# Patient Record
Sex: FEMALE | Race: WHITE | NOT HISPANIC OR LATINO | Employment: UNEMPLOYED | URBAN - METROPOLITAN AREA
[De-identification: names, ages, dates, MRNs, and addresses within clinical notes are randomized per-mention and may not be internally consistent; named-entity substitution may affect disease eponyms.]

---

## 2012-11-15 LAB — PATH REPORT.MICROSCOPIC SPEC OTHER STN: NORMAL

## 2017-01-05 ENCOUNTER — OFFICE VISIT (OUTPATIENT)
Dept: FAMILY MEDICINE | Facility: CLINIC | Age: 57
End: 2017-01-05

## 2017-01-05 ENCOUNTER — CARE COORDINATION (OUTPATIENT)
Dept: FAMILY MEDICINE | Facility: CLINIC | Age: 57
End: 2017-01-05

## 2017-01-05 VITALS
HEART RATE: 106 BPM | BODY MASS INDEX: 24.3 KG/M2 | DIASTOLIC BLOOD PRESSURE: 79 MMHG | SYSTOLIC BLOOD PRESSURE: 108 MMHG | OXYGEN SATURATION: 97 % | WEIGHT: 139.4 LBS | TEMPERATURE: 98 F

## 2017-01-05 DIAGNOSIS — R11.0 NAUSEA: ICD-10-CM

## 2017-01-05 DIAGNOSIS — R11.2 NAUSEA AND VOMITING, INTRACTABILITY OF VOMITING NOT SPECIFIED, UNSPECIFIED VOMITING TYPE: Primary | ICD-10-CM

## 2017-01-05 DIAGNOSIS — I10 ESSENTIAL HYPERTENSION, BENIGN: ICD-10-CM

## 2017-01-05 DIAGNOSIS — M54.2 NECK PAIN: ICD-10-CM

## 2017-01-05 DIAGNOSIS — F51.01 PRIMARY INSOMNIA: ICD-10-CM

## 2017-01-05 RX ORDER — ZOLPIDEM TARTRATE 5 MG/1
5 TABLET ORAL
Qty: 15 TABLET | Refills: 0 | Status: SHIPPED | OUTPATIENT
Start: 2017-01-05 | End: 2017-01-19

## 2017-01-05 RX ORDER — ONDANSETRON 4 MG/1
8 TABLET, FILM COATED ORAL EVERY 12 HOURS PRN
Qty: 36 TABLET | Refills: 1 | Status: SHIPPED | OUTPATIENT
Start: 2017-01-05 | End: 2017-01-05

## 2017-01-05 RX ORDER — ZOLPIDEM TARTRATE 5 MG/1
5 TABLET ORAL
Qty: 15 TABLET | Refills: 0 | Status: SHIPPED | OUTPATIENT
Start: 2017-01-05 | End: 2017-01-05

## 2017-01-05 RX ORDER — CYCLOBENZAPRINE HCL 5 MG
5 TABLET ORAL 3 TIMES DAILY PRN
Qty: 42 TABLET | Refills: 0 | Status: SHIPPED | OUTPATIENT
Start: 2017-01-05 | End: 2017-01-12

## 2017-01-05 RX ORDER — ONDANSETRON 4 MG/1
8 TABLET, FILM COATED ORAL EVERY 12 HOURS PRN
Qty: 36 TABLET | Refills: 1 | Status: SHIPPED | OUTPATIENT
Start: 2017-01-05 | End: 2017-02-09

## 2017-01-05 NOTE — PATIENT INSTRUCTIONS
Increase Zofran to 2 tablets twice a day as needed- if you continue to feel nauseous, come back and we will talk about having you see a stomach doctor  Refills sent for muscle relaxer  Call [facility] to help you with housing  Come back in 2-4 weeks, sooner if needed

## 2017-01-05 NOTE — MR AVS SNAPSHOT
After Visit Summary   1/5/2017    Lisa Scott    MRN: [id number]           Patient Information     Date Of Birth          1960        Visit Information        Provider Department      1/5/2017 10:00 AM Shanell Isabel MD [facility]        Today's Diagnoses     Nausea and vomiting, intractability of vomiting not specified, unspecified vomiting type    -  1     Essential hypertension, benign         Nausea         Neck pain         Primary insomnia           Care Instructions    Increase Zofran to 2 tablets twice a day as needed- if you continue to feel nauseous, come back and we will talk about having you see a stomach doctor  Refills sent for muscle relaxer  Call [facility] to help you with housing  Come back in 2-4 weeks, sooner if needed        Follow-ups after your visit        Who to contact     Please call your clinic at [phone] to:    Ask questions about your health    Make or cancel appointments    Discuss your medicines    Learn about your test results    Speak to your doctor   If you have compliments or concerns about an experience at your clinic, or if you wish to file a complaint, please contact [facility] Patient Relations at [phone] or email us at [email or web address]         Additional Information About Your Visit        Care EveryWhere ID     This is your Care EveryWhere ID. This could be used by other organizations to access your [place] medical records  [id number]        Your Vitals Were     Pulse Temperature Pulse Oximetry             106 98  F (36.7  C) (Oral) 97%          Blood Pressure from Last 3 Encounters:   01/05/17 136/97   12/21/16 158/100   12/02/16 129/93    Weight from Last 3 Encounters:   01/05/17 139 lb 6.4 oz (63.231 kg)   12/21/16 143 lb (64.864 kg)   12/02/16 144 lb 12.8 oz (65.681 kg)              Today, you had the following     No orders found for display         Today's Medication Changes           These changes are accurate as of: 1/5/17 10:01 AM.  If you have any questions, ask your nurse or doctor.               Start taking these medicines.        Dose/Directions    zolpidem 5 MG tablet   Commonly known as:  AMBIEN   Used for:  Primary insomnia   Started by:  Shanell Isabel MD        Dose:  5 mg   Take 1 tablet (5 mg) by mouth nightly as needed for sleep   Quantity:  15 tablet   Refills:  0         These medicines have changed or have updated prescriptions.        Dose/Directions    * ondansetron 4 MG tablet   Commonly known as:  ZOFRAN   This may have changed:  Another medication with the same name was added. Make sure you understand how and when to take each.   Used for:  Nausea   Changed by:  Shanell Isabel MD        Dose:  4 mg   Take 1 tablet (4 mg) by mouth every 8 hours as needed for nausea   Quantity:  18 tablet   Refills:  0       * ondansetron 4 MG tablet   Commonly known as:  ZOFRAN   This may have changed:  You were already taking a medication with the same name, and this prescription was added. Make sure you understand how and when to take each.   Used for:  Nausea   Changed by:  Shanell Isabel MD        Dose:  8 mg   Take 2 tablets (8 mg) by mouth every 12 hours as needed for nausea   Quantity:  36 tablet   Refills:  1       * Notice:  This list has 2 medication(s) that are the same as other medications prescribed for you. Read the directions carefully, and ask your doctor or other care provider to review them with you.         Where to get your medicines      These medications were sent to [id number] - [place], [place] - [address], [place] [address]     Phone:  [phone]    - cyclobenzaprine 5 MG tablet  - ondansetron 4 MG tablet      Some of these will need a paper prescription and others can be bought over the counter.  Ask your nurse if you have questions.     Bring a paper prescription for each of these medications    - zolpidem 5 MG  tablet             Primary Care Provider Office Phone # Fax #    Shanell Raysa Isabel [phone]       [facility] [address] [place] [address]        Thank you!     Thank you for choosing [facility]  for your care. Our goal is always to provide you with excellent care. Hearing back from our patients is one way we can continue to improve our services. Please take a few minutes to complete the written survey that you may receive in the mail after your visit with us. Thank you!             Your Updated Medication List - Protect others around you: Learn how to safely use, store and throw away your medicines at www.disposemymeds.org.          This list is accurate as of: 1/5/17 10:01 AM.  Always use your most recent med list.                   Brand Name Dispense Instructions for use    * acetaminophen 500 MG tablet    TYLENOL    100 tablet    Take two tablets by mouth every 4 hours as needed. Max 8 tabs daily.       * acetaminophen 325 MG tablet    TYLENOL    100 tablet    Take 2 tablets (650 mg) by mouth every 6 hours as needed for mild pain       albuterol 108 (90 BASE) MCG/ACT Inhaler    albuterol    3 Inhaler    Inhale 2 puffs every 4-6 hours as needed.       amLODIPine 10 MG tablet    NORVASC    30 tablet    Take 1 tablet (10 mg) by mouth daily       Blood Pressure Cuff Misc     1 each    Take blood pressure daily       busPIRone 5 MG tablet    BUSPAR    150 tablet    Take 1 tablet (5 mg) by mouth 3 times daily       cyclobenzaprine 5 MG tablet    FLEXERIL    42 tablet    Take 1 tablet (5 mg) by mouth 3 times daily as needed for muscle spasms       fluticasone 50 MCG/ACT spray    FLONASE    16 g    Spray 1-2 sprays into both nostrils daily       fluticasone-salmeterol 500-50 MCG/DOSE diskus inhaler    ADVAIR DISKUS    60 Inhaler    Inhale 1 puff into the lungs every 12 hours       hydrOXYzine 25 MG capsule    VISTARIL    90 capsule    Take 1 capsule (25 mg) by mouth 3  times daily as needed for itching or anxiety       ibuprofen 200 MG tablet    ADVIL/MOTRIN    120 tablet    Take 1 tablet (200 mg) by mouth every 6 hours as needed for moderate pain       ipratropium - albuterol 0.5 mg/2.5 mg/3 mL 0.5-2.5 (3) MG/3ML neb solution    DUONEB    90 vial    Take 1 vial (3 mLs) by nebulization every 6 hours as needed for shortness of breath / dyspnea       lidocaine 2 % solution    XYLOCAINE    100 mL    Take 15 mLs by mouth every 6 hours as needed for moderate pain swish and spit every 3-8 hours as needed; max 8 doses/24 hour period       lisinopril 20 MG tablet    PRINIVIL/ZESTRIL    30 tablet    Take 1 tablet (20 mg) by mouth daily       loratadine 10 MG tablet    CLARITIN    90 tablet    Take 1 tablet (10 mg) by mouth daily       metoprolol 100 MG 24 hr tablet    TOPROL-XL    60 tablet    Take 1.5 tablets (150 mg) by mouth daily       mirtazapine 15 MG tablet    REMERON     Take 1 tablet (15 mg) by mouth At Bedtime       montelukast 10 MG tablet    SINGULAIR    30 tablet    Take 1 tablet (10 mg) by mouth At Bedtime       omeprazole 20 MG tablet     60 tablet    Take 1 tablet (20 mg) by mouth 2 times daily       * ondansetron 4 MG tablet    ZOFRAN    18 tablet    Take 1 tablet (4 mg) by mouth every 8 hours as needed for nausea       * ondansetron 4 MG tablet    ZOFRAN    36 tablet    Take 2 tablets (8 mg) by mouth every 12 hours as needed for nausea       order for DME     1 each    Adult nebulizer mask and tubing.       polyethylene glycol powder    MIRALAX/GLYCOLAX    500 g    Take 17 g by mouth daily       prazosin 1 MG capsule    MINIPRESS    30 capsule    Take 1 capsule (1 mg) by mouth At Bedtime       tiotropium 18 MCG capsule    SPIRIVA HANDIHALER    30 capsule    Take once daily       zolpidem 5 MG tablet    AMBIEN    15 tablet    Take 1 tablet (5 mg) by mouth nightly as needed for sleep       * Notice:  This list has 4 medication(s) that are the same as other medications  prescribed for you. Read the directions carefully, and ask your doctor or other care provider to review them with you.

## 2017-01-05 NOTE — PROGRESS NOTES
Patient in for appointment today and did not bring in her combined application that is necessary to attain housing through [facility]. She does not wish to meet with me today so asked [facility] to give her the form she needs to bring to [employer] to attain a state ID card for .50 cents. Will await her to bring in application and documentation as well as ID to secure housing.

## 2017-01-10 ENCOUNTER — TELEPHONE (OUTPATIENT)
Dept: FAMILY MEDICINE | Facility: CLINIC | Age: 57
End: 2017-01-10

## 2017-01-10 NOTE — TELEPHONE ENCOUNTER
Call from Tina, resident at [facility] to help coordinate hospital follow up appt. This is scheduled.  Asked if discharge summary could be faxed to me at discharge today. I will attempt to reach patient tomorrow to go over TCM questions.
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
## 2017-01-11 ENCOUNTER — TELEPHONE (OUTPATIENT)
Dept: FAMILY MEDICINE | Facility: CLINIC | Age: 57
End: 2017-01-11

## 2017-01-11 NOTE — TELEPHONE ENCOUNTER
Date of discharge: 1/10/2017   Facility of discharge: [facility]  Patient concerns about condition: No concerns at this time.  Patient concerns about medications: No concerns at this time.  Full med reconciliation will be completed at clinic visit.  Patient concerns about transitioning: No concerns at this time.  Clinic office visit appointment date: 1/11/2017  Dr Chalo Chaparro established through insurance.   Patient reminded to bring all medications (prescription and over-the-counter) to clinic appointment: Yes   No discharging medications sent    Using the date of discharge as day 1, the 30th day post discharge is 2/9/2017.

## 2017-01-12 ENCOUNTER — OFFICE VISIT (OUTPATIENT)
Dept: PHARMACY | Facility: CLINIC | Age: 57
End: 2017-01-12

## 2017-01-12 ENCOUNTER — OFFICE VISIT (OUTPATIENT)
Dept: FAMILY MEDICINE | Facility: CLINIC | Age: 57
End: 2017-01-12

## 2017-01-12 VITALS
BODY MASS INDEX: 27 KG/M2 | WEIGHT: 143 LBS | TEMPERATURE: 97.8 F | OXYGEN SATURATION: 100 % | HEART RATE: 70 BPM | DIASTOLIC BLOOD PRESSURE: 85 MMHG | SYSTOLIC BLOOD PRESSURE: 127 MMHG | RESPIRATION RATE: 20 BRPM | HEIGHT: 61 IN

## 2017-01-12 DIAGNOSIS — K59.09 OTHER CONSTIPATION: ICD-10-CM

## 2017-01-12 DIAGNOSIS — F33.1 MAJOR DEPRESSIVE DISORDER, RECURRENT EPISODE, MODERATE (H): ICD-10-CM

## 2017-01-12 DIAGNOSIS — K59.00 CONSTIPATION, UNSPECIFIED CONSTIPATION TYPE: ICD-10-CM

## 2017-01-12 DIAGNOSIS — I10 ESSENTIAL HYPERTENSION, BENIGN: ICD-10-CM

## 2017-01-12 DIAGNOSIS — G47.9 SLEEP DISORDER: ICD-10-CM

## 2017-01-12 DIAGNOSIS — K21.9 GASTROESOPHAGEAL REFLUX DISEASE, ESOPHAGITIS PRESENCE NOT SPECIFIED: ICD-10-CM

## 2017-01-12 DIAGNOSIS — F41.1 GENERALIZED ANXIETY DISORDER: ICD-10-CM

## 2017-01-12 DIAGNOSIS — J44.9 CHRONIC OBSTRUCTIVE PULMONARY DISEASE, UNSPECIFIED COPD TYPE (H): ICD-10-CM

## 2017-01-12 DIAGNOSIS — J31.0 CHRONIC RHINITIS: ICD-10-CM

## 2017-01-12 DIAGNOSIS — F33.1 MAJOR DEPRESSIVE DISORDER, RECURRENT EPISODE, MODERATE (H): Primary | ICD-10-CM

## 2017-01-12 DIAGNOSIS — F51.01 PRIMARY INSOMNIA: ICD-10-CM

## 2017-01-12 DIAGNOSIS — Z09 HOSPITAL DISCHARGE FOLLOW-UP: Primary | ICD-10-CM

## 2017-01-12 DIAGNOSIS — J30.2 SEASONAL ALLERGIC RHINITIS, UNSPECIFIED ALLERGIC RHINITIS TRIGGER: ICD-10-CM

## 2017-01-12 DIAGNOSIS — Z71.89 ENCOUNTER FOR MEDICATION REVIEW AND COUNSELING: Primary | ICD-10-CM

## 2017-01-12 DIAGNOSIS — F51.5 NIGHTMARES: ICD-10-CM

## 2017-01-12 LAB
ANION GAP SERPL CALCULATED.3IONS-SCNC: 9 MMOL/L (ref 5–18)
BUN SERPL-MCNC: 23 MG/DL (ref 8–22)
CALCIUM SERPL-MCNC: 9.1 MG/DL (ref 8.5–10.5)
CHLORIDE SERPL-SCNC: 106 MMOL/L (ref 98–107)
CO2 SERPL-SCNC: 21 MMOL/L (ref 22–31)
CREAT SERPL-MCNC: 1.38 MG/DL (ref 0.6–1.1)
FERRITIN SERPL-MCNC: 90 NG/ML (ref 10–130)
GLUCOSE SERPL-MCNC: 96 MG/DL (ref 70–125)
HCT VFR BLD AUTO: 33.9 % (ref 35–47)
HEMOGLOBIN: 10.6 G/DL (ref 11.7–15.7)
MCH RBC QN AUTO: 30 PG (ref 26.5–35)
MCHC RBC AUTO-ENTMCNC: 31.3 G/DL (ref 32–36)
MCV RBC AUTO: 96.1 FL (ref 78–100)
PLATELET # BLD AUTO: 439 K/UL (ref 150–450)
POTASSIUM SERPL-SCNC: 4.3 MMOL/L (ref 3.5–5)
RBC # BLD AUTO: 3.53 M/UL (ref 3.8–5.2)
SODIUM SERPL-SCNC: 136 MMOL/L (ref 136–145)
WBC # BLD AUTO: 7.9 K/UL (ref 4–11)

## 2017-01-12 RX ORDER — POLYETHYLENE GLYCOL 3350 17 G/17G
1 POWDER, FOR SOLUTION ORAL DAILY PRN
Qty: 119 G | Refills: 3 | Status: SHIPPED | OUTPATIENT
Start: 2017-01-12 | End: 2018-06-22

## 2017-01-12 RX ORDER — POLYETHYLENE GLYCOL 3350 17 G/17G
1 POWDER, FOR SOLUTION ORAL DAILY PRN
COMMUNITY
Start: 2017-01-12 | End: 2017-01-12

## 2017-01-12 RX ORDER — BUPROPION HYDROCHLORIDE 150 MG/1
300 TABLET ORAL EVERY MORNING
COMMUNITY
Start: 2017-01-12 | End: 2018-06-22

## 2017-01-12 RX ORDER — LORAZEPAM 1 MG/1
1 TABLET ORAL 2 TIMES DAILY PRN
COMMUNITY
Start: 2017-01-12 | End: 2018-06-22 | Stop reason: DRUGHIGH

## 2017-01-12 RX ORDER — LORATADINE 10 MG/1
1 TABLET ORAL DAILY PRN
COMMUNITY
Start: 2017-01-12 | End: 2018-06-22

## 2017-01-12 RX ORDER — MIRTAZAPINE 15 MG/1
30 TABLET, FILM COATED ORAL AT BEDTIME
Qty: 30 TABLET | COMMUNITY
Start: 2017-01-12 | End: 2018-06-22

## 2017-01-12 RX ORDER — TRAZODONE HYDROCHLORIDE 100 MG/1
100 TABLET ORAL
COMMUNITY
Start: 2017-01-12 | End: 2017-01-17

## 2017-01-12 RX ORDER — PRAZOSIN HYDROCHLORIDE 1 MG/1
1 CAPSULE ORAL AT BEDTIME
Qty: 30 CAPSULE | Refills: 3 | Status: SHIPPED | OUTPATIENT
Start: 2017-01-12 | End: 2018-06-22
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
## 2017-01-12 NOTE — PROGRESS NOTES
Preceptor Attestation:  Patient's case reviewed and discussed with Shanell Isabel MD resident and I evaluated the patient. I agree with written assessment and plan of care.  Supervising Physician:  MARIA EUGENIA DE LA PAZ MD  [facility]

## 2017-01-12 NOTE — MR AVS SNAPSHOT
"              After Visit Summary   1/12/2017    Lisa Scott    MRN: [id number]           Patient Information     Date Of Birth          1960        Visit Information        Provider Department      1/12/2017 4:00 PM Shanell Isabel MD [facility]        Today's Diagnoses     Hospital discharge follow-up    -  1     Seasonal allergic rhinitis, unspecified allergic rhinitis trigger         Other constipation         Sleep disorder         Nightmares           Care Instructions    Once you are at home double check your medication regimen. We want to know if and how much you are taking of Prazosin, Mirtazapine and Bupropion. Call Faye with this information.     We would prefer you use Acetaminophen (Tylenol) for pain, rather than Ibuprofen.     Great job knowing your medicines!        Follow-ups after your visit        Your next 10 appointments already scheduled     Jan 19, 2017 10:00 AM   Return Visit with Shanell Isabel MD   [facility] ([facility])    [address] [place] [id number]   [phone]              Who to contact     Please call your clinic at [phone] to:    Ask questions about your health    Make or cancel appointments    Discuss your medicines    Learn about your test results    Speak to your doctor   If you have compliments or concerns about an experience at your clinic, or if you wish to file a complaint, please contact [facility] Patient Relations at [phone] or email us at [email or web address]         Additional Information About Your Visit        Care EveryWhere ID     This is your Care EveryWhere ID. This could be used by other organizations to access your [place] medical records  [id number]        Your Vitals Were     Pulse Temperature Respirations Height BMI (Body Mass Index) Pulse Oximetry    70 97.8  F (36.6  C) (Oral) 20 5' 1\" (154.9 cm) 27.03 kg/m2 100%       Blood Pressure from Last 3 " Encounters:   01/12/17 127/85   01/05/17 108/79   12/21/16 158/100    Weight from Last 3 Encounters:   01/12/17 143 lb (64.864 kg)   01/05/17 139 lb 6.4 oz (63.231 kg)   12/21/16 143 lb (64.864 kg)              We Performed the Following     Basic Metabolic Panel ([facility]) - Results < 1 hr     CBC with Plt ([facility])     Ferritin ([facility])          Today's Medication Changes          These changes are accurate as of: 1/12/17  5:20 PM.  If you have any questions, ask your nurse or doctor.               Start taking these medicines.        Dose/Directions    polyethylene glycol powder   Commonly known as:  MIRALAX/GLYCOLAX   Used for:  Other constipation   Started by:  Shanell Isabel MD        Dose:  1 capful   Take 17 g (1 capful) by mouth daily as needed for constipation   Quantity:  119 g   Refills:  3         These medicines have changed or have updated prescriptions.        Dose/Directions    acetaminophen 500 MG tablet   Commonly known as:  TYLENOL   This may have changed:  Another medication with the same name was removed. Continue taking this medication, and follow the directions you see here.   Used for:  Cervicalgia, Chronic pain disorder   Changed by:  Shanell Isabel MD        Take two tablets by mouth every 4 hours as needed. Max 8 tabs daily.   Quantity:  100 tablet   Refills:  11       loratadine 10 MG tablet   Commonly known as:  CLARITIN   This may have changed:    - when to take this  - reasons to take this   Used for:  Seasonal allergic rhinitis, unspecified allergic rhinitis trigger   Changed by:  Shanell Isabel MD        Dose:  1 tablet   Take 1 tablet (10 mg) by mouth daily as needed for allergies   Refills:  0       mirtazapine 15 MG tablet   Commonly known as:  REMERON   This may have changed:  how much to take   Changed by:  Shanell Isabel MD        Dose:  30 mg   Take 2 tablets (30 mg) by mouth At Bedtime   Quantity:  30 tablet   Refills:  0       ondansetron 4 MG tablet   Commonly  known as:  ZOFRAN   This may have changed:  Another medication with the same name was removed. Continue taking this medication, and follow the directions you see here.   Used for:  Nausea   Changed by:  Shanell Isabel MD        Dose:  8 mg   Take 2 tablets (8 mg) by mouth every 12 hours as needed for nausea   Quantity:  36 tablet   Refills:  1         Stop taking these medicines if you haven't already. Please contact your care team if you have questions.     Blood Pressure Cuff Misc   Stopped by:  Shanell Isabel MD           busPIRone 5 MG tablet   Commonly known as:  BUSPAR   Stopped by:  Shanell Isabel MD           cyclobenzaprine 5 MG tablet   Commonly known as:  FLEXERIL   Stopped by:  Shanell Isabel MD           fluticasone 50 MCG/ACT spray   Commonly known as:  FLONASE   Stopped by:  Shanell Isabel MD           ibuprofen 200 MG tablet   Commonly known as:  ADVIL/MOTRIN   Stopped by:  Shanell Isabel MD           order for DME   Stopped by:  Shanell Isabel MD                Where to get your medicines      These medications were sent to [id number] - [place], [place] - [address], [place] [address]     Phone:  [phone]    - polyethylene glycol powder      Some of these will need a paper prescription and others can be bought over the counter.  Ask your nurse if you have questions.     Bring a paper prescription for each of these medications    - prazosin 1 MG capsule             Primary Care Provider Office Phone # Fax #    Shanell Isabel [phone]       [facility] [address] [place] [address]        Thank you!     Thank you for choosing [facility]  for your care. Our goal is always to provide you with excellent care. Hearing back from our patients is one way we can continue to improve our services. Please take a few minutes to complete the written survey that you may receive in the mail after your  visit with us. Thank you!             Your Updated Medication List - Protect others around you: Learn how to safely use, store and throw away your medicines at www.disposemymeds.org.          This list is accurate as of: 1/12/17  5:20 PM.  Always use your most recent med list.                   Brand Name Dispense Instructions for use    acetaminophen 500 MG tablet    TYLENOL    100 tablet    Take two tablets by mouth every 4 hours as needed. Max 8 tabs daily.       albuterol 108 (90 BASE) MCG/ACT Inhaler    albuterol    3 Inhaler    Inhale 2 puffs every 4-6 hours as needed.       amLODIPine 10 MG tablet    NORVASC    30 tablet    Take 1 tablet (10 mg) by mouth daily       buPROPion 150 MG 24 hr tablet    WELLBUTRIN XL     Take 1 tablet (150 mg) by mouth every morning       fluticasone-salmeterol 500-50 MCG/DOSE diskus inhaler    ADVAIR DISKUS    60 Inhaler    Inhale 1 puff into the lungs every 12 hours       hydrOXYzine 25 MG capsule    VISTARIL    90 capsule    Take 1 capsule (25 mg) by mouth 3 times daily as needed for itching or anxiety       lisinopril 20 MG tablet    PRINIVIL/ZESTRIL    30 tablet    Take 1 tablet (20 mg) by mouth daily       loratadine 10 MG tablet    CLARITIN     Take 1 tablet (10 mg) by mouth daily as needed for allergies       LORazepam 1 MG tablet    ATIVAN     Take 1 tablet (1 mg) by mouth 2 times daily as needed for anxiety       metoprolol 100 MG 24 hr tablet    TOPROL-XL    60 tablet    Take 1.5 tablets (150 mg) by mouth daily       mirtazapine 15 MG tablet    REMERON    30 tablet    Take 2 tablets (30 mg) by mouth At Bedtime       omeprazole 20 MG tablet     60 tablet    Take 1 tablet (20 mg) by mouth 2 times daily       ondansetron 4 MG tablet    ZOFRAN    36 tablet    Take 2 tablets (8 mg) by mouth every 12 hours as needed for nausea       polyethylene glycol powder    MIRALAX/GLYCOLAX    119 g    Take 17 g (1 capful) by mouth daily as needed for constipation       prazosin 1 MG  capsule    MINIPRESS    30 capsule    Take 1 capsule (1 mg) by mouth At Bedtime       tiotropium 18 MCG capsule    SPIRIVA HANDIHALER    30 capsule    Take once daily       traZODone 100 MG tablet    DESYREL     Take 1 tablet (100 mg) by mouth nightly as needed for sleep       zolpidem 5 MG tablet    AMBIEN    15 tablet    Take 1 tablet (5 mg) by mouth nightly as needed for sleep

## 2017-01-12 NOTE — PATIENT INSTRUCTIONS
Once you are at home double check your medication regimen. We want to know if and how much you are taking of Prazosin, Mirtazapine and Bupropion. Call Faye with this information.     We would prefer you use Acetaminophen (Tylenol) for pain, rather than Ibuprofen.     Great job knowing your medicines!

## 2017-01-13 RX ORDER — FLUTICASONE PROPIONATE 50 MCG
1 SPRAY, SUSPENSION (ML) NASAL DAILY PRN
COMMUNITY
Start: 2017-01-13 | End: 2018-06-22

## 2017-01-13 ASSESSMENT — PATIENT HEALTH QUESTIONNAIRE - PHQ9: SUM OF ALL RESPONSES TO PHQ QUESTIONS 1-9: 27
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
## 2017-01-16 ENCOUNTER — CARE COORDINATION (OUTPATIENT)
Dept: FAMILY MEDICINE | Facility: CLINIC | Age: 57
End: 2017-01-16

## 2017-01-16 DIAGNOSIS — G47.9 SLEEP DISORDER: Primary | ICD-10-CM

## 2017-01-16 DIAGNOSIS — J44.9 CHRONIC OBSTRUCTIVE PULMONARY DISEASE, UNSPECIFIED COPD TYPE (H): Primary | ICD-10-CM

## 2017-01-16 RX ORDER — ALBUTEROL SULFATE 90 UG/1
AEROSOL, METERED RESPIRATORY (INHALATION)
Qty: 18 G | Refills: 11 | Status: SHIPPED | OUTPATIENT
Start: 2017-01-16 | End: 2017-02-09

## 2017-01-16 NOTE — PROGRESS NOTES
Was requested to clarify some of patient's medications with her. Patient states that she has been taking Mirtazapine 30 mg q hs, Buproprion  mg q am, Metroprolol 100 mg 1 1/2 tabs qd, Prazosin 1 mg 1 q hs, and Trazodone 100 mg 3 tabs q hs.

## 2017-01-17 ENCOUNTER — TELEPHONE (OUTPATIENT)
Dept: FAMILY MEDICINE | Facility: CLINIC | Age: 57
End: 2017-01-17

## 2017-01-17 RX ORDER — TRAZODONE HYDROCHLORIDE 100 MG/1
300 TABLET ORAL AT BEDTIME
COMMUNITY
Start: 2017-01-17 | End: 2017-12-11

## 2017-01-17 NOTE — PROGRESS NOTES
Thanks for your help Faye! Updated medication list to reflect current therapies per patient report.    Per D/C instructions from [facility], was to titrate Bupropion to 450 mg daily and to D/C Trazodone.     Will route to PCP as FYI.

## 2017-01-17 NOTE — TELEPHONE ENCOUNTER
TCM APPOINTMENT FOLLOW UP    Language:English    Medication questions: no    Specialist follow up: no    Concerns since last visit: yes:  Explain: Was unsure how things are going, felt it was too early to tell- has an appt thurs here    Next appointment at [facility]:Yes    Comments: Appt Thursday here, to follow up    [facility] yes- encouraged her to call day or night if needed, she does have a follow up on Thursday- was tired and sleepy when I called   Tracy Ryan

## 2017-01-18 NOTE — PROGRESS NOTES
Preceptor Attestation:  Patient's case reviewed and discussed with Shanell Isabel MD resident and I evaluated the patient. I agree with written assessment and plan of care.  Supervising Physician:  Nga Abdullahi MD  [facility]

## 2017-01-19 ENCOUNTER — OFFICE VISIT (OUTPATIENT)
Dept: FAMILY MEDICINE | Facility: CLINIC | Age: 57
End: 2017-01-19

## 2017-01-19 VITALS
SYSTOLIC BLOOD PRESSURE: 167 MMHG | WEIGHT: 151.13 LBS | RESPIRATION RATE: 19 BRPM | HEIGHT: 62 IN | HEART RATE: 91 BPM | DIASTOLIC BLOOD PRESSURE: 114 MMHG | OXYGEN SATURATION: 92 % | BODY MASS INDEX: 27.81 KG/M2 | TEMPERATURE: 97.3 F

## 2017-01-19 DIAGNOSIS — F41.9 ANXIETY: Primary | ICD-10-CM

## 2017-01-19 DIAGNOSIS — I10 ESSENTIAL HYPERTENSION, BENIGN: ICD-10-CM

## 2017-01-19 DIAGNOSIS — F51.01 PRIMARY INSOMNIA: ICD-10-CM

## 2017-01-19 DIAGNOSIS — M54.50 ACUTE BILATERAL LOW BACK PAIN WITHOUT SCIATICA: Primary | ICD-10-CM

## 2017-01-19 RX ORDER — NAPROXEN 500 MG/1
500 TABLET ORAL 2 TIMES DAILY WITH MEALS
Qty: 60 TABLET | Refills: 1 | Status: SHIPPED | OUTPATIENT
Start: 2017-01-19 | End: 2017-01-30

## 2017-01-19 RX ORDER — ACETAMINOPHEN 325 MG/1
650 TABLET ORAL EVERY 4 HOURS PRN
Qty: 100 TABLET | Refills: 0 | Status: SHIPPED | OUTPATIENT
Start: 2017-01-19 | End: 2017-01-19

## 2017-01-19 RX ORDER — NAPROXEN SODIUM 550 MG/1
550 TABLET ORAL 2 TIMES DAILY WITH MEALS
Qty: 60 TABLET | Refills: 0 | Status: SHIPPED | OUTPATIENT
Start: 2017-01-19 | End: 2017-01-19

## 2017-01-19 RX ORDER — HYDROXYZINE PAMOATE 25 MG/1
25 CAPSULE ORAL 3 TIMES DAILY PRN
Qty: 90 CAPSULE | Refills: 11 | Status: SHIPPED | OUTPATIENT
Start: 2017-01-19 | End: 2018-06-22

## 2017-01-19 RX ORDER — ACETAMINOPHEN 325 MG/1
650 TABLET ORAL 3 TIMES DAILY
Qty: 100 TABLET | Refills: 1 | Status: SHIPPED | OUTPATIENT
Start: 2017-01-19 | End: 2017-08-09

## 2017-01-19 RX ORDER — ZOLPIDEM TARTRATE 5 MG/1
5 TABLET ORAL
Qty: 15 TABLET | Refills: 0 | Status: SHIPPED | OUTPATIENT
Start: 2017-01-19 | End: 2017-12-11

## 2017-01-19 RX ORDER — LISINOPRIL 20 MG/1
20 TABLET ORAL DAILY
Qty: 30 TABLET | Refills: 11 | Status: SHIPPED | OUTPATIENT
Start: 2017-01-19 | End: 2017-02-09

## 2017-01-19 NOTE — MR AVS SNAPSHOT
"              After Visit Summary   1/19/2017    Lisa Scott    MRN: [id number]           Patient Information     Date Of Birth          1960        Visit Information        Provider Department      1/19/2017 10:00 AM Shanell Isabel MD [facility]        Today's Diagnoses     Acute bilateral low back pain without sciatica    -  1       Care Instructions    Naproxen twice a day for 2 weeks  Take tylenol three times a day  Return for a visit if your pain worsens  Warm baths, heat, ice        Follow-ups after your visit        Who to contact     Please call your clinic at [phone] to:    Ask questions about your health    Make or cancel appointments    Discuss your medicines    Learn about your test results    Speak to your doctor   If you have compliments or concerns about an experience at your clinic, or if you wish to file a complaint, please contact [facility] Patient Relations at [phone] or email us at [email or web address]         Additional Information About Your Visit        Care EveryWhere ID     This is your Care EveryWhere ID. This could be used by other organizations to access your [place] medical records  [id number]        Your Vitals Were     Pulse Temperature Respirations Height BMI (Body Mass Index) Pulse Oximetry    91 97.3  F (36.3  C) (Oral) 19 5' 2\" (157.5 cm) 27.63 kg/m2 92%       Blood Pressure from Last 3 Encounters:   01/19/17 167/114   01/12/17 127/85   01/05/17 108/79    Weight from Last 3 Encounters:   01/19/17 151 lb 2 oz (68.55 kg)   01/12/17 143 lb (64.864 kg)   01/05/17 139 lb 6.4 oz (63.231 kg)              Today, you had the following     No orders found for display         Today's Medication Changes          These changes are accurate as of: 1/19/17 10:37 AM.  If you have any questions, ask your nurse or doctor.               Start taking these medicines.        Dose/Directions    naproxen 500 MG tablet   Commonly " known as:  NAPROSYN   Used for:  Acute bilateral low back pain without sciatica   Started by:  Shanell Isabel MD        Dose:  500 mg   Take 1 tablet (500 mg) by mouth 2 times daily (with meals)   Quantity:  60 tablet   Refills:  1         These medicines have changed or have updated prescriptions.        Dose/Directions    * acetaminophen 500 MG tablet   Commonly known as:  TYLENOL   This may have changed:  Another medication with the same name was added. Make sure you understand how and when to take each.   Used for:  Cervicalgia, Chronic pain disorder   Changed by:  Shanell Isabel MD        Take two tablets by mouth every 4 hours as needed. Max 8 tabs daily.   Quantity:  100 tablet   Refills:  11       * acetaminophen 325 MG tablet   Commonly known as:  TYLENOL   This may have changed:  You were already taking a medication with the same name, and this prescription was added. Make sure you understand how and when to take each.   Used for:  Acute bilateral low back pain without sciatica   Changed by:  Shanell Isabel MD        Dose:  650 mg   Take 2 tablets (650 mg) by mouth 3 times daily   Quantity:  100 tablet   Refills:  1       * Notice:  This list has 2 medication(s) that are the same as other medications prescribed for you. Read the directions carefully, and ask your doctor or other care provider to review them with you.         Where to get your medicines      These medications were sent to [id number] - [place], [place] - [address], [place] [address]     Phone:  [phone]    - acetaminophen 325 MG tablet      Some of these will need a paper prescription and others can be bought over the counter.  Ask your nurse if you have questions.     Bring a paper prescription for each of these medications    - naproxen 500 MG tablet             Primary Care Provider Office Phone # Fax #    Shanell Isabel [phone]       [facility] [address]  [facility] [address]        Thank you!     Thank you for choosing [facility]  for your care. Our goal is always to provide you with excellent care. Hearing back from our patients is one way we can continue to improve our services. Please take a few minutes to complete the written survey that you may receive in the mail after your visit with us. Thank you!             Your Updated Medication List - Protect others around you: Learn how to safely use, store and throw away your medicines at www.disposemymeds.org.          This list is accurate as of: 1/19/17 10:37 AM.  Always use your most recent med list.                   Brand Name Dispense Instructions for use    * acetaminophen 500 MG tablet    TYLENOL    100 tablet    Take two tablets by mouth every 4 hours as needed. Max 8 tabs daily.       * acetaminophen 325 MG tablet    TYLENOL    100 tablet    Take 2 tablets (650 mg) by mouth 3 times daily       albuterol 108 (90 BASE) MCG/ACT Inhaler    albuterol    18 g    Inhale 2 puffs every 4-6 hours as needed.       amLODIPine 10 MG tablet    NORVASC    30 tablet    Take 1 tablet (10 mg) by mouth daily       buPROPion 150 MG 24 hr tablet    WELLBUTRIN XL     Take 1 tablet (150 mg) by mouth every morning       FLONASE 50 MCG/ACT spray   Generic drug:  fluticasone      Spray 1 spray into both nostrils daily as needed for allergies       fluticasone-salmeterol 500-50 MCG/DOSE diskus inhaler    ADVAIR DISKUS    60 Inhaler    Inhale 1 puff into the lungs every 12 hours       hydrOXYzine 25 MG capsule    VISTARIL    90 capsule    Take 1 capsule (25 mg) by mouth 3 times daily as needed for itching or anxiety       lisinopril 20 MG tablet    PRINIVIL/ZESTRIL    30 tablet    Take 1 tablet (20 mg) by mouth daily       loratadine 10 MG tablet    CLARITIN     Take 1 tablet (10 mg) by mouth daily as needed for allergies       LORazepam 1 MG tablet    ATIVAN     Take 1 tablet (1 mg) by mouth 2 times daily as  needed for anxiety       metoprolol 100 MG 24 hr tablet    TOPROL-XL    60 tablet    Take 1.5 tablets (150 mg) by mouth daily       mirtazapine 15 MG tablet    REMERON    30 tablet    Take 2 tablets (30 mg) by mouth At Bedtime       naproxen 500 MG tablet    NAPROSYN    60 tablet    Take 1 tablet (500 mg) by mouth 2 times daily (with meals)       omeprazole 20 MG tablet     60 tablet    Take 1 tablet (20 mg) by mouth 2 times daily       ondansetron 4 MG tablet    ZOFRAN    36 tablet    Take 2 tablets (8 mg) by mouth every 12 hours as needed for nausea       polyethylene glycol powder    MIRALAX/GLYCOLAX    119 g    Take 17 g (1 capful) by mouth daily as needed for constipation       prazosin 1 MG capsule    MINIPRESS    30 capsule    Take 1 capsule (1 mg) by mouth At Bedtime       tiotropium 18 MCG capsule    SPIRIVA HANDIHALER    30 capsule    Take once daily       traZODone 100 MG tablet    DESYREL     Take 3 tablets (300 mg) by mouth At Bedtime       zolpidem 5 MG tablet    AMBIEN    15 tablet    Take 1 tablet (5 mg) by mouth nightly as needed for sleep       * Notice:  This list has 2 medication(s) that are the same as other medications prescribed for you. Read the directions carefully, and ask your doctor or other care provider to review them with you.

## 2017-01-19 NOTE — PATIENT INSTRUCTIONS
Naproxen twice a day for 2 weeks  Take tylenol three times a day  Return for a visit if your pain worsens  Warm baths, heat, ice

## 2017-01-20 ENCOUNTER — CARE COORDINATION (OUTPATIENT)
Dept: FAMILY MEDICINE | Facility: CLINIC | Age: 57
End: 2017-01-20

## 2017-01-20 NOTE — PROGRESS NOTES
Faxed last 2 office notes to Dr Le at [facility] along with the PAULINO.

## 2017-01-23 NOTE — PROGRESS NOTES
Preceptor Attestation:  Patient's case reviewed and discussed with Shanell Isabel MD Patient seen and discussed with the resident.. I agree with assessment and plan of care.  Supervising Physician:  Nga Abdullahi MD  [facility]

## 2017-01-24 ENCOUNTER — TELEPHONE (OUTPATIENT)
Dept: FAMILY MEDICINE | Facility: CLINIC | Age: 57
End: 2017-01-24

## 2017-01-24 NOTE — TELEPHONE ENCOUNTER
Chart reviewed. Per PCP, denied Flexeril at visit for fall and pt to do Naproxen and Tylenol with heat/ice. However will route to PCP pt's report of diarrhea side effect  and request for med.
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
## 2017-01-24 NOTE — TELEPHONE ENCOUNTER
[facility] phone call message- general phone call:    Reason for call: Patient is calling stating that slipped on ice and states that you Rx her some naproxen. She states that the medication is giving her some diarrhea and is wondering if you can prescribe her something else, perhaps a muscle relaxer. But not sure if that will help or not.     Return call needed: Yes    OK to leave a message on voice mail? Yes    Primary language: English      needed? No    Call taken on January 24, 2017 at 8:29 AM by Ivory Mina

## 2017-01-25 NOTE — TELEPHONE ENCOUNTER
Warren Gonzalez,        If the naproxen is not helping, you can try ibuprofen that you have used in the past.  Continue tylenol.  As discussed at prior visits, there are no safe options for muscle relaxers so I do not recommend these medications.              Thank  You,       Dr Nelson      Called, left message for pt to call me back. Rochelle LONGO

## 2017-01-25 NOTE — TELEPHONE ENCOUNTER
Informed Thresa of information or recommendations from Dr Isabel. She states she can further discuss with Dr Isabel on Monday, 1/30/2017 when she comes in. Rochelle LONGO

## 2017-01-30 ENCOUNTER — CARE COORDINATION (OUTPATIENT)
Dept: FAMILY MEDICINE | Facility: CLINIC | Age: 57
End: 2017-01-30

## 2017-01-30 ENCOUNTER — OFFICE VISIT (OUTPATIENT)
Dept: FAMILY MEDICINE | Facility: CLINIC | Age: 57
End: 2017-01-30

## 2017-01-30 VITALS
RESPIRATION RATE: 24 BRPM | WEIGHT: 142.8 LBS | DIASTOLIC BLOOD PRESSURE: 113 MMHG | HEART RATE: 90 BPM | BODY MASS INDEX: 25.3 KG/M2 | OXYGEN SATURATION: 99 % | SYSTOLIC BLOOD PRESSURE: 159 MMHG | TEMPERATURE: 97.4 F | HEIGHT: 63 IN

## 2017-01-30 DIAGNOSIS — I16.0 HYPERTENSIVE URGENCY: Primary | ICD-10-CM

## 2017-01-30 DIAGNOSIS — F41.1 GENERALIZED ANXIETY DISORDER: ICD-10-CM

## 2017-01-30 DIAGNOSIS — F33.1 MAJOR DEPRESSIVE DISORDER, RECURRENT EPISODE, MODERATE (H): ICD-10-CM

## 2017-01-30 NOTE — PROGRESS NOTES
Preceptor Attestation:  Patient's case reviewed and discussed with Shanell Isabel MD Patient seen and discussed with the resident.. I agree with assessment and plan of care.  Supervising Physician:  Crescencio De La Cruz MD  [facility]

## 2017-01-30 NOTE — MR AVS SNAPSHOT
"              After Visit Summary   1/30/2017    Lisa Scott    MRN: [id number]           Patient Information     Date Of Birth          1960        Visit Information        Provider Department      1/30/2017 10:00 AM Shanell Isabel MD [facility]         Follow-ups after your visit        Who to contact     Please call your clinic at [phone] to:    Ask questions about your health    Make or cancel appointments    Discuss your medicines    Learn about your test results    Speak to your doctor   If you have compliments or concerns about an experience at your clinic, or if you wish to file a complaint, please contact [facility] Patient Relations at [phone] or email us at [email or web address]         Additional Information About Your Visit        Care EveryWhere ID     This is your Care EveryWhere ID. This could be used by other organizations to access your [facility] medical records  [id number]        Your Vitals Were     Pulse Temperature Respirations Height BMI (Body Mass Index) Pulse Oximetry    90 97.4  F (36.3  C) (Oral) 24 5' 2.75\" (159.4 cm) 25.49 kg/m2 99%       Blood Pressure from Last 3 Encounters:   01/30/17 159/113   01/19/17 167/114   01/12/17 127/85    Weight from Last 3 Encounters:   01/30/17 142 lb 12.8 oz (64.774 kg)   01/19/17 151 lb 2 oz (68.55 kg)   01/12/17 143 lb (64.864 kg)              Today, you had the following     No orders found for display       Primary Care Provider Office Phone # Fax #    Shanell Isabel [phone]       [facility] [address] [place] [address]        Thank you!     Thank you for choosing [facility]  for your care. Our goal is always to provide you with excellent care. Hearing back from our patients is one way we can continue to improve our services. Please take a few minutes to complete the written survey that you may receive in the mail after your " visit with us. Thank you!             Your Updated Medication List - Protect others around you: Learn how to safely use, store and throw away your medicines at www.disposemymeds.org.          This list is accurate as of: 1/30/17 11:13 AM.  Always use your most recent med list.                   Brand Name Dispense Instructions for use    * acetaminophen 500 MG tablet    TYLENOL    100 tablet    Take two tablets by mouth every 4 hours as needed. Max 8 tabs daily.       * acetaminophen 325 MG tablet    TYLENOL    100 tablet    Take 2 tablets (650 mg) by mouth 3 times daily       albuterol 108 (90 BASE) MCG/ACT Inhaler    albuterol    18 g    Inhale 2 puffs every 4-6 hours as needed.       amLODIPine 10 MG tablet    NORVASC    30 tablet    Take 1 tablet (10 mg) by mouth daily       buPROPion 150 MG 24 hr tablet    WELLBUTRIN XL     Take 1 tablet (150 mg) by mouth every morning       FLONASE 50 MCG/ACT spray   Generic drug:  fluticasone      Spray 1 spray into both nostrils daily as needed for allergies       fluticasone-salmeterol 500-50 MCG/DOSE diskus inhaler    ADVAIR DISKUS    60 Inhaler    Inhale 1 puff into the lungs every 12 hours       hydrOXYzine 25 MG capsule    VISTARIL    90 capsule    Take 1 capsule (25 mg) by mouth 3 times daily as needed for itching or anxiety       lisinopril 20 MG tablet    PRINIVIL/ZESTRIL    30 tablet    Take 1 tablet (20 mg) by mouth daily       loratadine 10 MG tablet    CLARITIN     Take 1 tablet (10 mg) by mouth daily as needed for allergies       LORazepam 1 MG tablet    ATIVAN     Take 1 tablet (1 mg) by mouth 2 times daily as needed for anxiety       metoprolol 100 MG 24 hr tablet    TOPROL-XL    60 tablet    Take 1.5 tablets (150 mg) by mouth daily       mirtazapine 15 MG tablet    REMERON    30 tablet    Take 2 tablets (30 mg) by mouth At Bedtime       naproxen 500 MG tablet    NAPROSYN    60 tablet    Take 1 tablet (500 mg) by mouth 2 times daily (with meals)        omeprazole 20 MG tablet     60 tablet    Take 1 tablet (20 mg) by mouth 2 times daily       ondansetron 4 MG tablet    ZOFRAN    36 tablet    Take 2 tablets (8 mg) by mouth every 12 hours as needed for nausea       polyethylene glycol powder    MIRALAX/GLYCOLAX    119 g    Take 17 g (1 capful) by mouth daily as needed for constipation       prazosin 1 MG capsule    MINIPRESS    30 capsule    Take 1 capsule (1 mg) by mouth At Bedtime       tiotropium 18 MCG capsule    SPIRIVA HANDIHALER    30 capsule    Take once daily       traZODone 100 MG tablet    DESYREL     Take 3 tablets (300 mg) by mouth At Bedtime       zolpidem 5 MG tablet    AMBIEN    15 tablet    Take 1 tablet (5 mg) by mouth nightly as needed for sleep       * Notice:  This list has 2 medication(s) that are the same as other medications prescribed for you. Read the directions carefully, and ask your doctor or other care provider to review them with you.

## 2017-01-30 NOTE — PROGRESS NOTES
Met with patient at visit today. 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 She has not heard back. Called Wilton and she states that she will put this in the mail today and mail to the [facility] address. Patient is still waiting on her [place] ID card. Once these items are attained we can look into housing through [place] [facility] [place].
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
## 2017-01-31 ASSESSMENT — PATIENT HEALTH QUESTIONNAIRE - PHQ9: SUM OF ALL RESPONSES TO PHQ QUESTIONS 1-9: 27

## 2017-02-02 ENCOUNTER — TELEPHONE (OUTPATIENT)
Dept: FAMILY MEDICINE | Facility: CLINIC | Age: 57
End: 2017-02-02

## 2017-02-03 ENCOUNTER — TRANSFERRED RECORDS (OUTPATIENT)
Dept: HEALTH INFORMATION MANAGEMENT | Facility: CLINIC | Age: 57
End: 2017-02-03

## 2017-02-08 ENCOUNTER — TELEPHONE (OUTPATIENT)
Dept: FAMILY MEDICINE | Facility: CLINIC | Age: 57
End: 2017-02-08

## 2017-02-08 DIAGNOSIS — K21.9 GASTROESOPHAGEAL REFLUX DISEASE WITHOUT ESOPHAGITIS: Primary | ICD-10-CM

## 2017-02-08 RX ORDER — NICOTINE POLACRILEX 4 MG/1
20 GUM, CHEWING ORAL 2 TIMES DAILY
Qty: 60 TABLET | Refills: 3 | Status: SHIPPED | OUTPATIENT
Start: 2017-02-08 | End: 2017-02-09

## 2017-02-08 NOTE — TELEPHONE ENCOUNTER
LM for patient that I did not receive the faxed copies of her [place] ID card or proof on income that was to be faxed to me yesterday. Lisa has an appointment tomorrow, so asked that she bring it in then.

## 2017-02-09 ENCOUNTER — CARE COORDINATION (OUTPATIENT)
Dept: FAMILY MEDICINE | Facility: CLINIC | Age: 57
End: 2017-02-09

## 2017-02-09 ENCOUNTER — OFFICE VISIT (OUTPATIENT)
Dept: FAMILY MEDICINE | Facility: CLINIC | Age: 57
End: 2017-02-09

## 2017-02-09 VITALS
BODY MASS INDEX: 26.87 KG/M2 | HEIGHT: 62 IN | OXYGEN SATURATION: 99 % | RESPIRATION RATE: 17 BRPM | HEART RATE: 82 BPM | DIASTOLIC BLOOD PRESSURE: 122 MMHG | WEIGHT: 146 LBS | SYSTOLIC BLOOD PRESSURE: 184 MMHG | TEMPERATURE: 99 F

## 2017-02-09 DIAGNOSIS — J44.1 COPD EXACERBATION (H): Primary | ICD-10-CM

## 2017-02-09 DIAGNOSIS — M54.42 CHRONIC LEFT-SIDED LOW BACK PAIN WITH LEFT-SIDED SCIATICA: ICD-10-CM

## 2017-02-09 DIAGNOSIS — E87.1 HYPONATREMIA: ICD-10-CM

## 2017-02-09 DIAGNOSIS — K21.9 GASTROESOPHAGEAL REFLUX DISEASE WITHOUT ESOPHAGITIS: ICD-10-CM

## 2017-02-09 DIAGNOSIS — G89.29 CHRONIC LEFT-SIDED LOW BACK PAIN WITH LEFT-SIDED SCIATICA: ICD-10-CM

## 2017-02-09 DIAGNOSIS — I10 ESSENTIAL HYPERTENSION, BENIGN: ICD-10-CM

## 2017-02-09 RX ORDER — LISINOPRIL 20 MG/1
20 TABLET ORAL DAILY
Qty: 30 TABLET | Refills: 11 | Status: SHIPPED
Start: 2017-02-09 | End: 2017-02-09

## 2017-02-09 RX ORDER — GABAPENTIN 100 MG/1
CAPSULE ORAL
Qty: 30 CAPSULE | Refills: 0 | Status: SHIPPED | OUTPATIENT
Start: 2017-02-09 | End: 2017-02-13

## 2017-02-09 RX ORDER — NICOTINE POLACRILEX 4 MG/1
20 GUM, CHEWING ORAL 2 TIMES DAILY
Qty: 60 TABLET | Refills: 1 | Status: SHIPPED
Start: 2017-02-09 | End: 2017-07-25

## 2017-02-09 RX ORDER — AMLODIPINE BESYLATE 10 MG/1
10 TABLET ORAL DAILY
Qty: 30 TABLET | Refills: 3 | Status: SHIPPED
Start: 2017-02-09 | End: 2017-06-20

## 2017-02-09 RX ORDER — PREDNISONE 20 MG/1
20 TABLET ORAL DAILY
Qty: 5 TABLET | Refills: 0 | Status: SHIPPED
Start: 2017-02-09 | End: 2017-02-14

## 2017-02-09 RX ORDER — METOPROLOL SUCCINATE 100 MG/1
150 TABLET, EXTENDED RELEASE ORAL DAILY
Qty: 60 TABLET | Refills: 11 | Status: SHIPPED
Start: 2017-02-09 | End: 2018-06-22

## 2017-02-09 RX ORDER — LISINOPRIL 30 MG/1
30 TABLET ORAL DAILY
Qty: 30 TABLET | Refills: 1 | Status: SHIPPED
Start: 2017-02-09 | End: 2018-06-22

## 2017-02-09 RX ORDER — ALBUTEROL SULFATE 90 UG/1
AEROSOL, METERED RESPIRATORY (INHALATION)
Qty: 18 G | Refills: 11 | Status: SHIPPED
Start: 2017-02-09 | End: 2017-06-20

## 2017-02-09 RX ORDER — ONDANSETRON 4 MG/1
8 TABLET, FILM COATED ORAL EVERY 12 HOURS PRN
Qty: 36 TABLET | Refills: 1 | Status: SHIPPED
Start: 2017-02-09 | End: 2017-03-23

## 2017-02-09 NOTE — PATIENT INSTRUCTIONS
For COPD - take prednisone 20 mg for 5 more days  For back pain - continue taking Naproxen and Tylenol, can try Gabapentin to see if this helps, this can cause you to feel drowsy  For hypertension we will increase your lisinopril to 30 mg daily      What: Mammogram  Where: [facility]   When: Tuesday, February 21, 2017 at 9:45 AM  Who is with: Breast Coffeen  Telephone: [phone]  Fax: [phone]  Any additional comments: DO not use any lotions, powders, lotions, or deodorant on body day of exam  Scheduled by: NURY ARIAS  Referral faxed to [facility] [phone]    What: Colonoscopy  Where: [facility]   When: [facility] will call to schedule your colonoscopy and will send directions and prep   Who is with: [facility]  Telephone: [phone]  Fax:   [phone]   Any additional comments:   Scheduled by: NURY ARIAS  Referral faxed to [phone] NURY ARIAS

## 2017-02-09 NOTE — PROGRESS NOTES
Preceptor Attestation:  Patient's case reviewed and discussed with Bolivar Galindo MD Patient seen and discussed with the resident.. I agree with assessment and plan of care.  Supervising Physician:  Tremaine Alvarez MD  [facility]

## 2017-02-09 NOTE — PROGRESS NOTES
Met with patient. She provides her [place] ID, income documentation as well as a letter from her landlord. These items as well as Combined Application and Housing Statement of Need is faxed to [facility] at [phone].  Message left from  Coral there that they have no openings for housing right now but may next week.

## 2017-02-09 NOTE — PROGRESS NOTES
"  Hospitalization Follow-up Visit         HPI       Hospital Follow-up Visit:    Hospital:  [facility]   Date of Admission: 1/30/17  Date of Discharge: 2/1/17  Reason(s) for Admission: HTN urgency, COPD, hyponatremia            Problems taking medications regularly:  None       Post Discharge Medication Reconciliation: discharge medications reconciled and changed, per note/orders (see AVS).       Problems adhering to non-medication therapy:  None       Medications reviewed by: by myself.    Summary of hospitalization:  CareEverywhere information obtained and reviewed  \"Hyponatremia: likely secondary to dehydration. Sodium is trending up after 1L NS given in ED. ED has continued maintenance fluids, stable this morning on recheck, will resassess in AM after of eating, and returning more to a euvolemic state. Potentially may be secondary to underlying malignancy, would consider outpatient CT to r/o malignancy given pack years. Sodium 133 on discharge.      Viral GE: 10d of N/V and diarrhea. Patient reports normal stool today -albeit green.  With continued nausea and vomiting, last episode this morning.  Symptoms treated with zofran. Tolerating with fluid.    HTN: with hypertensive urgency prior to admission. home regimen: metoprolol, lisinopril, amlodipine.  Patient has been unable to keep meds down for the past day. Home meds given in ED with improvement in BP as well as headache and blurring of vision. CT head was negative in ED.  Discharge on home meds.     Acute exacerbation COPD: patient has been taking home medications as prescribed but notes worsened cough and sob over the past week with N/V and diarrhea. CXR negative for infiltrate. No leukocytosis. Afebrile.  prednisone and nebs given in ED. Will discharge with prednisone and doxycycline.\"    Diagnostic Tests/Treatments reviewed.  Follow up needed: BMP  Other Healthcare Providers Involved in Patient s Care:         Care Coordination  Update since discharge: " "improved.   Plan of care communicated with patient            HPI:     HTN: has been taking meds every day as prescribed, now able to keep meds down. BP measured at psychiatry office the other day was at goal which patient was surprised about.     Viral GE: no longer having diarrhea or vomiting. Intake now back to normal.     Chronic back pain: Taking Naproxen and Tylenol for back pain. Wonders if she can be prescribed a muscle relaxant. Not interested in PT currently because of transportation issues.     COPD exacerbation: Finished prednisone and doxycycline that was prescribed at discharge. Still wheezing and mildly short of breath.          Review of Systems:   Complete ROS negative other than above            Physical Exam:     Vitals:    02/09/17 0809 02/09/17 0816 02/09/17 0903   BP: (!) 170/115 (!) 157/110 (!) 184/122   Pulse: 83 82    Resp: 17     Temp: 99  F (37.2  C)     TempSrc: Oral     SpO2: 99%     Weight: 146 lb (66.2 kg)     Height: 5' 2\" (157.5 cm)       Body mass index is 26.7 kg/(m^2).    Gen: AOx3, NAD, well groomed  HEENT: PERRL, EOMI, no icterus, MMM  Neck: no LAD  Lungs: moderate bilateral expiratory wheezing, no respiratory distress or tachypnea   CV: RRR, no murmurs/rubs/gallops  ABD: Soft, NT, ND, no masses, BS+  Extrem: warm with pulses, no edema  Skin: no rash or lesions visible  Neuro: grossly intact, no focal deficits           Results:     none    Assessment and Plan      Lisa was seen today for hospital f/u and results.    Diagnoses and all orders for this visit:    HTN: BP significantly above goal on recheck today 157/110 --> 187/122. Asymptomatic - no chest pain, headache, blurry vision.   - increase lisinopril to 30 mg daily   - continue amlodipine 10 mg and metoprolol 150 mg  - check BMP at next visit    Hyponatremia: down to 123 on admission to the hospital on 1/30. On 2/1 had improved to 133. Likely secondary to poor intake and GI loss (diarrhea/vomiting). She now reports her " intake is back to baseline. Did order labs for today to check BMP however, patient left before they were drawn.     COPD exacerbation: diagnosed with exacerbation while inpatient and was treated with prednisone burst for 5 days as well as doxycycline. Still wheezing today and having productive cough. Sats 99% on Room air.   - Prednisone 20 mg for 5 days  - Albuterol PRN  - continue Advair and Spiriva     Chronic left low back Pain: Patient requesting muscle relaxant today. Has had trouble with altered mental status in the past related to these.  - recommended PT referral - patient declines at this time due to transportation issues  - Continue Regular use of Naproxen and Tylenol  - Trial low dose Gabapentin start with 100 mg at night, if tolerating can increase to 100 mg TID    HCM:   - ordered Hep C screening, mammogram, colonoscopy     Follow up in 2 weeks with Dr. Isabel      E&M code to be billed if TCM cannot be: 63458    Type of decision making: Moderate complexity (54054)    Options for treatment and follow-up care were reviewed with the patient  Lisa Scott   engaged in the decision making process and verbalized understanding of the options discussed and agreed with the final plan.      Bolivar Galindo MD R3    Precepted with Dr. Antonio MD

## 2017-02-09 NOTE — NURSING NOTE
TCM H/F/U  DUE FOR:  PHQ 9 / THANH 7 given  Mammogram patient agrees  Colonoscopy patient agrees    LABS DUE:  Hep C Screening Patient agrees

## 2017-02-09 NOTE — MR AVS SNAPSHOT
After Visit Summary   2/9/2017    Lisa Scott    MRN: [id number]           Patient Information     Date Of Birth          1960        Visit Information        Provider Department      2/9/2017 8:20 AM Bolivar Galindo MD [facility]        Today's Diagnoses     Encounter for screening mammogram for breast cancer    -  1     Screening for malignant neoplasm of the rectum         Need for hepatitis C screening test         Gastroesophageal reflux disease without esophagitis         Chronic obstructive pulmonary disease, unspecified COPD type (H)         Benign essential hypertension         Essential hypertension, benign         Essential hypertension         Nausea         Hyponatremia         Chronic left-sided low back pain with left-sided sciatica         COPD exacerbation (H)           Care Instructions    For COPD - take prednisone 20 mg for 5 more days  For back pain - continue taking Naproxen and Tylenol, can try Gabapentin to see if this helps, this can cause you to feel drowsy  For hypertension we will increase your lisinopril to 30 mg daily      What: Mammogram  Where: [facility]   When: Tuesday, February 21, 2017 at 9:45 AM  Who is with: [facility]  Telephone: [phone]  Fax: [phone]  Any additional comments: DO not use any lotions, powders, lotions, or deodorant on body day of exam  Scheduled by: NURY ARIAS  Referral faxed to [facility] [phone]    What: Colonoscopy  Where: [facility]   When: [facility] will call to schedule your colonoscopy and will send directions and prep   Who is with: [facility]  Telephone: [phone]  Fax:   [phone]   Any additional comments:   Scheduled by: NURY ARIAS  Referral faxed to [phone] NURY ARIAS            Follow-ups after your visit        Additional Services     GASTROENTEROLOGY ADULT REF PROCEDURE ONLY       Last Lab Result: CREATININE (mg/dL)       Date                     Value                 01/12/2017    "            1.38*            ----------  Body mass index is 26.7 kg/(m^2).     Needed:  No  Language:  English    Patient will be contacted to schedule procedure.     Please be aware that coverage of these services is subject to the terms and limitations of your health insurance plan.  Call member services at your health plan with any benefit or coverage questions.  Any procedures must be performed at a [place] facility OR coordinated by your clinic's referral office.    Please bring the following with you to your appointment:    (1) Any X-Rays, CTs or MRIs which have been performed.  Contact the facility where they were done to arrange for  prior to your scheduled appointment.    (2) List of current medications   (3) This referral request   (4) Any documents/labs given to you for this referral                  Who to contact     Please call your clinic at [phone] to:    Ask questions about your health    Make or cancel appointments    Discuss your medicines    Learn about your test results    Speak to your doctor   If you have compliments or concerns about an experience at your clinic, or if you wish to file a complaint, please contact [facility] Patient Relations at [phone] or email us at [email or web address]         Additional Information About Your Visit        Care EveryWhere ID     This is your Care EveryWhere ID. This could be used by other organizations to access your [place] medical records  [id number]        Your Vitals Were     Pulse Temperature Respirations Height BMI (Body Mass Index) Pulse Oximetry    82 99  F (37.2  C) (Oral) 17 5' 2\" (157.5 cm) 26.70 kg/m2 99%       Blood Pressure from Last 3 Encounters:   02/09/17 184/122   01/30/17 159/113   01/19/17 167/114    Weight from Last 3 Encounters:   02/09/17 146 lb (66.225 kg)   01/30/17 142 lb 12.8 oz (64.774 kg)   01/19/17 151 lb 2 oz (68.55 kg)              We Performed the " Following     Basic Metabolic Panel ([facility]) - Results < 1 hr     GASTROENTEROLOGY ADULT REF PROCEDURE ONLY     [facility]     MA SCREENING DIGITAL BILAT          Today's Medication Changes          These changes are accurate as of: 2/9/17  9:14 AM.  If you have any questions, ask your nurse or doctor.               Start taking these medicines.        Dose/Directions    gabapentin 100 MG capsule   Commonly known as:  NEURONTIN   Used for:  Chronic left-sided low back pain with left-sided sciatica   Started by:  Bolivar Galindo MD        Start with 100 mg at night. Can then increase to 100 mg twice a day as needed for pain.   Quantity:  30 capsule   Refills:  0       lisinopril 30 MG tablet   Commonly known as:  PRINIVIL,ZESTRIL   Used for:  Essential hypertension, benign   Started by:  Bolivar Galindo MD        Dose:  30 mg   Take 1 tablet (30 mg) by mouth daily   Quantity:  30 tablet   Refills:  1       order for DME   Used for:  Chronic obstructive pulmonary disease, unspecified COPD type (H)   Started by:  Bolivar Galindo MD        Equipment being ordered: Nebulizer tubing and supplies   Quantity:  1 Device   Refills:  0       predniSONE 20 MG tablet   Commonly known as:  DELTASONE   Used for:  COPD exacerbation (H)   Started by:  Bolivar Galindo MD        Dose:  20 mg   Take 1 tablet (20 mg) by mouth daily for 5 days   Quantity:  5 tablet   Refills:  0            Where to get your medicines      These medications were sent to [id number] - [place], [place] - [address], [place] [address]     Phone:  [phone]    - albuterol 108 (90 BASE) MCG/ACT Inhaler  - amLODIPine 10 MG tablet  - lisinopril 30 MG tablet  - metoprolol 100 MG 24 hr tablet  - omeprazole 20 MG tablet  - ondansetron 4 MG tablet  - predniSONE 20 MG tablet      Some of these will need a paper prescription and others can be bought over the counter.  Ask your nurse  if you have questions.     Bring a paper prescription for each of these medications    - gabapentin 100 MG capsule  - order for DME             Primary Care Provider Office Phone # Fax #    Shanell Isabel [phone]       [facility] [address] [place] [address]        Thank you!     Thank you for choosing [facility]  for your care. Our goal is always to provide you with excellent care. Hearing back from our patients is one way we can continue to improve our services. Please take a few minutes to complete the written survey that you may receive in the mail after your visit with us. Thank you!             Your Updated Medication List - Protect others around you: Learn how to safely use, store and throw away your medicines at www.disposemymeds.org.          This list is accurate as of: 2/9/17  9:14 AM.  Always use your most recent med list.                   Brand Name Dispense Instructions for use    * acetaminophen 500 MG tablet    TYLENOL    100 tablet    Take two tablets by mouth every 4 hours as needed. Max 8 tabs daily.       * acetaminophen 325 MG tablet    TYLENOL    100 tablet    Take 2 tablets (650 mg) by mouth 3 times daily       albuterol 108 (90 BASE) MCG/ACT Inhaler    albuterol    18 g    Inhale 2 puffs every 4-6 hours as needed.       amLODIPine 10 MG tablet    NORVASC    30 tablet    Take 1 tablet (10 mg) by mouth daily       buPROPion 150 MG 24 hr tablet    WELLBUTRIN XL     Take 1 tablet (150 mg) by mouth every morning       FLONASE 50 MCG/ACT spray   Generic drug:  fluticasone      Spray 1 spray into both nostrils daily as needed for allergies       fluticasone-salmeterol 500-50 MCG/DOSE diskus inhaler    ADVAIR DISKUS    60 Inhaler    Inhale 1 puff into the lungs every 12 hours       gabapentin 100 MG capsule    NEURONTIN    30 capsule    Start with 100 mg at night. Can then increase to 100 mg twice a day as needed for pain.       hydrOXYzine 25  MG capsule    VISTARIL    90 capsule    Take 1 capsule (25 mg) by mouth 3 times daily as needed for itching or anxiety       lisinopril 30 MG tablet    PRINIVIL,ZESTRIL    30 tablet    Take 1 tablet (30 mg) by mouth daily       loratadine 10 MG tablet    CLARITIN     Take 1 tablet (10 mg) by mouth daily as needed for allergies       LORazepam 1 MG tablet    ATIVAN     Take 1 tablet (1 mg) by mouth 2 times daily as needed for anxiety       metoprolol 100 MG 24 hr tablet    TOPROL-XL    60 tablet    Take 1.5 tablets (150 mg) by mouth daily       mirtazapine 15 MG tablet    REMERON    30 tablet    Take 2 tablets (30 mg) by mouth At Bedtime       omeprazole 20 MG tablet     60 tablet    Take 1 tablet (20 mg) by mouth 2 times daily       ondansetron 4 MG tablet    ZOFRAN    36 tablet    Take 2 tablets (8 mg) by mouth every 12 hours as needed for nausea       order for DME     1 Device    Equipment being ordered: Nebulizer tubing and supplies       polyethylene glycol powder    MIRALAX/GLYCOLAX    119 g    Take 17 g (1 capful) by mouth daily as needed for constipation       prazosin 1 MG capsule    MINIPRESS    30 capsule    Take 1 capsule (1 mg) by mouth At Bedtime       predniSONE 20 MG tablet    DELTASONE    5 tablet    Take 1 tablet (20 mg) by mouth daily for 5 days       tiotropium 18 MCG capsule    SPIRIVA HANDIHALER    30 capsule    Take once daily       traZODone 100 MG tablet    DESYREL     Take 3 tablets (300 mg) by mouth At Bedtime       zolpidem 5 MG tablet    AMBIEN    15 tablet    Take 1 tablet (5 mg) by mouth nightly as needed for sleep       * Notice:  This list has 2 medication(s) that are the same as other medications prescribed for you. Read the directions carefully, and ask your doctor or other care provider to review them with you.

## 2017-02-13 ENCOUNTER — CARE COORDINATION (OUTPATIENT)
Dept: FAMILY MEDICINE | Facility: CLINIC | Age: 57
End: 2017-02-13

## 2017-02-13 DIAGNOSIS — M54.42 CHRONIC LEFT-SIDED LOW BACK PAIN WITH LEFT-SIDED SCIATICA: ICD-10-CM

## 2017-02-13 DIAGNOSIS — G89.29 CHRONIC LEFT-SIDED LOW BACK PAIN WITH LEFT-SIDED SCIATICA: ICD-10-CM

## 2017-02-13 NOTE — PROGRESS NOTES
Call from Coral with [facility] [phone] stating that they have room for this patient to move in today. She is allowed to bring 2 lrg bags of clothes, hygiene items and one electronic device (TV). Call to patient. She wishes to look at the place before making a decision. She is given Coral's phone number.

## 2017-02-14 ENCOUNTER — CARE COORDINATION (OUTPATIENT)
Dept: FAMILY MEDICINE | Facility: CLINIC | Age: 57
End: 2017-02-14

## 2017-02-14 RX ORDER — GABAPENTIN 100 MG/1
CAPSULE ORAL
Qty: 60 CAPSULE | Refills: 3 | Status: SHIPPED | OUTPATIENT
Start: 2017-02-14 | End: 2017-03-23

## 2017-02-14 NOTE — PROGRESS NOTES
Call from Lisa. She states that she called Coral with [facility] and due to the rule that she cannot have visitors at the group home and that there is a curfew she has decided that she does not want to go there to live. She was upset that they do take a percentage of her [place] to help pay for her lodging and food and she is not willing to do this. She requested my help with other housing options. I informed her that I am unsure if public housing is even taking applications any longer but that she could call and get on the waiting list if they are still functioning. Advised that it takes quite a long time to receive housing.

## 2017-02-15 ENCOUNTER — RECORDS - HEALTHEAST (OUTPATIENT)
Dept: ADMINISTRATIVE | Facility: OTHER | Age: 57
End: 2017-02-15

## 2017-02-20 ENCOUNTER — TELEPHONE (OUTPATIENT)
Dept: FAMILY MEDICINE | Facility: CLINIC | Age: 57
End: 2017-02-20

## 2017-02-20 DIAGNOSIS — J44.9 CHRONIC OBSTRUCTIVE PULMONARY DISEASE, UNSPECIFIED COPD TYPE (H): Primary | ICD-10-CM

## 2017-02-20 NOTE — TELEPHONE ENCOUNTER
Call from patient. She requests that a new order for nebulizer machine, tubing be faxed to [facility] at [phone].  She also requests that a new Rx for solution be sent to her [facility] pharmacy on [address].

## 2017-02-23 ENCOUNTER — OFFICE VISIT (OUTPATIENT)
Dept: FAMILY MEDICINE | Facility: CLINIC | Age: 57
End: 2017-02-23

## 2017-02-23 VITALS
BODY MASS INDEX: 26.3 KG/M2 | SYSTOLIC BLOOD PRESSURE: 123 MMHG | DIASTOLIC BLOOD PRESSURE: 84 MMHG | OXYGEN SATURATION: 100 % | WEIGHT: 143.8 LBS | HEART RATE: 87 BPM | TEMPERATURE: 98.3 F

## 2017-02-23 DIAGNOSIS — J44.9 CHRONIC OBSTRUCTIVE PULMONARY DISEASE, UNSPECIFIED COPD TYPE (H): Primary | ICD-10-CM

## 2017-02-23 DIAGNOSIS — I10 ESSENTIAL HYPERTENSION, BENIGN: ICD-10-CM

## 2017-02-23 RX ORDER — IPRATROPIUM BROMIDE AND ALBUTEROL SULFATE 2.5; .5 MG/3ML; MG/3ML
1 SOLUTION RESPIRATORY (INHALATION) EVERY 6 HOURS PRN
Qty: 30 VIAL | Refills: 3 | Status: SHIPPED | OUTPATIENT
Start: 2017-02-23 | End: 2017-06-20

## 2017-02-23 RX ORDER — ALBUTEROL SULFATE 0.83 MG/ML
1 SOLUTION RESPIRATORY (INHALATION) EVERY 6 HOURS PRN
Qty: 50 VIAL | Refills: 1 | Status: SHIPPED | OUTPATIENT
Start: 2017-02-23 | End: 2017-07-20

## 2017-02-23 NOTE — PROGRESS NOTES
Preceptor Attestation:  Patient's case reviewed and discussed with Shanell Isabel MD Patient seen and discussed with the resident.. I agree with assessment and plan of care.  Supervising Physician:  Tracy Kraus MD  [facility]

## 2017-02-23 NOTE — MR AVS SNAPSHOT
After Visit Summary   2017    Lisa Scott    MRN: [id number]           Patient Information     Date Of Birth          1960        Visit Information        Provider Department      2017 9:00 AM Shanell Isabel MD [facility]        Today's Diagnoses     Chronic obstructive pulmonary disease, unspecified COPD type (H)    -  1    Essential hypertension, benign           Follow-ups after your visit        Your next 10 appointments already scheduled     Mar 23, 2017 10:00 AM CDT   Return Visit with Shanell Isabel MD   [facility] ([facility])    [address] [place] [id number]   [phone]              Who to contact     Please call your clinic at [phone] to:    Ask questions about your health    Make or cancel appointments    Discuss your medicines    Learn about your test results    Speak to your doctor   If you have compliments or concerns about an experience at your clinic, or if you wish to file a complaint, please contact [facility] Patient Relations at [phone] or email us at [email or web address]         Additional Information About Your Visit        MyChart Information     [employer] is an electronic gateway that provides easy, online access to your medical records. With [employer], you can request a clinic appointment, read your test results, renew a prescription or communicate with your care team.     To sign up for [facility] visit the website at [email or web address]   You will be asked to enter the access code listed below, as well as some personal information. Please follow the directions to create your username and password.     Your access code is: [email or web address]  Expires: 6/10/2017  4:00 PM     Your access code will  in 90 days. If you need help or a new code, please contact your [facility] or call [phone] for assistance.        Care EveryWhere ID      This is your Care EveryWhere ID. This could be used by other organizations to access your [facility] medical records  [id number]        Your Vitals Were     Pulse Temperature Pulse Oximetry BMI (Body Mass Index)          87 98.3  F (36.8  C) (Oral) 100% 26.3 kg/m2         Blood Pressure from Last 3 Encounters:   02/23/17 123/84   02/09/17 (!) 184/122   01/30/17 (!) 159/113    Weight from Last 3 Encounters:   02/23/17 143 lb 12.8 oz (65.2 kg)   02/09/17 146 lb (66.2 kg)   01/30/17 142 lb 12.8 oz (64.8 kg)              Today, you had the following     No orders found for display         Today's Medication Changes          These changes are accurate as of: 2/23/17 11:59 PM.  If you have any questions, ask your nurse or doctor.               Start taking these medicines.        Dose/Directions    ipratropium - albuterol 0.5 mg/2.5 mg/3 mL 0.5-2.5 (3) MG/3ML neb solution   Commonly known as:  DUONEB   Used for:  Chronic obstructive pulmonary disease, unspecified COPD type (H)   Started by:  Shanell Isabel MD        Dose:  1 vial   Take 1 vial (3 mLs) by nebulization every 6 hours as needed for shortness of breath / dyspnea or wheezing   Quantity:  30 vial   Refills:  3         These medicines have changed or have updated prescriptions.        Dose/Directions    * albuterol 108 (90 BASE) MCG/ACT Inhaler   Commonly known as:  albuterol   This may have changed:  Another medication with the same name was added. Make sure you understand how and when to take each.   Changed by:  Bolivar Galindo MD        Inhale 2 puffs every 4-6 hours as needed.   Quantity:  18 g   Refills:  11       * albuterol (2.5 MG/3ML) 0.083% neb solution   This may have changed:  You were already taking a medication with the same name, and this prescription was added. Make sure you understand how and when to take each.   Used for:  Chronic obstructive pulmonary disease, unspecified COPD type (H)   Changed by:  Shanell Isabel MD         Dose:  1 vial   Take 1 vial (2.5 mg) by nebulization every 6 hours as needed for shortness of breath / dyspnea or wheezing   Quantity:  50 vial   Refills:  1       * Notice:  This list has 2 medication(s) that are the same as other medications prescribed for you. Read the directions carefully, and ask your doctor or other care provider to review them with you.         Where to get your medicines      Some of these will need a paper prescription and others can be bought over the counter.  Ask your nurse if you have questions.     Bring a paper prescription for each of these medications     albuterol (2.5 MG/3ML) 0.083% neb solution    ipratropium - albuterol 0.5 mg/2.5 mg/3 mL 0.5-2.5 (3) MG/3ML neb solution                Primary Care Provider Office Phone # Fax #    Shanell Isabel [phone]       [facility] [address] [place] [address]        Thank you!     Thank you for choosing [facility]  for your care. Our goal is always to provide you with excellent care. Hearing back from our patients is one way we can continue to improve our services. Please take a few minutes to complete the written survey that you may receive in the mail after your visit with us. Thank you!             Your Updated Medication List - Protect others around you: Learn how to safely use, store and throw away your medicines at www.disposemymeds.org.          This list is accurate as of: 2/23/17 11:59 PM.  Always use your most recent med list.                   Brand Name Dispense Instructions for use    * acetaminophen 500 MG tablet    TYLENOL    100 tablet    Take two tablets by mouth every 4 hours as needed. Max 8 tabs daily.       * acetaminophen 325 MG tablet    TYLENOL    100 tablet    Take 2 tablets (650 mg) by mouth 3 times daily       * albuterol 108 (90 BASE) MCG/ACT Inhaler    albuterol    18 g    Inhale 2 puffs every 4-6 hours as needed.       * albuterol (2.5 MG/3ML) 0.083% neb  solution     50 vial    Take 1 vial (2.5 mg) by nebulization every 6 hours as needed for shortness of breath / dyspnea or wheezing       amLODIPine 10 MG tablet    NORVASC    30 tablet    Take 1 tablet (10 mg) by mouth daily       buPROPion 150 MG 24 hr tablet    WELLBUTRIN XL     Take 1 tablet (150 mg) by mouth every morning       FLONASE 50 MCG/ACT spray   Generic drug:  fluticasone      Spray 1 spray into both nostrils daily as needed for allergies       fluticasone-salmeterol 500-50 MCG/DOSE diskus inhaler    ADVAIR DISKUS    60 Inhaler    Inhale 1 puff into the lungs every 12 hours       gabapentin 100 MG capsule    NEURONTIN    60 capsule    Start with 100 mg at night. Can then increase to 100 mg twice a day as needed for pain.       hydrOXYzine 25 MG capsule    VISTARIL    90 capsule    Take 1 capsule (25 mg) by mouth 3 times daily as needed for itching or anxiety       ipratropium - albuterol 0.5 mg/2.5 mg/3 mL 0.5-2.5 (3) MG/3ML neb solution    DUONEB    30 vial    Take 1 vial (3 mLs) by nebulization every 6 hours as needed for shortness of breath / dyspnea or wheezing       lisinopril 30 MG tablet    PRINIVIL,ZESTRIL    30 tablet    Take 1 tablet (30 mg) by mouth daily       loratadine 10 MG tablet    CLARITIN     Take 1 tablet (10 mg) by mouth daily as needed for allergies       LORazepam 1 MG tablet    ATIVAN     Take 1 tablet (1 mg) by mouth 2 times daily as needed for anxiety       metoprolol 100 MG 24 hr tablet    TOPROL-XL    60 tablet    Take 1.5 tablets (150 mg) by mouth daily       mirtazapine 15 MG tablet    REMERON    30 tablet    Take 2 tablets (30 mg) by mouth At Bedtime       omeprazole 20 MG tablet     60 tablet    Take 1 tablet (20 mg) by mouth 2 times daily       ondansetron 4 MG tablet    ZOFRAN    36 tablet    Take 2 tablets (8 mg) by mouth every 12 hours as needed for nausea       * order for DME     1 Device    Equipment being ordered: Nebulizer tubing and supplies       * order for  DME     1 Device    Equipment being ordered: Nebulizer and tubing       polyethylene glycol powder    MIRALAX/GLYCOLAX    119 g    Take 17 g (1 capful) by mouth daily as needed for constipation       prazosin 1 MG capsule    MINIPRESS    30 capsule    Take 1 capsule (1 mg) by mouth At Bedtime       tiotropium 18 MCG capsule    SPIRIVA HANDIHALER    30 capsule    Take once daily       traZODone 100 MG tablet    DESYREL     Take 3 tablets (300 mg) by mouth At Bedtime       zolpidem 5 MG tablet    AMBIEN    15 tablet    Take 1 tablet (5 mg) by mouth nightly as needed for sleep       * Notice:  This list has 6 medication(s) that are the same as other medications prescribed for you. Read the directions carefully, and ask your doctor or other care provider to review them with you.
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
## 2017-02-25 ENCOUNTER — TRANSFERRED RECORDS (OUTPATIENT)
Dept: HEALTH INFORMATION MANAGEMENT | Facility: CLINIC | Age: 57
End: 2017-02-25

## 2017-03-03 ENCOUNTER — TRANSFERRED RECORDS (OUTPATIENT)
Dept: HEALTH INFORMATION MANAGEMENT | Facility: CLINIC | Age: 57
End: 2017-03-03

## 2017-03-23 ENCOUNTER — OFFICE VISIT (OUTPATIENT)
Dept: FAMILY MEDICINE | Facility: CLINIC | Age: 57
End: 2017-03-23

## 2017-03-23 VITALS
BODY MASS INDEX: 25.76 KG/M2 | WEIGHT: 140 LBS | HEIGHT: 62 IN | TEMPERATURE: 98.7 F | DIASTOLIC BLOOD PRESSURE: 88 MMHG | OXYGEN SATURATION: 98 % | RESPIRATION RATE: 20 BRPM | SYSTOLIC BLOOD PRESSURE: 136 MMHG | HEART RATE: 84 BPM

## 2017-03-23 DIAGNOSIS — I10 ESSENTIAL HYPERTENSION, BENIGN: Primary | ICD-10-CM

## 2017-03-23 DIAGNOSIS — R11.0 NAUSEA: Primary | ICD-10-CM

## 2017-03-23 DIAGNOSIS — F10.20 UNCOMPLICATED ALCOHOL DEPENDENCE (H): ICD-10-CM

## 2017-03-23 LAB
BILIRUBIN UR: NEGATIVE
BLOOD UR: NEGATIVE
CLARITY, URINE: CLEAR
COLOR UR: YELLOW
GLUCOSE URINE: NEGATIVE
KETONES UR QL: NEGATIVE
LEUKOCYTE ESTERASE UR: ABNORMAL
NITRITE UR QL STRIP: NEGATIVE
PH UR STRIP: 7 [PH] (ref 5–7)
PROTEIN UR: NEGATIVE
SP GR UR STRIP: 1.02
UROBILINOGEN UR STRIP-ACNC: ABNORMAL

## 2017-03-23 RX ORDER — GABAPENTIN 100 MG/1
CAPSULE ORAL
Qty: 60 CAPSULE | Refills: 3 | Status: SHIPPED | OUTPATIENT
Start: 2017-03-23 | End: 2017-06-21

## 2017-03-23 RX ORDER — IBUPROFEN 400 MG/1
400 TABLET, FILM COATED ORAL EVERY 6 HOURS PRN
Qty: 120 TABLET | Refills: 1 | Status: SHIPPED | OUTPATIENT
Start: 2017-03-23 | End: 2017-07-27

## 2017-03-23 NOTE — PATIENT INSTRUCTIONS
I will be thinking of you as you get ready to go to treatment, stay strong, you can do it!  Blood pressure looks great  Come back in 1 month for a visit- call Faye if you need help coordinating anything
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
## 2017-03-23 NOTE — MR AVS SNAPSHOT
After Visit Summary   3/23/2017    Lisa Scott    MRN: [id number]           Patient Information     Date Of Birth          1960        Visit Information        Provider Department      3/23/2017 10:00 AM Shanell Isabel MD [facility]        Today's Diagnoses     Urinary tract infection    -  1    Chronic left-sided low back pain with left-sided sciatica          Care Instructions    I will be thinking of you as you get ready to go to treatment, stay strong, you can do it!  Blood pressure looks great  Come back in 1 month for a visit- call Faye if you need help coordinating anything        Follow-ups after your visit        Who to contact     Please call your clinic at [phone] to:    Ask questions about your health    Make or cancel appointments    Discuss your medicines    Learn about your test results    Speak to your doctor   If you have compliments or concerns about an experience at your clinic, or if you wish to file a complaint, please contact [facility] Patient Relations at [phone] or email us at [email or web address]         Additional Information About Your Visit        MyChart Information     [employer] is an electronic gateway that provides easy, online access to your medical records. With [employer], you can request a clinic appointment, read your test results, renew a prescription or communicate with your care team.     To sign up for [employer] visit the website at [email or web address]   You will be asked to enter the access code listed below, as well as some personal information. Please follow the directions to create your username and password.     Your access code is: [email or web address]  Expires: 6/10/2017  4:00 PM     Your access code will  in 90 days. If you need help or a new code, please contact your [facility] or call [phone] for assistance.        Care EveryWhere ID     This is  "your Care EveryWhere ID. This could be used by other organizations to access your [facility] medical records  [id number]        Your Vitals Were     Pulse Temperature Respirations Height Pulse Oximetry BMI (Body Mass Index)    84 98.7  F (37.1  C) 20 5' 2\" (157.5 cm) 98% 25.61 kg/m2       Blood Pressure from Last 3 Encounters:   03/23/17 136/88   02/23/17 123/84   02/09/17 (!) 184/122    Weight from Last 3 Encounters:   03/23/17 140 lb (63.5 kg)   02/23/17 143 lb 12.8 oz (65.2 kg)   02/09/17 146 lb (66.2 kg)              We Performed the Following     Urinalysis, Micro If ([facility])          Today's Medication Changes          These changes are accurate as of: 3/23/17 10:31 AM.  If you have any questions, ask your nurse or doctor.               Start taking these medicines.        Dose/Directions    ibuprofen 400 MG tablet   Commonly known as:  ADVIL/MOTRIN   Used for:  Chronic left-sided low back pain with left-sided sciatica        Dose:  400 mg   Take 1 tablet (400 mg) by mouth every 6 hours as needed for moderate pain   Quantity:  120 tablet   Refills:  1            Where to get your medicines      Some of these will need a paper prescription and others can be bought over the counter.  Ask your nurse if you have questions.     Bring a paper prescription for each of these medications     gabapentin 100 MG capsule    ibuprofen 400 MG tablet                Primary Care Provider Office Phone # Fax #    Shanell Raysa Isabel [phone]       [facility] [address] [place] [address]        Thank you!     Thank you for choosing [facility]  for your care. Our goal is always to provide you with excellent care. Hearing back from our patients is one way we can continue to improve our services. Please take a few minutes to complete the written survey that you may receive in the mail after your visit with us. Thank you!             Your Updated Medication List - Protect others " around you: Learn how to safely use, store and throw away your medicines at www.disposemymeds.org.          This list is accurate as of: 3/23/17 10:31 AM.  Always use your most recent med list.                   Brand Name Dispense Instructions for use    * acetaminophen 500 MG tablet    TYLENOL    100 tablet    Take two tablets by mouth every 4 hours as needed. Max 8 tabs daily.       * acetaminophen 325 MG tablet    TYLENOL    100 tablet    Take 2 tablets (650 mg) by mouth 3 times daily       * albuterol 108 (90 BASE) MCG/ACT Inhaler    albuterol    18 g    Inhale 2 puffs every 4-6 hours as needed.       * albuterol (2.5 MG/3ML) 0.083% neb solution     50 vial    Take 1 vial (2.5 mg) by nebulization every 6 hours as needed for shortness of breath / dyspnea or wheezing       amLODIPine 10 MG tablet    NORVASC    30 tablet    Take 1 tablet (10 mg) by mouth daily       buPROPion 150 MG 24 hr tablet    WELLBUTRIN XL     Take 1 tablet (150 mg) by mouth every morning       FLONASE 50 MCG/ACT spray   Generic drug:  fluticasone      Spray 1 spray into both nostrils daily as needed for allergies       fluticasone-salmeterol 500-50 MCG/DOSE diskus inhaler    ADVAIR DISKUS    60 Inhaler    Inhale 1 puff into the lungs every 12 hours       gabapentin 100 MG capsule    NEURONTIN    60 capsule    Start with 100 mg at night. Can then increase to 100 mg twice a day as needed for pain.       hydrOXYzine 25 MG capsule    VISTARIL    90 capsule    Take 1 capsule (25 mg) by mouth 3 times daily as needed for itching or anxiety       ibuprofen 400 MG tablet    ADVIL/MOTRIN    120 tablet    Take 1 tablet (400 mg) by mouth every 6 hours as needed for moderate pain       ipratropium - albuterol 0.5 mg/2.5 mg/3 mL 0.5-2.5 (3) MG/3ML neb solution    DUONEB    30 vial    Take 1 vial (3 mLs) by nebulization every 6 hours as needed for shortness of breath / dyspnea or wheezing       lisinopril 30 MG tablet    PRINIVIL,ZESTRIL    30 tablet     Take 1 tablet (30 mg) by mouth daily       loratadine 10 MG tablet    CLARITIN     Take 1 tablet (10 mg) by mouth daily as needed for allergies       LORazepam 1 MG tablet    ATIVAN     Take 1 tablet (1 mg) by mouth 2 times daily as needed for anxiety       metoprolol 100 MG 24 hr tablet    TOPROL-XL    60 tablet    Take 1.5 tablets (150 mg) by mouth daily       mirtazapine 15 MG tablet    REMERON    30 tablet    Take 2 tablets (30 mg) by mouth At Bedtime       omeprazole 20 MG tablet     60 tablet    Take 1 tablet (20 mg) by mouth 2 times daily       ondansetron 4 MG tablet    ZOFRAN    36 tablet    Take 2 tablets (8 mg) by mouth every 12 hours as needed for nausea       * order for DME     1 Device    Equipment being ordered: Nebulizer tubing and supplies       * order for DME     1 Device    Equipment being ordered: Nebulizer and tubing       polyethylene glycol powder    MIRALAX/GLYCOLAX    119 g    Take 17 g (1 capful) by mouth daily as needed for constipation       prazosin 1 MG capsule    MINIPRESS    30 capsule    Take 1 capsule (1 mg) by mouth At Bedtime       tiotropium 18 MCG capsule    SPIRIVA HANDIHALER    30 capsule    Take once daily       traZODone 100 MG tablet    DESYREL     Take 3 tablets (300 mg) by mouth At Bedtime       zolpidem 5 MG tablet    AMBIEN    15 tablet    Take 1 tablet (5 mg) by mouth nightly as needed for sleep       * Notice:  This list has 6 medication(s) that are the same as other medications prescribed for you. Read the directions carefully, and ask your doctor or other care provider to review them with you.

## 2017-03-23 NOTE — LETTER
March 28, 2017      Lisa DACIA Scott  [address]  [place] [address]        Dear Lisa,    Your urine test was normal, there is no infection.    Please see below for your test results.    Resulted Orders   Urinalysis, Micro If ([facility])   Result Value Ref Range    Specific Gravity Urine 1.020 1.005 - 1.030    pH Urine 7.0 4.5 - 8.0    Leukocyte Esterase UR Trace (A) NEGATIVE    Nitrite Urine Negative NEGATIVE    Protein UR Negative NEGATIVE    Glucose Urine Negative NEGATIVE    Ketones Urine Negative NEGATIVE    Urobilinogen mg/dL 0.2 E.U./dL 0.2 E.U./dL    Bilirubin UR Negative NEGATIVE    Blood UR Negative NEGATIVE    Color Urine Yellow     Clarity, urine Clear    Urine Culture ([facility])   Result Value Ref Range    Culture SEE RESULTS BELOW       Comment:      CULTURE, URINE   SOURCE: Urine, Random   CULTURE RESULTS:    No Growth      Narrative    Test performed by:  [facility]  [address] [place] [address]       If you have any questions, please call the clinic to make an appointment.    Sincerely,    Shanell Isabel MD

## 2017-03-24 LAB — CULTURE: NORMAL

## 2017-03-24 RX ORDER — ONDANSETRON 4 MG/1
8 TABLET, FILM COATED ORAL EVERY 12 HOURS PRN
Qty: 36 TABLET | Refills: 3 | Status: SHIPPED | OUTPATIENT
Start: 2017-03-24 | End: 2017-06-20

## 2017-03-24 ASSESSMENT — PATIENT HEALTH QUESTIONNAIRE - PHQ9: SUM OF ALL RESPONSES TO PHQ QUESTIONS 1-9: 16

## 2017-03-28 PROBLEM — F10.20 UNCOMPLICATED ALCOHOL DEPENDENCE (H): Status: ACTIVE | Noted: 2017-03-28

## 2017-03-28 NOTE — PROGRESS NOTES
Please call:  Warren Gonzalez,  Your urine test was normal- there is no infection.  Thank you  Dr Isabel

## 2017-04-14 ENCOUNTER — TELEPHONE (OUTPATIENT)
Dept: FAMILY MEDICINE | Facility: CLINIC | Age: 57
End: 2017-04-14

## 2017-04-14 NOTE — TELEPHONE ENCOUNTER
[facility] phone call message- general phone call:    Reason for call: Forms - [phone] Inquiring about Housing Forms. She needs to talk to you before you fill them out. They will be coming directly to the clinic from Public Housing. Also still having back pain from fall would like MRI    Return call needed: Yes    OK to leave a message on voice mail? Yes    Primary language: English      needed? No    Call taken on April 14, 2017 at 10:41 AM by Tammy Crespo

## 2017-04-24 NOTE — TELEPHONE ENCOUNTER
Spoke with patient. She requests that a medication list be sent with her housing form and she would like a copy of the form for herself, please call when complete.

## 2017-05-25 ENCOUNTER — AMBULATORY - HEALTHEAST (OUTPATIENT)
Dept: PHYSICAL MEDICINE AND REHAB | Facility: CLINIC | Age: 57
End: 2017-05-25

## 2017-05-31 ENCOUNTER — TRANSFERRED RECORDS (OUTPATIENT)
Dept: HEALTH INFORMATION MANAGEMENT | Facility: CLINIC | Age: 57
End: 2017-05-31

## 2017-05-31 ENCOUNTER — TELEPHONE (OUTPATIENT)
Dept: FAMILY MEDICINE | Facility: CLINIC | Age: 57
End: 2017-05-31

## 2017-06-07 ENCOUNTER — TELEPHONE (OUTPATIENT)
Dept: FAMILY MEDICINE | Facility: CLINIC | Age: 57
End: 2017-06-07

## 2017-06-08 ENCOUNTER — TELEPHONE (OUTPATIENT)
Dept: FAMILY MEDICINE | Facility: CLINIC | Age: 57
End: 2017-06-08

## 2017-06-08 ENCOUNTER — COMMUNICATION - HEALTHEAST (OUTPATIENT)
Dept: PHYSICAL MEDICINE AND REHAB | Facility: CLINIC | Age: 57
End: 2017-06-08
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
## 2017-06-12 ENCOUNTER — HOME CARE/HOSPICE - HEALTHEAST (OUTPATIENT)
Dept: HOME HEALTH SERVICES | Facility: HOME HEALTH | Age: 57
End: 2017-06-12

## 2017-06-13 ENCOUNTER — AMBULATORY - HEALTHEAST (OUTPATIENT)
Dept: NEUROLOGY | Facility: CLINIC | Age: 57
End: 2017-06-13

## 2017-06-13 ENCOUNTER — TELEPHONE (OUTPATIENT)
Dept: FAMILY MEDICINE | Facility: CLINIC | Age: 57
End: 2017-06-13

## 2017-06-13 DIAGNOSIS — R41.82 ALTERED MENTAL STATUS: ICD-10-CM

## 2017-06-13 NOTE — TELEPHONE ENCOUNTER
[facility] phone call message - order or referral request for patient:     Order or referral being requested: SN eval & tx      Additional Comments: They gave service request over to [facility] because they are unable to go out. [facility] will call for ongoing services.     OK to leave a message on voice mail? Yes    Primary language: English      needed? No    Call taken on June 13, 2017 at 2:20 PM by Ivory Mina

## 2017-06-13 NOTE — TELEPHONE ENCOUNTER
Attempted to reach patient to follow up from discharge 6/12/17. LM on VM, called emergency contact listed in HE system as our emergency contact phone number is non working (Robyn [phone])  She provides me with another number to try [phone] as she feels that patient likely no longer has a cell. LM on this number for RC.

## 2017-06-14 ENCOUNTER — MEDICAL CORRESPONDENCE (OUTPATIENT)
Dept: HEALTH INFORMATION MANAGEMENT | Facility: CLINIC | Age: 57
End: 2017-06-14

## 2017-06-14 NOTE — TELEPHONE ENCOUNTER
Not sure if this is from [employer] Called. Left message on Mika's VM: returning call on order and with question.  To call me back

## 2017-06-14 NOTE — TELEPHONE ENCOUNTER
[facility] phone call message - order or referral request for patient:     Order or referral being requested: Harpreet luke verbal orders for OT service for home health aide for bathing       Additional Comments: none     OK to leave a message on voice mail? Yes    Primary language: English      needed? No    Call taken on June 14, 2017 at 3:20 PM by Carin Vick

## 2017-06-14 NOTE — TELEPHONE ENCOUNTER
Mika, Physical Therapist called back. Is from [facility]. Mika went out today for PT eval with referral from the hospital. Would like verbal order for OT eval/treat due to concern with cooking by self and ADL assistance. Patient also having trouble with bathing. Would like order for HHA once a week.     Verbal okay given for OT eval/tx and HHA. Will route to FYI PCP.

## 2017-06-15 ENCOUNTER — TELEPHONE (OUTPATIENT)
Dept: FAMILY MEDICINE | Facility: CLINIC | Age: 57
End: 2017-06-15

## 2017-06-15 NOTE — TELEPHONE ENCOUNTER
Message left from patient on my VM requesting a RC to help schedule a hospital follow up appointment. She provides the phone number [phone]. LM for RC from patient. LM to call the FD and schedule with Erica and Dr Isabel. This will not be TCM as outside window.

## 2017-06-20 ENCOUNTER — OFFICE VISIT (OUTPATIENT)
Dept: FAMILY MEDICINE | Facility: CLINIC | Age: 57
End: 2017-06-20

## 2017-06-20 VITALS
SYSTOLIC BLOOD PRESSURE: 188 MMHG | OXYGEN SATURATION: 100 % | WEIGHT: 147 LBS | HEIGHT: 62 IN | BODY MASS INDEX: 27.05 KG/M2 | DIASTOLIC BLOOD PRESSURE: 126 MMHG | TEMPERATURE: 98.4 F | HEART RATE: 92 BPM | RESPIRATION RATE: 16 BRPM

## 2017-06-20 DIAGNOSIS — R11.0 NAUSEA: ICD-10-CM

## 2017-06-20 DIAGNOSIS — F10.21 ALCOHOL DEPENDENCE IN REMISSION (H): Primary | ICD-10-CM

## 2017-06-20 DIAGNOSIS — I10 ESSENTIAL HYPERTENSION, BENIGN: ICD-10-CM

## 2017-06-20 DIAGNOSIS — G89.4 CHRONIC PAIN DISORDER: ICD-10-CM

## 2017-06-20 DIAGNOSIS — M54.2 CERVICALGIA: ICD-10-CM

## 2017-06-20 DIAGNOSIS — J44.9 CHRONIC OBSTRUCTIVE PULMONARY DISEASE, UNSPECIFIED COPD TYPE (H): ICD-10-CM

## 2017-06-20 PROBLEM — F10.20 UNCOMPLICATED ALCOHOL DEPENDENCE (H): Status: RESOLVED | Noted: 2017-03-28 | Resolved: 2017-06-20

## 2017-06-20 RX ORDER — ALBUTEROL SULFATE 90 UG/1
AEROSOL, METERED RESPIRATORY (INHALATION)
Qty: 18 G | Refills: 11 | Status: SHIPPED | OUTPATIENT
Start: 2017-06-20 | End: 2018-03-23

## 2017-06-20 RX ORDER — THIAMINE HCL 50 MG
100 TABLET ORAL DAILY
Qty: 30 TABLET | Refills: 3 | Status: SHIPPED | OUTPATIENT
Start: 2017-06-20 | End: 2017-12-11

## 2017-06-20 RX ORDER — AMLODIPINE BESYLATE 10 MG/1
10 TABLET ORAL DAILY
Qty: 30 TABLET | Refills: 3 | Status: SHIPPED | OUTPATIENT
Start: 2017-06-20 | End: 2018-02-20

## 2017-06-20 RX ORDER — TIOTROPIUM BROMIDE 18 UG/1
CAPSULE ORAL; RESPIRATORY (INHALATION)
Qty: 30 CAPSULE | Refills: 11 | Status: SHIPPED | OUTPATIENT
Start: 2017-06-20 | End: 2018-05-25

## 2017-06-20 RX ORDER — IPRATROPIUM BROMIDE AND ALBUTEROL SULFATE 2.5; .5 MG/3ML; MG/3ML
1 SOLUTION RESPIRATORY (INHALATION) EVERY 6 HOURS PRN
Qty: 30 VIAL | Refills: 3 | Status: SHIPPED | OUTPATIENT
Start: 2017-06-20 | End: 2018-06-22

## 2017-06-20 RX ORDER — ONDANSETRON 4 MG/1
8 TABLET, FILM COATED ORAL EVERY 12 HOURS PRN
Qty: 36 TABLET | Refills: 3 | Status: SHIPPED | OUTPATIENT
Start: 2017-06-20 | End: 2017-08-21

## 2017-06-20 RX ORDER — ACETAMINOPHEN 500 MG
1000 TABLET ORAL 3 TIMES DAILY
Qty: 100 TABLET | Refills: 1 | Status: SHIPPED | OUTPATIENT
Start: 2017-06-20 | End: 2017-07-25

## 2017-06-20 ASSESSMENT — ANXIETY QUESTIONNAIRES
3. WORRYING TOO MUCH ABOUT DIFFERENT THINGS: NEARLY EVERY DAY
6. BECOMING EASILY ANNOYED OR IRRITABLE: NEARLY EVERY DAY
2. NOT BEING ABLE TO STOP OR CONTROL WORRYING: NEARLY EVERY DAY
5. BEING SO RESTLESS THAT IT IS HARD TO SIT STILL: NEARLY EVERY DAY
1. FEELING NERVOUS, ANXIOUS, OR ON EDGE: NEARLY EVERY DAY
GAD7 TOTAL SCORE: 21
7. FEELING AFRAID AS IF SOMETHING AWFUL MIGHT HAPPEN: NEARLY EVERY DAY

## 2017-06-20 ASSESSMENT — PATIENT HEALTH QUESTIONNAIRE - PHQ9: 5. POOR APPETITE OR OVEREATING: NEARLY EVERY DAY
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
## 2017-06-20 NOTE — NURSING NOTE
DUE FOR:  PHQ9/ GAD7   Spirometry   Hepatitis C Screening  Mammogram  Colon Cancer screening    HM reviewed with patient today. Does not want any of these until current issue is better.

## 2017-06-20 NOTE — MR AVS SNAPSHOT
"              After Visit Summary   6/20/2017    Lisa Scott    MRN: [id number]           Patient Information     Date Of Birth          1960        Visit Information        Provider Department      6/20/2017 3:20 PM Shanell Isabel MD [facility]        Today's Diagnoses     Alcohol dependence in remission (H)    -  1    Cervicalgia        Chronic pain disorder        Essential hypertension, benign        Chronic obstructive pulmonary disease, unspecified COPD type (H)        Nausea          Care Instructions    Really consider an outpatient program to help you stay sober  Take thiamine vitamin, this may help with your memory  I sent the rest of the refills to [facility]          Follow-ups after your visit        Who to contact     Please call your clinic at [phone] to:    Ask questions about your health    Make or cancel appointments    Discuss your medicines    Learn about your test results    Speak to your doctor   If you have compliments or concerns about an experience at your clinic, or if you wish to file a complaint, please contact [facility] Patient Relations at [phone] or email us at [email or web address]         Additional Information About Your Visit        Care EveryWhere ID     This is your Care EveryWhere ID. This could be used by other organizations to access your [facility] medical records  [id number]        Your Vitals Were     Pulse Temperature Respirations Height Pulse Oximetry BMI (Body Mass Index)    92 98.4  F (36.9  C) (Oral) 16 5' 2\" (157.5 cm) 100% 26.89 kg/m2       Blood Pressure from Last 3 Encounters:   06/20/17 (!) 188/126   03/23/17 136/88   02/23/17 123/84    Weight from Last 3 Encounters:   06/20/17 147 lb (66.7 kg)   03/23/17 140 lb (63.5 kg)   02/23/17 143 lb 12.8 oz (65.2 kg)              We Performed the Following     Basic Metabolic Panel ([facility]) - Results < 1 hr          Today's Medication Changes          These " changes are accurate as of: 6/20/17  4:30 PM.  If you have any questions, ask your nurse or doctor.               Start taking these medicines.        Dose/Directions    vitamin  B-1 50 MG tablet   Used for:  Alcohol dependence in remission (H)   Started by:  Shanell Isabel MD        Dose:  100 mg   Take 2 tablets (100 mg) by mouth daily   Quantity:  30 tablet   Refills:  3         These medicines have changed or have updated prescriptions.        Dose/Directions    * acetaminophen 325 MG tablet   Commonly known as:  TYLENOL   This may have changed:  Another medication with the same name was changed. Make sure you understand how and when to take each.   Used for:  Acute bilateral low back pain without sciatica   Changed by:  Shanell Isabel MD        Dose:  650 mg   Take 2 tablets (650 mg) by mouth 3 times daily   Quantity:  100 tablet   Refills:  1       * acetaminophen 500 MG tablet   Commonly known as:  TYLENOL   This may have changed:    - how much to take  - how to take this  - when to take this  - additional instructions   Used for:  Cervicalgia, Chronic pain disorder   Changed by:  Shanell Isabel MD        Dose:  1000 mg   Take 2 tablets (1,000 mg) by mouth 3 times daily Take two tablets by mouth three times daily   Quantity:  100 tablet   Refills:  1       * Notice:  This list has 2 medication(s) that are the same as other medications prescribed for you. Read the directions carefully, and ask your doctor or other care provider to review them with you.         Where to get your medicines      These medications were sent to [id number] - [place], [place] - [address], [place] [address]     Phone:  [phone]     acetaminophen 500 MG tablet    albuterol 108 (90 BASE) MCG/ACT Inhaler    amLODIPine 10 MG tablet    fluticasone-salmeterol 500-50 MCG/DOSE diskus inhaler    ipratropium - albuterol 0.5 mg/2.5 mg/3 mL 0.5-2.5 (3) MG/3ML neb solution    ondansetron 4 MG  tablet    tiotropium 18 MCG capsule         Some of these will need a paper prescription and others can be bought over the counter.  Ask your nurse if you have questions.     Bring a paper prescription for each of these medications     vitamin  B-1 50 MG tablet                Primary Care Provider Office Phone # Fax #    Shanell Raysa Isabel [phone]       [facility] [address] [place] [address]        Thank you!     Thank you for choosing [facility]  for your care. Our goal is always to provide you with excellent care. Hearing back from our patients is one way we can continue to improve our services. Please take a few minutes to complete the written survey that you may receive in the mail after your visit with us. Thank you!             Your Updated Medication List - Protect others around you: Learn how to safely use, store and throw away your medicines at www.disposemymeds.org.          This list is accurate as of: 6/20/17  4:30 PM.  Always use your most recent med list.                   Brand Name Dispense Instructions for use    * acetaminophen 325 MG tablet    TYLENOL    100 tablet    Take 2 tablets (650 mg) by mouth 3 times daily       * acetaminophen 500 MG tablet    TYLENOL    100 tablet    Take 2 tablets (1,000 mg) by mouth 3 times daily Take two tablets by mouth three times daily       * albuterol (2.5 MG/3ML) 0.083% neb solution     50 vial    Take 1 vial (2.5 mg) by nebulization every 6 hours as needed for shortness of breath / dyspnea or wheezing       * albuterol 108 (90 BASE) MCG/ACT Inhaler    albuterol    18 g    Inhale 2 puffs every 4-6 hours as needed.       amLODIPine 10 MG tablet    NORVASC    30 tablet    Take 1 tablet (10 mg) by mouth daily       buPROPion 150 MG 24 hr tablet    WELLBUTRIN XL     Take 1 tablet (150 mg) by mouth every morning       FLONASE 50 MCG/ACT spray   Generic drug:  fluticasone      Spray 1 spray into both nostrils daily  as needed for allergies       fluticasone-salmeterol 500-50 MCG/DOSE diskus inhaler    ADVAIR DISKUS    60 Inhaler    Inhale 1 puff into the lungs every 12 hours       gabapentin 100 MG capsule    NEURONTIN    60 capsule    Start with 100 mg at night. Can then increase to 100 mg twice a day as needed for pain.       hydrOXYzine 25 MG capsule    VISTARIL    90 capsule    Take 1 capsule (25 mg) by mouth 3 times daily as needed for itching or anxiety       ibuprofen 400 MG tablet    ADVIL/MOTRIN    120 tablet    Take 1 tablet (400 mg) by mouth every 6 hours as needed for moderate pain       ipratropium - albuterol 0.5 mg/2.5 mg/3 mL 0.5-2.5 (3) MG/3ML neb solution    DUONEB    30 vial    Take 1 vial (3 mLs) by nebulization every 6 hours as needed for shortness of breath / dyspnea or wheezing       lisinopril 30 MG tablet    PRINIVIL,ZESTRIL    30 tablet    Take 1 tablet (30 mg) by mouth daily       loratadine 10 MG tablet    CLARITIN     Take 1 tablet (10 mg) by mouth daily as needed for allergies       LORazepam 1 MG tablet    ATIVAN     Take 1 tablet (1 mg) by mouth 2 times daily as needed for anxiety       metoprolol 100 MG 24 hr tablet    TOPROL-XL    60 tablet    Take 1.5 tablets (150 mg) by mouth daily       mirtazapine 15 MG tablet    REMERON    30 tablet    Take 2 tablets (30 mg) by mouth At Bedtime       omeprazole 20 MG tablet     60 tablet    Take 1 tablet (20 mg) by mouth 2 times daily       ondansetron 4 MG tablet    ZOFRAN    36 tablet    Take 2 tablets (8 mg) by mouth every 12 hours as needed for nausea       * order for DME     1 Device    Equipment being ordered: Nebulizer tubing and supplies       * order for DME     1 Device    Equipment being ordered: Nebulizer and tubing       polyethylene glycol powder    MIRALAX/GLYCOLAX    119 g    Take 17 g (1 capful) by mouth daily as needed for constipation       prazosin 1 MG capsule    MINIPRESS    30 capsule    Take 1 capsule (1 mg) by mouth At Bedtime        tiotropium 18 MCG capsule    SPIRIVA HANDIHALER    30 capsule    Take once daily       traZODone 100 MG tablet    DESYREL     Take 3 tablets (300 mg) by mouth At Bedtime       vitamin  B-1 50 MG tablet     30 tablet    Take 2 tablets (100 mg) by mouth daily       zolpidem 5 MG tablet    AMBIEN    15 tablet    Take 1 tablet (5 mg) by mouth nightly as needed for sleep       * Notice:  This list has 6 medication(s) that are the same as other medications prescribed for you. Read the directions carefully, and ask your doctor or other care provider to review them with you.

## 2017-06-21 ENCOUNTER — CARE COORDINATION (OUTPATIENT)
Dept: FAMILY MEDICINE | Facility: CLINIC | Age: 57
End: 2017-06-21

## 2017-06-21 DIAGNOSIS — G89.29 CHRONIC BILATERAL LOW BACK PAIN WITH LEFT-SIDED SCIATICA: Primary | ICD-10-CM

## 2017-06-21 DIAGNOSIS — M54.42 CHRONIC BILATERAL LOW BACK PAIN WITH LEFT-SIDED SCIATICA: Primary | ICD-10-CM

## 2017-06-21 ASSESSMENT — PATIENT HEALTH QUESTIONNAIRE - PHQ9: SUM OF ALL RESPONSES TO PHQ QUESTIONS 1-9: 27

## 2017-06-21 ASSESSMENT — ANXIETY QUESTIONNAIRES: GAD7 TOTAL SCORE: 21

## 2017-06-21 NOTE — PROGRESS NOTES
Met with patient at her visit yesterday. She has very poor insight to her addiction and it's consequences. I asked if she is wanting to seek CD treatment. She is unsure and changes the subject many times. Advised that I will attain some treatment options and give to her for her to call and attain more information to make a decision. [facility]. outpatient [phone]- residential [phone], [facility] residential [phone], [facility] outpatient [phone], and should she choose a program that does not do their own Rule 25, that number is [phone].  Informed patient that she is R/S with Matty at [facility] outpatient  for 6/28/17 at 11 am. Given contact info and address. Patient will let me know what she decides.

## 2017-06-23 ENCOUNTER — TELEPHONE (OUTPATIENT)
Dept: FAMILY MEDICINE | Facility: CLINIC | Age: 57
End: 2017-06-23

## 2017-06-23 NOTE — TELEPHONE ENCOUNTER
Called home care nurse back- his name is Vince, not Aleksandar as initially indicated. He is requesting for the initial nursing order to eval and treat. Initial order will have to be reviewed or approved by PCP, ok to wait until Monday 6/26/17 for Dr Isabel. Rochelle LONGO

## 2017-06-23 NOTE — TELEPHONE ENCOUNTER
[facility] phone call message - order or referral request for patient:     Order or referral being requested: OT  Nursing for home visits to be put in for teaching and med-set-up.       Additional Comments: Need to get this done ASAP please     OK to leave a message on voice mail? Yes    Primary language: English      needed? No    Call taken on June 23, 2017 at 1:43 PM by Carin Vick

## 2017-06-26 RX ORDER — GABAPENTIN 100 MG/1
CAPSULE ORAL
Qty: 60 CAPSULE | Refills: 3 | Status: SHIPPED | OUTPATIENT
Start: 2017-06-26 | End: 2017-07-25

## 2017-06-26 NOTE — TELEPHONE ENCOUNTER
Called, left detailed message approving initial nursing order for eval and treat on identified voice mail for Vince OT with Home Health. Rochelle RN

## 2017-06-28 ENCOUNTER — OFFICE VISIT - HEALTHEAST (OUTPATIENT)
Dept: BEHAVIORAL HEALTH | Facility: CLINIC | Age: 57
End: 2017-06-28

## 2017-06-28 DIAGNOSIS — F10.20 ALCOHOL USE DISORDER, SEVERE, DEPENDENCE (H): ICD-10-CM

## 2017-06-28 DIAGNOSIS — F13.20 BENZODIAZEPINE DEPENDENCE (H): ICD-10-CM

## 2017-06-28 DIAGNOSIS — F39 UNSPECIFIED MOOD (AFFECTIVE) DISORDER (H): ICD-10-CM

## 2017-06-28 DIAGNOSIS — F41.1 ANXIETY STATE: ICD-10-CM

## 2017-06-30 ENCOUNTER — TELEPHONE (OUTPATIENT)
Dept: FAMILY MEDICINE | Facility: CLINIC | Age: 57
End: 2017-06-30

## 2017-06-30 NOTE — TELEPHONE ENCOUNTER
Attempted to reach patient to follow up and see if she has looked into any treatment options. Phone number after ringing goes into fax. Emergency contact phone number is disconnected.

## 2017-07-06 ENCOUNTER — TELEPHONE (OUTPATIENT)
Dept: FAMILY MEDICINE | Facility: CLINIC | Age: 57
End: 2017-07-06

## 2017-07-06 NOTE — TELEPHONE ENCOUNTER
Attempted to reach patient. NA, called [facility], (home care agency). Patient was seen for OT on 7/3/17 then missed appointment on 7/5/17.  Unable to reach patient.

## 2017-07-09 ENCOUNTER — COMMUNICATION - HEALTHEAST (OUTPATIENT)
Dept: BEHAVIORAL HEALTH | Facility: CLINIC | Age: 57
End: 2017-07-09

## 2017-07-09 DIAGNOSIS — F10.20 ALCOHOL USE DISORDER, SEVERE, DEPENDENCE (H): ICD-10-CM

## 2017-07-09 DIAGNOSIS — I10 ESSENTIAL HYPERTENSION WITH GOAL BLOOD PRESSURE LESS THAN 140/90: ICD-10-CM

## 2017-07-11 ENCOUNTER — COMMUNICATION - HEALTHEAST (OUTPATIENT)
Dept: BEHAVIORAL HEALTH | Facility: CLINIC | Age: 57
End: 2017-07-11

## 2017-07-11 DIAGNOSIS — F10.20 ALCOHOL USE DISORDER, SEVERE, DEPENDENCE (H): ICD-10-CM

## 2017-07-12 ENCOUNTER — TELEPHONE (OUTPATIENT)
Dept: FAMILY MEDICINE | Facility: CLINIC | Age: 57
End: 2017-07-12

## 2017-07-12 ENCOUNTER — OFFICE VISIT (OUTPATIENT)
Dept: FAMILY MEDICINE | Facility: CLINIC | Age: 57
End: 2017-07-12

## 2017-07-12 VITALS
RESPIRATION RATE: 22 BRPM | DIASTOLIC BLOOD PRESSURE: 88 MMHG | HEIGHT: 62 IN | TEMPERATURE: 97.5 F | WEIGHT: 140 LBS | SYSTOLIC BLOOD PRESSURE: 136 MMHG | HEART RATE: 92 BPM | BODY MASS INDEX: 25.76 KG/M2 | OXYGEN SATURATION: 98 %

## 2017-07-12 DIAGNOSIS — S00.86XA BUG BITE OF FACE WITHOUT INFECTION, INITIAL ENCOUNTER: Primary | ICD-10-CM

## 2017-07-12 DIAGNOSIS — K21.9 GASTROESOPHAGEAL REFLUX DISEASE, ESOPHAGITIS PRESENCE NOT SPECIFIED: ICD-10-CM
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
## 2017-07-12 DIAGNOSIS — M54.6 LEFT-SIDED THORACIC BACK PAIN, UNSPECIFIED CHRONICITY: ICD-10-CM

## 2017-07-12 PROBLEM — F09 MILD COGNITIVE DISORDER: Status: ACTIVE | Noted: 2017-06-06

## 2017-07-12 RX ORDER — ONDANSETRON 8 MG/1
8 TABLET, FILM COATED ORAL EVERY 12 HOURS PRN
Qty: 14 TABLET | Refills: 0 | Status: SHIPPED | OUTPATIENT
Start: 2017-07-12 | End: 2017-07-25

## 2017-07-12 RX ORDER — HYDROCORTISONE VALERATE CREAM 2 MG/G
CREAM TOPICAL
Qty: 45 G | Refills: 0 | Status: SHIPPED | OUTPATIENT
Start: 2017-07-12 | End: 2017-12-11

## 2017-07-12 RX ORDER — GABAPENTIN 100 MG/1
100 CAPSULE ORAL 3 TIMES DAILY PRN
Qty: 21 CAPSULE | Refills: 0 | Status: SHIPPED | OUTPATIENT
Start: 2017-07-12 | End: 2017-07-12

## 2017-07-12 RX ORDER — GABAPENTIN 300 MG/1
300 CAPSULE ORAL DAILY PRN
Qty: 3 CAPSULE | Refills: 0 | Status: SHIPPED | OUTPATIENT
Start: 2017-07-12 | End: 2017-07-25
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
## 2017-07-12 NOTE — TELEPHONE ENCOUNTER
Spoke with Aleksandar from [facility] pharmacy. Pt had chose to receive RX via mail and her Gabapentin 100mg will not arrive in the mail until possibly tomorrow afternoon. Pt is without it today. Brought in a Gabapentin RX  100mg today to pharmacy but will not be covered by insurance. Will need to have new rx to cover patient until she receive her Gabapentin in mail. They have 300mg.     Discussed with Dr. Salas, new rx for gabapentin 300mg sent to [facility] pharmacy.    Called and spoke with Aleksandar and informed new rx of 300mg sent and to disregard today rx of 100mg.

## 2017-07-12 NOTE — MR AVS SNAPSHOT
"              After Visit Summary   7/12/2017    Lisa Scott    MRN: [id number]           Patient Information     Date Of Birth          1960        Visit Information        Provider Department      7/12/2017 9:40 AM Teri Salas MD [facility]        Today's Diagnoses     Bug bite of face without infection, initial encounter    -  1    Gastroesophageal reflux disease, esophagitis presence not specified        Left-sided thoracic back pain, unspecified chronicity           Follow-ups after your visit        Follow-up notes from your care team     Return in about 1 month (around 8/12/2017).      Your next 10 appointments already scheduled     Jul 27, 2017  2:40 PM CDT   Return Visit with Teri Salas MD   [facility] ([facility])    [address] [place] [id number]   [phone]              Who to contact     Please call your clinic at [phone] to:    Ask questions about your health    Make or cancel appointments    Discuss your medicines    Learn about your test results    Speak to your doctor   If you have compliments or concerns about an experience at your clinic, or if you wish to file a complaint, please contact [facility] Patient Relations at [phone] or email us at [email or web address]         Additional Information About Your Visit        Care EveryWhere ID     This is your Care EveryWhere ID. This could be used by other organizations to access your [facility] medical records  [id number]        Your Vitals Were     Pulse Temperature Respirations Height Pulse Oximetry BMI (Body Mass Index)    92 97.5  F (36.4  C) 22 5' 2\" (157.5 cm) 98% 25.61 kg/m2       Blood Pressure from Last 3 Encounters:   07/12/17 136/88   06/20/17 (!) 188/126   03/23/17 136/88    Weight from Last 3 Encounters:   07/12/17 140 lb (63.5 kg)   06/20/17 147 lb (66.7 kg)   03/23/17 140 lb (63.5 kg)              Today, you had the " following     No orders found for display         Today's Medication Changes          These changes are accurate as of: 7/12/17 11:59 PM.  If you have any questions, ask your nurse or doctor.               Start taking these medicines.        Dose/Directions    hydrocortisone 0.2 % cream   Commonly known as:  WESTCORT   Used for:  Bug bite of face without infection, initial encounter   Started by:  Teri Salas MD        Apply sparingly to affected area three times daily as needed.   Quantity:  45 g   Refills:  0         These medicines have changed or have updated prescriptions.        Dose/Directions    * gabapentin 100 MG capsule   Commonly known as:  NEURONTIN   This may have changed:  Another medication with the same name was added. Make sure you understand how and when to take each.   Used for:  Chronic bilateral low back pain with left-sided sciatica   Changed by:  Shanell Isabel MD        Start with 100 mg at night. Can then increase to 100 mg twice a day as needed for pain.   Quantity:  60 capsule   Refills:  3       * gabapentin 300 MG capsule   Commonly known as:  NEURONTIN   This may have changed:  You were already taking a medication with the same name, and this prescription was added. Make sure you understand how and when to take each.   Used for:  Left-sided thoracic back pain, unspecified chronicity   Changed by:  Teri Salas MD        Dose:  300 mg   Take 1 capsule (300 mg) by mouth daily as needed   Quantity:  3 capsule   Refills:  0       * omeprazole 20 MG tablet   This may have changed:  Another medication with the same name was added. Make sure you understand how and when to take each.   Used for:  Gastroesophageal reflux disease without esophagitis   Changed by:  Bolivar Galindo MD        Dose:  20 mg   Take 1 tablet (20 mg) by mouth 2 times daily   Quantity:  60 tablet   Refills:  1       * omeprazole 20 MG CR capsule   Commonly known as:  priLOSEC   This may  have changed:  You were already taking a medication with the same name, and this prescription was added. Make sure you understand how and when to take each.   Used for:  Gastroesophageal reflux disease, esophagitis presence not specified   Changed by:  Teri Salas MD        Dose:  20 mg   Take 1 capsule (20 mg) by mouth 2 times daily   Quantity:  14 capsule   Refills:  0       * ondansetron 4 MG tablet   Commonly known as:  ZOFRAN   This may have changed:  Another medication with the same name was added. Make sure you understand how and when to take each.   Used for:  Nausea   Changed by:  Shanell Isabel MD        Dose:  8 mg   Take 2 tablets (8 mg) by mouth every 12 hours as needed for nausea   Quantity:  36 tablet   Refills:  3       * ondansetron 8 MG tablet   Commonly known as:  ZOFRAN   This may have changed:  You were already taking a medication with the same name, and this prescription was added. Make sure you understand how and when to take each.   Used for:  Gastroesophageal reflux disease, esophagitis presence not specified   Changed by:  Teri Salas MD        Dose:  8 mg   Take 1 tablet (8 mg) by mouth every 12 hours as needed for nausea   Quantity:  14 tablet   Refills:  0       * Notice:  This list has 6 medication(s) that are the same as other medications prescribed for you. Read the directions carefully, and ask your doctor or other care provider to review them with you.         Where to get your medicines      These medications were sent to [id number] - [place], [place] - [address], [place] [address]     Phone:  [phone]     gabapentin 300 MG capsule    hydrocortisone 0.2 % cream    omeprazole 20 MG CR capsule    ondansetron 8 MG tablet                Primary Care Provider Office Phone # Fax #    Teri Salas [phone]       [facility] [address] [place] [address]        Equal Access to  Services     [facility]: Hadii cecilia schwab mishelrandy Anaanca, waludivinada luqadaha, qaybta kaalmacindy blanco, joaquin watkins . So [facility] [place] [phone].    ATENCIÓN: Si lilibeth beard, tiene a holley disposición servicios gratuitos de asistencia lingüística. Llame al [phone].    We comply with applicable federal civil rights laws and [place] laws. We do not discriminate on the basis of race, color, national origin, age, disability sex, sexual orientation or gender identity.            Thank you!     Thank you for choosing [facility]  for your care. Our goal is always to provide you with excellent care. Hearing back from our patients is one way we can continue to improve our services. Please take a few minutes to complete the written survey that you may receive in the mail after your visit with us. Thank you!             Your Updated Medication List - Protect others around you: Learn how to safely use, store and throw away your medicines at www.disposemymeds.org.          This list is accurate as of: 7/12/17 11:59 PM.  Always use your most recent med list.                   Brand Name Dispense Instructions for use Diagnosis    * acetaminophen 325 MG tablet    TYLENOL    100 tablet    Take 2 tablets (650 mg) by mouth 3 times daily    Acute bilateral low back pain without sciatica       * acetaminophen 500 MG tablet    TYLENOL    100 tablet    Take 2 tablets (1,000 mg) by mouth 3 times daily Take two tablets by mouth three times daily    Cervicalgia, Chronic pain disorder       * albuterol (2.5 MG/3ML) 0.083% neb solution     50 vial    Take 1 vial (2.5 mg) by nebulization every 6 hours as needed for shortness of breath / dyspnea or wheezing    Chronic obstructive pulmonary disease, unspecified COPD type (H)       * albuterol 108 (90 BASE) MCG/ACT Inhaler    albuterol    18 g    Inhale 2 puffs every 4-6 hours as needed.    Chronic obstructive pulmonary disease, unspecified COPD type (H)        amLODIPine 10 MG tablet    NORVASC    30 tablet    Take 1 tablet (10 mg) by mouth daily    Essential hypertension, benign       buPROPion 150 MG 24 hr tablet    WELLBUTRIN XL     Take 1 tablet (150 mg) by mouth every morning    Major depressive disorder, recurrent episode, moderate (H)       FLONASE 50 MCG/ACT spray   Generic drug:  fluticasone      Spray 1 spray into both nostrils daily as needed for allergies    Chronic rhinitis       fluticasone-salmeterol 500-50 MCG/DOSE diskus inhaler    ADVAIR DISKUS    60 Inhaler    Inhale 1 puff into the lungs every 12 hours    Chronic obstructive pulmonary disease, unspecified COPD type (H)       * gabapentin 100 MG capsule    NEURONTIN    60 capsule    Start with 100 mg at night. Can then increase to 100 mg twice a day as needed for pain.    Chronic bilateral low back pain with left-sided sciatica       * gabapentin 300 MG capsule    NEURONTIN    3 capsule    Take 1 capsule (300 mg) by mouth daily as needed    Left-sided thoracic back pain, unspecified chronicity       hydrocortisone 0.2 % cream    WESTCORT    45 g    Apply sparingly to affected area three times daily as needed.    Bug bite of face without infection, initial encounter       hydrOXYzine 25 MG capsule    VISTARIL    90 capsule    Take 1 capsule (25 mg) by mouth 3 times daily as needed for itching or anxiety    Anxiety       ibuprofen 400 MG tablet    ADVIL/MOTRIN    120 tablet    Take 1 tablet (400 mg) by mouth every 6 hours as needed for moderate pain        ipratropium - albuterol 0.5 mg/2.5 mg/3 mL 0.5-2.5 (3) MG/3ML neb solution    DUONEB    30 vial    Take 1 vial (3 mLs) by nebulization every 6 hours as needed for shortness of breath / dyspnea or wheezing    Chronic obstructive pulmonary disease, unspecified COPD type (H)       lisinopril 30 MG tablet    PRINIVIL,ZESTRIL    30 tablet    Take 1 tablet (30 mg) by mouth daily    Essential hypertension, benign       loratadine 10 MG tablet    CLARITIN      Take 1 tablet (10 mg) by mouth daily as needed for allergies    Seasonal allergic rhinitis, unspecified allergic rhinitis trigger       LORazepam 1 MG tablet    ATIVAN     Take 1 tablet (1 mg) by mouth 2 times daily as needed for anxiety    Generalized anxiety disorder       metoprolol 100 MG 24 hr tablet    TOPROL-XL    60 tablet    Take 1.5 tablets (150 mg) by mouth daily        mirtazapine 15 MG tablet    REMERON    30 tablet    Take 2 tablets (30 mg) by mouth At Bedtime        * omeprazole 20 MG tablet     60 tablet    Take 1 tablet (20 mg) by mouth 2 times daily    Gastroesophageal reflux disease without esophagitis       * omeprazole 20 MG CR capsule    priLOSEC    14 capsule    Take 1 capsule (20 mg) by mouth 2 times daily    Gastroesophageal reflux disease, esophagitis presence not specified       * ondansetron 4 MG tablet    ZOFRAN    36 tablet    Take 2 tablets (8 mg) by mouth every 12 hours as needed for nausea    Nausea       * ondansetron 8 MG tablet    ZOFRAN    14 tablet    Take 1 tablet (8 mg) by mouth every 12 hours as needed for nausea    Gastroesophageal reflux disease, esophagitis presence not specified       * order for DME     1 Device    Equipment being ordered: Nebulizer tubing and supplies        * order for DME     1 Device    Equipment being ordered: Nebulizer and tubing    Chronic obstructive pulmonary disease, unspecified COPD type (H)       polyethylene glycol powder    MIRALAX/GLYCOLAX    119 g    Take 17 g (1 capful) by mouth daily as needed for constipation    Other constipation       prazosin 1 MG capsule    MINIPRESS    30 capsule    Take 1 capsule (1 mg) by mouth At Bedtime    Nightmares       tiotropium 18 MCG capsule    SPIRIVA HANDIHALER    30 capsule    Take once daily    Chronic obstructive pulmonary disease, unspecified COPD type (H)       traZODone 100 MG tablet    DESYREL     Take 3 tablets (300 mg) by mouth At Bedtime    Sleep disorder       vitamin  B-1 50 MG tablet      30 tablet    Take 2 tablets (100 mg) by mouth daily    Alcohol dependence in remission (H)       zolpidem 5 MG tablet    AMBIEN    15 tablet    Take 1 tablet (5 mg) by mouth nightly as needed for sleep    Primary insomnia       * Notice:  This list has 12 medication(s) that are the same as other medications prescribed for you. Read the directions carefully, and ask your doctor or other care provider to review them with you.

## 2017-07-12 NOTE — TELEPHONE ENCOUNTER
[facility] phone call message- medication clarification/question:    Full Medication Name: gabapentin (NEURONTIN) 100 MG capsule    Question: Pharmacist called to change the following medication. I routed to Triage.     Pharmacy confirmed as [id number] - [place], [place] - [address]: Yes    OK to leave a message on voice mail? Yes    Primary language: English      needed? No    Call taken on July 12, 2017 at 12:06 PM by Jaz Erickson

## 2017-07-13 ASSESSMENT — PATIENT HEALTH QUESTIONNAIRE - PHQ9: SUM OF ALL RESPONSES TO PHQ QUESTIONS 1-9: 20

## 2017-07-19 NOTE — PROGRESS NOTES
Preceptor Attestation:  Patient's case reviewed and discussed with Teri Salas MD resident and I evaluated the patient. I agree with written assessment and plan of care.  Supervising Physician:  Zana Power MD MD  [facility]

## 2017-07-20 DIAGNOSIS — J44.9 CHRONIC OBSTRUCTIVE PULMONARY DISEASE, UNSPECIFIED COPD TYPE (H): ICD-10-CM

## 2017-07-20 RX ORDER — ALBUTEROL SULFATE 0.83 MG/ML
1 SOLUTION RESPIRATORY (INHALATION) EVERY 6 HOURS PRN
Qty: 150 VIAL | Refills: 3 | Status: SHIPPED | OUTPATIENT
Start: 2017-07-20 | End: 2018-06-22

## 2017-07-25 ENCOUNTER — CARE COORDINATION (OUTPATIENT)
Dept: FAMILY MEDICINE | Facility: CLINIC | Age: 57
End: 2017-07-25

## 2017-07-25 ENCOUNTER — OFFICE VISIT (OUTPATIENT)
Dept: FAMILY MEDICINE | Facility: CLINIC | Age: 57
End: 2017-07-25

## 2017-07-25 VITALS
DIASTOLIC BLOOD PRESSURE: 107 MMHG | HEIGHT: 62 IN | OXYGEN SATURATION: 97 % | BODY MASS INDEX: 26.28 KG/M2 | TEMPERATURE: 98.5 F | HEART RATE: 83 BPM | SYSTOLIC BLOOD PRESSURE: 162 MMHG | WEIGHT: 142.8 LBS | RESPIRATION RATE: 18 BRPM

## 2017-07-25 DIAGNOSIS — M19.90 ARTHRITIS: ICD-10-CM

## 2017-07-25 DIAGNOSIS — I10 ESSENTIAL HYPERTENSION, BENIGN: ICD-10-CM

## 2017-07-25 DIAGNOSIS — F41.0 PANIC DISORDER WITHOUT AGORAPHOBIA: ICD-10-CM

## 2017-07-25 DIAGNOSIS — J30.81 ACUTE ALLERGIC RHINITIS DUE TO ANIMAL HAIR AND DANDER: Primary | ICD-10-CM

## 2017-07-25 DIAGNOSIS — Z12.9 SCREENING FOR CANCER: ICD-10-CM

## 2017-07-25 RX ORDER — GABAPENTIN 100 MG/1
200 CAPSULE ORAL 3 TIMES DAILY PRN
Qty: 90 CAPSULE | Refills: 1 | Status: SHIPPED | OUTPATIENT
Start: 2017-07-25 | End: 2017-09-12

## 2017-07-25 RX ORDER — NAPROXEN 500 MG/1
500 TABLET ORAL 2 TIMES DAILY PRN
Qty: 30 TABLET | Refills: 11 | Status: SHIPPED | OUTPATIENT
Start: 2017-07-25 | End: 2017-07-25

## 2017-07-25 RX ORDER — FLUTICASONE PROPIONATE 50 MCG
1-2 SPRAY, SUSPENSION (ML) NASAL DAILY
Qty: 1 BOTTLE | Refills: 3 | Status: SHIPPED | OUTPATIENT
Start: 2017-07-25 | End: 2018-06-22

## 2017-07-25 RX ORDER — CETIRIZINE HYDROCHLORIDE 10 MG/1
10 TABLET ORAL EVERY EVENING
Qty: 90 TABLET | Refills: 3 | Status: SHIPPED | OUTPATIENT
Start: 2017-07-25 | End: 2017-12-11

## 2017-07-25 RX ORDER — NAPROXEN 500 MG/1
500 TABLET ORAL 2 TIMES DAILY PRN
Qty: 30 TABLET | Refills: 11 | Status: SHIPPED | OUTPATIENT
Start: 2017-07-25 | End: 2017-12-11

## 2017-07-25 NOTE — PATIENT INSTRUCTIONS
Referral for Mammogram faxed to  Central Scheduling and pt will be contacted to schedule appointment.  ABHILASH

## 2017-07-25 NOTE — PROGRESS NOTES
Call from patient. She states that she spoke with someone from Social [employer] who told her that she was approved for disability since May of this year. She was given the name of the person working with her case and has left several messages for RC from him to find out what the next step is for getting funds. I inform her that without a PAULINO they will not talk to me and truly they won't give me any more information than they will give her. She also states that her public housing has gone through and she is now waiting for the few apartments that she chose to have an opening. I asked her if she had reached out for CD treatment. She had forgotten our conversation and requests the contact information for these options be mailed out to her. This is done.
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
## 2017-07-25 NOTE — MR AVS SNAPSHOT
"              After Visit Summary   7/25/2017    Lisa Scott    MRN: [id number]           Patient Information     Date Of Birth          1960        Visit Information        Provider Department      7/25/2017 9:00 AM Teri Salas MD [facility]        Today's Diagnoses     Acute allergic rhinitis due to animal hair and dander    -  1    Essential hypertension, benign        Panic disorder without agoraphobia        Screening for cancer        Arthritis          Care Instructions    Referral for Mammogram faxed to  Central Scheduling and pt will be contacted to schedule appointment.  LK          Follow-ups after your visit        Follow-up notes from your care team     Return in about 1 month (around 8/25/2017) for Follow-up.      Your next 10 appointments already scheduled     Aug 14, 2017  9:40 AM CDT   Return Visit with Teri Salas MD   [facility] ([facility])    [address] [place] [id number]   [phone]              Who to contact     Please call your clinic at [phone] to:    Ask questions about your health    Make or cancel appointments    Discuss your medicines    Learn about your test results    Speak to your doctor   If you have compliments or concerns about an experience at your clinic, or if you wish to file a complaint, please contact [facility] Patient Relations at [phone] or email us at [email or web address]         Additional Information About Your Visit        Care EveryWhere ID     This is your Care EveryWhere ID. This could be used by other organizations to access your [facility] medical records  [id number]        Your Vitals Were     Pulse Temperature Respirations Height Pulse Oximetry BMI (Body Mass Index)    83 98.5  F (36.9  C) 18 5' 2\" (157.5 cm) 97% 26.12 kg/m2       Blood Pressure from Last 3 Encounters:   07/25/17 (!) 162/107   07/12/17 136/88   06/20/17 (!) 188/126    " Weight from Last 3 Encounters:   07/25/17 142 lb 12.8 oz (64.8 kg)   07/12/17 140 lb (63.5 kg)   06/20/17 147 lb (66.7 kg)              We Performed the Following     MA SCREENING DIGITAL BILAT          Today's Medication Changes          These changes are accurate as of: 7/25/17 11:59 PM.  If you have any questions, ask your nurse or doctor.               Start taking these medicines.        Dose/Directions    cetirizine 10 MG tablet   Commonly known as:  zyrTEC   Used for:  Acute allergic rhinitis due to animal hair and dander   Started by:  Teri Salas MD        Dose:  10 mg   Take 1 tablet (10 mg) by mouth every evening   Quantity:  90 tablet   Refills:  3       naproxen 500 MG tablet   Commonly known as:  NAPROSYN   Used for:  Acute allergic rhinitis due to animal hair and dander   Started by:  Teri Salas MD        Dose:  500 mg   Take 1 tablet (500 mg) by mouth 2 times daily as needed for moderate pain   Quantity:  30 tablet   Refills:  11         These medicines have changed or have updated prescriptions.        Dose/Directions    acetaminophen 325 MG tablet   Commonly known as:  TYLENOL   This may have changed:  Another medication with the same name was removed. Continue taking this medication, and follow the directions you see here.   Used for:  Acute bilateral low back pain without sciatica   Changed by:  Shanell Isabel MD        Dose:  650 mg   Take 2 tablets (650 mg) by mouth 3 times daily   Quantity:  100 tablet   Refills:  1       * FLONASE 50 MCG/ACT spray   This may have changed:  Another medication with the same name was added. Make sure you understand how and when to take each.   Used for:  Chronic rhinitis   Generic drug:  fluticasone   Changed by:  Erica Molina, [facility]        Dose:  1 spray   Spray 1 spray into both nostrils daily as needed for allergies   Refills:  0       * fluticasone 50 MCG/ACT spray   Commonly known as:  FLONASE   This may have changed:   You were already taking a medication with the same name, and this prescription was added. Make sure you understand how and when to take each.   Used for:  Acute allergic rhinitis due to animal hair and dander   Changed by:  Teri Salas MD        Dose:  1-2 spray   Spray 1-2 sprays into both nostrils daily   Quantity:  1 Bottle   Refills:  3       gabapentin 100 MG capsule   Commonly known as:  NEURONTIN   This may have changed:    - how much to take  - how to take this  - when to take this  - reasons to take this  - additional instructions  - Another medication with the same name was removed. Continue taking this medication, and follow the directions you see here.   Used for:  Arthritis   Changed by:  Teri Salas MD        Dose:  200 mg   Take 2 capsules (200 mg) by mouth 3 times daily as needed   Quantity:  90 capsule   Refills:  1       omeprazole 20 MG CR capsule   Commonly known as:  priLOSEC   This may have changed:  Another medication with the same name was removed. Continue taking this medication, and follow the directions you see here.   Used for:  Gastroesophageal reflux disease, esophagitis presence not specified   Changed by:  Teri Salas MD        Dose:  20 mg   Take 1 capsule (20 mg) by mouth 2 times daily   Quantity:  14 capsule   Refills:  0       ondansetron 4 MG tablet   Commonly known as:  ZOFRAN   This may have changed:  Another medication with the same name was removed. Continue taking this medication, and follow the directions you see here.   Used for:  Nausea   Changed by:  Shanell Isabel MD        Dose:  8 mg   Take 2 tablets (8 mg) by mouth every 12 hours as needed for nausea   Quantity:  36 tablet   Refills:  3       * Notice:  This list has 2 medication(s) that are the same as other medications prescribed for you. Read the directions carefully, and ask your doctor or other care provider to review them with you.         Where to get your medicines       These medications were sent to [id number] - [place], [place] - [address] [facility]  [address], [place] [address]     Phone:  [phone]     cetirizine 10 MG tablet    fluticasone 50 MCG/ACT spray    gabapentin 100 MG capsule         Some of these will need a paper prescription and others can be bought over the counter.  Ask your nurse if you have questions.     Bring a paper prescription for each of these medications     naproxen 500 MG tablet                Primary Care Provider Office Phone # Fax #    Teri Salas [phone]       [facility] [address] [place] [address]        Equal Access to Services     ELIF BROOKS : Hadii cecilia Storey, waaxda savannah, qaybta kaalmada bella, joaquin quintanilla. So [facility] [phone].    ATENCIÓN: Si habla español, tiene a holley disposición servicios gratuitos de asistencia lingüística. Llame al [phone].    We comply with applicable federal civil rights laws and [place] laws. We do not discriminate on the basis of race, color, national origin, age, disability sex, sexual orientation or gender identity.            Thank you!     Thank you for choosing [facility]  for your care. Our goal is always to provide you with excellent care. Hearing back from our patients is one way we can continue to improve our services. Please take a few minutes to complete the written survey that you may receive in the mail after your visit with us. Thank you!             Your Updated Medication List - Protect others around you: Learn how to safely use, store and throw away your medicines at www.disposemymeds.org.          This list is accurate as of: 7/25/17 11:59 PM.  Always use your most recent med list.                   Brand Name Dispense Instructions for use Diagnosis    acetaminophen 325 MG tablet    TYLENOL    100 tablet    Take 2 tablets (650 mg) by mouth 3 times daily     Acute bilateral low back pain without sciatica       * albuterol 108 (90 BASE) MCG/ACT Inhaler    albuterol    18 g    Inhale 2 puffs every 4-6 hours as needed.    Chronic obstructive pulmonary disease, unspecified COPD type (H)       * albuterol (2.5 MG/3ML) 0.083% neb solution     150 vial    Take 1 vial (2.5 mg) by nebulization every 6 hours as needed for shortness of breath / dyspnea or wheezing    Chronic obstructive pulmonary disease, unspecified COPD type (H)       amLODIPine 10 MG tablet    NORVASC    30 tablet    Take 1 tablet (10 mg) by mouth daily    Essential hypertension, benign       buPROPion 150 MG 24 hr tablet    WELLBUTRIN XL     Take 1 tablet (150 mg) by mouth every morning    Major depressive disorder, recurrent episode, moderate (H)       cetirizine 10 MG tablet    zyrTEC    90 tablet    Take 1 tablet (10 mg) by mouth every evening    Acute allergic rhinitis due to animal hair and dander       * FLONASE 50 MCG/ACT spray   Generic drug:  fluticasone      Spray 1 spray into both nostrils daily as needed for allergies    Chronic rhinitis       * fluticasone 50 MCG/ACT spray    FLONASE    1 Bottle    Spray 1-2 sprays into both nostrils daily    Acute allergic rhinitis due to animal hair and dander       fluticasone-salmeterol 500-50 MCG/DOSE diskus inhaler    ADVAIR DISKUS    60 Inhaler    Inhale 1 puff into the lungs every 12 hours    Chronic obstructive pulmonary disease, unspecified COPD type (H)       gabapentin 100 MG capsule    NEURONTIN    90 capsule    Take 2 capsules (200 mg) by mouth 3 times daily as needed    Arthritis       hydrocortisone 0.2 % cream    WESTCORT    45 g    Apply sparingly to affected area three times daily as needed.    Bug bite of face without infection, initial encounter       hydrOXYzine 25 MG capsule    VISTARIL    90 capsule    Take 1 capsule (25 mg) by mouth 3 times daily as needed for itching or anxiety    Anxiety       ipratropium - albuterol 0.5 mg/2.5 mg/3 mL  0.5-2.5 (3) MG/3ML neb solution    DUONEB    30 vial    Take 1 vial (3 mLs) by nebulization every 6 hours as needed for shortness of breath / dyspnea or wheezing    Chronic obstructive pulmonary disease, unspecified COPD type (H)       lisinopril 30 MG tablet    PRINIVIL,ZESTRIL    30 tablet    Take 1 tablet (30 mg) by mouth daily    Essential hypertension, benign       loratadine 10 MG tablet    CLARITIN     Take 1 tablet (10 mg) by mouth daily as needed for allergies    Seasonal allergic rhinitis, unspecified allergic rhinitis trigger       LORazepam 1 MG tablet    ATIVAN     Take 1 tablet (1 mg) by mouth 2 times daily as needed for anxiety    Generalized anxiety disorder       metoprolol 100 MG 24 hr tablet    TOPROL-XL    60 tablet    Take 1.5 tablets (150 mg) by mouth daily        mirtazapine 15 MG tablet    REMERON    30 tablet    Take 2 tablets (30 mg) by mouth At Bedtime        naproxen 500 MG tablet    NAPROSYN    30 tablet    Take 1 tablet (500 mg) by mouth 2 times daily as needed for moderate pain    Acute allergic rhinitis due to animal hair and dander       omeprazole 20 MG CR capsule    priLOSEC    14 capsule    Take 1 capsule (20 mg) by mouth 2 times daily    Gastroesophageal reflux disease, esophagitis presence not specified       ondansetron 4 MG tablet    ZOFRAN    36 tablet    Take 2 tablets (8 mg) by mouth every 12 hours as needed for nausea    Nausea       * order for DME     1 Device    Equipment being ordered: Nebulizer tubing and supplies        * order for DME     1 Device    Equipment being ordered: Nebulizer and tubing    Chronic obstructive pulmonary disease, unspecified COPD type (H)       polyethylene glycol powder    MIRALAX/GLYCOLAX    119 g    Take 17 g (1 capful) by mouth daily as needed for constipation    Other constipation       prazosin 1 MG capsule    MINIPRESS    30 capsule    Take 1 capsule (1 mg) by mouth At Bedtime    Nightmares       tiotropium 18 MCG capsule    SPIRIVA  HANDIHALER    30 capsule    Take once daily    Chronic obstructive pulmonary disease, unspecified COPD type (H)       traZODone 100 MG tablet    DESYREL     Take 3 tablets (300 mg) by mouth At Bedtime    Sleep disorder       vitamin  B-1 50 MG tablet     30 tablet    Take 2 tablets (100 mg) by mouth daily    Alcohol dependence in remission (H)       zolpidem 5 MG tablet    AMBIEN    15 tablet    Take 1 tablet (5 mg) by mouth nightly as needed for sleep    Primary insomnia       * Notice:  This list has 6 medication(s) that are the same as other medications prescribed for you. Read the directions carefully, and ask your doctor or other care provider to review them with you.

## 2017-07-26 ASSESSMENT — PATIENT HEALTH QUESTIONNAIRE - PHQ9: SUM OF ALL RESPONSES TO PHQ QUESTIONS 1-9: 19

## 2017-07-27 DIAGNOSIS — M19.90 ARTHRITIS: Primary | ICD-10-CM

## 2017-07-27 RX ORDER — IBUPROFEN 400 MG/1
400 TABLET, FILM COATED ORAL EVERY 6 HOURS PRN
Qty: 120 TABLET | Refills: 3 | Status: SHIPPED | OUTPATIENT
Start: 2017-07-27 | End: 2018-03-23 | Stop reason: ALTCHOICE

## 2017-08-01 ENCOUNTER — CARE COORDINATION (OUTPATIENT)
Dept: FAMILY MEDICINE | Facility: CLINIC | Age: 57
End: 2017-08-01

## 2017-08-01 ENCOUNTER — COMMUNICATION - HEALTHEAST (OUTPATIENT)
Dept: BEHAVIORAL HEALTH | Facility: CLINIC | Age: 57
End: 2017-08-01

## 2017-08-01 NOTE — PROGRESS NOTES
Bus exemption form faxed to [employer] at [phone]. Patient informed. Patient informs me that she received a letter stating that her disability has been denied. She has been leaving several messages for the person that called her and told her that she had been approved. She will await a call back and will look into appealing this decision.

## 2017-08-03 NOTE — PROGRESS NOTES
Preceptor Attestation:  Patient's case reviewed and discussed with  Patient seen and discussed with the resident..  I agree with written assessment and plan of care.  Supervising Physician:  Eleonora Parekh MD  [facility]

## 2017-08-04 ENCOUNTER — MEDICAL CORRESPONDENCE (OUTPATIENT)
Dept: HEALTH INFORMATION MANAGEMENT | Facility: CLINIC | Age: 57
End: 2017-08-04

## 2017-08-09 DIAGNOSIS — M54.50 ACUTE BILATERAL LOW BACK PAIN WITHOUT SCIATICA: ICD-10-CM

## 2017-08-10 RX ORDER — ACETAMINOPHEN 325 MG/1
650 TABLET ORAL 3 TIMES DAILY
Qty: 100 TABLET | Refills: 11 | Status: SHIPPED | OUTPATIENT
Start: 2017-08-10 | End: 2018-06-22

## 2017-08-14 ENCOUNTER — MEDICAL CORRESPONDENCE (OUTPATIENT)
Dept: HEALTH INFORMATION MANAGEMENT | Facility: CLINIC | Age: 57
End: 2017-08-14

## 2017-08-16 ENCOUNTER — MEDICAL CORRESPONDENCE (OUTPATIENT)
Dept: HEALTH INFORMATION MANAGEMENT | Facility: CLINIC | Age: 57
End: 2017-08-16

## 2017-08-21 DIAGNOSIS — R11.0 NAUSEA: ICD-10-CM

## 2017-08-21 RX ORDER — ONDANSETRON 4 MG/1
8 TABLET, FILM COATED ORAL EVERY 12 HOURS PRN
Qty: 36 TABLET | Refills: 3 | Status: SHIPPED | OUTPATIENT
Start: 2017-08-21 | End: 2017-12-18

## 2017-09-12 DIAGNOSIS — M19.90 ARTHRITIS: ICD-10-CM

## 2017-09-12 RX ORDER — GABAPENTIN 100 MG/1
200 CAPSULE ORAL 3 TIMES DAILY PRN
Qty: 90 CAPSULE | Refills: 0 | Status: SHIPPED | OUTPATIENT
Start: 2017-09-12 | End: 2017-12-07

## 2017-09-19 ENCOUNTER — MEDICAL CORRESPONDENCE (OUTPATIENT)
Dept: HEALTH INFORMATION MANAGEMENT | Facility: CLINIC | Age: 57
End: 2017-09-19

## 2017-09-25 DIAGNOSIS — K21.9 GASTROESOPHAGEAL REFLUX DISEASE, ESOPHAGITIS PRESENCE NOT SPECIFIED: ICD-10-CM

## 2017-11-27 ENCOUNTER — TELEPHONE (OUTPATIENT)
Dept: FAMILY MEDICINE | Facility: CLINIC | Age: 57
End: 2017-11-27

## 2017-11-27 NOTE — TELEPHONE ENCOUNTER
Friend with concern about Lisa calling primary clinic with a concern. Since this year Lisa has been experiencing seizures, probable secondary to alcohol intake per friend. Thinks Lisa may have experienced a recent seizure. Last night Robyn arrived at pt's home 930pm and has noticed since then changed behavior in [place]. C/o stuttering, pt leaning more toward left side of her body, noticed weakness in left arm and leg. No facial asymmetry. Disoriented to person, thinks Robyn (has been friends x 7 years) is her grandma or someone else. C/o visual hallucinations- seeing children in the house and there are no children. Eating cigarette buds. Cutting into candle, thinking it is a chocolate cake. Strongly advised Robyn take Lisa into ED for further evaluation and assessment. No transportation, given [facility] medical ride contact number, Robyn to call and try to coordinate ride. Otherwise if condition has worsened, to call paramedics. Rochelle LONGO

## 2017-11-27 NOTE — TELEPHONE ENCOUNTER
[facility] phone call message-patient reporting a symptom:     Symptom: Hallucinations, weak, when walking leads more towards the left, and stuttering        Same Day Visit Offered: Yes, but no transportation     Additional comments: Calling in stating that pt thinks she had a seizure last Wednesday and has been stuttering since. Pt has also been hallucinating, talking to people that aren't there, cutting a candle thinking it was a cake, and looks weak, when she walks she leads more towards the left. Offered appointment but needs help with transportation. States is worried about her. Please call and advise.     OK to leave message on voice mail? Yes    Primary language: English      needed? No    Call taken on November 27, 2017 at 9:25 AM by Rach Vasquez

## 2017-12-07 DIAGNOSIS — M19.90 ARTHRITIS: ICD-10-CM

## 2017-12-11 ENCOUNTER — OFFICE VISIT (OUTPATIENT)
Dept: FAMILY MEDICINE | Facility: CLINIC | Age: 57
End: 2017-12-11

## 2017-12-11 VITALS
WEIGHT: 144 LBS | DIASTOLIC BLOOD PRESSURE: 91 MMHG | OXYGEN SATURATION: 97 % | RESPIRATION RATE: 20 BRPM | HEIGHT: 61 IN | HEART RATE: 77 BPM | BODY MASS INDEX: 27.19 KG/M2 | SYSTOLIC BLOOD PRESSURE: 147 MMHG | TEMPERATURE: 97.7 F

## 2017-12-11 DIAGNOSIS — Z12.31 ENCOUNTER FOR SCREENING MAMMOGRAM FOR BREAST CANCER: ICD-10-CM

## 2017-12-11 DIAGNOSIS — J44.1 COPD EXACERBATION (H): ICD-10-CM

## 2017-12-11 DIAGNOSIS — R09.89 CHEST CONGESTION: ICD-10-CM

## 2017-12-11 DIAGNOSIS — G93.40 ACUTE ENCEPHALOPATHY: Primary | ICD-10-CM

## 2017-12-11 DIAGNOSIS — Z12.11 SCREEN FOR COLON CANCER: ICD-10-CM

## 2017-12-11 DIAGNOSIS — F33.1 MAJOR DEPRESSIVE DISORDER, RECURRENT EPISODE, MODERATE (H): ICD-10-CM

## 2017-12-11 DIAGNOSIS — R05.9 COUGH: ICD-10-CM

## 2017-12-11 DIAGNOSIS — I10 ESSENTIAL HYPERTENSION, BENIGN: ICD-10-CM

## 2017-12-11 RX ORDER — PREDNISONE 20 MG/1
20 TABLET ORAL DAILY
Qty: 5 TABLET | Refills: 0 | Status: SHIPPED | OUTPATIENT
Start: 2017-12-11 | End: 2018-05-25

## 2017-12-11 RX ORDER — GUAIFENESIN/DEXTROMETHORPHAN 100-10MG/5
5 SYRUP ORAL EVERY 4 HOURS PRN
Qty: 560 ML | Refills: 0 | Status: SHIPPED | OUTPATIENT
Start: 2017-12-11 | End: 2018-06-22

## 2017-12-11 NOTE — PATIENT INSTRUCTIONS
Mammogram scheduled 1/2/2018 11:30AM [facility]   No lotion, deodorant, powder, or perfume.    - FOLLOW UP WITH DR MOISE FOR MENTAL HEALTH  - TAKE STEROID FOR COPD SYMPTOMS FOR THE NEXT 5 DAYS  - CONTINUE WITH HOME INHALER  - FOLLOW UP IN 2-3 WEEKS FOR COPD  - DRINK LOTS OF FLUID  - MAY TAKE ROBITUSSIN-DM FOR CONGESTION/COUGH  - FOLLOW UP AS NEEDED
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
## 2017-12-11 NOTE — PROGRESS NOTES
Preceptor Attestation:  Patient's case reviewed and discussed with Isiah Bain MD Patient seen and discussed with the resident.. I agree with assessment and plan of care.  Supervising Physician:  Crescencio De La Cruz MD  [facility]

## 2017-12-11 NOTE — MR AVS SNAPSHOT
After Visit Summary   12/11/2017    Lisa Scott    MRN: [id number]           Patient Information     Date Of Birth          1960        Visit Information        Provider Department      12/11/2017 10:00 AM Isiah Bain MD [facility]        Today's Diagnoses     Encounter for screening mammogram for breast cancer    -  1    Screen for colon cancer        Cough        Chest congestion        COPD exacerbation (H)          Care Instructions    Mammogram scheduled 1/2/2018 11:30AM [facility]   No lotion, deodorant, powder, or perfume.    - FOLLOW UP WITH DR MOISE FOR MENTAL HEALTH  - TAKE STEROID FOR COPD SYMPTOMS FOR THE NEXT 5 DAYS  - CONTINUE WITH HOME INHALER  - FOLLOW UP IN 2-3 WEEKS FOR COPD  - DRINK LOTS OF FLUID  - MAY TAKE ROBITUSSIN-DM FOR CONGESTION/COUGH  - FOLLOW UP AS NEEDED          Follow-ups after your visit        Follow-up notes from your care team     Return in about 2 weeks (around 12/25/2017), or COPD exacerbation.      Future tests that were ordered for you today     Open Future Orders        Priority Expected Expires Ordered    MA SCREENING DIGITAL BILAT Routine  12/11/2018 12/11/2017    Fecal Occult Blood - FIT, iFOB ([facility]) Routine  12/18/2017 12/11/2017            Who to contact     Please call your clinic at [phone] to:    Ask questions about your health    Make or cancel appointments    Discuss your medicines    Learn about your test results    Speak to your doctor   If you have compliments or concerns about an experience at your clinic, or if you wish to file a complaint, please contact [facility] Patient Relations at [phone] or email us at [email or web address]         Additional Information About Your Visit        Care EveryWhere ID     This is your Care EveryWhere ID. This could be used by other organizations to access your [facility] medical records  [id number]        Your Vitals Were      "Pulse Temperature Respirations Height Pulse Oximetry BMI (Body Mass Index)    77 97.7  F (36.5  C) (Oral) 20 5' 1\" (154.9 cm) 97% 27.21 kg/m2       Blood Pressure from Last 3 Encounters:   12/11/17 (!) 147/91   07/25/17 (!) 162/107   07/12/17 136/88    Weight from Last 3 Encounters:   12/11/17 144 lb (65.3 kg)   07/25/17 142 lb 12.8 oz (64.8 kg)   07/12/17 140 lb (63.5 kg)                 Today's Medication Changes          These changes are accurate as of: 12/11/17 10:52 AM.  If you have any questions, ask your nurse or doctor.               Start taking these medicines.        Dose/Directions    guaiFENesin-dextromethorphan 100-10 MG/5ML syrup   Commonly known as:  ROBITUSSIN DM   Used for:  Cough, Chest congestion   Started by:  Isiah Bain MD        Dose:  5 mL   Take 5 mLs by mouth every 4 hours as needed for cough   Quantity:  560 mL   Refills:  0       predniSONE 20 MG tablet   Commonly known as:  DELTASONE   Used for:  COPD exacerbation (H)   Started by:  Isiah Bain MD        Dose:  20 mg   Take 1 tablet (20 mg) by mouth daily   Quantity:  5 tablet   Refills:  0            Where to get your medicines      Some of these will need a paper prescription and others can be bought over the counter.  Ask your nurse if you have questions.     Bring a paper prescription for each of these medications     guaiFENesin-dextromethorphan 100-10 MG/5ML syrup    predniSONE 20 MG tablet                Primary Care Provider Office Phone # Fax #    Teri Salas [phone]       [facility] [address] [place] [address]        Equal Access to Services     ELIF BROOKS AH: Nieves Storey, darcie lujenny, qasuma kaalmada aderhettyada, joaquin quintanilla. So [facility] [phone].    ATENCIÓN: Si habla español, tiene a holley disposición servicios gratuitos de asistencia lingüística. Gunner al [phone].    We comply with applicable federal " civil rights laws and [place] laws. We do not discriminate on the basis of race, color, national origin, age, disability, sex, sexual orientation, or gender identity.            Thank you!     Thank you for choosing [facility]  for your care. Our goal is always to provide you with excellent care. Hearing back from our patients is one way we can continue to improve our services. Please take a few minutes to complete the written survey that you may receive in the mail after your visit with us. Thank you!             Your Updated Medication List - Protect others around you: Learn how to safely use, store and throw away your medicines at www.disposemymeds.org.          This list is accurate as of: 12/11/17 10:52 AM.  Always use your most recent med list.                   Brand Name Dispense Instructions for use Diagnosis    acetaminophen 325 MG tablet    TYLENOL    100 tablet    Take 2 tablets (650 mg) by mouth 3 times daily    Acute bilateral low back pain without sciatica       * albuterol 108 (90 BASE) MCG/ACT Inhaler    PROAIR HFA    18 g    Inhale 2 puffs every 4-6 hours as needed.    Chronic obstructive pulmonary disease, unspecified COPD type (H)       * albuterol (2.5 MG/3ML) 0.083% neb solution     150 vial    Take 1 vial (2.5 mg) by nebulization every 6 hours as needed for shortness of breath / dyspnea or wheezing    Chronic obstructive pulmonary disease, unspecified COPD type (H)       amLODIPine 10 MG tablet    NORVASC    30 tablet    Take 1 tablet (10 mg) by mouth daily    Essential hypertension, benign       buPROPion 150 MG 24 hr tablet    WELLBUTRIN XL     Take 300 mg by mouth every morning    Major depressive disorder, recurrent episode, moderate (H)       * FLONASE 50 MCG/ACT spray   Generic drug:  fluticasone      Spray 1 spray into both nostrils daily as needed for allergies    Chronic rhinitis       * fluticasone 50 MCG/ACT spray    FLONASE    1 Bottle    Spray 1-2 sprays into both  nostrils daily    Acute allergic rhinitis due to animal hair and dander       fluticasone-salmeterol 500-50 MCG/DOSE diskus inhaler    ADVAIR DISKUS    60 Inhaler    Inhale 1 puff into the lungs every 12 hours    Chronic obstructive pulmonary disease, unspecified COPD type (H)       gabapentin 100 MG capsule    NEURONTIN    90 capsule    Take 2 capsules (200 mg) by mouth 3 times daily as needed    Arthritis       guaiFENesin-dextromethorphan 100-10 MG/5ML syrup    ROBITUSSIN DM    560 mL    Take 5 mLs by mouth every 4 hours as needed for cough    Cough, Chest congestion       hydrOXYzine 25 MG capsule    VISTARIL    90 capsule    Take 1 capsule (25 mg) by mouth 3 times daily as needed for itching or anxiety    Anxiety       ibuprofen 400 MG tablet    ADVIL/MOTRIN    120 tablet    Take 1 tablet (400 mg) by mouth every 6 hours as needed for moderate pain    Arthritis       ipratropium - albuterol 0.5 mg/2.5 mg/3 mL 0.5-2.5 (3) MG/3ML neb solution    DUONEB    30 vial    Take 1 vial (3 mLs) by nebulization every 6 hours as needed for shortness of breath / dyspnea or wheezing    Chronic obstructive pulmonary disease, unspecified COPD type (H)       lisinopril 30 MG tablet    PRINIVIL,ZESTRIL    30 tablet    Take 1 tablet (30 mg) by mouth daily    Essential hypertension, benign       loratadine 10 MG tablet    CLARITIN     Take 1 tablet (10 mg) by mouth daily as needed for allergies    Seasonal allergic rhinitis, unspecified allergic rhinitis trigger       LORazepam 1 MG tablet    ATIVAN     Take 1 tablet (1 mg) by mouth 2 times daily as needed for anxiety    Generalized anxiety disorder       metoprolol 100 MG 24 hr tablet    TOPROL-XL    60 tablet    Take 1.5 tablets (150 mg) by mouth daily        mirtazapine 15 MG tablet    REMERON    30 tablet    Take 2 tablets (30 mg) by mouth At Bedtime        omeprazole 20 MG CR capsule    priLOSEC    60 capsule    Take 1 capsule (20 mg) by mouth 2 times daily    Gastroesophageal  reflux disease, esophagitis presence not specified       ondansetron 4 MG tablet    ZOFRAN    36 tablet    Take 2 tablets (8 mg) by mouth every 12 hours as needed for nausea    Nausea       * order for DME     1 Device    Equipment being ordered: Nebulizer tubing and supplies        * order for DME     1 Device    Equipment being ordered: Nebulizer and tubing    Chronic obstructive pulmonary disease, unspecified COPD type (H)       polyethylene glycol powder    MIRALAX/GLYCOLAX    119 g    Take 17 g (1 capful) by mouth daily as needed for constipation    Other constipation       prazosin 1 MG capsule    MINIPRESS    30 capsule    Take 1 capsule (1 mg) by mouth At Bedtime    Nightmares       predniSONE 20 MG tablet    DELTASONE    5 tablet    Take 1 tablet (20 mg) by mouth daily    COPD exacerbation (H)       tiotropium 18 MCG capsule    SPIRIVA HANDIHALER    30 capsule    Take once daily    Chronic obstructive pulmonary disease, unspecified COPD type (H)       * Notice:  This list has 6 medication(s) that are the same as other medications prescribed for you. Read the directions carefully, and ask your doctor or other care provider to review them with you.
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
## 2017-12-13 RX ORDER — GABAPENTIN 100 MG/1
200 CAPSULE ORAL 3 TIMES DAILY PRN
Qty: 90 CAPSULE | Refills: 1 | Status: SHIPPED | OUTPATIENT
Start: 2017-12-13 | End: 2018-07-24

## 2017-12-13 ASSESSMENT — PATIENT HEALTH QUESTIONNAIRE - PHQ9: SUM OF ALL RESPONSES TO PHQ QUESTIONS 1-9: 15

## 2017-12-18 DIAGNOSIS — R11.0 NAUSEA: ICD-10-CM

## 2017-12-19 RX ORDER — ONDANSETRON 4 MG/1
8 TABLET, FILM COATED ORAL EVERY 12 HOURS PRN
Qty: 36 TABLET | Refills: 1 | Status: SHIPPED | OUTPATIENT
Start: 2017-12-19 | End: 2018-06-22

## 2018-01-11 ENCOUNTER — TELEPHONE (OUTPATIENT)
Dept: FAMILY MEDICINE | Facility: CLINIC | Age: 58
End: 2018-01-11
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
## 2018-02-20 DIAGNOSIS — I10 ESSENTIAL HYPERTENSION, BENIGN: ICD-10-CM

## 2018-02-21 RX ORDER — AMLODIPINE BESYLATE 10 MG/1
10 TABLET ORAL DAILY
Qty: 30 TABLET | Refills: 3 | Status: SHIPPED | OUTPATIENT
Start: 2018-02-21 | End: 2018-06-22

## 2018-03-14 ENCOUNTER — RECORDS - HEALTHEAST (OUTPATIENT)
Dept: LAB | Facility: CLINIC | Age: 58
End: 2018-03-14

## 2018-03-14 LAB
BASOPHILS # BLD AUTO: 0.1 THOU/UL (ref 0–0.2)
BASOPHILS NFR BLD AUTO: 1 % (ref 0–2)
EOSINOPHIL # BLD AUTO: 0.4 THOU/UL (ref 0–0.4)
EOSINOPHIL NFR BLD AUTO: 5 % (ref 0–6)
ERYTHROCYTE [DISTWIDTH] IN BLOOD BY AUTOMATED COUNT: 18 % (ref 11–14.5)
HCT VFR BLD AUTO: 33.4 % (ref 35–47)
HGB BLD-MCNC: 10.6 G/DL (ref 12–16)
LYMPHOCYTES # BLD AUTO: 3.5 THOU/UL (ref 0.8–4.4)
LYMPHOCYTES NFR BLD AUTO: 47 % (ref 20–40)
MCH RBC QN AUTO: 27.6 PG (ref 27–34)
MCHC RBC AUTO-ENTMCNC: 31.7 G/DL (ref 32–36)
MCV RBC AUTO: 87 FL (ref 80–100)
MONOCYTES # BLD AUTO: 1.2 THOU/UL (ref 0–0.9)
MONOCYTES NFR BLD AUTO: 16 % (ref 2–10)
NEUTROPHILS # BLD AUTO: 2.3 THOU/UL (ref 2–7.7)
NEUTROPHILS NFR BLD AUTO: 31 % (ref 50–70)
PLATELET # BLD AUTO: 332 THOU/UL (ref 140–440)
PMV BLD AUTO: 10.5 FL (ref 8.5–12.5)
RBC # BLD AUTO: 3.84 MILL/UL (ref 3.8–5.4)
WBC: 7.5 THOU/UL (ref 4–11)

## 2018-03-23 ENCOUNTER — OFFICE VISIT (OUTPATIENT)
Dept: FAMILY MEDICINE | Facility: CLINIC | Age: 58
End: 2018-03-23

## 2018-03-23 VITALS
BODY MASS INDEX: 24.22 KG/M2 | SYSTOLIC BLOOD PRESSURE: 169 MMHG | HEART RATE: 62 BPM | WEIGHT: 128.2 LBS | TEMPERATURE: 97.9 F | DIASTOLIC BLOOD PRESSURE: 111 MMHG | RESPIRATION RATE: 99 BRPM

## 2018-03-23 DIAGNOSIS — J44.9 CHRONIC OBSTRUCTIVE PULMONARY DISEASE, UNSPECIFIED COPD TYPE (H): ICD-10-CM

## 2018-03-23 DIAGNOSIS — M79.662 PAIN OF LEFT LOWER LEG: ICD-10-CM

## 2018-03-23 DIAGNOSIS — R03.0 ELEVATED BLOOD PRESSURE READING WITHOUT DIAGNOSIS OF HYPERTENSION: Primary | ICD-10-CM

## 2018-03-23 LAB
BUN SERPL-MCNC: 22 MG/DL (ref 7–30)
CALCIUM SERPL-MCNC: 8.9 MG/DL (ref 8.5–10.4)
CHLORIDE SERPLBLD-SCNC: 101 MMOL/L (ref 94–109)
CO2 SERPL-SCNC: 23 MMOL/L (ref 20–32)
CREAT SERPL-MCNC: 1.2 MG/DL (ref 0.6–1.3)
EGFR CALCULATED (BLACK REFERENCE): 60 ML/MIN
EGFR CALCULATED (NON BLACK REFERENCE): 49.6 ML/MIN
GLUCOSE SERPL-MCNC: 86 MG/DL (ref 60–109)
HCT VFR BLD AUTO: 40.3 % (ref 35–47)
HEMOGLOBIN: 12.7 G/DL (ref 11.7–15.7)
MCH RBC QN AUTO: 27.1 PG (ref 26.5–35)
MCHC RBC AUTO-ENTMCNC: 31.5 G/DL (ref 32–36)
MCV RBC AUTO: 85.9 FL (ref 78–100)
PLATELET # BLD AUTO: 410 K/UL (ref 150–450)
POTASSIUM SERPL-SCNC: 4.2 MMOL/L (ref 3.4–5.3)
RBC # BLD AUTO: 4.69 M/UL (ref 3.8–5.2)
SODIUM SERPL-SCNC: 132 MMOL/L (ref 133–144)
WBC # BLD AUTO: 7.1 K/UL (ref 4–11)

## 2018-03-23 RX ORDER — ALBUTEROL SULFATE 90 UG/1
AEROSOL, METERED RESPIRATORY (INHALATION)
Qty: 18 G | Refills: 11 | Status: SHIPPED | OUTPATIENT
Start: 2018-03-23 | End: 2018-06-22

## 2018-03-23 RX ORDER — NAPROXEN 500 MG/1
500 TABLET ORAL 2 TIMES DAILY WITH MEALS
Qty: 20 TABLET | Refills: 0 | Status: SHIPPED | OUTPATIENT
Start: 2018-03-23 | End: 2018-04-02

## 2018-03-23 NOTE — PROGRESS NOTES
Preceptor Attestation:  Patient's case reviewed and discussed with Misbah Y. Palla, MD Patient seen and discussed with the resident.. I agree with assessment and plan of care.  Supervising Physician:  Jacqueline Foster MD  [facility]

## 2018-03-23 NOTE — MR AVS SNAPSHOT
After Visit Summary   3/23/2018    Lisa Scott    MRN: [id number]           Patient Information     Date Of Birth          1960        Visit Information        Provider Department      3/23/2018 11:20 AM Palla, Misbah Yousuf, MD [facility]        Today's Diagnoses     Elevated blood pressure reading without diagnosis of hypertension    -  1    Pain of left lower leg        Chronic obstructive pulmonary disease, unspecified COPD type (H)           Follow-ups after your visit        Your next 10 appointments already scheduled     Apr 10, 2018 10:00 AM CDT   Return Visit with Teri Salas MD   [facility] ([facility])    [address] [place] [address]   [phone]              Who to contact     Please call your clinic at [phone] to:    Ask questions about your health    Make or cancel appointments    Discuss your medicines    Learn about your test results    Speak to your doctor            Additional Information About Your Visit        MyChart Information     [employer] is an electronic gateway that provides easy, online access to your medical records. With [employer], you can request a clinic appointment, read your test results, renew a prescription or communicate with your care team.     To sign up for [employer] visit the website at [email or web address]   You will be asked to enter the access code listed below, as well as some personal information. Please follow the directions to create your username and password.     Your access code is: [email or web address]  Expires: 2018 10:13 AM     Your access code will  in 90 days. If you need help or a new code, please contact your [facility] or call [phone] for assistance.        Care EveryWhere ID     This is your Care EveryWhere ID. This could be used by other organizations to access your [place] medical records  [id number]        Your Vitals Were     Pulse  Temperature Respirations BMI (Body Mass Index)          62 97.9  F (36.6  C) (Oral) 99 24.22 kg/m2         Blood Pressure from Last 3 Encounters:   03/23/18 (!) 169/111   12/11/17 (!) 147/91   07/25/17 (!) 162/107    Weight from Last 3 Encounters:   03/23/18 128 lb 3.2 oz (58.2 kg)   12/11/17 144 lb (65.3 kg)   07/25/17 142 lb 12.8 oz (64.8 kg)              We Performed the Following     Basic Metabolic Panel ([facility]) - Results < 1 hr     CBC with Plt ([facility])          Today's Medication Changes          These changes are accurate as of 3/23/18 11:59 PM.  If you have any questions, ask your nurse or doctor.               Start taking these medicines.        Dose/Directions    naproxen 500 MG tablet   Commonly known as:  NAPROSYN   Used for:  Pain of left lower leg   Started by:  Palla, Misbah Yousuf, MD        Dose:  500 mg   Take 1 tablet (500 mg) by mouth 2 times daily (with meals) for 10 days   Quantity:  20 tablet   Refills:  0         Stop taking these medicines if you haven't already. Please contact your care team if you have questions.     ibuprofen 400 MG tablet   Commonly known as:  ADVIL/MOTRIN   Stopped by:  Palla, Misbah Yousuf, MD                Where to get your medicines      These medications were sent to [id number] - [place], [place] - [address], [place] [address]     Phone:  [phone]     albuterol 108 (90 BASE) MCG/ACT Inhaler    naproxen 500 MG tablet                Primary Care Provider Office Phone # Fax #    Teri Salas [phone]       [facility] [address] [place] [address]        Equal Access to Services     VINNIE BROOKS AH: Nieves Storey, darcie nuñez, qaybta kaalmacindy blanco, joaquin quintanilla. So [facility] [phone].    ATENCIÓN: Si habla español, tiene a holley disposición servicios gratuitos de asistencia lingüística. Gunner garcia [phone].    We comply with  applicable federal civil rights laws and [place] laws. We do not discriminate on the basis of race, color, national origin, age, disability, sex, sexual orientation, or gender identity.            Thank you!     Thank you for choosing [facility]  for your care. Our goal is always to provide you with excellent care. Hearing back from our patients is one way we can continue to improve our services. Please take a few minutes to complete the written survey that you may receive in the mail after your visit with us. Thank you!             Your Updated Medication List - Protect others around you: Learn how to safely use, store and throw away your medicines at www.disposemymeds.org.          This list is accurate as of 3/23/18 11:59 PM.  Always use your most recent med list.                   Brand Name Dispense Instructions for use Diagnosis    acetaminophen 325 MG tablet    TYLENOL    100 tablet    Take 2 tablets (650 mg) by mouth 3 times daily    Acute bilateral low back pain without sciatica       * albuterol (2.5 MG/3ML) 0.083% neb solution     150 vial    Take 1 vial (2.5 mg) by nebulization every 6 hours as needed for shortness of breath / dyspnea or wheezing    Chronic obstructive pulmonary disease, unspecified COPD type (H)       * albuterol 108 (90 BASE) MCG/ACT Inhaler    PROAIR HFA    18 g    Inhale 2 puffs every 4-6 hours as needed.    Chronic obstructive pulmonary disease, unspecified COPD type (H)       amLODIPine 10 MG tablet    NORVASC    30 tablet    Take 1 tablet (10 mg) by mouth daily    Essential hypertension, benign       buPROPion 150 MG 24 hr tablet    WELLBUTRIN XL     Take 300 mg by mouth every morning    Major depressive disorder, recurrent episode, moderate (H)       * FLONASE 50 MCG/ACT spray   Generic drug:  fluticasone      Spray 1 spray into both nostrils daily as needed for allergies    Chronic rhinitis       * fluticasone 50 MCG/ACT spray    FLONASE    1 Bottle    Spray 1-2  sprays into both nostrils daily    Acute allergic rhinitis due to animal hair and dander       fluticasone-salmeterol 500-50 MCG/DOSE diskus inhaler    ADVAIR DISKUS    60 Inhaler    Inhale 1 puff into the lungs every 12 hours    Chronic obstructive pulmonary disease, unspecified COPD type (H)       gabapentin 100 MG capsule    NEURONTIN    90 capsule    Take 2 capsules (200 mg) by mouth 3 times daily as needed    Arthritis       guaiFENesin-dextromethorphan 100-10 MG/5ML syrup    ROBITUSSIN DM    560 mL    Take 5 mLs by mouth every 4 hours as needed for cough    Cough, Chest congestion       hydrOXYzine 25 MG capsule    VISTARIL    90 capsule    Take 1 capsule (25 mg) by mouth 3 times daily as needed for itching or anxiety    Anxiety       ipratropium - albuterol 0.5 mg/2.5 mg/3 mL 0.5-2.5 (3) MG/3ML neb solution    DUONEB    30 vial    Take 1 vial (3 mLs) by nebulization every 6 hours as needed for shortness of breath / dyspnea or wheezing    Chronic obstructive pulmonary disease, unspecified COPD type (H)       lisinopril 30 MG tablet    PRINIVIL,ZESTRIL    30 tablet    Take 1 tablet (30 mg) by mouth daily    Essential hypertension, benign       loratadine 10 MG tablet    CLARITIN     Take 1 tablet (10 mg) by mouth daily as needed for allergies    Seasonal allergic rhinitis, unspecified allergic rhinitis trigger       LORazepam 1 MG tablet    ATIVAN     Take 1 tablet (1 mg) by mouth 2 times daily as needed for anxiety    Generalized anxiety disorder       metoprolol succinate 100 MG 24 hr tablet    TOPROL-XL    60 tablet    Take 1.5 tablets (150 mg) by mouth daily        mirtazapine 15 MG tablet    REMERON    30 tablet    Take 2 tablets (30 mg) by mouth At Bedtime        naproxen 500 MG tablet    NAPROSYN    20 tablet    Take 1 tablet (500 mg) by mouth 2 times daily (with meals) for 10 days    Pain of left lower leg       omeprazole 20 MG CR capsule    priLOSEC    60 capsule    Take 1 capsule (20 mg) by mouth 2  times daily    Gastroesophageal reflux disease, esophagitis presence not specified       ondansetron 4 MG tablet    ZOFRAN    36 tablet    Take 2 tablets (8 mg) by mouth every 12 hours as needed for nausea    Nausea       * order for DME     1 Device    Equipment being ordered: Nebulizer tubing and supplies        * order for DME     1 Device    Equipment being ordered: Nebulizer and tubing    Chronic obstructive pulmonary disease, unspecified COPD type (H)       polyethylene glycol powder    MIRALAX/GLYCOLAX    119 g    Take 17 g (1 capful) by mouth daily as needed for constipation    Other constipation       prazosin 1 MG capsule    MINIPRESS    30 capsule    Take 1 capsule (1 mg) by mouth At Bedtime    Nightmares       predniSONE 20 MG tablet    DELTASONE    5 tablet    Take 1 tablet (20 mg) by mouth daily    COPD exacerbation (H)       tiotropium 18 MCG capsule    SPIRIVA HANDIHALER    30 capsule    Take once daily    Chronic obstructive pulmonary disease, unspecified COPD type (H)       * Notice:  This list has 6 medication(s) that are the same as other medications prescribed for you. Read the directions carefully, and ask your doctor or other care provider to review them with you.

## 2018-03-23 NOTE — PROGRESS NOTES
HPI    Lisa Scott is 57 year old yo female presenting for:    1. Left leg pain  - patient had hip surgery in January 2018 with Dr. Jessica Salas  - was initially on oxycodone but her TCU stopped giving it because patient was altered with oxycodone  - currently receives ibuprofen 400 mg q6h, states that minimally helps her pain  - description of pain:   - dull, achy   - localized to hip and anterior thigh   - no sharp/shooting pain   - is able to ambulate with a walker  - her pain has improved significantly from the time of surgery     ROS: Negative except as noted above    OBJECTIVE    Vitals  BP (!) 169/111  Pulse 62  Temp 97.9  F (36.6  C) (Oral)  Resp (!) 99  Wt 128 lb 3.2 oz (58.2 kg)  BMI 24.22 kg/m2      Physical Exam  General: No acute distress, appears older than age   CV: RRR  Respiratory: CTA bilaterally, no wheezes/rhonchi/rhales appreciated, no respiratory distress  Abdomen: soft, non-distended, non-tender, normoactive bowel sounds  Extremities: no cyanosis, no edema, capillary refill <2 seconds, well perfused   LLE: mild soreness with adduction/abduction; ambulating without discomfort when walking with walker; no pain on knee flexion/extension; popliteal and pedal pulses in tact    ASSESSMENT/PLAN    # Left leg pain, likely MSK  - d/c ibuprofen  - start naproxen 500 mg BID for 10 days with food  - BMP was checked   - patient has had normal BMPs in all of 6/2017   - patient did have decreased GFR and increased creatinine in 5/2017 when she was admitted for AMS, normalized by the time she was discharged - was likely pre-renal   - today, GFR is 49, creatinine is 1.2   - patient states she has not been eating/drinking well the last few days because she was mad at staff at her TCU - likely pre-renal etiology   - advised to hydrate herself and continue to ensure she is eating well    - will follow up in 1 week for BP check and repeat BMP   - if BMP continues to show decreased GFR, will consider  other alternatives for pain meds      # H/o COPD  - would like refill on albuterol  - refill prescribed    #Hypertension  - BP elevated today  - asymptomatic   - no numbness/tingling of extremities, no motor weakness, no headaches, no blurry vision  - would like to come back to talk about BP later  - advised of importance of following up  - will come back in 1 week for BP check  - [facility] ([facility] called to fax over med list         Precepted with Dr. Foster

## 2018-03-23 NOTE — LETTER
[facility] [address] [facility] [address]  Phone: [phone]  Fax: [phone]    March 23, 2018        Lisa Scott  [facility] [place] [address]          To whom it may concern:    RE: Lisa Scott    Will discontinue her ibuprofen and start naproxen 500 mg daily. Patient is OK to carry her own albuterol to use as needed.     Please contact me for questions or concerns.      Sincerely,        Misbah Y. Palla, MD

## 2018-05-10 ENCOUNTER — RECORDS - HEALTHEAST (OUTPATIENT)
Dept: LAB | Facility: CLINIC | Age: 58
End: 2018-05-10

## 2018-05-11 LAB — HGB BLD-MCNC: 11.9 G/DL (ref 12–16)

## 2018-05-25 ENCOUNTER — CARE COORDINATION (OUTPATIENT)
Dept: FAMILY MEDICINE | Facility: CLINIC | Age: 58
End: 2018-05-25

## 2018-05-25 ENCOUNTER — OFFICE VISIT (OUTPATIENT)
Dept: FAMILY MEDICINE | Facility: CLINIC | Age: 58
End: 2018-05-25

## 2018-05-25 VITALS
WEIGHT: 117 LBS | BODY MASS INDEX: 22.11 KG/M2 | SYSTOLIC BLOOD PRESSURE: 134 MMHG | HEART RATE: 49 BPM | TEMPERATURE: 97.6 F | RESPIRATION RATE: 18 BRPM | DIASTOLIC BLOOD PRESSURE: 96 MMHG | OXYGEN SATURATION: 97 %

## 2018-05-25 DIAGNOSIS — J44.9 CHRONIC OBSTRUCTIVE PULMONARY DISEASE, UNSPECIFIED COPD TYPE (H): ICD-10-CM

## 2018-05-25 DIAGNOSIS — R06.2 WHEEZING: ICD-10-CM

## 2018-05-25 DIAGNOSIS — I10 ESSENTIAL HYPERTENSION, BENIGN: ICD-10-CM

## 2018-05-25 DIAGNOSIS — J44.1 COPD EXACERBATION (H): Primary | ICD-10-CM

## 2018-05-25 LAB
BUN SERPL-MCNC: 17 MG/DL (ref 7–30)
CALCIUM SERPL-MCNC: 9.2 MG/DL (ref 8.5–10.4)
CHLORIDE SERPLBLD-SCNC: 103 MMOL/L (ref 94–109)
CO2 SERPL-SCNC: 27 MMOL/L (ref 20–32)
CREAT SERPL-MCNC: 1 MG/DL (ref 0.6–1.3)
EGFR CALCULATED (BLACK REFERENCE): 73.8 ML/MIN
EGFR CALCULATED (NON BLACK REFERENCE): 61 ML/MIN
GLUCOSE SERPL-MCNC: 94 MG/DL (ref 60–109)
POTASSIUM SERPL-SCNC: 4.3 MMOL/L (ref 3.4–5.3)
SODIUM SERPL-SCNC: 139 MMOL/L (ref 133–144)

## 2018-05-25 RX ORDER — PREDNISONE 20 MG/1
40 TABLET ORAL DAILY
Qty: 10 TABLET | Refills: 0 | Status: SHIPPED | OUTPATIENT
Start: 2018-05-25 | End: 2018-06-19

## 2018-05-25 RX ORDER — PREDNISONE 20 MG/1
40 TABLET ORAL DAILY
Qty: 10 TABLET | Refills: 0 | Status: SHIPPED | OUTPATIENT
Start: 2018-05-25 | End: 2018-05-30

## 2018-05-25 NOTE — NURSING NOTE
Faxed new med list to [facility] at [phone].  Called Incruse to [facility]. Called [facility] and cancelled Prednisone and Incruse. JR

## 2018-05-25 NOTE — MR AVS SNAPSHOT
After Visit Summary   5/25/2018    Lisa Scott    MRN: [id number]           Patient Information     Date Of Birth          1960        Visit Information        Provider Department      5/25/2018 9:40 AM Eulalia De La Cruz MD [facility]        Today's Diagnoses     COPD exacerbation (H)    -  1    Wheezing        Chronic obstructive pulmonary disease, unspecified COPD type (H)        Essential hypertension, benign           Follow-ups after your visit        Who to contact     Please call your clinic at [phone] to:    Ask questions about your health    Make or cancel appointments    Discuss your medicines    Learn about your test results    Speak to your doctor            Additional Information About Your Visit        Care EveryWhere ID     This is your Care EveryWhere ID. This could be used by other organizations to access your [facility] medical records  [id number]        Your Vitals Were     Pulse Temperature Respirations Pulse Oximetry BMI (Body Mass Index)       49 97.6  F (36.4  C) (Oral) 18 97% 22.11 kg/m2        Blood Pressure from Last 3 Encounters:   05/25/18 (!) 134/96   03/23/18 (!) 169/111   12/11/17 (!) 147/91    Weight from Last 3 Encounters:   05/25/18 117 lb (53.1 kg)   03/23/18 128 lb 3.2 oz (58.2 kg)   12/11/17 144 lb (65.3 kg)              We Performed the Following     Basic Metabolic Panel ([facility]) - Results < 1 hr     XR CHEST 2 VW          Today's Medication Changes          These changes are accurate as of 5/25/18 11:59 PM.  If you have any questions, ask your nurse or doctor.               Start taking these medicines.        Dose/Directions    umeclidinium 62.5 MCG/INH oral inhaler   Commonly known as:  INCRUSE ELLIPTA   Used for:  Chronic obstructive pulmonary disease, unspecified COPD type (H)   Started by:  Eulalia De La Cruz MD        Dose:  1 puff   Inhale 1 puff into the lungs daily   Quantity:  1 Inhaler   Refills:  3         These  medicines have changed or have updated prescriptions.        Dose/Directions    * predniSONE 20 MG tablet   Commonly known as:  DELTASONE   This may have changed:  how much to take   Used for:  COPD exacerbation (H)   Changed by:  Eulalia De La Cruz MD        Dose:  40 mg   Take 2 tablets (40 mg) by mouth daily   Quantity:  10 tablet   Refills:  0       * predniSONE 20 MG tablet   Commonly known as:  DELTASONE   This may have changed:  You were already taking a medication with the same name, and this prescription was added. Make sure you understand how and when to take each.   Used for:  COPD exacerbation (H)   Changed by:  Eulalia De La Cruz MD        Dose:  40 mg   Take 2 tablets (40 mg) by mouth daily for 5 days   Quantity:  10 tablet   Refills:  0       * Notice:  This list has 2 medication(s) that are the same as other medications prescribed for you. Read the directions carefully, and ask your doctor or other care provider to review them with you.         Where to get your medicines      These medications were sent to [facility] - [place], [place] - [address], [place] [address]     Phone:  [phone]     predniSONE 20 MG tablet    umeclidinium 62.5 MCG/INH oral inhaler         These medications were sent to [address] [address]     Phone:  [phone]     predniSONE 20 MG tablet                Primary Care Provider Office Phone # Fax #    Teri Salas [phone]       [facility] [address] [place] [address]        Equal Access to Services     [facility] AH: Hadii cecilia ku hadasho Soanca, waaxda luqadaha, qaybta kaalmada bella, joaquin quintanilla. So [facility] [phone].    ATENCIÓN: Si habla español, tiene a holley disposición servicios gratuitos de asistencia lingüística. Llame al  [phone].    We comply with applicable federal civil rights laws and [place] laws. We do not discriminate on the basis of race, color, national origin, age, disability, sex, sexual orientation, or gender identity.            Thank you!     Thank you for choosing [facility]  for your care. Our goal is always to provide you with excellent care. Hearing back from our patients is one way we can continue to improve our services. Please take a few minutes to complete the written survey that you may receive in the mail after your visit with us. Thank you!             Your Updated Medication List - Protect others around you: Learn how to safely use, store and throw away your medicines at www.disposemymeds.org.          This list is accurate as of 5/25/18 11:59 PM.  Always use your most recent med list.                   Brand Name Dispense Instructions for use Diagnosis    acetaminophen 325 MG tablet    TYLENOL    100 tablet    Take 2 tablets (650 mg) by mouth 3 times daily    Acute bilateral low back pain without sciatica       * albuterol (2.5 MG/3ML) 0.083% neb solution     150 vial    Take 1 vial (2.5 mg) by nebulization every 6 hours as needed for shortness of breath / dyspnea or wheezing    Chronic obstructive pulmonary disease, unspecified COPD type (H)       * albuterol 108 (90 Base) MCG/ACT Inhaler    PROAIR HFA    18 g    Inhale 2 puffs every 4-6 hours as needed.    Chronic obstructive pulmonary disease, unspecified COPD type (H)       amLODIPine 10 MG tablet    NORVASC    30 tablet    Take 1 tablet (10 mg) by mouth daily    Essential hypertension, benign       buPROPion 150 MG 24 hr tablet    WELLBUTRIN XL     Take 300 mg by mouth every morning    Major depressive disorder, recurrent episode, moderate (H)       * FLONASE 50 MCG/ACT spray   Generic drug:  fluticasone      Spray 1 spray into both nostrils daily as needed for allergies    Chronic rhinitis       * fluticasone 50 MCG/ACT spray     FLONASE    1 Bottle    Spray 1-2 sprays into both nostrils daily    Acute allergic rhinitis due to animal hair and dander       fluticasone-salmeterol 500-50 MCG/DOSE diskus inhaler    ADVAIR DISKUS    60 Inhaler    Inhale 1 puff into the lungs every 12 hours    Chronic obstructive pulmonary disease, unspecified COPD type (H)       gabapentin 100 MG capsule    NEURONTIN    90 capsule    Take 2 capsules (200 mg) by mouth 3 times daily as needed    Arthritis       guaiFENesin-dextromethorphan 100-10 MG/5ML syrup    ROBITUSSIN DM    560 mL    Take 5 mLs by mouth every 4 hours as needed for cough    Cough, Chest congestion       hydrOXYzine 25 MG capsule    VISTARIL    90 capsule    Take 1 capsule (25 mg) by mouth 3 times daily as needed for itching or anxiety    Anxiety       ipratropium - albuterol 0.5 mg/2.5 mg/3 mL 0.5-2.5 (3) MG/3ML neb solution    DUONEB    30 vial    Take 1 vial (3 mLs) by nebulization every 6 hours as needed for shortness of breath / dyspnea or wheezing    Chronic obstructive pulmonary disease, unspecified COPD type (H)       lisinopril 30 MG tablet    PRINIVIL,ZESTRIL    30 tablet    Take 1 tablet (30 mg) by mouth daily    Essential hypertension, benign       loratadine 10 MG tablet    CLARITIN     Take 1 tablet (10 mg) by mouth daily as needed for allergies    Seasonal allergic rhinitis, unspecified allergic rhinitis trigger       LORazepam 1 MG tablet    ATIVAN     Take 1 tablet (1 mg) by mouth 2 times daily as needed for anxiety    Generalized anxiety disorder       metoprolol succinate 100 MG 24 hr tablet    TOPROL-XL    60 tablet    Take 1.5 tablets (150 mg) by mouth daily        mirtazapine 15 MG tablet    REMERON    30 tablet    Take 2 tablets (30 mg) by mouth At Bedtime        omeprazole 20 MG CR capsule    priLOSEC    60 capsule    Take 1 capsule (20 mg) by mouth 2 times daily    Gastroesophageal reflux disease, esophagitis presence not specified       ondansetron 4 MG tablet    ZOFRAN     36 tablet    Take 2 tablets (8 mg) by mouth every 12 hours as needed for nausea    Nausea       * order for DME     1 Device    Equipment being ordered: Nebulizer tubing and supplies        * order for DME     1 Device    Equipment being ordered: Nebulizer and tubing    Chronic obstructive pulmonary disease, unspecified COPD type (H)       polyethylene glycol powder    MIRALAX/GLYCOLAX    119 g    Take 17 g (1 capful) by mouth daily as needed for constipation    Other constipation       prazosin 1 MG capsule    MINIPRESS    30 capsule    Take 1 capsule (1 mg) by mouth At Bedtime    Nightmares       * predniSONE 20 MG tablet    DELTASONE    10 tablet    Take 2 tablets (40 mg) by mouth daily    COPD exacerbation (H)       * predniSONE 20 MG tablet    DELTASONE    10 tablet    Take 2 tablets (40 mg) by mouth daily for 5 days    COPD exacerbation (H)       umeclidinium 62.5 MCG/INH oral inhaler    INCRUSE ELLIPTA    1 Inhaler    Inhale 1 puff into the lungs daily    Chronic obstructive pulmonary disease, unspecified COPD type (H)       * Notice:  This list has 8 medication(s) that are the same as other medications prescribed for you. Read the directions carefully, and ask your doctor or other care provider to review them with you.

## 2018-05-25 NOTE — PROGRESS NOTES
Patient requested to meet with me. She is at [facility] following her fracture. She states that they are telling her that she cannot leave, that she is not competent to make her own decisions. She then states that they have told her that she cannot leave because she cannot afford independent living. Verbal PAULINO is attained. LM for LARRY Zoë at [facility] for RC. Advised patient that I will speak with them and if she is looking for housing I can give her some resources to contact to find out available rentals in the area. She states that she needs a roommate, I advise I cannot find a roommate for her.     5/29/18 LM for LARRY Rosa at [facility] care for RC  RC from Rsoa JUAREZ at [facility] requesting PAULINO be faxed to her,this is done. LM informing her that it is faxed and to please RC. Call from Nurse at [facility] stating that they did not receive new orders of medications last week. Informed I faxed them to them Friday afternoon. Refaxed visit notes. Nurse states that Lisa has been upset about her spendown and that she did not want to pay the facility this. The facility will be paid by the [facility] but patient wanted to leave AMA as to not have to pay this. They could not discharge her onto the street and so a  through the [facility] has been working with her. She is able to leave AMA anytime she desires. Attempted to reach patient. She does not know how to retreive her messages, did not LM.  5/30/18 RC from [facility] who states that patient is worried about the spendown she has. She does not want to spend her monies that she gets on paying for the facility or rent out in the community. Any funds that she receives will be used partly to support her housing. Spoke with patient. She is informed that she needs to speak with relocation person as she wants independent apartment with a roommate, unsure if she could afford this. She has appropriate resources in place.

## 2018-05-25 NOTE — PROGRESS NOTES
Preceptor Attestation:   Patient seen, evaluated and discussed with the resident. I have verified the content of the note, which accurately reflects my assessment of the patient and the plan of care.  I reviewed all images , CXR with the resident.  Supervising Physician:Jacqueline Foster MD  [facility]
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
## 2018-05-25 NOTE — LETTER
June 1, 2018      Lisa Scott  [address] [place] [address]        Dear Lisa,    Please see below for your test results.    At your recent clinic visit, your electrolytes and kidney function were normal.     Please call our clinic with any questions. We look forward to seeing you again.    Resulted Orders   Basic Metabolic Panel ([facility]) - Results < 1 hr   Result Value Ref Range    Glucose 94.0 60.0 - 109.0 mg/dL    Urea Nitrogen 17.0 7.0 - 30.0 mg/dL    Creatinine 1.0 0.6 - 1.3 mg/dL    Sodium 139.0 133.0 - 144.0 mmol/L    Potassium 4.3 3.4 - 5.3 mmol/L    Chloride 103.0 94.0 - 109.0 mmol/L    Carbon Dioxide 27.0 20.0 - 32.0 mmol/L    Calcium 9.2 8.5 - 10.4 mg/dL    eGFR Calculated (Non Black Reference) 61.0 >60.0 mL/min    eGFR Calculated (Black Reference) 73.8 >60.0 mL/min       If you have any questions, please call the clinic to make an appointment.    Sincerely,    Eulalia De La Cruz MD

## 2018-05-25 NOTE — Clinical Note
ZEUS Lopez saw her today to assist with some housing concerns; sounds like she has more mood issues that can be assessed in the next week or so (her preference)

## 2018-05-26 ASSESSMENT — PATIENT HEALTH QUESTIONNAIRE - PHQ9: SUM OF ALL RESPONSES TO PHQ QUESTIONS 1-9: 17

## 2018-05-30 NOTE — PROGRESS NOTES
Please send result letter. (and include scanned imaging/report if it pertains).    Dear Lisa    At your recent clinic visit, your electrolytes and kidney function were normal.     Please call our clinic with any questions. We look forward to seeing you again.        Sincerely,   Eulalia De La Cruz MD  Family Medicine Resident, PGY-3    [facility] [address] [place] [address]  [phone]

## 2018-06-07 NOTE — PROGRESS NOTES
CXR negative for PNA. Radiology report indicates there is an elongated linear density which is likely c/w breast tissue. Could obtain CT for further clarification. Would not pursue that at this time.

## 2018-06-19 ENCOUNTER — OFFICE VISIT (OUTPATIENT)
Dept: FAMILY MEDICINE | Facility: CLINIC | Age: 58
End: 2018-06-19

## 2018-06-19 VITALS
TEMPERATURE: 97.4 F | BODY MASS INDEX: 22.28 KG/M2 | DIASTOLIC BLOOD PRESSURE: 89 MMHG | SYSTOLIC BLOOD PRESSURE: 128 MMHG | HEIGHT: 61 IN | OXYGEN SATURATION: 96 % | HEART RATE: 61 BPM | RESPIRATION RATE: 18 BRPM | WEIGHT: 118 LBS

## 2018-06-19 DIAGNOSIS — M25.552 HIP PAIN, LEFT: ICD-10-CM

## 2018-06-19 DIAGNOSIS — J44.1 COPD EXACERBATION (H): Primary | ICD-10-CM

## 2018-06-19 RX ORDER — ACETAMINOPHEN 325 MG/1
650 TABLET ORAL EVERY 4 HOURS PRN
Qty: 100 TABLET | Refills: 0 | Status: SHIPPED | OUTPATIENT
Start: 2018-06-19 | End: 2018-06-22

## 2018-06-19 RX ORDER — PREDNISONE 20 MG/1
40 TABLET ORAL DAILY
Qty: 10 TABLET | Refills: 0 | Status: SHIPPED | OUTPATIENT
Start: 2018-06-19 | End: 2018-06-24

## 2018-06-19 RX ORDER — AZITHROMYCIN 250 MG/1
TABLET, FILM COATED ORAL
Qty: 6 TABLET | Refills: 0 | Status: SHIPPED | OUTPATIENT
Start: 2018-06-19 | End: 2018-06-29

## 2018-06-19 NOTE — NURSING NOTE
6/19/2018 PCS Previsit Plan   DUE FOR:  Phq9-given  Hiv screen  Hep c screen  Mammo-declined for now, in transitional housing deal per pt  [place]/fit-declined  gad7-given  KONG Desai

## 2018-06-19 NOTE — MR AVS SNAPSHOT
"              After Visit Summary   6/19/2018    Lisa Scott    MRN: [id number]           Patient Information     Date Of Birth          1960        Visit Information        Provider Department      6/19/2018 10:20 AM Teri Salas MD [facility]        Today's Diagnoses     COPD exacerbation (H)    -  1    Hip pain, left           Follow-ups after your visit        Your next 10 appointments already scheduled     Jun 29, 2018 11:00 AM CDT   Return Visit with Erica Molina, RPH [facility] ([facility])    [address] [place] [address]   [phone]            Jun 29, 2018 11:20 AM CDT   Hospital Follow Up with Bolivar Pineda MD   [facility] ([facility])    [address] [place] [address]   [phone]              Who to contact     Please call your clinic at [phone] to:    Ask questions about your health    Make or cancel appointments    Discuss your medicines    Learn about your test results    Speak to your doctor            Additional Information About Your Visit        Care EveryWhere ID     This is your Care EveryWhere ID. This could be used by other organizations to access your [facility] medical records  [id number]        Your Vitals Were     Pulse Temperature Respirations Height Pulse Oximetry BMI (Body Mass Index)    61 97.4  F (36.3  C) (Oral) 18 5' 0.5\" (153.7 cm) 96% 22.67 kg/m2       Blood Pressure from Last 3 Encounters:   06/19/18 128/89   05/25/18 (!) 134/96   03/23/18 (!) 169/111    Weight from Last 3 Encounters:   06/19/18 118 lb (53.5 kg)   05/25/18 117 lb (53.1 kg)   03/23/18 128 lb 3.2 oz (58.2 kg)              Today, you had the following     No orders found for display         Today's Medication Changes          These changes are accurate as of 6/19/18 11:59 PM.  If you have any questions, ask your nurse or doctor.               Start taking these medicines.        " Dose/Directions    acetaminophen 325 MG tablet   Commonly known as:  TYLENOL   Used for:  COPD exacerbation (H)   Started by:  Teri Salas MD        Dose:  650 mg   Take 2 tablets (650 mg) by mouth every 4 hours as needed for mild pain   Quantity:  100 tablet   Refills:  0       azithromycin 250 MG tablet   Commonly known as:  ZITHROMAX   Used for:  COPD exacerbation (H)   Started by:  Teri Salas MD        Two tablets first day, then one tablet daily for four days.   Quantity:  6 tablet   Refills:  0       predniSONE 20 MG tablet   Commonly known as:  DELTASONE   Used for:  COPD exacerbation (H)   Started by:  Teri Salas MD        Dose:  40 mg   Take 2 tablets (40 mg) by mouth daily for 5 days   Quantity:  10 tablet   Refills:  0            Where to get your medicines      Some of these will need a paper prescription and others can be bought over the counter.  Ask your nurse if you have questions.     Bring a paper prescription for each of these medications     acetaminophen 325 MG tablet    azithromycin 250 MG tablet    predniSONE 20 MG tablet                Primary Care Provider Office Phone # Fax #    Teri Salas [phone]       [facility] [address] [place] [address]        Equal Access to Services     [facility] Center[facility] AH: Hadii cecilia schwab hadjamisono Soanca, waaxda luqadaha, qaybta kaalmada adeegyada, joaquin quintanilla. So [facility] [phone].    ATENCIÓN: Si habla español, tiene a holley disposición servicios gratuitos de asistencia lingüística. Llame al [phone].    We comply with applicable federal civil rights laws and [place] laws. We do not discriminate on the basis of race, color, national origin, age, disability, sex, sexual orientation, or gender identity.            Thank you!     Thank you for choosing [facility]  for your care. Our goal is always to provide you with excellent care. Hearing back  from our patients is one way we can continue to improve our services. Please take a few minutes to complete the written survey that you may receive in the mail after your visit with us. Thank you!             Your Updated Medication List - Protect others around you: Learn how to safely use, store and throw away your medicines at www.disposemymeds.org.          This list is accurate as of 6/19/18 11:59 PM.  Always use your most recent med list.                   Brand Name Dispense Instructions for use Diagnosis    acetaminophen 325 MG tablet    TYLENOL    100 tablet    Take 2 tablets (650 mg) by mouth every 4 hours as needed for mild pain    COPD exacerbation (H)       azithromycin 250 MG tablet    ZITHROMAX    6 tablet    Two tablets first day, then one tablet daily for four days.    COPD exacerbation (H)       gabapentin 100 MG capsule    NEURONTIN    90 capsule    Take 2 capsules (200 mg) by mouth 3 times daily as needed    Arthritis       LORazepam 1 MG tablet    ATIVAN     Take 1 tablet (1 mg) by mouth 2 times daily as needed for anxiety    Generalized anxiety disorder       omeprazole 20 MG CR capsule    priLOSEC    60 capsule    Take 1 capsule (20 mg) by mouth 2 times daily    Gastroesophageal reflux disease, esophagitis presence not specified       predniSONE 20 MG tablet    DELTASONE    10 tablet    Take 2 tablets (40 mg) by mouth daily for 5 days    COPD exacerbation (H)       umeclidinium 62.5 MCG/INH oral inhaler    INCRUSE ELLIPTA    1 Inhaler    Inhale 1 puff into the lungs daily    Chronic obstructive pulmonary disease, unspecified COPD type (H)

## 2018-06-19 NOTE — LETTER
6/19/2018    Re: Lisa Scott  1960      To Whom It May Concern:     Lisa Scott was seen in clinic today. She may keep her albuterol inhaler with her on her person at all times.      Teri Salas MD  6/19/2018 11:02 AM
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
## 2018-06-22 ENCOUNTER — TELEPHONE (OUTPATIENT)
Dept: FAMILY MEDICINE | Facility: CLINIC | Age: 58
End: 2018-06-22

## 2018-06-22 DIAGNOSIS — G40.909 NONINTRACTABLE EPILEPSY WITHOUT STATUS EPILEPTICUS, UNSPECIFIED EPILEPSY TYPE (H): ICD-10-CM

## 2018-06-22 DIAGNOSIS — F33.1 MAJOR DEPRESSIVE DISORDER, RECURRENT EPISODE, MODERATE (H): ICD-10-CM

## 2018-06-22 DIAGNOSIS — F41.1 GENERALIZED ANXIETY DISORDER: ICD-10-CM

## 2018-06-22 DIAGNOSIS — F29 PSYCHOSIS, UNSPECIFIED PSYCHOSIS TYPE (H): ICD-10-CM

## 2018-06-22 DIAGNOSIS — F10.21 ALCOHOL DEPENDENCE IN REMISSION (H): ICD-10-CM

## 2018-06-22 DIAGNOSIS — J44.9 CHRONIC OBSTRUCTIVE PULMONARY DISEASE, UNSPECIFIED COPD TYPE (H): ICD-10-CM

## 2018-06-22 DIAGNOSIS — I10 ESSENTIAL HYPERTENSION, BENIGN: ICD-10-CM

## 2018-06-22 DIAGNOSIS — Z79.899 ENCOUNTER FOR MEDICATION REVIEW: Primary | ICD-10-CM

## 2018-06-22 RX ORDER — HYDROXYZINE HYDROCHLORIDE 25 MG/1
25 TABLET, FILM COATED ORAL EVERY 6 HOURS PRN
COMMUNITY
Start: 2018-06-22 | End: 2018-06-29

## 2018-06-22 RX ORDER — VENLAFAXINE HYDROCHLORIDE 150 MG/1
150 CAPSULE, EXTENDED RELEASE ORAL DAILY
COMMUNITY
Start: 2018-06-22 | End: 2018-10-12

## 2018-06-22 RX ORDER — QUETIAPINE FUMARATE 100 MG/1
100 TABLET, FILM COATED ORAL AT BEDTIME
COMMUNITY
Start: 2018-06-22 | End: 2018-08-15

## 2018-06-22 RX ORDER — BUPROPION HYDROCHLORIDE 300 MG/1
600 TABLET ORAL EVERY MORNING
COMMUNITY
Start: 2018-06-22 | End: 2018-08-01

## 2018-06-22 RX ORDER — ACAMPROSATE CALCIUM 333 MG/1
333 TABLET, DELAYED RELEASE ORAL 3 TIMES DAILY
COMMUNITY
Start: 2018-06-22 | End: 2018-11-09

## 2018-06-22 RX ORDER — BUSPIRONE HYDROCHLORIDE 30 MG/1
30 TABLET ORAL DAILY
COMMUNITY
Start: 2018-06-22 | End: 2018-11-28

## 2018-06-22 RX ORDER — METOPROLOL SUCCINATE 50 MG/1
150 TABLET, EXTENDED RELEASE ORAL DAILY
COMMUNITY
Start: 2018-06-22 | End: 2018-07-24

## 2018-06-22 RX ORDER — ALBUTEROL SULFATE 90 UG/1
1-2 AEROSOL, METERED RESPIRATORY (INHALATION) EVERY 4 HOURS PRN
COMMUNITY
Start: 2018-06-22 | End: 2018-08-15

## 2018-06-22 RX ORDER — TIZANIDINE 2 MG/1
2 TABLET ORAL 2 TIMES DAILY PRN
COMMUNITY
Start: 2018-06-22 | End: 2018-07-24

## 2018-06-22 RX ORDER — MIRTAZAPINE 45 MG/1
45 TABLET, FILM COATED ORAL AT BEDTIME
COMMUNITY
Start: 2018-06-22 | End: 2018-08-01

## 2018-06-22 RX ORDER — LORAZEPAM 1 MG/1
1 TABLET ORAL 4 TIMES DAILY
COMMUNITY
Start: 2018-06-22 | End: 2018-07-24

## 2018-06-22 RX ORDER — DIVALPROEX SODIUM 250 MG/1
750 TABLET, EXTENDED RELEASE ORAL 2 TIMES DAILY
COMMUNITY
Start: 2018-06-22 | End: 2018-11-09

## 2018-06-22 NOTE — TELEPHONE ENCOUNTER
Called and requested records from [facility] pharmacy as patient no-showed to appointment.     Updated medication list to reflect medications filled in last 3 months. Note, majority of medicines prescribed by Ronit Pollard. The exceptions include: Lorazepam prescribed by Cheryl Cunha; Incruse and Prednisone burst 5/25/2018 prescribed by Eulalia De La Cruz.     Notably, Rx for Azithromycin and Predisone from 6/19/18 NOT received by [facility]. Prescriptions were printed in clinic. Call to [facility], learned that patient D/C from their care AMA this morning. Unclear if Rx filled elsewhere. Left message for patient to determine if Rx need to be resent.     Routed to PCP as FYI.         Current medication list as outlined:      acamprosate (CAMPRAL) 333 MG EC tablet Take 1 tablet (333 mg) by mouth 3 times daily     albuterol (PROAIR HFA) 108 (90 Base) MCG/ACT Inhaler Inhale 1-2 puffs into the lungs every 4 hours as needed for wheezing     buPROPion (WELLBUTRIN XL) 300 MG 24 hr tablet Take 2 tablets (600 mg) by mouth every morning     BusPIRone HCl 30 MG TABS Take 30 mg by mouth daily     divalproex sodium extended-release (DEPAKOTE ER) 250 MG 24 hr tablet Take 3 tablets (750 mg) by mouth 2 times daily     gabapentin (NEURONTIN) 100 MG capsule Take 2 capsules (200 mg) by mouth 3 times daily as needed     hydrOXYzine (ATARAX) 25 MG tablet Take 1 tablet (25 mg) by mouth every 6 hours as needed for anxiety     LORazepam (ATIVAN) 1 MG tablet Take 1 tablet (1 mg) by mouth 4 times daily     metoprolol succinate (TOPROL-XL) 50 MG 24 hr tablet Take 3 tablets (150 mg) by mouth daily     mirtazapine (REMERON) 45 MG tablet Take 1 tablet (45 mg) by mouth At Bedtime     naproxen (NAPROSYN) 375 MG tablet Take 1 tablet (375 mg) by mouth 2 times daily as needed (pain)     omeprazole (PRILOSEC) 20 MG CR capsule Take 1 capsule (20 mg) by mouth 2 times daily     QUEtiapine (SEROQUEL) 100 MG tablet Take 1 tablet (100 mg) by mouth At  Bedtime     tiZANidine (ZANAFLEX) 2 MG tablet Take 1 tablet (2 mg) by mouth 2 times daily as needed     tiZANidine (ZANAFLEX) 4 MG tablet Take 1 tablet (4 mg) by mouth At Bedtime     umeclidinium (INCRUSE ELLIPTA) 62.5 MCG/INH oral inhaler Inhale 1 puff into the lungs daily     venlafaxine (EFFEXOR-XR) 150 MG 24 hr capsule Take 1 capsule (150 mg) by mouth daily     azithromycin (ZITHROMAX) 250 MG tablet Two tablets first day, then one tablet daily for four days.     predniSONE (DELTASONE) 20 MG tablet Take 2 tablets (40 mg) by mouth daily for 5 days        Medications discontinued from medication list as no fill history in last 3 months:    Medication     albuterol (2.5 MG/3ML) 0.083% neb solution     acetaminophen (TYLENOL) 325 MG tablet     fluticasone-salmeterol (ADVAIR DISKUS) 500-50 MCG/DOSE diskus inhaler     fluticasone (FLONASE) 50 MCG/ACT spray     amLODIPine (NORVASC) 10 MG tablet     guaiFENesin-dextromethorphan (ROBITUSSIN DM) 100-10 MG/5ML syrup     lisinopril (PRINIVIL,ZESTRIL) 30 MG tablet     ipratropium - albuterol 0.5 mg/2.5 mg/3 mL (DUONEB) 0.5-2.5 (3) MG/3ML neb solution     loratadine (CLARITIN) 10 MG tablet     prazosin (MINIPRESS) 1 MG capsule     polyethylene glycol (MIRALAX/GLYCOLAX) powder     ondansetron (ZOFRAN) 4 MG tablet

## 2018-06-23 ASSESSMENT — PATIENT HEALTH QUESTIONNAIRE - PHQ9: SUM OF ALL RESPONSES TO PHQ QUESTIONS 1-9: 19

## 2018-06-26 ENCOUNTER — TELEPHONE (OUTPATIENT)
Dept: FAMILY MEDICINE | Facility: CLINIC | Age: 58
End: 2018-06-26

## 2018-06-26 NOTE — TELEPHONE ENCOUNTER
[facility] phone call message- general phone call:    Reason for call: FYI: Patient was hospitalized 6/23 at [facility] for alcohol dependant with withdrawals. Plan to [facility].     Return call needed: No    OK to leave a message on voice mail?    Primary language: English      needed? No    Call taken on June 26, 2018 at 10:24 AM by Dangelo Arias

## 2018-06-29 ENCOUNTER — OFFICE VISIT (OUTPATIENT)
Dept: PHARMACY | Facility: CLINIC | Age: 58
End: 2018-06-29

## 2018-06-29 ENCOUNTER — OFFICE VISIT (OUTPATIENT)
Dept: FAMILY MEDICINE | Facility: CLINIC | Age: 58
End: 2018-06-29

## 2018-06-29 VITALS
SYSTOLIC BLOOD PRESSURE: 101 MMHG | DIASTOLIC BLOOD PRESSURE: 69 MMHG | TEMPERATURE: 98.5 F | HEART RATE: 65 BPM | WEIGHT: 119.4 LBS | OXYGEN SATURATION: 96 % | BODY MASS INDEX: 21.97 KG/M2 | RESPIRATION RATE: 18 BRPM | HEIGHT: 62 IN

## 2018-06-29 DIAGNOSIS — G47.00 INSOMNIA, UNSPECIFIED TYPE: ICD-10-CM

## 2018-06-29 DIAGNOSIS — Z09 HOSPITAL DISCHARGE FOLLOW-UP: Primary | ICD-10-CM

## 2018-06-29 DIAGNOSIS — F41.1 GENERALIZED ANXIETY DISORDER: ICD-10-CM

## 2018-06-29 DIAGNOSIS — J44.9 CHRONIC OBSTRUCTIVE PULMONARY DISEASE, UNSPECIFIED COPD TYPE (H): ICD-10-CM

## 2018-06-29 DIAGNOSIS — Z71.89 ENCOUNTER FOR MEDICATION REVIEW AND COUNSELING: Primary | ICD-10-CM

## 2018-06-29 DIAGNOSIS — M54.30 SCIATICA, UNSPECIFIED LATERALITY: ICD-10-CM

## 2018-06-29 PROBLEM — F31.60 BIPOLAR DISORDER, MIXED (H): Status: ACTIVE | Noted: 2018-06-29

## 2018-06-29 RX ORDER — ONDANSETRON 4 MG/1
4 TABLET, FILM COATED ORAL EVERY 8 HOURS PRN
Qty: 30 TABLET | Refills: 0 | Status: SHIPPED | OUTPATIENT
Start: 2018-06-29 | End: 2018-07-24

## 2018-06-29 RX ORDER — ACETAMINOPHEN 325 MG/1
650 TABLET ORAL EVERY 4 HOURS PRN
Qty: 60 TABLET | Refills: 0 | Status: SHIPPED | OUTPATIENT
Start: 2018-06-29 | End: 2018-08-01

## 2018-06-29 RX ORDER — IPRATROPIUM BROMIDE AND ALBUTEROL SULFATE 2.5; .5 MG/3ML; MG/3ML
1 SOLUTION RESPIRATORY (INHALATION) EVERY 6 HOURS PRN
Qty: 90 VIAL | Refills: 1 | Status: SHIPPED | OUTPATIENT
Start: 2018-06-29 | End: 2018-08-09

## 2018-06-29 RX ORDER — HYDROXYZINE HYDROCHLORIDE 25 MG/1
25 TABLET, FILM COATED ORAL EVERY 6 HOURS PRN
Qty: 45 TABLET | Refills: 1 | Status: SHIPPED | OUTPATIENT
Start: 2018-06-29 | End: 2018-07-24
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
## 2018-06-29 NOTE — MR AVS SNAPSHOT
"              After Visit Summary   6/29/2018    Lisa Scott    MRN: [id number]           Patient Information     Date Of Birth          1960        Visit Information        Provider Department      6/29/2018 11:20 AM Teri Salas MD [facility]        Today's Diagnoses     Hospital discharge follow-up    -  1    Generalized anxiety disorder        Insomnia, unspecified type        Sciatica, unspecified laterality        Chronic obstructive pulmonary disease, unspecified COPD type (H)          Care Instructions    Take ativan 1mg at 0800 and 1200 and 1600 and 2000.          Follow-ups after your visit        Your next 10 appointments already scheduled     Jul 19, 2018 11:20 AM CDT   Return Visit with Teri Salas MD   [facility] ([facility])    [address] [place] [address]   [phone]              Who to contact     Please call your clinic at [phone] to:    Ask questions about your health    Make or cancel appointments    Discuss your medicines    Learn about your test results    Speak to your doctor            Additional Information About Your Visit        Care EveryWhere ID     This is your Care EveryWhere ID. This could be used by other organizations to access your [facility] medical records  [id number]        Your Vitals Were     Pulse Temperature Respirations Height Pulse Oximetry BMI (Body Mass Index)    65 98.5  F (36.9  C) 18 5' 2\" (157.5 cm) 96% 21.84 kg/m2       Blood Pressure from Last 3 Encounters:   06/29/18 101/69   06/19/18 128/89   05/25/18 (!) 134/96    Weight from Last 3 Encounters:   06/29/18 119 lb 6.4 oz (54.2 kg)   06/19/18 118 lb (53.5 kg)   05/25/18 117 lb (53.1 kg)              Today, you had the following     No orders found for display         Today's Medication Changes          These changes are accurate as of 6/29/18 11:59 PM.  If you have any questions, ask your nurse or doctor.               Start taking these " medicines.        Dose/Directions    acetaminophen 325 MG tablet   Commonly known as:  TYLENOL   Used for:  Sciatica, unspecified laterality   Started by:  Teri Salas MD        Dose:  650 mg   Take 2 tablets (650 mg) by mouth every 4 hours as needed for mild pain   Quantity:  60 tablet   Refills:  0       ipratropium - albuterol 0.5 mg/2.5 mg/3 mL 0.5-2.5 (3) MG/3ML neb solution   Commonly known as:  DUONEB   Used for:  Chronic obstructive pulmonary disease, unspecified COPD type (H)   Started by:  Teri Salas MD        Dose:  1 vial   Take 1 vial (3 mLs) by nebulization every 6 hours as needed for shortness of breath / dyspnea or wheezing   Quantity:  90 vial   Refills:  1       ondansetron 4 MG tablet   Commonly known as:  ZOFRAN   Used for:  Generalized anxiety disorder   Started by:  Teri Salas MD        Dose:  4 mg   Take 1 tablet (4 mg) by mouth every 8 hours as needed for nausea   Quantity:  30 tablet   Refills:  0         These medicines have changed or have updated prescriptions.        Dose/Directions    * tiZANidine 2 MG tablet   Commonly known as:  ZANAFLEX   This may have changed:  Another medication with the same name was changed. Make sure you understand how and when to take each.   Changed by:  Teri Salas MD        Dose:  2 mg   Take 1 tablet (2 mg) by mouth 2 times daily as needed   Refills:  0       * tiZANidine 4 MG tablet   Commonly known as:  ZANAFLEX   This may have changed:    - when to take this  - reasons to take this   Used for:  Sciatica, unspecified laterality   Changed by:  Teri Saals MD        Dose:  4 mg   Take 1 tablet (4 mg) by mouth nightly as needed for muscle spasms   Quantity:  30 tablet   Refills:  0       * Notice:  This list has 2 medication(s) that are the same as other medications prescribed for you. Read the directions carefully, and ask your doctor or other care provider to review them with you.         Where to  get your medicines      These medications were sent to [id number] - [place], [place] - [address] AT [facility]  [address] [place] [address], [place] [address]     Phone:  [phone]     acetaminophen 325 MG tablet    hydrOXYzine 25 MG tablet    ipratropium - albuterol 0.5 mg/2.5 mg/3 mL 0.5-2.5 (3) MG/3ML neb solution    ondansetron 4 MG tablet    tiZANidine 4 MG tablet                Primary Care Provider Office Phone # Fax #    Teri Salas [phone]       [facility] [address] [place] [address]        Equal Access to Services     ELIF BROOKS : Hadii cecilia ku hadasho Somgali, waaxda luqadaha, qaybta kaalmada adeegyada, waxay idiin haydiaz watkins . So [facility] [phone].    ATENCIÓN: Si habla español, tiene a holley disposición servicios gratuitos de asistencia lingüística. [facility] [phone].    We comply with applicable federal civil rights laws and [place] laws. We do not discriminate on the basis of race, color, national origin, age, disability, sex, sexual orientation, or gender identity.            Thank you!     Thank you for choosing [facility]  for your care. Our goal is always to provide you with excellent care. Hearing back from our patients is one way we can continue to improve our services. Please take a few minutes to complete the written survey that you may receive in the mail after your visit with us. Thank you!             Your Updated Medication List - Protect others around you: Learn how to safely use, store and throw away your medicines at www.disposemymeds.org.          This list is accurate as of 6/29/18 11:59 PM.  Always use your most recent med list.                   Brand Name Dispense Instructions for use Diagnosis    acamprosate 333 MG EC tablet    CAMPRAL     Take 1 tablet (333 mg) by mouth 3 times daily    Alcohol dependence in remission (H)       acetaminophen 325  MG tablet    TYLENOL    60 tablet    Take 2 tablets (650 mg) by mouth every 4 hours as needed for mild pain    Sciatica, unspecified laterality       buPROPion 300 MG 24 hr tablet    WELLBUTRIN XL     Take 2 tablets (600 mg) by mouth every morning    Major depressive disorder, recurrent episode, moderate (H)       BusPIRone HCl 30 MG Tabs      Take 30 mg by mouth daily    Major depressive disorder, recurrent episode, moderate (H)       divalproex sodium extended-release 250 MG 24 hr tablet    DEPAKOTE ER     Take 3 tablets (750 mg) by mouth 2 times daily    Nonintractable epilepsy without status epilepticus, unspecified epilepsy type (H)       gabapentin 100 MG capsule    NEURONTIN    90 capsule    Take 2 capsules (200 mg) by mouth 3 times daily as needed    Arthritis       hydrOXYzine 25 MG tablet    ATARAX    45 tablet    Take 1 tablet (25 mg) by mouth every 6 hours as needed for anxiety    Generalized anxiety disorder, Insomnia, unspecified type       ipratropium - albuterol 0.5 mg/2.5 mg/3 mL 0.5-2.5 (3) MG/3ML neb solution    DUONEB    90 vial    Take 1 vial (3 mLs) by nebulization every 6 hours as needed for shortness of breath / dyspnea or wheezing    Chronic obstructive pulmonary disease, unspecified COPD type (H)       LORazepam 1 MG tablet    ATIVAN     Take 1 tablet (1 mg) by mouth 4 times daily        metoprolol succinate 50 MG 24 hr tablet    TOPROL-XL     Take 3 tablets (150 mg) by mouth daily    Essential hypertension, benign       mirtazapine 45 MG tablet    REMERON     Take 1 tablet (45 mg) by mouth At Bedtime        naproxen 375 MG tablet    NAPROSYN     Take 1 tablet (375 mg) by mouth 2 times daily as needed (pain)        omeprazole 20 MG CR capsule    priLOSEC    60 capsule    Take 1 capsule (20 mg) by mouth 2 times daily    Gastroesophageal reflux disease, esophagitis presence not specified       ondansetron 4 MG tablet    ZOFRAN    30 tablet    Take 1 tablet (4 mg) by mouth every 8 hours as  needed for nausea    Generalized anxiety disorder       PROAIR  (90 Base) MCG/ACT Inhaler   Generic drug:  albuterol      Inhale 1-2 puffs into the lungs every 4 hours as needed for wheezing    Chronic obstructive pulmonary disease, unspecified COPD type (H)       QUEtiapine 100 MG tablet    SEROquel     Take 1 tablet (100 mg) by mouth At Bedtime    Psychosis, unspecified psychosis type       * tiZANidine 2 MG tablet    ZANAFLEX     Take 1 tablet (2 mg) by mouth 2 times daily as needed        * tiZANidine 4 MG tablet    ZANAFLEX    30 tablet    Take 1 tablet (4 mg) by mouth nightly as needed for muscle spasms    Sciatica, unspecified laterality       umeclidinium 62.5 MCG/INH oral inhaler    INCRUSE ELLIPTA    1 Inhaler    Inhale 1 puff into the lungs daily    Chronic obstructive pulmonary disease, unspecified COPD type (H)       venlafaxine 150 MG 24 hr capsule    EFFEXOR-XR     Take 1 capsule (150 mg) by mouth daily    Major depressive disorder, recurrent episode, moderate (H)       * Notice:  This list has 2 medication(s) that are the same as other medications prescribed for you. Read the directions carefully, and ask your doctor or other care provider to review them with you.

## 2018-06-29 NOTE — MR AVS SNAPSHOT
After Visit Summary   6/29/2018    Lisa Scott    MRN: [id number]           Patient Information     Date Of Birth          1960        Visit Information        Provider Department      6/29/2018 11:00 AM Erica Molina RPH [facility]        Today's Diagnoses     Encounter for medication review and counseling    -  1       Follow-ups after your visit        Your next 10 appointments already scheduled     Jul 19, 2018 11:20 AM CDT   Return Visit with Teri Salas MD   [facility] ([facility])    [address] [place] [address]   [phone]              Who to contact     Please call your clinic at [phone] to:    Ask questions about your health    Make or cancel appointments    Discuss your medicines    Learn about your test results    Speak to your doctor            Additional Information About Your Visit        Care EveryWhere ID     This is your Care EveryWhere ID. This could be used by other organizations to access your [facility] medical records  [id number]         Blood Pressure from Last 3 Encounters:   06/29/18 101/69   06/19/18 128/89   05/25/18 (!) 134/96    Weight from Last 3 Encounters:   06/29/18 119 lb 6.4 oz (54.2 kg)   06/19/18 118 lb (53.5 kg)   05/25/18 117 lb (53.1 kg)              Today, you had the following     No orders found for display         Today's Medication Changes          These changes are accurate as of 6/29/18 11:59 PM.  If you have any questions, ask your nurse or doctor.               Start taking these medicines.        Dose/Directions    acetaminophen 325 MG tablet   Commonly known as:  TYLENOL   Used for:  Sciatica, unspecified laterality   Started by:  Teri Salas MD        Dose:  650 mg   Take 2 tablets (650 mg) by mouth every 4 hours as needed for mild pain   Quantity:  60 tablet   Refills:  0       ipratropium - albuterol 0.5 mg/2.5 mg/3 mL 0.5-2.5 (3) MG/3ML neb solution    Commonly known as:  DUONEB   Used for:  Chronic obstructive pulmonary disease, unspecified COPD type (H)   Started by:  Teri Salas MD        Dose:  1 vial   Take 1 vial (3 mLs) by nebulization every 6 hours as needed for shortness of breath / dyspnea or wheezing   Quantity:  90 vial   Refills:  1       ondansetron 4 MG tablet   Commonly known as:  ZOFRAN   Used for:  Generalized anxiety disorder   Started by:  Teri Salas MD        Dose:  4 mg   Take 1 tablet (4 mg) by mouth every 8 hours as needed for nausea   Quantity:  30 tablet   Refills:  0         These medicines have changed or have updated prescriptions.        Dose/Directions    * tiZANidine 2 MG tablet   Commonly known as:  ZANAFLEX   This may have changed:  Another medication with the same name was changed. Make sure you understand how and when to take each.   Changed by:  Teri Salas MD        Dose:  2 mg   Take 1 tablet (2 mg) by mouth 2 times daily as needed   Refills:  0       * tiZANidine 4 MG tablet   Commonly known as:  ZANAFLEX   This may have changed:    - when to take this  - reasons to take this   Used for:  Sciatica, unspecified laterality   Changed by:  Teri Salas MD        Dose:  4 mg   Take 1 tablet (4 mg) by mouth nightly as needed for muscle spasms   Quantity:  30 tablet   Refills:  0       * Notice:  This list has 2 medication(s) that are the same as other medications prescribed for you. Read the directions carefully, and ask your doctor or other care provider to review them with you.         Where to get your medicines      These medications were sent to [id number] - [place], [place] - [address], [place] [address]     Phone:  [phone]     acetaminophen 325 MG tablet    hydrOXYzine 25 MG tablet    ipratropium - albuterol 0.5 mg/2.5 mg/3 mL 0.5-2.5 (3) MG/3ML neb solution    ondansetron 4 MG tablet     tiZANidine 4 MG tablet                Primary Care Provider Office Phone # Fax #    Teri Ruth Ann Salas [phone]       [facility] [address] [place] [address]        Equal Access to Services     ELIF BROOKS : Hadii cecilia schwab mishelo Soomaali, waaxda luqadaha, qaybta kaalmada adeegyada, joaquin francyin hayaan po peter june quintanilla. So [facility] [phone].    ATENCIÓN: Si habla español, tiene a holley disposición servicios gratuitos de asistencia lingüística. Llame al [phone].    We comply with applicable federal civil rights laws and [place] laws. We do not discriminate on the basis of race, color, national origin, age, disability, sex, sexual orientation, or gender identity.            Thank you!     Thank you for choosing [facility]  for your care. Our goal is always to provide you with excellent care. Hearing back from our patients is one way we can continue to improve our services. Please take a few minutes to complete the written survey that you may receive in the mail after your visit with us. Thank you!             Your Updated Medication List - Protect others around you: Learn how to safely use, store and throw away your medicines at www.disposemymeds.org.          This list is accurate as of 6/29/18 11:59 PM.  Always use your most recent med list.                   Brand Name Dispense Instructions for use Diagnosis    acamprosate 333 MG EC tablet    CAMPRAL     Take 1 tablet (333 mg) by mouth 3 times daily    Alcohol dependence in remission (H)       acetaminophen 325 MG tablet    TYLENOL    60 tablet    Take 2 tablets (650 mg) by mouth every 4 hours as needed for mild pain    Sciatica, unspecified laterality       buPROPion 300 MG 24 hr tablet    WELLBUTRIN XL     Take 2 tablets (600 mg) by mouth every morning    Major depressive disorder, recurrent episode, moderate (H)       BusPIRone HCl 30 MG Tabs      Take 30 mg by mouth daily    Major depressive  disorder, recurrent episode, moderate (H)       divalproex sodium extended-release 250 MG 24 hr tablet    DEPAKOTE ER     Take 3 tablets (750 mg) by mouth 2 times daily    Nonintractable epilepsy without status epilepticus, unspecified epilepsy type (H)       gabapentin 100 MG capsule    NEURONTIN    90 capsule    Take 2 capsules (200 mg) by mouth 3 times daily as needed    Arthritis       hydrOXYzine 25 MG tablet    ATARAX    45 tablet    Take 1 tablet (25 mg) by mouth every 6 hours as needed for anxiety    Generalized anxiety disorder, Insomnia, unspecified type       ipratropium - albuterol 0.5 mg/2.5 mg/3 mL 0.5-2.5 (3) MG/3ML neb solution    DUONEB    90 vial    Take 1 vial (3 mLs) by nebulization every 6 hours as needed for shortness of breath / dyspnea or wheezing    Chronic obstructive pulmonary disease, unspecified COPD type (H)       LORazepam 1 MG tablet    ATIVAN     Take 1 tablet (1 mg) by mouth 4 times daily        metoprolol succinate 50 MG 24 hr tablet    TOPROL-XL     Take 3 tablets (150 mg) by mouth daily    Essential hypertension, benign       mirtazapine 45 MG tablet    REMERON     Take 1 tablet (45 mg) by mouth At Bedtime        naproxen 375 MG tablet    NAPROSYN     Take 1 tablet (375 mg) by mouth 2 times daily as needed (pain)        omeprazole 20 MG CR capsule    priLOSEC    60 capsule    Take 1 capsule (20 mg) by mouth 2 times daily    Gastroesophageal reflux disease, esophagitis presence not specified       ondansetron 4 MG tablet    ZOFRAN    30 tablet    Take 1 tablet (4 mg) by mouth every 8 hours as needed for nausea    Generalized anxiety disorder       PROAIR  (90 Base) MCG/ACT Inhaler   Generic drug:  albuterol      Inhale 1-2 puffs into the lungs every 4 hours as needed for wheezing    Chronic obstructive pulmonary disease, unspecified COPD type (H)       QUEtiapine 100 MG tablet    SEROquel     Take 1 tablet (100 mg) by mouth At Bedtime    Psychosis, unspecified psychosis  type       * tiZANidine 2 MG tablet    ZANAFLEX     Take 1 tablet (2 mg) by mouth 2 times daily as needed        * tiZANidine 4 MG tablet    ZANAFLEX    30 tablet    Take 1 tablet (4 mg) by mouth nightly as needed for muscle spasms    Sciatica, unspecified laterality       umeclidinium 62.5 MCG/INH oral inhaler    INCRUSE ELLIPTA    1 Inhaler    Inhale 1 puff into the lungs daily    Chronic obstructive pulmonary disease, unspecified COPD type (H)       venlafaxine 150 MG 24 hr capsule    EFFEXOR-XR     Take 1 capsule (150 mg) by mouth daily    Major depressive disorder, recurrent episode, moderate (H)       * Notice:  This list has 2 medication(s) that are the same as other medications prescribed for you. Read the directions carefully, and ask your doctor or other care provider to review them with you.
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
## 2018-07-06 NOTE — PROGRESS NOTES
Preceptor Attestation:   Patient seen, evaluated and discussed with the resident. I have verified the content of the note, which accurately reflects my assessment of the patient and the plan of care.  Supervising Physician:Jacqueline Foster MD  [facility]

## 2018-07-07 ASSESSMENT — PATIENT HEALTH QUESTIONNAIRE - PHQ9: SUM OF ALL RESPONSES TO PHQ QUESTIONS 1-9: 11

## 2018-07-16 ENCOUNTER — COMMUNICATION - HEALTHEAST (OUTPATIENT)
Dept: PHARMACY | Facility: CLINIC | Age: 58
End: 2018-07-16

## 2018-07-16 DIAGNOSIS — F10.20 ALCOHOL USE DISORDER, SEVERE, DEPENDENCE (H): ICD-10-CM

## 2018-07-24 ENCOUNTER — OFFICE VISIT (OUTPATIENT)
Dept: FAMILY MEDICINE | Facility: CLINIC | Age: 58
End: 2018-07-24

## 2018-07-24 VITALS
WEIGHT: 118 LBS | HEART RATE: 73 BPM | BODY MASS INDEX: 21.58 KG/M2 | OXYGEN SATURATION: 97 % | TEMPERATURE: 97.8 F | RESPIRATION RATE: 18 BRPM | SYSTOLIC BLOOD PRESSURE: 125 MMHG | DIASTOLIC BLOOD PRESSURE: 84 MMHG

## 2018-07-24 DIAGNOSIS — F41.1 GENERALIZED ANXIETY DISORDER: ICD-10-CM

## 2018-07-24 DIAGNOSIS — I10 ESSENTIAL HYPERTENSION, BENIGN: ICD-10-CM

## 2018-07-24 DIAGNOSIS — R56.9 SEIZURE (H): Primary | ICD-10-CM

## 2018-07-24 DIAGNOSIS — G47.00 INSOMNIA, UNSPECIFIED TYPE: ICD-10-CM

## 2018-07-24 DIAGNOSIS — M19.90 ARTHRITIS: ICD-10-CM

## 2018-07-24 DIAGNOSIS — J44.9 CHRONIC OBSTRUCTIVE PULMONARY DISEASE, UNSPECIFIED COPD TYPE (H): ICD-10-CM

## 2018-07-24 RX ORDER — METOPROLOL SUCCINATE 100 MG/1
100 TABLET, EXTENDED RELEASE ORAL DAILY
Qty: 30 TABLET | Refills: 1 | Status: SHIPPED | OUTPATIENT
Start: 2018-07-24 | End: 2018-08-01 | Stop reason: DRUGHIGH

## 2018-07-24 RX ORDER — GABAPENTIN 100 MG/1
200 CAPSULE ORAL 3 TIMES DAILY PRN
Qty: 90 CAPSULE | Refills: 1 | Status: SHIPPED | OUTPATIENT
Start: 2018-07-24 | End: 2018-08-09

## 2018-07-24 RX ORDER — LORAZEPAM 1 MG/1
1 TABLET ORAL 4 TIMES DAILY
Qty: 8 TABLET | Refills: 0 | Status: SHIPPED | OUTPATIENT
Start: 2018-07-24 | End: 2018-08-15

## 2018-07-24 RX ORDER — TIZANIDINE 2 MG/1
2 TABLET ORAL 2 TIMES DAILY PRN
Qty: 90 TABLET | Refills: 0 | Status: SHIPPED | OUTPATIENT
Start: 2018-07-24 | End: 2018-08-09

## 2018-07-24 RX ORDER — HYDROXYZINE HYDROCHLORIDE 25 MG/1
25 TABLET, FILM COATED ORAL EVERY 6 HOURS PRN
Qty: 45 TABLET | Refills: 1 | Status: SHIPPED | OUTPATIENT
Start: 2018-07-24 | End: 2018-08-09

## 2018-07-24 RX ORDER — ONDANSETRON 4 MG/1
4 TABLET, FILM COATED ORAL EVERY 8 HOURS PRN
Qty: 30 TABLET | Refills: 0 | Status: SHIPPED | OUTPATIENT
Start: 2018-07-24 | End: 2018-08-09

## 2018-07-24 NOTE — MR AVS SNAPSHOT
After Visit Summary   7/24/2018    Lisa Scott    MRN: [id number]           Patient Information     Date Of Birth          1960        Visit Information        Provider Department      7/24/2018 11:00 AM Eulalia De La Cruz MD [facility]        Today's Diagnoses     Seizure (H)    -  1    Essential hypertension, benign        Generalized anxiety disorder        Insomnia, unspecified type        Chronic obstructive pulmonary disease, unspecified COPD type (H)        Arthritis          Care Instructions    Referral for Neurology along with OV notes faxed to [facility]  [phone]  [phone]  Clinic will contact patient to schedule appointment          Follow-ups after your visit        Additional Services     NEUROLOGY ADULT REFERRAL       Patient prefers to be called- ok to leave VM with appointment time (mornings preferred    Reason for Referral: recent witnessed seizure on 7/19, no known prior seizure history?, on multiple meds (wellbutrin/ativan/zolpidem/effexor/and others from psych), also on depakote--- unclear reasons and unsure who is prescribing this). Appreciate recs regarding abnormal head CT with encephalomalacia (?focus for seizure activity).   Referral Location: [facility] ([facility]): [phone]     needed: No  Language: English    May leave message on voicemail: Yes    ([facility]) Referral should be tracked (Yes/No)? Yes                  Your next 10 appointments already scheduled     Aug 23, 2018 10:00 AM CDT   Return Visit with Teri Salas MD   [facility] ([facility])    [address] [place] [id number]   [phone]              Who to contact     Please call your clinic at [phone] to:    Ask questions about your health    Make or cancel appointments    Discuss your medicines    Learn about your test results    Speak to your doctor            Additional  Information About Your Visit        Care EveryWhere ID     This is your Care EveryWhere ID. This could be used by other organizations to access your [facility] medical records  [id number]        Your Vitals Were     Pulse Temperature Respirations Pulse Oximetry BMI (Body Mass Index)       73 97.8  F (36.6  C) (Oral) 18 97% 21.58 kg/m2        Blood Pressure from Last 3 Encounters:   07/24/18 125/84   06/29/18 101/69   06/19/18 128/89    Weight from Last 3 Encounters:   07/24/18 118 lb (53.5 kg)   06/29/18 119 lb 6.4 oz (54.2 kg)   06/19/18 118 lb (53.5 kg)                 Today's Medication Changes          These changes are accurate as of 7/24/18 11:59 PM.  If you have any questions, ask your nurse or doctor.               These medicines have changed or have updated prescriptions.        Dose/Directions    metoprolol succinate 100 MG 24 hr tablet   Commonly known as:  TOPROL-XL   This may have changed:    - medication strength  - how much to take   Used for:  Essential hypertension, benign   Changed by:  Eulalia De La Cruz MD        Dose:  100 mg   Take 1 tablet (100 mg) by mouth daily   Quantity:  30 tablet   Refills:  1            Where to get your medicines      These medications were sent to [id number] - [place], [place] - [address], [place] [address]     Phone:  [phone]     gabapentin 100 MG capsule    hydrOXYzine 25 MG tablet    metoprolol succinate 100 MG 24 hr tablet    ondansetron 4 MG tablet    tiZANidine 2 MG tablet    umeclidinium 62.5 MCG/INH oral inhaler         Some of these will need a paper prescription and others can be bought over the counter.  Ask your nurse if you have questions.     Bring a paper prescription for each of these medications     LORazepam 1 MG tablet                Primary Care Provider Office Phone # Fax #    Teri Salas [phone]       [facility] [address] [place] [address]        Equal Access to Services     ELIF BROOKS : Hadii aad ku hadjamisonrandy Soanca, waaxda luqadaha, qaybta kaalmacindy blanco, joaquin petelienlyly quintanilla. So [facility] [phone].    ATENCIÓN: Si habla español, tiene a holley disposición servicios gratuitos de asistencia lingüística. Llame al [phone].    We comply with applicable federal civil rights laws and [place] laws. We do not discriminate on the basis of race, color, national origin, age, disability, sex, sexual orientation, or gender identity.            Thank you!     Thank you for choosing [facility]  for your care. Our goal is always to provide you with excellent care. Hearing back from our patients is one way we can continue to improve our services. Please take a few minutes to complete the written survey that you may receive in the mail after your visit with us. Thank you!             Your Updated Medication List - Protect others around you: Learn how to safely use, store and throw away your medicines at www.disposemymeds.org.          This list is accurate as of 7/24/18 11:59 PM.  Always use your most recent med list.                   Brand Name Dispense Instructions for use Diagnosis    acamprosate 333 MG EC tablet    CAMPRAL     Take 1 tablet (333 mg) by mouth 3 times daily    Alcohol dependence in remission (H)       acetaminophen 325 MG tablet    TYLENOL    60 tablet    Take 2 tablets (650 mg) by mouth every 4 hours as needed for mild pain    Sciatica, unspecified laterality       buPROPion 300 MG 24 hr tablet    WELLBUTRIN XL     Take 2 tablets (600 mg) by mouth every morning    Major depressive disorder, recurrent episode, moderate (H)       BusPIRone HCl 30 MG Tabs      Take 30 mg by mouth daily    Major depressive disorder, recurrent episode, moderate (H)       divalproex sodium extended-release 250 MG 24 hr tablet    DEPAKOTE ER     Take 3 tablets (750 mg) by mouth 2  times daily    Nonintractable epilepsy without status epilepticus, unspecified epilepsy type (H)       gabapentin 100 MG capsule    NEURONTIN    90 capsule    Take 2 capsules (200 mg) by mouth 3 times daily as needed    Arthritis       hydrOXYzine 25 MG tablet    ATARAX    45 tablet    Take 1 tablet (25 mg) by mouth every 6 hours as needed for anxiety    Generalized anxiety disorder, Insomnia, unspecified type       ipratropium - albuterol 0.5 mg/2.5 mg/3 mL 0.5-2.5 (3) MG/3ML neb solution    DUONEB    90 vial    Take 1 vial (3 mLs) by nebulization every 6 hours as needed for shortness of breath / dyspnea or wheezing    Chronic obstructive pulmonary disease, unspecified COPD type (H)       LORazepam 1 MG tablet    ATIVAN    8 tablet    Take 1 tablet (1 mg) by mouth 4 times daily    Generalized anxiety disorder       metoprolol succinate 100 MG 24 hr tablet    TOPROL-XL    30 tablet    Take 1 tablet (100 mg) by mouth daily    Essential hypertension, benign       mirtazapine 45 MG tablet    REMERON     Take 1 tablet (45 mg) by mouth At Bedtime        naproxen 375 MG tablet    NAPROSYN     Take 1 tablet (375 mg) by mouth 2 times daily as needed (pain)        omeprazole 20 MG CR capsule    priLOSEC    60 capsule    Take 1 capsule (20 mg) by mouth 2 times daily    Gastroesophageal reflux disease, esophagitis presence not specified       ondansetron 4 MG tablet    ZOFRAN    30 tablet    Take 1 tablet (4 mg) by mouth every 8 hours as needed for nausea    Generalized anxiety disorder       PROAIR  (90 Base) MCG/ACT Inhaler   Generic drug:  albuterol      Inhale 1-2 puffs into the lungs every 4 hours as needed for wheezing    Chronic obstructive pulmonary disease, unspecified COPD type (H)       QUEtiapine 100 MG tablet    SEROquel     Take 1 tablet (100 mg) by mouth At Bedtime    Psychosis, unspecified psychosis type       * tiZANidine 4 MG tablet    ZANAFLEX    30 tablet    Take 1 tablet (4 mg) by mouth nightly as  needed for muscle spasms    Sciatica, unspecified laterality       * tiZANidine 2 MG tablet    ZANAFLEX    90 tablet    Take 1 tablet (2 mg) by mouth 2 times daily as needed    Generalized anxiety disorder       umeclidinium 62.5 MCG/INH oral inhaler    INCRUSE ELLIPTA    1 Inhaler    Inhale 1 puff into the lungs daily    Chronic obstructive pulmonary disease, unspecified COPD type (H)       venlafaxine 150 MG 24 hr capsule    EFFEXOR-XR     Take 1 capsule (150 mg) by mouth daily    Major depressive disorder, recurrent episode, moderate (H)       * Notice:  This list has 2 medication(s) that are the same as other medications prescribed for you. Read the directions carefully, and ask your doctor or other care provider to review them with you.

## 2018-07-24 NOTE — Clinical Note
HENNA Lopez. I recommend she come in for closer follow ups with single person or a small team to identify issues better
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
## 2018-07-24 NOTE — PROGRESS NOTES
Preceptor Attestation:   Patient seen, evaluated and discussed with the resident. I have verified the content of the note, which accurately reflects my assessment of the patient and the plan of care.    Supervising Physician:Lora Atkins MD    [facility]

## 2018-07-24 NOTE — NURSING NOTE
7/23/2018 PCS Previsit Plan   DUE FOR:  Currently living in a homeless [facility]  PHQ9/GAD7-given  HIV screen  Hep C screen  Mammogram  Colon/FIT  Advance directive-packet given  KONG Desai

## 2018-07-24 NOTE — LETTER
Accommodations Letter    7/24/2018    Re: Lisa Scott  1960      To Whom It May Concern:     Lisa Scott was seen in clinic today. She is requesting a lower bunk for sleeping accommodations as sleeping on the floor makes her left hip more painful which was recently surgical revised. She had a recent seizure episode for which is why she was told to sleep on the floor. However, to my knowledge she had multiple other medical and medication reasons contributing to the state of being at that time. She does not have a known history of seizure to my knowledge. I recommend she be allowed the lower bunk bed, but not a high bunk bed. She is aware of the risk if she were to fall out of the lower bunk and wishes to still do this.     Eulalia De La Cruz MD  7/24/2018 11:33 AM

## 2018-07-26 NOTE — PATIENT INSTRUCTIONS
Referral for Neurology along with OV notes faxed to [facility]  [phone]  [phone]  Clinic will contact patient to schedule appointment    8/28/18 Per Neuro, they called and left message on 8/16/18 for pt to return call to schedule. KONG Oneal    9/18/18 Mailed letter, pt still not seen yet. KONG Oneal

## 2018-07-31 ASSESSMENT — PATIENT HEALTH QUESTIONNAIRE - PHQ9: SUM OF ALL RESPONSES TO PHQ QUESTIONS 1-9: 24

## 2018-08-01 ENCOUNTER — OFFICE VISIT (OUTPATIENT)
Dept: FAMILY MEDICINE | Facility: CLINIC | Age: 58
End: 2018-08-01
Payer: COMMERCIAL

## 2018-08-01 VITALS
OXYGEN SATURATION: 97 % | DIASTOLIC BLOOD PRESSURE: 117 MMHG | RESPIRATION RATE: 16 BRPM | WEIGHT: 118 LBS | SYSTOLIC BLOOD PRESSURE: 169 MMHG | HEART RATE: 56 BPM | TEMPERATURE: 98.1 F | BODY MASS INDEX: 21.58 KG/M2

## 2018-08-01 DIAGNOSIS — M75.01 ADHESIVE CAPSULITIS OF BOTH SHOULDERS: ICD-10-CM

## 2018-08-01 DIAGNOSIS — F33.1 MAJOR DEPRESSIVE DISORDER, RECURRENT EPISODE, MODERATE (H): ICD-10-CM

## 2018-08-01 DIAGNOSIS — F41.1 GENERALIZED ANXIETY DISORDER: Primary | ICD-10-CM

## 2018-08-01 DIAGNOSIS — R56.9 ALCOHOL RELATED SEIZURE (H): ICD-10-CM

## 2018-08-01 DIAGNOSIS — M75.02 ADHESIVE CAPSULITIS OF BOTH SHOULDERS: ICD-10-CM

## 2018-08-01 DIAGNOSIS — M54.30 SCIATICA, UNSPECIFIED LATERALITY: ICD-10-CM

## 2018-08-01 DIAGNOSIS — J44.9 CHRONIC OBSTRUCTIVE PULMONARY DISEASE, UNSPECIFIED COPD TYPE (H): ICD-10-CM

## 2018-08-01 DIAGNOSIS — K21.9 GASTROESOPHAGEAL REFLUX DISEASE, ESOPHAGITIS PRESENCE NOT SPECIFIED: ICD-10-CM

## 2018-08-01 PROBLEM — F13.20 BENZODIAZEPINE DEPENDENCE (H): Status: ACTIVE | Noted: 2017-05-03

## 2018-08-01 PROBLEM — Z59.00 HOMELESS SINGLE PERSON: Status: ACTIVE | Noted: 2018-07-30

## 2018-08-01 PROBLEM — F10.10 ALCOHOL ABUSE: Status: ACTIVE | Noted: 2018-01-07

## 2018-08-01 RX ORDER — ACETAMINOPHEN 325 MG/1
650 TABLET ORAL EVERY 4 HOURS PRN
Qty: 60 TABLET | Refills: 0 | Status: SHIPPED | OUTPATIENT
Start: 2018-08-01 | End: 2018-08-01

## 2018-08-01 RX ORDER — METOPROLOL SUCCINATE 50 MG/1
150 TABLET, EXTENDED RELEASE ORAL DAILY
COMMUNITY
End: 2018-08-09

## 2018-08-01 RX ORDER — LORAZEPAM 1 MG/1
1 TABLET ORAL 4 TIMES DAILY
Qty: 32 TABLET | Refills: 0 | Status: CANCELLED | OUTPATIENT
Start: 2018-08-01 | End: 2018-08-09

## 2018-08-01 RX ORDER — PREDNISONE 50 MG/1
50 TABLET ORAL DAILY
COMMUNITY
End: 2018-08-15

## 2018-08-01 RX ORDER — BUPROPION HYDROCHLORIDE 300 MG/1
600 TABLET ORAL EVERY MORNING
Qty: 30 TABLET | Refills: 0 | Status: SHIPPED | OUTPATIENT
Start: 2018-08-01 | End: 2018-08-15

## 2018-08-01 RX ORDER — CALCIUM CARBONATE 500 MG/1
500 TABLET, CHEWABLE ORAL 3 TIMES DAILY PRN
COMMUNITY
Start: 2018-07-31 | End: 2018-08-15

## 2018-08-01 RX ORDER — ACETAMINOPHEN 325 MG/1
650 TABLET ORAL EVERY 4 HOURS PRN
Qty: 60 TABLET | Refills: 0 | Status: SHIPPED | OUTPATIENT
Start: 2018-08-01 | End: 2018-08-10

## 2018-08-01 RX ORDER — FLUTICASONE PROPIONATE 110 UG/1
1 AEROSOL, METERED RESPIRATORY (INHALATION) 2 TIMES DAILY
COMMUNITY
Start: 2018-07-30 | End: 2018-08-01

## 2018-08-01 RX ORDER — FLUTICASONE PROPIONATE 110 UG/1
1 AEROSOL, METERED RESPIRATORY (INHALATION) 2 TIMES DAILY
Qty: 12 G | Refills: 1 | Status: SHIPPED | OUTPATIENT
Start: 2018-08-01 | End: 2018-08-15

## 2018-08-01 RX ORDER — MIRTAZAPINE 45 MG/1
45 TABLET, FILM COATED ORAL AT BEDTIME
Qty: 30 TABLET | Refills: 0 | Status: SHIPPED | OUTPATIENT
Start: 2018-08-01 | End: 2018-08-09
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
## 2018-08-01 NOTE — PATIENT INSTRUCTIONS
Thanks you for coming to clinic.    We refilled your medications today. Call your psychiatrist concerning ativan refills if needed.    See us back in 1 week to see how you are doing.

## 2018-08-01 NOTE — PROGRESS NOTES
Preceptor Attestation:  Patient's case reviewed and discussed with Benjamin Rosenstein, MD resident and I evaluated the patient. I agree with written assessment and plan of care.  Supervising Physician:  MARIA EUGENIA DE LA PAZ MD  [facility]

## 2018-08-01 NOTE — MR AVS SNAPSHOT
After Visit Summary   8/1/2018    Lisa Scott    MRN: [id number]           Patient Information     Date Of Birth          1960        Visit Information        Provider Department      8/1/2018 8:40 AM Rosenstein, Benjamin, MD [facility]        Today's Diagnoses     Gastroesophageal reflux disease, esophagitis presence not specified    -  1    Generalized anxiety disorder        Major depressive disorder, recurrent episode, moderate (H)        Sciatica, unspecified laterality        Adhesive capsulitis of both shoulders        Chronic obstructive pulmonary disease, unspecified COPD type (H)          Care Instructions    Thanks you for coming to clinic.    We refilled your medications today. Call your psychiatrist concerning ativan refills if needed.    See us back in 1 week to see how you are doing.           Follow-ups after your visit        Your next 10 appointments already scheduled     Aug 23, 2018 10:00 AM CDT   Return Visit with Teri Salas MD   [facility] ([facility])    [address] [place] [id number]   [phone]              Who to contact     Please call your clinic at [phone] to:    Ask questions about your health    Make or cancel appointments    Discuss your medicines    Learn about your test results    Speak to your doctor            Additional Information About Your Visit        Care EveryWhere ID     This is your Care EveryWhere ID. This could be used by other organizations to access your [facility] medical records  [id number]        Your Vitals Were     Pulse Temperature Respirations Pulse Oximetry BMI (Body Mass Index)       56 98.1  F (36.7  C) (Oral) 16 97% 21.58 kg/m2        Blood Pressure from Last 3 Encounters:   08/01/18 (!) 169/117   07/24/18 125/84   06/29/18 101/69    Weight from Last 3 Encounters:   08/01/18 118 lb (53.5 kg)   07/24/18 118 lb (53.5 kg)   06/29/18 119 lb 6.4 oz (54.2 kg)              Today, you  had the following     No orders found for display         Today's Medication Changes          These changes are accurate as of 8/1/18 10:34 AM.  If you have any questions, ask your nurse or doctor.               Start taking these medicines.        Dose/Directions    acetaminophen 325 MG tablet   Commonly known as:  TYLENOL   Used for:  Sciatica, unspecified laterality   Started by:  Rosenstein, Benjamin, MD        Dose:  650 mg   Take 2 tablets (650 mg) by mouth every 4 hours as needed for mild pain   Quantity:  60 tablet   Refills:  0         These medicines have changed or have updated prescriptions.        Dose/Directions    metoprolol succinate 50 MG 24 hr tablet   Commonly known as:  TOPROL-XL   This may have changed:  Another medication with the same name was removed. Continue taking this medication, and follow the directions you see here.   Changed by:  Rosenstein, Benjamin, MD        Dose:  150 mg   Take 150 mg by mouth daily   Refills:  0            Where to get your medicines      These medications were sent to [facility] [address] [place] [address] [address] [place] [address] [address] [facility] [address]     Phone:  [phone]     buPROPion 300 MG 24 hr tablet    fluticasone 110 MCG/ACT Inhaler    mirtazapine 45 MG tablet    naproxen 375 MG tablet    ranitidine 150 MG tablet         Some of these will need a paper prescription and others can be bought over the counter.  Ask your nurse if you have questions.     Bring a paper prescription for each of these medications     acetaminophen 325 MG tablet                Primary Care Provider Office Phone # Fax #    Teri Ruth Ann Salas [phone]       [facility] [address] [place] [address]        Equal Access to Services     [facility] TODD AH: Nieves Storey, waaxda luqadaha, qaybta kaaljoaquin wheatley . So [facility]  [phone].    ATENCIÓN: Si lilibeth beard, tiene a holley disposición servicios gratuitos de asistencia lingüística. Gunner garcia [phone].    We comply with applicable federal civil rights laws and [place] laws. We do not discriminate on the basis of race, color, national origin, age, disability, sex, sexual orientation, or gender identity.            Thank you!     Thank you for choosing [facility]  for your care. Our goal is always to provide you with excellent care. Hearing back from our patients is one way we can continue to improve our services. Please take a few minutes to complete the written survey that you may receive in the mail after your visit with us. Thank you!             Your Updated Medication List - Protect others around you: Learn how to safely use, store and throw away your medicines at www.disposemymeds.org.          This list is accurate as of 8/1/18 10:34 AM.  Always use your most recent med list.                   Brand Name Dispense Instructions for use Diagnosis    acamprosate 333 MG EC tablet    CAMPRAL     Take 1 tablet (333 mg) by mouth 3 times daily    Alcohol dependence in remission (H)       acetaminophen 325 MG tablet    TYLENOL    60 tablet    Take 2 tablets (650 mg) by mouth every 4 hours as needed for mild pain    Sciatica, unspecified laterality       buPROPion 300 MG 24 hr tablet    WELLBUTRIN XL    30 tablet    Take 2 tablets (600 mg) by mouth every morning    Major depressive disorder, recurrent episode, moderate (H)       BusPIRone HCl 30 MG Tabs      Take 30 mg by mouth daily    Major depressive disorder, recurrent episode, moderate (H)       calcium carbonate 500 MG chewable tablet    TUMS     Take 500 mg by mouth 3 times daily as needed        divalproex sodium extended-release 250 MG 24 hr tablet    DEPAKOTE ER     Take 3 tablets (750 mg) by mouth 2 times daily    Nonintractable epilepsy without status epilepticus, unspecified epilepsy type (H)        fluticasone 110 MCG/ACT Inhaler    FLOVENT HFA    12 g    Inhale 1 puff into the lungs 2 times daily    Chronic obstructive pulmonary disease, unspecified COPD type (H)       gabapentin 100 MG capsule    NEURONTIN    90 capsule    Take 2 capsules (200 mg) by mouth 3 times daily as needed    Arthritis       hydrOXYzine 25 MG tablet    ATARAX    45 tablet    Take 1 tablet (25 mg) by mouth every 6 hours as needed for anxiety    Generalized anxiety disorder, Insomnia, unspecified type       ipratropium - albuterol 0.5 mg/2.5 mg/3 mL 0.5-2.5 (3) MG/3ML neb solution    DUONEB    90 vial    Take 1 vial (3 mLs) by nebulization every 6 hours as needed for shortness of breath / dyspnea or wheezing    Chronic obstructive pulmonary disease, unspecified COPD type (H)       LORazepam 1 MG tablet    ATIVAN    8 tablet    Take 1 tablet (1 mg) by mouth 4 times daily    Generalized anxiety disorder       metoprolol succinate 50 MG 24 hr tablet    TOPROL-XL     Take 150 mg by mouth daily        mirtazapine 45 MG tablet    REMERON    30 tablet    Take 1 tablet (45 mg) by mouth At Bedtime    Major depressive disorder, recurrent episode, moderate (H), Generalized anxiety disorder       naproxen 375 MG tablet    NAPROSYN    60 tablet    Take 1 tablet (375 mg) by mouth 2 times daily as needed (pain)    Adhesive capsulitis of both shoulders       omeprazole 20 MG CR capsule    priLOSEC    60 capsule    Take 1 capsule (20 mg) by mouth 2 times daily    Gastroesophageal reflux disease, esophagitis presence not specified       ondansetron 4 MG tablet    ZOFRAN    30 tablet    Take 1 tablet (4 mg) by mouth every 8 hours as needed for nausea    Generalized anxiety disorder       predniSONE 50 MG tablet    DELTASONE     Take 50 mg by mouth daily        PROAIR  (90 Base) MCG/ACT Inhaler   Generic drug:  albuterol      Inhale 1-2 puffs into the lungs every 4 hours as needed for wheezing    Chronic obstructive pulmonary disease, unspecified  COPD type (H)       QUEtiapine 100 MG tablet    SEROquel     Take 1 tablet (100 mg) by mouth At Bedtime    Psychosis, unspecified psychosis type       ranitidine 150 MG tablet    ZANTAC    60 tablet    Take 1 tablet (150 mg) by mouth 2 times daily    Gastroesophageal reflux disease, esophagitis presence not specified       * tiZANidine 4 MG tablet    ZANAFLEX    30 tablet    Take 1 tablet (4 mg) by mouth nightly as needed for muscle spasms    Sciatica, unspecified laterality       * tiZANidine 2 MG tablet    ZANAFLEX    90 tablet    Take 1 tablet (2 mg) by mouth 2 times daily as needed    Generalized anxiety disorder       umeclidinium 62.5 MCG/INH oral inhaler    INCRUSE ELLIPTA    1 Inhaler    Inhale 1 puff into the lungs daily    Chronic obstructive pulmonary disease, unspecified COPD type (H)       venlafaxine 150 MG 24 hr capsule    EFFEXOR-XR     Take 1 capsule (150 mg) by mouth daily    Major depressive disorder, recurrent episode, moderate (H)       * Notice:  This list has 2 medication(s) that are the same as other medications prescribed for you. Read the directions carefully, and ask your doctor or other care provider to review them with you.

## 2018-08-01 NOTE — LETTER
August 1, 2018      Lisa Scott  [address] [place] [address]        To whom it may concern    Please allow Lisa Scott to remain on a lower bunk due to her medical conditions.       Thank you,        Benjamin Rosenstein, MD, MA

## 2018-08-02 PROBLEM — J44.9 CHRONIC OBSTRUCTIVE PULMONARY DISEASE, UNSPECIFIED COPD TYPE (H): Status: RESOLVED | Noted: 2017-02-23 | Resolved: 2018-08-02

## 2018-08-02 ASSESSMENT — PATIENT HEALTH QUESTIONNAIRE - PHQ9: SUM OF ALL RESPONSES TO PHQ QUESTIONS 1-9: 22

## 2018-08-09 ENCOUNTER — OFFICE VISIT (OUTPATIENT)
Dept: FAMILY MEDICINE | Facility: CLINIC | Age: 58
End: 2018-08-09
Payer: COMMERCIAL

## 2018-08-09 VITALS
HEART RATE: 70 BPM | TEMPERATURE: 97.6 F | OXYGEN SATURATION: 99 % | SYSTOLIC BLOOD PRESSURE: 178 MMHG | DIASTOLIC BLOOD PRESSURE: 119 MMHG | BODY MASS INDEX: 22.28 KG/M2 | HEIGHT: 61 IN | WEIGHT: 118 LBS

## 2018-08-09 DIAGNOSIS — G47.00 INSOMNIA, UNSPECIFIED TYPE: ICD-10-CM

## 2018-08-09 DIAGNOSIS — Z59.00 HOMELESS SINGLE PERSON: ICD-10-CM

## 2018-08-09 DIAGNOSIS — F41.1 GENERALIZED ANXIETY DISORDER: ICD-10-CM

## 2018-08-09 DIAGNOSIS — J44.9 CHRONIC OBSTRUCTIVE PULMONARY DISEASE, UNSPECIFIED COPD TYPE (H): ICD-10-CM

## 2018-08-09 DIAGNOSIS — I10 ESSENTIAL HYPERTENSION: Primary | ICD-10-CM

## 2018-08-09 DIAGNOSIS — F41.0 PANIC DISORDER WITHOUT AGORAPHOBIA: ICD-10-CM

## 2018-08-09 RX ORDER — METOPROLOL SUCCINATE 50 MG/1
150 TABLET, EXTENDED RELEASE ORAL DAILY
Qty: 90 TABLET | Refills: 1 | Status: SHIPPED | OUTPATIENT
Start: 2018-08-09 | End: 2018-11-09

## 2018-08-09 RX ORDER — MIRTAZAPINE 45 MG/1
45 TABLET, FILM COATED ORAL AT BEDTIME
Qty: 60 TABLET | Refills: 0 | Status: SHIPPED | OUTPATIENT
Start: 2018-08-09 | End: 2018-11-28

## 2018-08-09 RX ORDER — IPRATROPIUM BROMIDE AND ALBUTEROL SULFATE 2.5; .5 MG/3ML; MG/3ML
1 SOLUTION RESPIRATORY (INHALATION) EVERY 6 HOURS PRN
Qty: 90 VIAL | Refills: 1 | Status: SHIPPED | OUTPATIENT
Start: 2018-08-09 | End: 2018-11-09

## 2018-08-09 RX ORDER — GABAPENTIN 100 MG/1
200 CAPSULE ORAL 3 TIMES DAILY PRN
Qty: 90 CAPSULE | Refills: 1 | Status: SHIPPED | OUTPATIENT
Start: 2018-08-09 | End: 2018-10-12

## 2018-08-09 RX ORDER — ALBUTEROL SULFATE 90 UG/1
2 AEROSOL, METERED RESPIRATORY (INHALATION) EVERY 6 HOURS PRN
Qty: 1 INHALER | Refills: 1 | Status: SHIPPED | OUTPATIENT
Start: 2018-08-09 | End: 2018-11-28

## 2018-08-09 RX ORDER — ONDANSETRON 4 MG/1
4 TABLET, FILM COATED ORAL EVERY 8 HOURS PRN
Qty: 30 TABLET | Refills: 0 | Status: SHIPPED | OUTPATIENT
Start: 2018-08-09 | End: 2018-11-09

## 2018-08-09 RX ORDER — HYDROXYZINE HYDROCHLORIDE 25 MG/1
25 TABLET, FILM COATED ORAL EVERY 6 HOURS PRN
Qty: 60 TABLET | Refills: 1 | Status: SHIPPED | OUTPATIENT
Start: 2018-08-09 | End: 2018-11-28

## 2018-08-09 SDOH — ECONOMIC STABILITY - HOUSING INSECURITY: HOMELESSNESS UNSPECIFIED: Z59.00
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
## 2018-08-09 NOTE — NURSING NOTE
Spirometry  PHQ9/GAD7- provided   HIV screen  Hep C screen  Mammogram- declined  Colon/FIT- declined   Advance directive

## 2018-08-09 NOTE — PROGRESS NOTES
Preceptor Attestation:   Patient seen, evaluated and discussed with the resident, Dr. Teri Salas. I have verified the content of the note, which accurately reflects my assessment of the patient and the plan of care.  Supervising Physician:Nga Abdullahi MD  [facility]

## 2018-08-09 NOTE — MR AVS SNAPSHOT
"              After Visit Summary   8/9/2018    Lisa Scott    MRN: [id number]           Patient Information     Date Of Birth          1960        Visit Information        Provider Department      8/9/2018 8:20 AM Teri Salas MD [facility]        Today's Diagnoses     Essential hypertension    -  1    Generalized anxiety disorder        Insomnia, unspecified type        Panic disorder without agoraphobia        Homeless single person        Chronic obstructive pulmonary disease, unspecified COPD type (H)           Follow-ups after your visit        Your next 10 appointments already scheduled     Aug 16, 2018 11:20 AM CDT   Return Visit with Teri Salas MD   [facility] ([facility])    [address] [place] [address]   [phone]            Aug 23, 2018 10:00 AM CDT   Return Visit with Teri Salas MD   [facility] ([facility])    [address] [place] [address]   [phone]              Who to contact     Please call your clinic at [phone] to:    Ask questions about your health    Make or cancel appointments    Discuss your medicines    Learn about your test results    Speak to your doctor            Additional Information About Your Visit        Care EveryWhere ID     This is your Care EveryWhere ID. This could be used by other organizations to access your [place] medical records  [id number]        Your Vitals Were     Pulse Temperature Height Pulse Oximetry BMI (Body Mass Index)       70 97.6  F (36.4  C) (Oral) 5' 0.67\" (154.1 cm) 99% 22.54 kg/m2        Blood Pressure from Last 3 Encounters:   08/09/18 (!) 178/119   08/01/18 (!) 169/117   07/24/18 125/84    Weight from Last 3 Encounters:   08/09/18 118 lb (53.5 kg)   08/01/18 118 lb (53.5 kg)   07/24/18 118 lb (53.5 kg)              We Performed the Following     Nebulizer/Inhalation Treatment, initial          Today's Medication Changes        "   These changes are accurate as of 8/9/18 11:06 AM.  If you have any questions, ask your nurse or doctor.               These medicines have changed or have updated prescriptions.        Dose/Directions    * PROAIR  (90 Base) MCG/ACT Inhaler   This may have changed:  Another medication with the same name was added. Make sure you understand how and when to take each.   Used for:  Chronic obstructive pulmonary disease, unspecified COPD type (H)   Generic drug:  albuterol   Changed by:  Teri Salas MD        Dose:  1-2 puff   Inhale 1-2 puffs into the lungs every 4 hours as needed for wheezing   Refills:  0       * albuterol 108 (90 Base) MCG/ACT Inhaler   Commonly known as:  PROAIR HFA/PROVENTIL HFA/VENTOLIN HFA   This may have changed:  You were already taking a medication with the same name, and this prescription was added. Make sure you understand how and when to take each.   Used for:  Chronic obstructive pulmonary disease, unspecified COPD type (H)   Changed by:  Teri Salas MD        Dose:  2 puff   Inhale 2 puffs into the lungs every 6 hours as needed for shortness of breath / dyspnea or wheezing   Quantity:  1 Inhaler   Refills:  1       * Notice:  This list has 2 medication(s) that are the same as other medications prescribed for you. Read the directions carefully, and ask your doctor or other care provider to review them with you.         Where to get your medicines      These medications were sent to [id number] - [place], [place] - [address], [place] [address]     Phone:  [phone]     albuterol 108 (90 Base) MCG/ACT Inhaler    gabapentin 100 MG capsule    hydrOXYzine 25 MG tablet    ipratropium - albuterol 0.5 mg/2.5 mg/3 mL 0.5-2.5 (3) MG/3ML neb solution    metoprolol succinate 50 MG 24 hr tablet    mirtazapine 45 MG tablet    ondansetron 4 MG tablet    umeclidinium 62.5 MCG/INH oral  inhaler                Primary Care Provider Office Phone # Fax #    Teri Salas [phone]       [facility] [address] [place] [address]        Equal Access to Services     ELIF BROOKS : Nieves schwab mishelo Somgali, waaxda luqadaha, qaybta kaalmada adeegyada, joaquin formann po green june quintanilla. So [facility] [phone].    ATENCIÓN: Si habla español, tiene a holley disposición servicios gratuitos de asistencia lingüística. Llame al [phone].    We comply with applicable federal civil rights laws and [place] laws. We do not discriminate on the basis of race, color, national origin, age, disability, sex, sexual orientation, or gender identity.            Thank you!     Thank you for choosing [facility]  for your care. Our goal is always to provide you with excellent care. Hearing back from our patients is one way we can continue to improve our services. Please take a few minutes to complete the written survey that you may receive in the mail after your visit with us. Thank you!             Your Updated Medication List - Protect others around you: Learn how to safely use, store and throw away your medicines at www.disposemymeds.org.          This list is accurate as of 8/9/18 11:06 AM.  Always use your most recent med list.                   Brand Name Dispense Instructions for use Diagnosis    acamprosate 333 MG EC tablet    CAMPRAL     Take 1 tablet (333 mg) by mouth 3 times daily    Alcohol dependence in remission (H)       acetaminophen 325 MG tablet    TYLENOL    60 tablet    Take 2 tablets (650 mg) by mouth every 4 hours as needed for mild pain    Sciatica, unspecified laterality       buPROPion 300 MG 24 hr tablet    WELLBUTRIN XL    30 tablet    Take 2 tablets (600 mg) by mouth every morning    Major depressive disorder, recurrent episode, moderate (H)       BusPIRone HCl 30 MG Tabs      Take 30 mg by mouth daily    Major depressive disorder,  recurrent episode, moderate (H)       calcium carbonate 500 MG chewable tablet    TUMS     Take 500 mg by mouth 3 times daily as needed        divalproex sodium extended-release 250 MG 24 hr tablet    DEPAKOTE ER     Take 3 tablets (750 mg) by mouth 2 times daily    Nonintractable epilepsy without status epilepticus, unspecified epilepsy type (H)       fluticasone 110 MCG/ACT Inhaler    FLOVENT HFA    12 g    Inhale 1 puff into the lungs 2 times daily    Chronic obstructive pulmonary disease, unspecified COPD type (H)       gabapentin 100 MG capsule    NEURONTIN    90 capsule    Take 2 capsules (200 mg) by mouth 3 times daily as needed        hydrOXYzine 25 MG tablet    ATARAX    60 tablet    Take 1 tablet (25 mg) by mouth every 6 hours as needed for anxiety    Generalized anxiety disorder, Insomnia, unspecified type       ipratropium - albuterol 0.5 mg/2.5 mg/3 mL 0.5-2.5 (3) MG/3ML neb solution    DUONEB    90 vial    Take 1 vial (3 mLs) by nebulization every 6 hours as needed for shortness of breath / dyspnea or wheezing    Chronic obstructive pulmonary disease, unspecified COPD type (H)       LORazepam 1 MG tablet    ATIVAN    8 tablet    Take 1 tablet (1 mg) by mouth 4 times daily    Generalized anxiety disorder       metoprolol succinate 50 MG 24 hr tablet    TOPROL-XL    90 tablet    Take 3 tablets (150 mg) by mouth daily    Essential hypertension       mirtazapine 45 MG tablet    REMERON    60 tablet    Take 1 tablet (45 mg) by mouth At Bedtime    Generalized anxiety disorder       naproxen 375 MG tablet    NAPROSYN    60 tablet    Take 1 tablet (375 mg) by mouth 2 times daily as needed (pain)    Adhesive capsulitis of both shoulders       omeprazole 20 MG CR capsule    priLOSEC    60 capsule    Take 1 capsule (20 mg) by mouth 2 times daily    Gastroesophageal reflux disease, esophagitis presence not specified       ondansetron 4 MG tablet    ZOFRAN    30 tablet    Take 1 tablet (4 mg) by mouth every 8 hours  as needed for nausea    Generalized anxiety disorder       predniSONE 50 MG tablet    DELTASONE     Take 50 mg by mouth daily        * PROAIR  (90 Base) MCG/ACT Inhaler   Generic drug:  albuterol      Inhale 1-2 puffs into the lungs every 4 hours as needed for wheezing    Chronic obstructive pulmonary disease, unspecified COPD type (H)       * albuterol 108 (90 Base) MCG/ACT Inhaler    PROAIR HFA/PROVENTIL HFA/VENTOLIN HFA    1 Inhaler    Inhale 2 puffs into the lungs every 6 hours as needed for shortness of breath / dyspnea or wheezing    Chronic obstructive pulmonary disease, unspecified COPD type (H)       QUEtiapine 100 MG tablet    SEROquel     Take 1 tablet (100 mg) by mouth At Bedtime    Psychosis, unspecified psychosis type       ranitidine 150 MG tablet    ZANTAC    60 tablet    Take 1 tablet (150 mg) by mouth 2 times daily    Gastroesophageal reflux disease, esophagitis presence not specified       tiZANidine 4 MG tablet    ZANAFLEX    30 tablet    Take 1 tablet (4 mg) by mouth nightly as needed for muscle spasms    Sciatica, unspecified laterality       umeclidinium 62.5 MCG/INH oral inhaler    INCRUSE ELLIPTA    1 Inhaler    Inhale 1 puff into the lungs daily    Chronic obstructive pulmonary disease, unspecified COPD type (H)       venlafaxine 150 MG 24 hr capsule    EFFEXOR-XR     Take 1 capsule (150 mg) by mouth daily    Major depressive disorder, recurrent episode, moderate (H)       * Notice:  This list has 2 medication(s) that are the same as other medications prescribed for you. Read the directions carefully, and ask your doctor or other care provider to review them with you.

## 2018-08-10 ENCOUNTER — CARE COORDINATION (OUTPATIENT)
Dept: FAMILY MEDICINE | Facility: CLINIC | Age: 58
End: 2018-08-10

## 2018-08-10 DIAGNOSIS — M54.30 SCIATICA, UNSPECIFIED LATERALITY: ICD-10-CM

## 2018-08-10 NOTE — PROGRESS NOTES
Was requested to provide a reduced fee ID card form for patient and to also RS her appointment with [facility]. Call to [facility] and she has not missed an appointment. She is scheduled for 8/23 at 1140 with Dr Le. Will await patient's call as she does not have a phone right now.     8/13 Attempted patient, phone still not accepting calls. Patient has appointment 8/15 will see her then. Donated walker to be given to her.

## 2018-08-13 RX ORDER — ACETAMINOPHEN 325 MG/1
650 TABLET ORAL EVERY 4 HOURS PRN
Qty: 60 TABLET | Refills: 1 | Status: SHIPPED | OUTPATIENT
Start: 2018-08-13 | End: 2018-10-12

## 2018-08-14 ASSESSMENT — PATIENT HEALTH QUESTIONNAIRE - PHQ9: SUM OF ALL RESPONSES TO PHQ QUESTIONS 1-9: 14

## 2018-08-15 ENCOUNTER — OFFICE VISIT (OUTPATIENT)
Dept: FAMILY MEDICINE | Facility: CLINIC | Age: 58
End: 2018-08-15
Payer: COMMERCIAL

## 2018-08-15 VITALS
BODY MASS INDEX: 23.27 KG/M2 | DIASTOLIC BLOOD PRESSURE: 123 MMHG | SYSTOLIC BLOOD PRESSURE: 179 MMHG | OXYGEN SATURATION: 100 % | HEART RATE: 65 BPM | RESPIRATION RATE: 20 BRPM | WEIGHT: 121.8 LBS | TEMPERATURE: 98.5 F

## 2018-08-15 DIAGNOSIS — Z79.899 ENCOUNTER FOR MEDICATION REVIEW: Primary | ICD-10-CM

## 2018-08-15 DIAGNOSIS — I10 ESSENTIAL HYPERTENSION: ICD-10-CM

## 2018-08-15 DIAGNOSIS — J42 CHRONIC BRONCHITIS, UNSPECIFIED CHRONIC BRONCHITIS TYPE (H): ICD-10-CM

## 2018-08-15 DIAGNOSIS — K21.9 GASTROESOPHAGEAL REFLUX DISEASE, ESOPHAGITIS PRESENCE NOT SPECIFIED: ICD-10-CM

## 2018-08-15 DIAGNOSIS — F29 PSYCHOSIS, UNSPECIFIED PSYCHOSIS TYPE (H): ICD-10-CM

## 2018-08-15 DIAGNOSIS — G40.802 OTHER EPILEPSY WITHOUT STATUS EPILEPTICUS, NOT INTRACTABLE (H): ICD-10-CM

## 2018-08-15 RX ORDER — QUETIAPINE FUMARATE 100 MG/1
100 TABLET, FILM COATED ORAL AT BEDTIME
Qty: 2 TABLET | Refills: 0 | Status: SHIPPED | OUTPATIENT
Start: 2018-08-15 | End: 2018-11-28

## 2018-08-15 RX ORDER — AMLODIPINE BESYLATE 5 MG/1
5 TABLET ORAL DAILY
Qty: 90 TABLET | Refills: 1 | Status: SHIPPED | OUTPATIENT
Start: 2018-08-15 | End: 2018-11-09

## 2018-08-15 NOTE — MR AVS SNAPSHOT
After Visit Summary   8/15/2018    Lisa Scott    MRN: [id number]           Patient Information     Date Of Birth          1960        Visit Information        Provider Department      8/15/2018 4:00 PM Teri Salas MD [facility]        Today's Diagnoses     Other epilepsy without status epilepticus, not intractable (H)    -  1    Gastroesophageal reflux disease, esophagitis presence not specified        Psychosis, unspecified psychosis type        Essential hypertension           Follow-ups after your visit        Additional Services     NEUROLOGY ADULT REFERRAL       Patient prefers to be called    Reason for Referral: Questionable diagnosis of epilepsy. Was on divalproex  Sodium, but has now run out. Complicated by hx of alcohol abuse.    [facility]erral Location[facility] ([facility]): [phone]     needed: No  Language: English    May leave message on voicemail: Yes    ([facility]) Referral should be tracked (Yes/No)?                  Your next 10 appointments already scheduled     Aug 23, 2018 10:00 AM CDT   Return Visit with Teri Salas MD   [facility] ([facility])    [address] [place] [address]   [phone]              Who to contact     Please call your clinic at [phone] to:    Ask questions about your health    Make or cancel appointments    Discuss your medicines    Learn about your test results    Speak to your doctor            Additional Information About Your Visit        Care EveryWhere ID     This is your Care EveryWhere ID. This could be used by other organizations to access your [place] medical records  [id number]        Your Vitals Were     Pulse Temperature Respirations Pulse Oximetry BMI (Body Mass Index)       65 98.5  F (36.9  C) (Oral) 20 100% 23.27 kg/m2        Blood Pressure from Last 3 Encounters:   08/15/18 (!) 179/123   08/09/18 (!) 178/119   08/01/18 (!) 169/117     Weight from Last 3 Encounters:   08/15/18 121 lb 12.8 oz (55.2 kg)   08/09/18 118 lb (53.5 kg)   08/01/18 118 lb (53.5 kg)              We Performed the Following     NEUROLOGY ADULT REFERRAL          Today's Medication Changes          These changes are accurate as of 8/15/18  5:32 PM.  If you have any questions, ask your nurse or doctor.               Start taking these medicines.        Dose/Directions    amLODIPine 5 MG tablet   Commonly known as:  NORVASC   Used for:  Essential hypertension   Started by:  Teri Salas MD        Dose:  5 mg   Take 1 tablet (5 mg) by mouth daily   Quantity:  90 tablet   Refills:  1            Where to get your medicines      These medications were sent to [id number] - [place], [place] - [address], [place] [address]     Phone:  [phone]     amLODIPine 5 MG tablet    omeprazole 20 MG CR capsule    QUEtiapine 100 MG tablet                Primary Care Provider Office Phone # Fax #    Teri Salas [phone]       [facility] [address] [place] [address]        Equal Access to Services     VINNIE BROOKS AH: Hadii aad ku hadasho Soomaali, waaxda luqadaha, qaybta kaalmada adeegyada, waxay idiin haymolinan po green lachantelle quintanilla. So [facility] [phone].    ATENCIÓN: Si habla español, tiene a holley disposición servicios gratuitos de asistencia lingüística. Ll[facility] [phone].    We comply with applicable federal civil rights laws and [place] laws. We do not discriminate on the basis of race, color, national origin, age, disability, sex, sexual orientation, or gender identity.            Thank you!     Thank you for choosing [facility]  for your care. Our goal is always to provide you with excellent care. Hearing back from our patients is one way we can continue to improve our services. Please take a few minutes to complete the written  survey that you may receive in the mail after your visit with us. Thank you!             Your Updated Medication List - Protect others around you: Learn how to safely use, store and throw away your medicines at www.disposemymeds.org.          This list is accurate as of 8/15/18  5:32 PM.  Always use your most recent med list.                   Brand Name Dispense Instructions for use Diagnosis    acamprosate 333 MG EC tablet    CAMPRAL     Take 1 tablet (333 mg) by mouth 3 times daily    Alcohol dependence in remission (H)       acetaminophen 325 MG tablet    TYLENOL    60 tablet    Take 2 tablets (650 mg) by mouth every 4 hours as needed for mild pain    Sciatica, unspecified laterality       albuterol 108 (90 Base) MCG/ACT inhaler    PROAIR HFA/PROVENTIL HFA/VENTOLIN HFA    1 Inhaler    Inhale 2 puffs into the lungs every 6 hours as needed for shortness of breath / dyspnea or wheezing    Chronic obstructive pulmonary disease, unspecified COPD type (H)       amLODIPine 5 MG tablet    NORVASC    90 tablet    Take 1 tablet (5 mg) by mouth daily    Essential hypertension       BusPIRone HCl 30 MG Tabs      Take 30 mg by mouth daily    Major depressive disorder, recurrent episode, moderate (H)       divalproex sodium extended-release 250 MG 24 hr tablet    DEPAKOTE ER     Take 3 tablets (750 mg) by mouth 2 times daily    Nonintractable epilepsy without status epilepticus, unspecified epilepsy type (H)       gabapentin 100 MG capsule    NEURONTIN    90 capsule    Take 2 capsules (200 mg) by mouth 3 times daily as needed        hydrOXYzine 25 MG tablet    ATARAX    60 tablet    Take 1 tablet (25 mg) by mouth every 6 hours as needed for anxiety    Generalized anxiety disorder, Insomnia, unspecified type       ipratropium - albuterol 0.5 mg/2.5 mg/3 mL 0.5-2.5 (3) MG/3ML neb solution    DUONEB    90 vial    Take 1 vial (3 mLs) by nebulization every 6 hours as needed for shortness of breath / dyspnea or wheezing    Chronic  obstructive pulmonary disease, unspecified COPD type (H)       metoprolol succinate 50 MG 24 hr tablet    TOPROL-XL    90 tablet    Take 3 tablets (150 mg) by mouth daily    Essential hypertension       mirtazapine 45 MG tablet    REMERON    60 tablet    Take 1 tablet (45 mg) by mouth At Bedtime    Generalized anxiety disorder       omeprazole 20 MG CR capsule    priLOSEC    60 capsule    Take 1 capsule (20 mg) by mouth 2 times daily    Gastroesophageal reflux disease, esophagitis presence not specified       ondansetron 4 MG tablet    ZOFRAN    30 tablet    Take 1 tablet (4 mg) by mouth every 8 hours as needed for nausea    Generalized anxiety disorder       QUEtiapine 100 MG tablet    SEROquel    2 tablet    Take 1 tablet (100 mg) by mouth At Bedtime for 2 days    Psychosis, unspecified psychosis type       umeclidinium 62.5 MCG/INH inhaler    INCRUSE ELLIPTA    1 Inhaler    Inhale 1 puff into the lungs daily    Chronic obstructive pulmonary disease, unspecified COPD type (H)       venlafaxine 150 MG 24 hr capsule    EFFEXOR-XR     Take 1 capsule (150 mg) by mouth daily    Major depressive disorder, recurrent episode, moderate (H)

## 2018-08-15 NOTE — NURSING NOTE
Chief Complaint   Patient presents with     Hypertension     follow up.      Medication Reconciliation     completed but here to review with provider per patient.       BP (!) 195/116  Pulse 65  Temp 98.5  F (36.9  C) (Oral)  Resp 20  Wt 121 lb 12.8 oz (55.2 kg)  SpO2 100%  BMI 23.27 kg/m2

## 2018-08-15 NOTE — PROGRESS NOTES
HPI       Lisa Scott is a 58 year old female who presents for:  Chief Complaint   Patient presents with     Hypertension     follow up.      Medication Reconciliation     completed but here to review with provider per patient. Needs attention.        Hypertension  - BP elevated today  - patient feels like it is d/t not having any of her anxiety medications.  - has been having headaches, but no chest pain. Dyspnea per COPD as below    COPD  - has been coughing up more phlegm in the last couple weeks  - has had prednisone in the past with some improvement  - has nebulizer machine now in her shelter  - has not been using her incruse  - exposed to second hand smoke, but she has not smoked for 6 months.    Insomnia  - would like seroquel refilled to last until her psychiatrist appointment on Friday.  - upon review, QTc was 434 in August 9 2018.    Medication reconcilliation  - met with pharmacist at the shelter who went through her medications  - she does not have her psych medications, but she has an appt with psychiatrist this Friday (8/17).  - brought in both pill bottles and printed medication list    ROS: Complete 6 point ROS completed and negative other than stated above.         Physical Exam:     Vitals:    08/15/18 1609 08/15/18 1619   BP: (!) 195/116 (!) 179/123   Pulse: 65    Resp: 20    Temp: 98.5  F (36.9  C)    TempSrc: Oral    SpO2: 100%    Weight: 121 lb 12.8 oz (55.2 kg)      Body mass index is 23.27 kg/(m^2).  Vital signs normal except for elevated BP    General: Alert and orientated in NAD. Pleasant and cooperative.   Cards: RRR, no murmurs, rubs or gallops. No lower extremity edema  Resp: clear vesicular breath sounds bilaterally, no crackles or wheezes. No increased work of breathing  Psych: mood anxious, affect congruent. Somewhat pressured speech, but no abnormal speech or mentation. Poor judgement, but at baseline w/o change. No evidence of hallucinations or delusions.    Assessment and  Plan     Medications were able to reviewed during this visit. See below for details.    1. Gastroesophageal reflux disease, esophagitis presence not specified  Trialed H2 blocker w/o alleviation. Discussed risks/benefits of PPI. Patient would like to continue on PPI for now.  - omeprazole (PRILOSEC) 20 MG CR capsule; Take 1 capsule (20 mg) by mouth 2 times daily  Dispense: 60 capsule; Refill: 2    2. Other epilepsy without status epilepticus, not intractable (H)  EtOH vs organic brain disorder? Has rx for depakote from outside provider. Will refer to neurology to assess if patient needs to continue on anti-seizure medication or not.  - NEUROLOGY ADULT REFERRAL    3. Psychosis, unspecified psychosis type  Chronic issue for patient. Sees a psychiatrist and has appointment for this Friday. She has multiple psychiatric medications listed, will defer to him for further management. Will give 3 doses of seroquel for sleep today.  - QUEtiapine (SEROQUEL) 100 MG tablet; Take 1 tablet (100 mg) by mouth At Bedtime for 2 days  Dispense: 2 tablet; Refill: 0    4. Essential hypertension  BP above goal today. Per patient report, she received amlodipine at [facility] which greatly improved her BP. Currently on metoprolol 150mg daily. Will start on amlodipine today.  - amLODIPine (NORVASC) 5 MG tablet; Take 1 tablet (5 mg) by mouth daily  Dispense: 90 tablet; Refill: 1    7. Chronic bronchitis, unspecified chronic bronchitis type (H)  Examination today consistent with prior examinations. HR, RR, and SpO2 unremarkable today. Abx and steroids not warranted at this time. Has not been taking controller medication d/t lost medication and just this week received nebulizer machine at her shelter.  - continue with controller medication  - continue with nebs prn    Follow up in 2 weeks for breathing and BP assessment.    Contact number at [facility]: [phone] ([place])    Options for treatment and follow-up care were reviewed with the patient.  Lisa Scott  engaged in the decision making process and verbalized understanding of the options discussed and agreed with the final plan.    Precepted with: Dr. Nitish Salas MD (PGY3)  Pager: [phone]  [facility] Resident

## 2018-08-15 NOTE — LETTER
8/15/2018    Re: Lisa Scott  1960      To Whom It May Concern:     Due to COPD Lisa Scott must be in the respit care section or medical section so she is able to use her nebulizer machine when needed. She is also medically cleared to use a fan for improved ventilation.         Teri Salas MD  8/15/2018 5:19 PM

## 2018-08-20 PROBLEM — F10.21 ALCOHOL DEPENDENCE IN REMISSION (H): Status: RESOLVED | Noted: 2017-06-20 | Resolved: 2018-08-20

## 2018-08-21 NOTE — PROGRESS NOTES
Preceptor Attestation:   Patient seen, evaluated and discussed with the resident. I have verified the content of the note, which accurately reflects my assessment of the patient and the plan of care.    Supervising Physician:Nitish Simpson MD    [facility]

## 2018-09-13 ENCOUNTER — TELEPHONE (OUTPATIENT)
Dept: FAMILY MEDICINE | Facility: CLINIC | Age: 58
End: 2018-09-13

## 2018-09-13 NOTE — TELEPHONE ENCOUNTER
Attempted to reach patient to follow up from recent discharge from [facility] for pneumonia. Phone is not working at this time.

## 2018-09-13 NOTE — LETTER
September 18, 2018      Lisa Scott  [address] [place] [address]        Dear Lisa,    I have been trying to reach you in regards to your referral to see [facility]  [phone].  Please give them a call to set up an appointment.      Please let me know if you have any question or need any assistance in scheduling this.    Sincerely,    KONG Desai

## 2018-09-18 NOTE — TELEPHONE ENCOUNTER
9/18/18 2:40pm   attempted to reach patient regarding [facility] referral.  Patient have not been seen there, several referrals done from our office.  Mailed letter to patient. CXiong, KONG

## 2018-10-12 ENCOUNTER — OFFICE VISIT (OUTPATIENT)
Dept: FAMILY MEDICINE | Facility: CLINIC | Age: 58
End: 2018-10-12
Payer: COMMERCIAL

## 2018-10-12 VITALS
SYSTOLIC BLOOD PRESSURE: 109 MMHG | HEIGHT: 61 IN | DIASTOLIC BLOOD PRESSURE: 82 MMHG | BODY MASS INDEX: 22.81 KG/M2 | TEMPERATURE: 97.9 F | HEART RATE: 104 BPM | WEIGHT: 120.8 LBS | RESPIRATION RATE: 18 BRPM | OXYGEN SATURATION: 100 %

## 2018-10-12 DIAGNOSIS — K21.9 GASTROESOPHAGEAL REFLUX DISEASE, ESOPHAGITIS PRESENCE NOT SPECIFIED: ICD-10-CM

## 2018-10-12 DIAGNOSIS — M75.02 ADHESIVE CAPSULITIS OF BOTH SHOULDERS: ICD-10-CM

## 2018-10-12 DIAGNOSIS — J44.1 COPD EXACERBATION (H): ICD-10-CM

## 2018-10-12 DIAGNOSIS — M54.30 SCIATICA, UNSPECIFIED LATERALITY: ICD-10-CM

## 2018-10-12 DIAGNOSIS — M75.01 ADHESIVE CAPSULITIS OF BOTH SHOULDERS: ICD-10-CM

## 2018-10-12 DIAGNOSIS — F33.1 MAJOR DEPRESSIVE DISORDER, RECURRENT EPISODE, MODERATE (H): ICD-10-CM

## 2018-10-12 DIAGNOSIS — J42 CHRONIC BRONCHITIS, UNSPECIFIED CHRONIC BRONCHITIS TYPE (H): Primary | ICD-10-CM

## 2018-10-12 RX ORDER — GABAPENTIN 100 MG/1
200 CAPSULE ORAL 3 TIMES DAILY PRN
Qty: 90 CAPSULE | Refills: 1 | Status: SHIPPED | OUTPATIENT
Start: 2018-10-12 | End: 2018-11-28

## 2018-10-12 RX ORDER — ACETAMINOPHEN 325 MG/1
650 TABLET ORAL EVERY 4 HOURS PRN
Qty: 60 TABLET | Refills: 1 | Status: SHIPPED | OUTPATIENT
Start: 2018-10-12 | End: 2019-01-30

## 2018-10-12 RX ORDER — VENLAFAXINE HYDROCHLORIDE 150 MG/1
150 CAPSULE, EXTENDED RELEASE ORAL DAILY
Qty: 30 CAPSULE | Refills: 1 | Status: SHIPPED | OUTPATIENT
Start: 2018-10-12 | End: 2019-03-06

## 2018-10-12 NOTE — MR AVS SNAPSHOT
After Visit Summary   10/12/2018    Lisa Scott    MRN: [id number]           Patient Information     Date Of Birth          1960        Visit Information        Provider Department      10/12/2018 10:20 AM Rosenstein, Benjamin, MD [facility]        Today's Diagnoses     Chronic bronchitis, unspecified chronic bronchitis type (H)    -  1    Sciatica, unspecified laterality        Gastroesophageal reflux disease, esophagitis presence not specified        Major depressive disorder, recurrent episode, moderate (H)        Adhesive capsulitis of both shoulders          Care Instructions    Thank you for coming to [facility]!     Important points to take away from today    I have refilled your medications    Check to see you have prednisone from the ED. Take 2 tablets daily until it's gone (another 3 days)    See us again in 1 month to see how you're doing    Good luck with the housing meeting    Your current medication list is printed and included with this document. Please keep this with you - it is helpful to bring this current list to any other medical appointments and if you ever go to the emergency room or hospital.    If you had lab testing today we will be sharing the results with you by mail, phone call, or [facility] when we have them.     The phone number we would call with results is # [phone] (home) . If this is not the best number please call our clinic and change the number.    If you need any refills, please call your pharmacy and they will contact us.    If you have any further concerns or wish to schedule another appointment, please call our office at [phone] during normal business hours (8-5, M-F)    If you have urgent medical questions that cannot wait, you may call [phone] at any time of day.    If you have a medical emergency, please call [phone].    Benjamin Rosenstein, MD, MA  [facility]            Follow-ups after your visit       "  Your next 10 appointments already scheduled     Oct 26, 2018 10:40 AM CDT   Return Visit with Benjamin Rosenstein, MD   [facility] ([facility])    [address] [place] [id number]   [phone]              Who to contact     Please call your clinic at [phone] to:    Ask questions about your health    Make or cancel appointments    Discuss your medicines    Learn about your test results    Speak to your doctor            Additional Information About Your Visit        Care EveryWhere ID     This is your Care EveryWhere ID. This could be used by other organizations to access your [place] medical records  [id number]        Your Vitals Were     Pulse Temperature Respirations Height Pulse Oximetry BMI (Body Mass Index)    104 97.9  F (36.6  C) (Oral) 18 5' 0.83\" (154.5 cm) 100% 22.95 kg/m2       Blood Pressure from Last 3 Encounters:   10/12/18 109/82   08/15/18 (!) 179/123   08/09/18 (!) 178/119    Weight from Last 3 Encounters:   10/12/18 120 lb 12.8 oz (54.8 kg)   08/15/18 121 lb 12.8 oz (55.2 kg)   08/09/18 118 lb (53.5 kg)              Today, you had the following     No orders found for display         Today's Medication Changes          These changes are accurate as of 10/12/18 10:52 AM.  If you have any questions, ask your nurse or doctor.               Start taking these medicines.        Dose/Directions    tiZANidine 4 MG tablet   Commonly known as:  ZANAFLEX   Used for:  Adhesive capsulitis of both shoulders   Started by:  Rosenstein, Benjamin, MD        Dose:  4 mg   Take 1 tablet (4 mg) by mouth 3 times daily as needed for muscle spasms   Quantity:  90 tablet   Refills:  1         These medicines have changed or have updated prescriptions.        Dose/Directions    gabapentin 100 MG capsule   Commonly known as:  NEURONTIN   This may have changed:  reasons to take this   Used for:  Adhesive capsulitis of both shoulders, Sciatica, unspecified laterality   Changed by:  " Rosenstein, Benjamin, MD        Dose:  200 mg   Take 2 capsules (200 mg) by mouth 3 times daily as needed (pain)   Quantity:  90 capsule   Refills:  1            Where to get your medicines      These medications were sent to [id number] - [place], [place] - [address] AT [facility]  [address], [place] [address]     Phone:  [phone]     acetaminophen 325 MG tablet    gabapentin 100 MG capsule    omeprazole 20 MG CR capsule    tiZANidine 4 MG tablet    venlafaxine 150 MG 24 hr capsule                Primary Care Provider Office Phone # Fax #    Teri Salas [phone]       [facility] [address] [place] [address]        Equal Access to Services     ELIF BROOKS : Hadii cecilia schwab hadasho Soomaali, waaxda luqadaha, qaybta kaalmada adeegyada, waxreanna senin haydiaz watkins . So [facility] [phone].    ATENCIÓN: Si habla español, tiene a holley disposición servicios gratuitos de asistencia lingüística. [facility] [phone].    We comply with applicable federal civil rights laws and [place] laws. We do not discriminate on the basis of race, color, national origin, age, disability, sex, sexual orientation, or gender identity.            Thank you!     Thank you for choosing [facility]  for your care. Our goal is always to provide you with excellent care. Hearing back from our patients is one way we can continue to improve our services. Please take a few minutes to complete the written survey that you may receive in the mail after your visit with us. Thank you!             Your Updated Medication List - Protect others around you: Learn how to safely use, store and throw away your medicines at www.disposemymeds.org.          This list is accurate as of 10/12/18 10:52 AM.  Always use your most recent med list.                   Brand Name Dispense Instructions for use Diagnosis    acamprosate 333 MG  EC tablet    CAMPRAL     Take 1 tablet (333 mg) by mouth 3 times daily    Alcohol dependence in remission (H)       acetaminophen 325 MG tablet    TYLENOL    60 tablet    Take 2 tablets (650 mg) by mouth every 4 hours as needed for mild pain    Adhesive capsulitis of both shoulders       albuterol 108 (90 Base) MCG/ACT inhaler    PROAIR HFA/PROVENTIL HFA/VENTOLIN HFA    1 Inhaler    Inhale 2 puffs into the lungs every 6 hours as needed for shortness of breath / dyspnea or wheezing    Chronic obstructive pulmonary disease, unspecified COPD type (H)       amLODIPine 5 MG tablet    NORVASC    90 tablet    Take 1 tablet (5 mg) by mouth daily    Essential hypertension       BusPIRone HCl 30 MG Tabs      Take 30 mg by mouth daily    Major depressive disorder, recurrent episode, moderate (H)       divalproex sodium extended-release 250 MG 24 hr tablet    DEPAKOTE ER     Take 3 tablets (750 mg) by mouth 2 times daily    Nonintractable epilepsy without status epilepticus, unspecified epilepsy type (H)       gabapentin 100 MG capsule    NEURONTIN    90 capsule    Take 2 capsules (200 mg) by mouth 3 times daily as needed (pain)    Adhesive capsulitis of both shoulders, Sciatica, unspecified laterality       hydrOXYzine 25 MG tablet    ATARAX    60 tablet    Take 1 tablet (25 mg) by mouth every 6 hours as needed for anxiety    Generalized anxiety disorder, Insomnia, unspecified type       ipratropium - albuterol 0.5 mg/2.5 mg/3 mL 0.5-2.5 (3) MG/3ML neb solution    DUONEB    90 vial    Take 1 vial (3 mLs) by nebulization every 6 hours as needed for shortness of breath / dyspnea or wheezing    Chronic obstructive pulmonary disease, unspecified COPD type (H)       metoprolol succinate 50 MG 24 hr tablet    TOPROL-XL    90 tablet    Take 3 tablets (150 mg) by mouth daily    Essential hypertension       mirtazapine 45 MG tablet    REMERON    60 tablet    Take 1 tablet (45 mg) by mouth At Bedtime    Generalized anxiety disorder        omeprazole 20 MG CR capsule    priLOSEC    60 capsule    Take 1 capsule (20 mg) by mouth 2 times daily    Gastroesophageal reflux disease, esophagitis presence not specified       ondansetron 4 MG tablet    ZOFRAN    30 tablet    Take 1 tablet (4 mg) by mouth every 8 hours as needed for nausea    Generalized anxiety disorder       QUEtiapine 100 MG tablet    SEROquel    2 tablet    Take 1 tablet (100 mg) by mouth At Bedtime for 2 days    Psychosis, unspecified psychosis type (H)       tiZANidine 4 MG tablet    ZANAFLEX    90 tablet    Take 1 tablet (4 mg) by mouth 3 times daily as needed for muscle spasms    Adhesive capsulitis of both shoulders       umeclidinium 62.5 MCG/INH inhaler    INCRUSE ELLIPTA    1 Inhaler    Inhale 1 puff into the lungs daily    Chronic obstructive pulmonary disease, unspecified COPD type (H)       venlafaxine 150 MG 24 hr capsule    EFFEXOR-XR    30 capsule    Take 1 capsule (150 mg) by mouth daily    Major depressive disorder, recurrent episode, moderate (H)
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
## 2018-10-12 NOTE — PATIENT INSTRUCTIONS
Thank you for coming to [facility]!     Important points to take away from today    I have refilled your medications    Check to see you have prednisone from the ED. Take 2 tablets daily until it's gone (another 3 days)    See us again in 1 month to see how you're doing    Good luck with the housing meeting    Your current medication list is printed and included with this document. Please keep this with you - it is helpful to bring this current list to any other medical appointments and if you ever go to the emergency room or hospital.    If you had lab testing today we will be sharing the results with you by mail, phone call, or MyChart when we have them.     The phone number we would call with results is # [phone] (home) . If this is not the best number please call our clinic and change the number.    If you need any refills, please call your pharmacy and they will contact us.    If you have any further concerns or wish to schedule another appointment, please call our office at [phone] during normal business hours (8-5, M-F)    If you have urgent medical questions that cannot wait, you may call [phone] at any time of day.    If you have a medical emergency, please call [phone].    Benjamin Rosenstein, MD, MA  [place]

## 2018-10-13 ASSESSMENT — PATIENT HEALTH QUESTIONNAIRE - PHQ9: SUM OF ALL RESPONSES TO PHQ QUESTIONS 1-9: 16

## 2018-10-15 ENCOUNTER — TELEPHONE (OUTPATIENT)
Dept: FAMILY MEDICINE | Facility: CLINIC | Age: 58
End: 2018-10-15

## 2018-10-15 NOTE — TELEPHONE ENCOUNTER
Attempted to reach patient to follow up from recent ED visit for seizure. Phone number not in service.

## 2018-10-24 ENCOUNTER — TELEPHONE (OUTPATIENT)
Dept: FAMILY MEDICINE | Facility: CLINIC | Age: 58
End: 2018-10-24

## 2018-10-24 NOTE — TELEPHONE ENCOUNTER
Attempted to reach patient to follow up from recent discharge from [facility] for seizures. Phone number is disconnected.

## 2018-11-06 ENCOUNTER — TELEPHONE (OUTPATIENT)
Dept: FAMILY MEDICINE | Facility: CLINIC | Age: 58
End: 2018-11-06

## 2018-11-06 NOTE — TELEPHONE ENCOUNTER
I got the pt ED discharge paperwork, I called to check up on the pt, and help setup a ED follow up.  The pt was at [facility] for right hip pain.  Both of the pt phone number on file was not working.  I will make the  staff aware of this.

## 2018-11-09 ENCOUNTER — AMBULATORY - HEALTHEAST (OUTPATIENT)
Dept: ADMINISTRATIVE | Facility: REHABILITATION | Age: 58
End: 2018-11-09

## 2018-11-09 ENCOUNTER — OFFICE VISIT (OUTPATIENT)
Dept: FAMILY MEDICINE | Facility: CLINIC | Age: 58
End: 2018-11-09
Payer: COMMERCIAL

## 2018-11-09 VITALS
DIASTOLIC BLOOD PRESSURE: 120 MMHG | HEART RATE: 107 BPM | OXYGEN SATURATION: 99 % | TEMPERATURE: 98.2 F | HEIGHT: 61 IN | RESPIRATION RATE: 20 BRPM | BODY MASS INDEX: 21.83 KG/M2 | WEIGHT: 115.6 LBS | SYSTOLIC BLOOD PRESSURE: 174 MMHG

## 2018-11-09 DIAGNOSIS — I10 ESSENTIAL HYPERTENSION: ICD-10-CM

## 2018-11-09 DIAGNOSIS — F10.21 ALCOHOL DEPENDENCE IN REMISSION (H): ICD-10-CM

## 2018-11-09 DIAGNOSIS — R19.7 VOMITING AND DIARRHEA: ICD-10-CM

## 2018-11-09 DIAGNOSIS — J44.9 CHRONIC OBSTRUCTIVE PULMONARY DISEASE, UNSPECIFIED COPD TYPE (H): ICD-10-CM

## 2018-11-09 DIAGNOSIS — F41.1 GENERALIZED ANXIETY DISORDER: ICD-10-CM

## 2018-11-09 DIAGNOSIS — R11.10 VOMITING AND DIARRHEA: ICD-10-CM

## 2018-11-09 DIAGNOSIS — F33.1 MAJOR DEPRESSIVE DISORDER, RECURRENT EPISODE, MODERATE (H): ICD-10-CM

## 2018-11-09 DIAGNOSIS — G40.309 NONINTRACTABLE GENERALIZED IDIOPATHIC EPILEPSY WITHOUT STATUS EPILEPTICUS (H): ICD-10-CM

## 2018-11-09 DIAGNOSIS — F10.10 ALCOHOL ABUSE: ICD-10-CM

## 2018-11-09 DIAGNOSIS — G40.909 NONINTRACTABLE EPILEPSY WITHOUT STATUS EPILEPTICUS, UNSPECIFIED EPILEPSY TYPE (H): ICD-10-CM

## 2018-11-09 DIAGNOSIS — M25.551 HIP PAIN, RIGHT: Primary | ICD-10-CM

## 2018-11-09 DIAGNOSIS — K21.9 GASTROESOPHAGEAL REFLUX DISEASE, ESOPHAGITIS PRESENCE NOT SPECIFIED: ICD-10-CM

## 2018-11-09 DIAGNOSIS — F13.20 BENZODIAZEPINE DEPENDENCE (H): ICD-10-CM

## 2018-11-09 DIAGNOSIS — M25.551 HIP PAIN, RIGHT: ICD-10-CM

## 2018-11-09 RX ORDER — ONDANSETRON 4 MG/1
4 TABLET, FILM COATED ORAL EVERY 8 HOURS PRN
Qty: 30 TABLET | Refills: 0 | Status: SHIPPED | OUTPATIENT
Start: 2018-11-09 | End: 2018-11-28

## 2018-11-09 RX ORDER — ACAMPROSATE CALCIUM 333 MG/1
333 TABLET, DELAYED RELEASE ORAL 3 TIMES DAILY
Qty: 90 TABLET | Refills: 1 | Status: SHIPPED | OUTPATIENT
Start: 2018-11-09 | End: 2019-01-30

## 2018-11-09 RX ORDER — METOPROLOL SUCCINATE 50 MG/1
150 TABLET, EXTENDED RELEASE ORAL DAILY
Qty: 90 TABLET | Refills: 1 | Status: SHIPPED | OUTPATIENT
Start: 2018-11-09 | End: 2019-01-30

## 2018-11-09 RX ORDER — IPRATROPIUM BROMIDE AND ALBUTEROL SULFATE 2.5; .5 MG/3ML; MG/3ML
1 SOLUTION RESPIRATORY (INHALATION) EVERY 6 HOURS PRN
Qty: 90 VIAL | Refills: 1 | Status: SHIPPED | OUTPATIENT
Start: 2018-11-09 | End: 2019-03-06

## 2018-11-09 RX ORDER — DIVALPROEX SODIUM 250 MG/1
750 TABLET, EXTENDED RELEASE ORAL 2 TIMES DAILY
Qty: 90 TABLET | Refills: 1 | Status: SHIPPED | OUTPATIENT
Start: 2018-11-09 | End: 2019-03-06

## 2018-11-09 RX ORDER — IBUPROFEN 800 MG/1
800 TABLET, FILM COATED ORAL EVERY 8 HOURS PRN
Qty: 60 TABLET | Refills: 1 | Status: SHIPPED | OUTPATIENT
Start: 2018-11-09 | End: 2019-01-30

## 2018-11-09 RX ORDER — AMLODIPINE BESYLATE 5 MG/1
5 TABLET ORAL DAILY
Qty: 90 TABLET | Refills: 1 | Status: SHIPPED | OUTPATIENT
Start: 2018-11-09 | End: 2019-01-30
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
## 2018-11-09 NOTE — MR AVS SNAPSHOT
After Visit Summary   11/9/2018    Lisa Scott    MRN: [id number]           Patient Information     Date Of Birth          1960        Visit Information        Provider Department      11/9/2018 10:00 AM Dixon Montes MD [facility]        Today's Diagnoses     Hip pain, right    -  1    Chronic obstructive pulmonary disease, unspecified COPD type (H)        Essential hypertension        Gastroesophageal reflux disease, esophagitis presence not specified        Generalized anxiety disorder        Nonintractable epilepsy without status epilepticus, unspecified epilepsy type (H)        Alcohol dependence in remission (H)        Alcohol abuse        Benzodiazepine dependence (H)        Nonintractable generalized idiopathic epilepsy without status epilepticus (H)        Major depressive disorder, recurrent episode, moderate (H)        Vomiting and diarrhea           Follow-ups after your visit        Additional Services     PHYSICAL THERAPY REFERRAL       PT/OT REFERRAL  Lisa Scott  1960  Phone #: [phone] (home)    needed: No  Language: English    PT/OT  Facility:   [facility], P: [phone], F: [phone]    History: SI dysfunction, overuse    Precautions/Contraindications: None    Imaging Studies: None    Surgical Procedure/Test Results: None    Treatment Goals:   Increase: Flexibility, Strength and ROM    Prognosis: good    Visits: Up to 6    Evaluate: Evaluate and treat    Plan: Flexibility Exercise and Strength Exercise    Ionto/Phonophoresis                  Follow-up notes from your care team     Return in about 1 month (around 12/9/2018) for F/u right hip, alcohol abuse, MDD/THANH.      Your next 10 appointments already scheduled     Nov 27, 2018 11:00 AM [facility]   Hospital Follow Up with Teri Salas MD   [facility] ([facility])    [address] [place] [id number]   [phone]             "  Who to contact     Please call your clinic at [phone] to:    Ask questions about your health    Make or cancel appointments    Discuss your medicines    Learn about your test results    Speak to your doctor            Additional Information About Your Visit        Care EveryWhere ID     This is your Care EveryWhere ID. This could be used by other organizations to access your [facility] medical records  [id number]        Your Vitals Were     Pulse Temperature Respirations Height Pulse Oximetry BMI (Body Mass Index)    107 98.2  F (36.8  C) (Oral) 20 5' 1\" (154.9 cm) 99% 21.84 kg/m2       Blood Pressure from Last 3 Encounters:   11/09/18 (!) 174/120   10/12/18 109/82   08/15/18 (!) 179/123    Weight from Last 3 Encounters:   11/09/18 115 lb 9.6 oz (52.4 kg)   10/12/18 120 lb 12.8 oz (54.8 kg)   08/15/18 121 lb 12.8 oz (55.2 kg)                 Today's Medication Changes          These changes are accurate as of 11/9/18 11:59 PM.  If you have any questions, ask your nurse or doctor.               Start taking these medicines.        Dose/Directions    ibuprofen 800 MG tablet   Commonly known as:  ADVIL/MOTRIN   Used for:  Hip pain, right   Started by:  Dixon Montes MD        Dose:  800 mg   Take 1 tablet (800 mg) by mouth every 8 hours as needed for moderate pain Always take with food.   Quantity:  60 tablet   Refills:  1            Where to get your medicines      These medications were sent to [id number] - [place], [place] - [address], [place] [address]     Phone:  [phone]     acamprosate 333 MG EC tablet    amLODIPine 5 MG tablet    divalproex sodium extended-release 250 MG 24 hr tablet    ibuprofen 800 MG tablet    ipratropium - albuterol 0.5 mg/2.5 mg/3 mL 0.5-2.5 (3) MG/3ML neb solution    metoprolol succinate 50 MG 24 hr tablet    omeprazole 20 MG CR capsule    ondansetron 4 MG tablet    umeclidinium " 62.5 MCG/INH inhaler                Primary Care Provider Office Phone # Fax #    Teri Ruth Ann Salas [phone]       [address] [place] [address]        Equal Access to Services     ELIF BROOKS : Nieves cecilia ku mishelo Soanca, waaxda luqadaha, qaybta kaalmada adeegyada, joaquin ramosdiaz quintanilla. So [place] [phone].    ATENCIÓN: Si habla español, tiene a holley disposición servicios gratuitos de asistencia lingüística. Llame al [phone].    We comply with applicable federal civil rights laws and [place] laws. We do not discriminate on the basis of race, color, national origin, age, disability, sex, sexual orientation, or gender identity.            Thank you!     Thank you for choosing [facility]  for your care. Our goal is always to provide you with excellent care. Hearing back from our patients is one way we can continue to improve our services. Please take a few minutes to complete the written survey that you may receive in the mail after your visit with us. Thank you!             Your Updated Medication List - Protect others around you: Learn how to safely use, store and throw away your medicines at www.disposemymeds.org.          This list is accurate as of 11/9/18 11:59 PM.  Always use your most recent med list.                   Brand Name Dispense Instructions for use Diagnosis    acamprosate 333 MG EC tablet    CAMPRAL    90 tablet    Take 1 tablet (333 mg) by mouth 3 times daily    Alcohol dependence in remission (H)       acetaminophen 325 MG tablet    TYLENOL    60 tablet    Take 2 tablets (650 mg) by mouth every 4 hours as needed for mild pain    Adhesive capsulitis of both shoulders       albuterol 108 (90 Base) MCG/ACT inhaler    PROAIR HFA/PROVENTIL HFA/VENTOLIN HFA    1 Inhaler    Inhale 2 puffs into the lungs every 6 hours as needed for shortness of breath / dyspnea or wheezing    Chronic obstructive pulmonary disease,  unspecified COPD type (H)       amLODIPine 5 MG tablet    NORVASC    90 tablet    Take 1 tablet (5 mg) by mouth daily    Essential hypertension       busPIRone HCl 30 MG tablet    BUSPAR     Take 30 mg by mouth daily    Major depressive disorder, recurrent episode, moderate (H)       divalproex sodium extended-release 250 MG 24 hr tablet    DEPAKOTE ER    90 tablet    Take 3 tablets (750 mg) by mouth 2 times daily    Nonintractable epilepsy without status epilepticus, unspecified epilepsy type (H)       gabapentin 100 MG capsule    NEURONTIN    90 capsule    Take 2 capsules (200 mg) by mouth 3 times daily as needed (pain)    Adhesive capsulitis of both shoulders, Sciatica, unspecified laterality       hydrOXYzine 25 MG tablet    ATARAX    60 tablet    Take 1 tablet (25 mg) by mouth every 6 hours as needed for anxiety    Generalized anxiety disorder, Insomnia, unspecified type       ibuprofen 800 MG tablet    ADVIL/MOTRIN    60 tablet    Take 1 tablet (800 mg) by mouth every 8 hours as needed for moderate pain Always take with food.    Hip pain, right       ipratropium - albuterol 0.5 mg/2.5 mg/3 mL 0.5-2.5 (3) MG/3ML neb solution    DUONEB    90 vial    Take 1 vial (3 mLs) by nebulization every 6 hours as needed for shortness of breath / dyspnea or wheezing    Chronic obstructive pulmonary disease, unspecified COPD type (H)       metoprolol succinate 50 MG 24 hr tablet    TOPROL-XL    90 tablet    Take 3 tablets (150 mg) by mouth daily    Essential hypertension       mirtazapine 45 MG tablet    REMERON    60 tablet    Take 1 tablet (45 mg) by mouth At Bedtime    Generalized anxiety disorder       omeprazole 20 MG CR capsule    priLOSEC    60 capsule    Take 1 capsule (20 mg) by mouth 2 times daily    Gastroesophageal reflux disease, esophagitis presence not specified       ondansetron 4 MG tablet    ZOFRAN    30 tablet    Take 1 tablet (4 mg) by mouth every 8 hours as needed for nausea    Generalized anxiety  disorder       QUEtiapine 100 MG tablet    SEROquel    2 tablet    Take 1 tablet (100 mg) by mouth At Bedtime for 2 days    Psychosis, unspecified psychosis type (H)       tiZANidine 4 MG tablet    ZANAFLEX    90 tablet    Take 1 tablet (4 mg) by mouth 3 times daily as needed for muscle spasms    Adhesive capsulitis of both shoulders       umeclidinium 62.5 MCG/INH inhaler    INCRUSE ELLIPTA    1 Inhaler    Inhale 1 puff into the lungs daily    Chronic obstructive pulmonary disease, unspecified COPD type (H)       venlafaxine 150 MG 24 hr capsule    EFFEXOR-XR    30 capsule    Take 1 capsule (150 mg) by mouth daily    Major depressive disorder, recurrent episode, moderate (H)

## 2018-11-16 PROBLEM — R19.7 VOMITING AND DIARRHEA: Status: ACTIVE | Noted: 2018-11-16

## 2018-11-16 PROBLEM — G40.909 EPILEPSY (H): Status: RESOLVED | Noted: 2018-06-22 | Resolved: 2018-11-16

## 2018-11-16 PROBLEM — R11.10 VOMITING AND DIARRHEA: Status: ACTIVE | Noted: 2018-11-16

## 2018-11-16 PROBLEM — G40.309 IDIOPATHIC GENERALIZED EPILEPSY (H): Status: ACTIVE | Noted: 2018-11-16

## 2018-11-16 PROBLEM — Z59.00 HOMELESS SINGLE PERSON: Status: RESOLVED | Noted: 2018-07-30 | Resolved: 2018-11-16

## 2018-11-21 ENCOUNTER — TELEPHONE (OUTPATIENT)
Dept: FAMILY MEDICINE | Facility: CLINIC | Age: 58
End: 2018-11-21

## 2018-11-21 NOTE — TELEPHONE ENCOUNTER
Date of discharge: 11/20/2018  Facility of discharge: [facility]  Patient concerns about condition: No concerns at this time.  Patient concerns about medications: No concerns at this time.  Full med reconciliation will be completed at clinic visit.  Patient concerns about transitioning: No concerns at this time.  Clinic office visit appointment date: 11/27/201/18  Patient reminded to bring all medications (prescription and over-the-counter) to clinic appointment: Yes    Using the date of discharge as day 1, the 30th day post discharge is 12/20/2018.

## 2018-11-24 NOTE — PROGRESS NOTES
Preceptor Attestation:   Patient seen, evaluated and discussed with the resident. I have verified the content of the note, which accurately reflects my assessment of the patient and the plan of care.  Supervising Physician:Eulalia Rivera DO [facility]

## 2018-11-27 RX ORDER — LEVETIRACETAM 500 MG/1
500 TABLET ORAL 2 TIMES DAILY
Qty: 60 TABLET | OUTPATIENT
Start: 2018-11-27

## 2018-11-27 NOTE — TELEPHONE ENCOUNTER
Message to physician:PLEASE VERIFY THIS MEDICATION NOT IN EPIC OR CURRENT MED LIST A REFILL FROM FROM THE PHARMACY.    Date of last visit: 11/9/2018     Date of next visit if scheduled: Visit date 11/28/2018    Last Comprehensive Metabolic Panel:  Sodium   Date Value Ref Range Status   05/25/2018 139.0 133.0 - 144.0 mmol/L Final     Potassium   Date Value Ref Range Status   05/25/2018 4.3 3.4 - 5.3 mmol/L Final     Chloride   Date Value Ref Range Status   05/25/2018 103.0 94.0 - 109.0 mmol/L Final     Carbon Dioxide   Date Value Ref Range Status   05/25/2018 27.0 20.0 - 32.0 mmol/L Final     Glucose   Date Value Ref Range Status   05/25/2018 94.0 60.0 - 109.0 mg/dL Final     Urea Nitrogen   Date Value Ref Range Status   05/25/2018 17.0 7.0 - 30.0 mg/dL Final     Creatinine   Date Value Ref Range Status   05/25/2018 1.0 0.6 - 1.3 mg/dL Final     GFR Estimate   Date Value Ref Range Status   01/12/2017 40 (L) >60 mL/min/1.73m2 Final     Calcium   Date Value Ref Range Status   05/25/2018 9.2 8.5 - 10.4 mg/dL Final       BP Readings from Last 3 Encounters:   11/09/18 (!) 174/120   10/12/18 109/82   08/15/18 (!) 179/123       Lab Results   Component Value Date    A1C 4.9 11/20/2014    A1C 5.7 03/16/2011                Please complete refill and CLOSE ENCOUNTER.  Closing the encounter signifies the refill is complete.

## 2018-11-28 ENCOUNTER — OFFICE VISIT (OUTPATIENT)
Dept: FAMILY MEDICINE | Facility: CLINIC | Age: 58
End: 2018-11-28
Payer: COMMERCIAL

## 2018-11-28 VITALS
DIASTOLIC BLOOD PRESSURE: 80 MMHG | BODY MASS INDEX: 22.24 KG/M2 | WEIGHT: 117.8 LBS | SYSTOLIC BLOOD PRESSURE: 110 MMHG | HEIGHT: 61 IN | TEMPERATURE: 98 F | HEART RATE: 86 BPM | RESPIRATION RATE: 18 BRPM | OXYGEN SATURATION: 100 %

## 2018-11-28 DIAGNOSIS — T43.503A: Primary | ICD-10-CM

## 2018-11-28 DIAGNOSIS — M75.02 ADHESIVE CAPSULITIS OF BOTH SHOULDERS: ICD-10-CM

## 2018-11-28 DIAGNOSIS — K21.9 GASTROESOPHAGEAL REFLUX DISEASE, ESOPHAGITIS PRESENCE NOT SPECIFIED: ICD-10-CM

## 2018-11-28 DIAGNOSIS — M54.30 SCIATICA, UNSPECIFIED LATERALITY: ICD-10-CM

## 2018-11-28 DIAGNOSIS — F41.1 GENERALIZED ANXIETY DISORDER: ICD-10-CM

## 2018-11-28 DIAGNOSIS — J44.9 CHRONIC OBSTRUCTIVE PULMONARY DISEASE, UNSPECIFIED COPD TYPE (H): ICD-10-CM

## 2018-11-28 DIAGNOSIS — F43.10 POSTTRAUMATIC STRESS DISORDER: ICD-10-CM

## 2018-11-28 DIAGNOSIS — M75.01 ADHESIVE CAPSULITIS OF BOTH SHOULDERS: ICD-10-CM

## 2018-11-28 DIAGNOSIS — F29 PSYCHOSIS, UNSPECIFIED PSYCHOSIS TYPE (H): ICD-10-CM

## 2018-11-28 RX ORDER — LEVETIRACETAM 500 MG/1
500 TABLET ORAL 2 TIMES DAILY
Qty: 60 TABLET | Refills: 3 | Status: SHIPPED | OUTPATIENT
Start: 2018-11-28 | End: 2019-08-15

## 2018-11-28 RX ORDER — AMLODIPINE BESYLATE 5 MG/1
5 TABLET ORAL DAILY
COMMUNITY
Start: 2018-09-12 | End: 2018-11-28

## 2018-11-28 RX ORDER — BUPROPION HYDROCHLORIDE 150 MG/1
150 TABLET ORAL DAILY
COMMUNITY
Start: 2018-11-20 | End: 2019-03-05

## 2018-11-28 RX ORDER — METOPROLOL SUCCINATE 50 MG/1
150 TABLET, EXTENDED RELEASE ORAL DAILY
COMMUNITY
End: 2018-11-28

## 2018-11-28 RX ORDER — PRAZOSIN HYDROCHLORIDE 2 MG/1
2 CAPSULE ORAL AT BEDTIME
Qty: 30 CAPSULE | Refills: 1 | Status: SHIPPED | OUTPATIENT
Start: 2018-11-28 | End: 2020-03-24

## 2018-11-28 RX ORDER — LEVETIRACETAM 500 MG/1
500 TABLET ORAL 2 TIMES DAILY
COMMUNITY
Start: 2018-10-23 | End: 2018-11-28

## 2018-11-28 RX ORDER — GABAPENTIN 100 MG/1
200 CAPSULE ORAL 3 TIMES DAILY
COMMUNITY
Start: 2018-09-12 | End: 2018-11-28

## 2018-11-28 RX ORDER — IPRATROPIUM BROMIDE AND ALBUTEROL SULFATE 2.5; .5 MG/3ML; MG/3ML
3 SOLUTION RESPIRATORY (INHALATION) 4 TIMES DAILY PRN
COMMUNITY
Start: 2018-09-12 | End: 2018-11-28

## 2018-11-28 RX ORDER — BUSPIRONE HYDROCHLORIDE 15 MG/1
30 TABLET ORAL EVERY MORNING
COMMUNITY
Start: 2018-07-27 | End: 2019-03-06

## 2018-11-28 RX ORDER — ACAMPROSATE CALCIUM 333 MG/1
1 TABLET, DELAYED RELEASE ORAL 3 TIMES DAILY
COMMUNITY
Start: 2018-11-09 | End: 2018-11-28

## 2018-11-28 RX ORDER — VENLAFAXINE HYDROCHLORIDE 150 MG/1
150 CAPSULE, EXTENDED RELEASE ORAL DAILY
COMMUNITY
Start: 2018-10-12 | End: 2018-11-28

## 2018-11-28 RX ORDER — ALBUTEROL SULFATE 90 UG/1
2 AEROSOL, METERED RESPIRATORY (INHALATION) EVERY 6 HOURS PRN
Qty: 1 INHALER | Refills: 3 | Status: SHIPPED | OUTPATIENT
Start: 2018-11-28 | End: 2019-01-30

## 2018-11-28 RX ORDER — HYDROXYZINE HYDROCHLORIDE 25 MG/1
25 TABLET, FILM COATED ORAL EVERY 6 HOURS PRN
COMMUNITY
Start: 2018-11-20 | End: 2019-03-06

## 2018-11-28 RX ORDER — QUETIAPINE FUMARATE 100 MG/1
100 TABLET, FILM COATED ORAL AT BEDTIME
Qty: 30 TABLET | Refills: 0 | Status: SHIPPED | OUTPATIENT
Start: 2018-11-28 | End: 2019-03-05

## 2018-11-28 RX ORDER — MIRTAZAPINE 45 MG/1
45 TABLET, FILM COATED ORAL AT BEDTIME
Qty: 60 TABLET | Refills: 1 | Status: SHIPPED | OUTPATIENT
Start: 2018-11-28 | End: 2019-03-05

## 2018-11-28 RX ORDER — DIVALPROEX SODIUM 250 MG/1
750 TABLET, EXTENDED RELEASE ORAL 2 TIMES DAILY
COMMUNITY
Start: 2018-11-09 | End: 2018-11-28

## 2018-11-28 RX ORDER — GABAPENTIN 100 MG/1
200 CAPSULE ORAL 3 TIMES DAILY PRN
Qty: 90 CAPSULE | Refills: 2 | Status: SHIPPED | OUTPATIENT
Start: 2018-11-28 | End: 2019-03-06

## 2018-11-28 RX ORDER — ONDANSETRON 4 MG/1
4 TABLET, FILM COATED ORAL EVERY 8 HOURS PRN
Qty: 30 TABLET | Refills: 0 | Status: SHIPPED | OUTPATIENT
Start: 2018-11-28 | End: 2019-01-30

## 2018-11-28 ASSESSMENT — PAIN SCALES - GENERAL: PAINLEVEL: EXTREME PAIN (8)

## 2018-11-28 ASSESSMENT — PATIENT HEALTH QUESTIONNAIRE - PHQ9: SUM OF ALL RESPONSES TO PHQ QUESTIONS 1-9: 19
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
## 2018-11-29 PROBLEM — R19.7 VOMITING AND DIARRHEA: Status: RESOLVED | Noted: 2018-11-16 | Resolved: 2018-11-29

## 2018-11-29 PROBLEM — T43.503A: Status: ACTIVE | Noted: 2018-11-17

## 2018-11-29 PROBLEM — R11.10 VOMITING AND DIARRHEA: Status: RESOLVED | Noted: 2018-11-16 | Resolved: 2018-11-29

## 2018-12-04 ENCOUNTER — TELEPHONE (OUTPATIENT)
Dept: FAMILY MEDICINE | Facility: CLINIC | Age: 58
End: 2018-12-04

## 2018-12-04 ENCOUNTER — AMBULATORY - HEALTHEAST (OUTPATIENT)
Dept: PULMONOLOGY | Facility: OTHER | Age: 58
End: 2018-12-04

## 2018-12-04 DIAGNOSIS — J44.9 COPD (CHRONIC OBSTRUCTIVE PULMONARY DISEASE) (H): ICD-10-CM

## 2018-12-04 NOTE — TELEPHONE ENCOUNTER
TCM APPOINTMENT FOLLOW UP    Language:English    Medication questions: no    Specialist follow up: no    Concerns since last visit: no    Next appointment at [facility]:Yes    Comments: Unable to speak to pt directly, she is scheduled her on Monday the 10th with Dr Salas for another follow up     @[facility]@ Pt is aware of this number from previous    Tracy Ryan

## 2018-12-05 ENCOUNTER — TELEPHONE (OUTPATIENT)
Dept: FAMILY MEDICINE | Facility: CLINIC | Age: 58
End: 2018-12-05

## 2018-12-05 NOTE — PROGRESS NOTES
Preceptor Attestation:  Patient's case reviewed and discussed with Benjamin Rosenstein, MD resident and I evaluated the patient. I agree with written assessment and plan of care.  Supervising Physician:  Zana Power MD MD  [facility]

## 2018-12-05 NOTE — TELEPHONE ENCOUNTER
I called to check up on the pt and help the pt setup a hospital follow up.  The pt phone was not accepting any incoming calls right now.

## 2018-12-05 NOTE — TELEPHONE ENCOUNTER
I called to check up on the pt and help setup a hospital follow up.  The pt phone was not accepting any incoming calls, and the emergency contact was disconnected.

## 2018-12-06 ENCOUNTER — TELEPHONE (OUTPATIENT)
Dept: FAMILY MEDICINE | Facility: CLINIC | Age: 58
End: 2018-12-06

## 2018-12-06 ENCOUNTER — COMMUNICATION - HEALTHEAST (OUTPATIENT)
Dept: RESPIRATORY THERAPY | Facility: CLINIC | Age: 58
End: 2018-12-06

## 2018-12-06 NOTE — TELEPHONE ENCOUNTER
I called to check up on the pt and help the pt setup a hospital follow up.  The pt phone was not accepting any incoming calls right now, and the emergency contact number is not working.

## 2018-12-07 ENCOUNTER — COMMUNICATION - HEALTHEAST (OUTPATIENT)
Dept: RESPIRATORY THERAPY | Facility: CLINIC | Age: 58
End: 2018-12-07

## 2018-12-19 ENCOUNTER — COMMUNICATION - HEALTHEAST (OUTPATIENT)
Dept: RESPIRATORY THERAPY | Facility: CLINIC | Age: 58
End: 2018-12-19

## 2019-01-03 ENCOUNTER — TELEPHONE (OUTPATIENT)
Dept: FAMILY MEDICINE | Facility: CLINIC | Age: 59
End: 2019-01-03

## 2019-01-03 NOTE — TELEPHONE ENCOUNTER
Hospital Ed follow up    Attempted to reach, and had to leave message for pt    Encouraged a call back and an appt to follow up on recent ed trips.    Britni    1/4/19  Outgoing call made to patient [phone] F/U [facility]. LMTCB to discuss how she is feeling and to also schedule clinic F/U.

## 2019-01-07 ENCOUNTER — COMMUNICATION - HEALTHEAST (OUTPATIENT)
Dept: RESPIRATORY THERAPY | Facility: CLINIC | Age: 59
End: 2019-01-07

## 2019-01-07 ENCOUNTER — TELEPHONE (OUTPATIENT)
Dept: FAMILY MEDICINE | Facility: CLINIC | Age: 59
End: 2019-01-07

## 2019-01-07 NOTE — TELEPHONE ENCOUNTER
3rd attempt calling to follow up on recent ER visit @[facility] 12/31/18. LMTCB to check in and assist with scheduling f/u visit.

## 2019-01-14 ENCOUNTER — TELEPHONE (OUTPATIENT)
Dept: FAMILY MEDICINE | Facility: CLINIC | Age: 59
End: 2019-01-14

## 2019-01-14 NOTE — TELEPHONE ENCOUNTER
Out going call made for January 2019 hospital visit. Lmtcb to assist with scheduling for post hospital visit.

## 2019-01-23 ENCOUNTER — TELEPHONE (OUTPATIENT)
Dept: FAMILY MEDICINE | Facility: CLINIC | Age: 59
End: 2019-01-23

## 2019-01-23 ENCOUNTER — OFFICE VISIT - HEALTHEAST (OUTPATIENT)
Dept: ADDICTION MEDICINE | Facility: CLINIC | Age: 59
End: 2019-01-23

## 2019-01-23 ENCOUNTER — TRANSFERRED RECORDS (OUTPATIENT)
Dept: HEALTH INFORMATION MANAGEMENT | Facility: CLINIC | Age: 59
End: 2019-01-23

## 2019-01-23 DIAGNOSIS — F12.20 CANNABIS USE DISORDER, SEVERE, DEPENDENCE (H): ICD-10-CM

## 2019-01-23 DIAGNOSIS — F10.20 ALCOHOL USE DISORDER, SEVERE, DEPENDENCE (H): ICD-10-CM

## 2019-01-23 NOTE — TELEPHONE ENCOUNTER
Out going call made to f/u on ER visit 1/23/19. LMTCB to discuss a couple of her recent ER visits and getting her scheduled for a clinic visit for medication and wellness check.

## 2019-01-30 ENCOUNTER — TELEPHONE (OUTPATIENT)
Dept: FAMILY MEDICINE | Facility: CLINIC | Age: 59
End: 2019-01-30

## 2019-01-30 ENCOUNTER — OFFICE VISIT (OUTPATIENT)
Dept: FAMILY MEDICINE | Facility: CLINIC | Age: 59
End: 2019-01-30

## 2019-01-30 VITALS — SYSTOLIC BLOOD PRESSURE: 170 MMHG | DIASTOLIC BLOOD PRESSURE: 106 MMHG

## 2019-01-30 DIAGNOSIS — K21.9 GASTROESOPHAGEAL REFLUX DISEASE, ESOPHAGITIS PRESENCE NOT SPECIFIED: ICD-10-CM

## 2019-01-30 DIAGNOSIS — F41.1 GENERALIZED ANXIETY DISORDER: ICD-10-CM

## 2019-01-30 DIAGNOSIS — F10.10 ALCOHOL ABUSE: Primary | ICD-10-CM

## 2019-01-30 DIAGNOSIS — J44.9 CHRONIC OBSTRUCTIVE PULMONARY DISEASE, UNSPECIFIED COPD TYPE (H): ICD-10-CM

## 2019-01-30 DIAGNOSIS — M75.01 ADHESIVE CAPSULITIS OF BOTH SHOULDERS: ICD-10-CM

## 2019-01-30 DIAGNOSIS — M75.02 ADHESIVE CAPSULITIS OF BOTH SHOULDERS: ICD-10-CM

## 2019-01-30 DIAGNOSIS — F33.1 MAJOR DEPRESSIVE DISORDER, RECURRENT EPISODE, MODERATE (H): ICD-10-CM

## 2019-01-30 DIAGNOSIS — Z96.642 HISTORY OF TOTAL LEFT HIP ARTHROPLASTY: ICD-10-CM

## 2019-01-30 DIAGNOSIS — I10 ESSENTIAL HYPERTENSION: ICD-10-CM

## 2019-01-30 RX ORDER — ALBUTEROL SULFATE 90 UG/1
2 AEROSOL, METERED RESPIRATORY (INHALATION) EVERY 6 HOURS PRN
Qty: 1 INHALER | Refills: 3 | Status: SHIPPED | OUTPATIENT
Start: 2019-01-30 | End: 2019-04-26

## 2019-01-30 RX ORDER — NAPROXEN 500 MG/1
500 TABLET ORAL 2 TIMES DAILY WITH MEALS
Qty: 180 TABLET | Refills: 3 | Status: SHIPPED | OUTPATIENT
Start: 2019-01-30 | End: 2019-03-05

## 2019-01-30 RX ORDER — NALTREXONE HYDROCHLORIDE 50 MG/1
50 TABLET, FILM COATED ORAL DAILY
Qty: 90 TABLET | Refills: 0 | Status: SHIPPED | OUTPATIENT
Start: 2019-01-30 | End: 2019-03-05

## 2019-01-30 RX ORDER — METOPROLOL SUCCINATE 50 MG/1
50 TABLET, EXTENDED RELEASE ORAL DAILY
Qty: 90 TABLET | Refills: 1 | Status: SHIPPED | OUTPATIENT
Start: 2019-01-30 | End: 2019-03-05

## 2019-01-30 RX ORDER — ONDANSETRON 4 MG/1
4 TABLET, FILM COATED ORAL EVERY 8 HOURS PRN
Qty: 30 TABLET | Refills: 0 | Status: SHIPPED | OUTPATIENT
Start: 2019-01-30 | End: 2019-03-05

## 2019-01-30 RX ORDER — AMLODIPINE BESYLATE 5 MG/1
5 TABLET ORAL DAILY
Qty: 90 TABLET | Refills: 1 | Status: SHIPPED | OUTPATIENT
Start: 2019-01-30 | End: 2019-03-05

## 2019-01-30 ASSESSMENT — PATIENT HEALTH QUESTIONNAIRE - PHQ9: SUM OF ALL RESPONSES TO PHQ QUESTIONS 1-9: 9

## 2019-01-30 NOTE — PROGRESS NOTES
HPI       Lisa Scott is a 58 year old female who presents for:  Chief Complaint   Patient presents with     ER F/U     On pneumonia and BP     Medication Reconciliation       Alcohol abuse  - chronic problem for patient  - multiple admission for intoxication and AMS  - questionable diagnosis of EtOH w/d seizures  - has had inpatient treatment x1 in the past  - wanting to get into an inpatient treatment plan at [facility]. Currently on a wait list. Has already completed a rule 25.  - has been on acramposate in the past and would like a medication to help with her abuse  - main issue with alcohol abuse is the craving  - LFTs from earlier this month wnl  - wanting to get all her chronic medication conditions stable in preparation for possible inpatient treatment.    HTN  - previously on amlodipine + metoprolol  - BP has not been that well controlled, but varies greatly with substance use.  - currently w/o headache, chest pain, dyspnea  BP Readings from Last 6 Encounters:   01/30/19 (!) 170/106   11/28/18 110/80   11/09/18 (!) 174/120   10/12/18 109/82   08/15/18 (!) 179/123   08/09/18 (!) 178/119       COPD  - patient currently on LAMA + prn NEREIDA.  - currently w/o productive sputum, stable breathing  - no fevers/chills  - UTD on flu and pneumonia vaccinations  - no current tobacco use    Chronic pain  OA  - s/p left hip replacement  - bilateral shoulder surgery  - all her chronic pain has worsened with the current weather  - ibuprofen somewhat helps, wanting another medication   Ref Range & Units Value   Sodium 136 - 145 mmol/L 136    Potassium 3.5 - 5.0 mmol/L 3.5    Chloride 98 - 107 mmol/L 103    CO2 22 - 31 mmol/L 22    Anion Gap, Calculation 5 - 18 mmol/L 11    Glucose 70 - 125 mg/dL 79    Calcium 8.5 - 10.5 mg/dL 8.9    BUN 8 - 22 mg/dL 8    Creatinine 0.60 - 1.10 mg/dL 0.72    GFR MDRD Af Amer >60 mL/min/1.73m2 >60    GFR MDRD Non Af Amer >60 mL/min/1.73m2 >60      Depression, anxiety, bipolar  - seeing  psychiatrist on Friday  - he prescribed all her medications  - no acute issues today.    ROS: Complete 6 point ROS completed and negative other than stated above.         Physical Exam:     Vitals:    01/30/19 1038   BP: (!) 170/106     There is no height or weight on file to calculate BMI.  Vital signs normal except for elevated BP    General: Alert and orientated in NAD. Pleasant and cooperative.   Cards: RRR, no murmurs, rubs or gallops. No lower extremity edema  Resp: clear vesicular breath sounds bilaterally, no crackles or wheezes. No increased work of breathing  Psych: mood good, affect congruent    Assessment and Plan     1. Alcohol abuse  Completed rule 25, on wait list for inpatient treatment at [facility]. Here to update chronic medication conditions in preparation for hopeful inpatient stay. Wanting medication to help with cravings. LFTs wnl earlier this month. No opioid use currently. Will trial naltrexone today.   - naltrexone (DEPADE/REVIA) 50 MG tablet; Take 1 tablet (50 mg) by mouth daily  Dispense: 90 tablet; Refill: 0    2. Essential hypertension  BP above goal but varies markedly when she is on medication. Asymptomatic today. Will restart amlodipine + metoprolol (at smaller dose; previously 150mg, concurrent anxiety).  - amLODIPine (NORVASC) 5 MG tablet; Take 1 tablet (5 mg) by mouth daily  Dispense: 90 tablet; Refill: 1  - metoprolol succinate ER (TOPROL-XL) 50 MG 24 hr tablet; Take 1 tablet (50 mg) by mouth daily  Dispense: 90 tablet; Refill: 1    3. Chronic obstructive pulmonary disease, unspecified COPD type (H)  Hx of noncompliance with controller inhaler. Looses multiple times as well given homelessness and unstable housing. UTD on vaccinations. No tobacco use. No signs or symptoms of exacerbation today. Inhalers refilled today. Basic education about controller and disease process reviewed.  - umeclidinium (INCRUSE ELLIPTA) 62.5 MCG/INH inhaler; Inhale 1 puff into the lungs daily  Dispense:  1 Inhaler; Refill: 3  - albuterol (PROAIR HFA/PROVENTIL HFA/VENTOLIN HFA) 108 (90 Base) MCG/ACT inhaler; Inhale 2 puffs into the lungs every 6 hours as needed for shortness of breath / dyspnea or wheezing  Dispense: 1 Inhaler; Refill: 3    4. Adhesive capsulitis of both shoulders  5. History of total left hip arthroplasty  - naproxen (NAPROSYN) 500 MG tablet; Take 1 tablet (500 mg) by mouth 2 times daily (with meals)  Dispense: 180 tablet; Refill: 3  - Acetaminophen (TYLENOL) 325 MG CAPS; Take 325-650 mg by mouth every 4 hours as needed (pain)  Dispense: 90 capsule; Refill: 3    6. Generalized anxiety disorder  7. Major depressive disorder, recurrent episode, moderate (H)  Follow up with psychiatrist this Friday as previously scheduled    8. Gastroesophageal reflux disease, esophagitis presence not specified  Refilled. Educated on risk of reduced bone density (, EtOH use, tobacco use, lower weight/malnourished), but patient declined stopping this medication in exchange of H2 blocker. Will continue to discuss at further appointments.  - omeprazole (PRILOSEC) 20 MG DR capsule; Take 1 capsule (20 mg) by mouth 2 times daily  Dispense: 180 capsule; Refill: 3    Follow up in 2-3 months: BP check, discuss PPI    Options for treatment and follow-up care were reviewed with the patient. Lisa Scott  engaged in the decision making process and verbalized understanding of the options discussed and agreed with the final plan.    Precepted with: MD Teri Justin MD (PGY3)  Pager: [phone]  [facility] Resident

## 2019-01-30 NOTE — TELEPHONE ENCOUNTER
"[facility] phone call message- medication clarification/question:    Full Medication Name: Nausea medication   Dose:     Question: Patient stated she would like a medication to help with her nausea. She states she believes she was previously prescribed a medication named \"ADENSTREN\". She states she she cant remember the name to well but would like a possible refill.     Pharmacy confirmed as    [id number] - [place], [place] - [address] AT [facility] & [facility] [address]: yes    [facility] [address] [place] [address] [address] [facility] [employer] [address]: No    OK to leave a message on voice mail? Yes    Primary language: English      needed? No    Call taken on January 30, 2019 at 12:11 PM by Ciara Rodriguez    "

## 2019-01-31 NOTE — PROGRESS NOTES
I have personally reviewed the history and examination as documented by Dr. Salas.  I was present during key portions of the visit and agree with the assessment and plan as documented for 58 yr old female with hx of alcohol abuse, COPD, HTN, depression/anxiety, chronic pain here for follow-up. Will try naltrexone for EtOH abuse, BP meds restarted, seeing psych for mood. Precautions given. Anticipatory guidance given.     Nikolay Cook MD  January 31, 2019  8:40 AM

## 2019-02-05 ENCOUNTER — COMMUNICATION - HEALTHEAST (OUTPATIENT)
Dept: RESPIRATORY THERAPY | Facility: CLINIC | Age: 59
End: 2019-02-05

## 2019-02-06 ENCOUNTER — TRANSFERRED RECORDS (OUTPATIENT)
Dept: HEALTH INFORMATION MANAGEMENT | Facility: CLINIC | Age: 59
End: 2019-02-06

## 2019-03-05 ENCOUNTER — OFFICE VISIT (OUTPATIENT)
Dept: FAMILY MEDICINE | Facility: CLINIC | Age: 59
End: 2019-03-05

## 2019-03-05 DIAGNOSIS — F10.10 ALCOHOL ABUSE: Primary | ICD-10-CM

## 2019-03-05 DIAGNOSIS — F41.1 GENERALIZED ANXIETY DISORDER: ICD-10-CM

## 2019-03-05 DIAGNOSIS — M75.02 ADHESIVE CAPSULITIS OF BOTH SHOULDERS: ICD-10-CM

## 2019-03-05 DIAGNOSIS — M75.01 ADHESIVE CAPSULITIS OF BOTH SHOULDERS: ICD-10-CM

## 2019-03-05 DIAGNOSIS — J42 CHRONIC BRONCHITIS, UNSPECIFIED CHRONIC BRONCHITIS TYPE (H): ICD-10-CM

## 2019-03-05 DIAGNOSIS — G47.00 INSOMNIA, UNSPECIFIED TYPE: ICD-10-CM

## 2019-03-05 DIAGNOSIS — F31.60 BIPOLAR DISORDER, MIXED (H): ICD-10-CM

## 2019-03-05 DIAGNOSIS — I95.1 ORTHOSTATIC HYPOTENSION: ICD-10-CM

## 2019-03-05 DIAGNOSIS — F29 PSYCHOSIS, UNSPECIFIED PSYCHOSIS TYPE (H): ICD-10-CM

## 2019-03-05 DIAGNOSIS — Z79.899 ENCOUNTER FOR MEDICATION REVIEW: ICD-10-CM

## 2019-03-05 LAB
BUN SERPL-MCNC: 21 MG/DL (ref 7–30)
CALCIUM SERPL-MCNC: 9.7 MG/DL (ref 8.5–10.4)
CHLORIDE SERPLBLD-SCNC: 98 MMOL/L (ref 94–109)
CO2 SERPL-SCNC: 27 MMOL/L (ref 20–32)
CREAT SERPL-MCNC: 0.8 MG/DL (ref 0.6–1.3)
EGFR CALCULATED (BLACK REFERENCE): >90 ML/MIN
EGFR CALCULATED (NON BLACK REFERENCE): 78.9 ML/MIN
GLUCOSE SERPL-MCNC: 93 MG/DL (ref 60–109)
POTASSIUM SERPL-SCNC: 4.1 MMOL/L (ref 3.4–5.3)
SODIUM SERPL-SCNC: 138 MMOL/L (ref 133–144)

## 2019-03-05 RX ORDER — ONDANSETRON 4 MG/1
4 TABLET, FILM COATED ORAL EVERY 8 HOURS PRN
Qty: 30 TABLET | Refills: 0 | Status: SHIPPED | OUTPATIENT
Start: 2019-03-05 | End: 2019-03-27

## 2019-03-05 RX ORDER — QUETIAPINE FUMARATE 100 MG/1
100 TABLET, FILM COATED ORAL
COMMUNITY
Start: 2018-08-29 | End: 2019-03-05

## 2019-03-05 RX ORDER — FLUTICASONE PROPIONATE 50 MCG
1 SPRAY, SUSPENSION (ML) NASAL DAILY
COMMUNITY
Start: 2019-02-28 | End: 2019-03-06

## 2019-03-05 RX ORDER — LANOLIN ALCOHOL/MO/W.PET/CERES
6 CREAM (GRAM) TOPICAL
Qty: 180 TABLET | Refills: 3 | Status: SHIPPED | OUTPATIENT
Start: 2019-03-05 | End: 2019-03-06

## 2019-03-05 RX ORDER — FUROSEMIDE 20 MG
20 TABLET ORAL DAILY
COMMUNITY
Start: 2019-03-01 | End: 2019-03-05

## 2019-03-05 RX ORDER — LORATADINE 10 MG/1
1 TABLET ORAL DAILY
COMMUNITY
Start: 2017-04-15 | End: 2019-03-06

## 2019-03-05 RX ORDER — QUETIAPINE FUMARATE 100 MG/1
100 TABLET, FILM COATED ORAL AT BEDTIME
Qty: 90 TABLET | Refills: 3 | Status: SHIPPED | OUTPATIENT
Start: 2019-03-05 | End: 2019-05-13

## 2019-03-05 RX ORDER — PROPRANOLOL HYDROCHLORIDE 10 MG/1
10 TABLET ORAL 3 TIMES DAILY PRN
COMMUNITY
Start: 2019-02-28 | End: 2019-03-06 | Stop reason: ALTCHOICE

## 2019-03-05 RX ORDER — NALTREXONE HYDROCHLORIDE 50 MG/1
50 TABLET, FILM COATED ORAL DAILY
Qty: 90 TABLET | Refills: 0 | Status: SHIPPED | OUTPATIENT
Start: 2019-03-05 | End: 2019-06-26

## 2019-03-05 RX ORDER — AMOXICILLIN 250 MG
1 CAPSULE ORAL 2 TIMES DAILY
COMMUNITY
Start: 2019-02-28 | End: 2019-03-06

## 2019-03-05 RX ORDER — ACETAMINOPHEN 500 MG
1000 TABLET ORAL 3 TIMES DAILY PRN
Qty: 100 TABLET | Refills: 3 | Status: SHIPPED | OUTPATIENT
Start: 2019-03-05 | End: 2019-03-06

## 2019-03-05 RX ORDER — CALCIUM CARBONATE 500 MG/1
400 TABLET, CHEWABLE ORAL 3 TIMES DAILY PRN
COMMUNITY
Start: 2019-02-28 | End: 2019-03-06

## 2019-03-05 ASSESSMENT — PAIN SCALES - GENERAL: PAINLEVEL: SEVERE PAIN (6)
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
## 2019-03-05 NOTE — Clinical Note
Good work, Teri! I think you completed what would have otherwise been an observation hospital stay in the setting of our clinic!!

## 2019-03-05 NOTE — LETTER
3/5/2019    Re: Lisa Scott  1960      To Whom It May Concern:     Lisa Scott was seen in clinic today. Due to previous hip fracture and surgery, bilateral shoulder adhesive capsulitis, please excuse her from vacuuming, shoveling, excessive stair use, or repetitive overhead movements. She is able to do laundry and dishes.      Teri Salas MD  3/5/2019 11:16 AM

## 2019-03-05 NOTE — NURSING NOTE
Triaged patient r/t dizziness. Patient was just discharged from inpatient treatment for ETOH dependency. Patient verbalized she has been sick with a cough for a long time, approximately a month long, and had a fall earlier today r/t dizziness. She stated she got up from her chair to walk to the kitchen and fell. Denies hitting her head, denies losing consciousness. Vitals assessed, blood pressure below standard. Offered fluids for the patient and blood pressure increased upon recheck. Afebrile(oral temperature difficult to obtain as patient was drinking fluids), o2 WNL, RR WNL, pulse WNL. Patient tearful during time spent with her, discussing past abuse, discontent with treatment at recent inpatient stay, and verbalized a recent change in her blood pressure medication. Discussed case with  who advised she still wanted to see the patient before determining if she needs to go to the hospital for IV hydration. Hand-off with , I stayed in patient room until the physician entered the room to see the patient. Sherif LONGO

## 2019-03-05 NOTE — LETTER
3/5/2019    Re: Lisa Scott  1960      To Whom It May Concern:     Lisa Scott was seen in clinic today. She will be late to her meeting this afternoon. Please call the clinic with any further questions or concerns.      Teri Salas MD  3/5/2019 11:47 AM

## 2019-03-05 NOTE — NURSING NOTE
Patient also noted during triaging patient, she is interested in seeing a  or licensed therapist alongside seeing her regular psychiatrist. Sherif LONGO

## 2019-03-05 NOTE — Clinical Note
We coded this a 5 based on the potential life threatening causes of hypotension and the fact that we were able to stabilize the patient in clinic.

## 2019-03-06 ENCOUNTER — TELEPHONE (OUTPATIENT)
Dept: FAMILY MEDICINE | Facility: CLINIC | Age: 59
End: 2019-03-06

## 2019-03-06 ENCOUNTER — COMMUNICATION - HEALTHEAST (OUTPATIENT)
Dept: RESPIRATORY THERAPY | Facility: CLINIC | Age: 59
End: 2019-03-06

## 2019-03-06 ENCOUNTER — OFFICE VISIT (OUTPATIENT)
Dept: PHARMACY | Facility: CLINIC | Age: 59
End: 2019-03-06

## 2019-03-06 VITALS
OXYGEN SATURATION: 96 % | SYSTOLIC BLOOD PRESSURE: 106 MMHG | TEMPERATURE: 97.7 F | HEART RATE: 59 BPM | BODY MASS INDEX: 24.61 KG/M2 | WEIGHT: 130.2 LBS | RESPIRATION RATE: 18 BRPM | DIASTOLIC BLOOD PRESSURE: 70 MMHG

## 2019-03-06 VITALS
OXYGEN SATURATION: 99 % | BODY MASS INDEX: 25.45 KG/M2 | RESPIRATION RATE: 16 BRPM | SYSTOLIC BLOOD PRESSURE: 106 MMHG | HEART RATE: 80 BPM | DIASTOLIC BLOOD PRESSURE: 72 MMHG | TEMPERATURE: 97.8 F | WEIGHT: 134.6 LBS

## 2019-03-06 DIAGNOSIS — I50.9 CHF (CONGESTIVE HEART FAILURE) (H): ICD-10-CM

## 2019-03-06 DIAGNOSIS — R12 HEARTBURN: ICD-10-CM

## 2019-03-06 DIAGNOSIS — F10.10 ALCOHOL ABUSE: ICD-10-CM

## 2019-03-06 DIAGNOSIS — Z71.89 ENCOUNTER FOR MEDICATION REVIEW AND COUNSELING: Primary | ICD-10-CM

## 2019-03-06 DIAGNOSIS — I10 ESSENTIAL HYPERTENSION: ICD-10-CM

## 2019-03-06 DIAGNOSIS — R52 PAIN: ICD-10-CM

## 2019-03-06 DIAGNOSIS — J44.9 CHRONIC OBSTRUCTIVE PULMONARY DISEASE, UNSPECIFIED COPD TYPE (H): ICD-10-CM

## 2019-03-06 DIAGNOSIS — F41.1 GENERALIZED ANXIETY DISORDER: ICD-10-CM

## 2019-03-06 DIAGNOSIS — G40.909 NONINTRACTABLE EPILEPSY WITHOUT STATUS EPILEPTICUS, UNSPECIFIED EPILEPSY TYPE (H): ICD-10-CM

## 2019-03-06 DIAGNOSIS — J30.81 ALLERGIC RHINITIS DUE TO ANIMALS: ICD-10-CM

## 2019-03-06 RX ORDER — NAPROXEN 500 MG/1
250-500 TABLET ORAL 2 TIMES DAILY PRN
COMMUNITY
Start: 2019-03-06 | End: 2019-05-13

## 2019-03-06 RX ORDER — GABAPENTIN 300 MG/1
600 CAPSULE ORAL 3 TIMES DAILY
COMMUNITY
Start: 2019-03-06 | End: 2019-05-01

## 2019-03-06 RX ORDER — FLUTICASONE PROPIONATE AND SALMETEROL 232; 14 UG/1; UG/1
1 POWDER, METERED RESPIRATORY (INHALATION) 2 TIMES DAILY
COMMUNITY
Start: 2019-03-06 | End: 2019-04-26

## 2019-03-06 RX ORDER — POLYETHYLENE GLYCOL 3350 17 G/17G
1 POWDER, FOR SOLUTION ORAL DAILY PRN
COMMUNITY
Start: 2019-03-06 | End: 2020-12-30

## 2019-03-06 RX ORDER — FUROSEMIDE 20 MG
20 TABLET ORAL DAILY
COMMUNITY
Start: 2019-03-06 | End: 2019-03-27

## 2019-03-06 RX ORDER — HYDROXYZINE HYDROCHLORIDE 25 MG/1
25 TABLET, FILM COATED ORAL 4 TIMES DAILY PRN
COMMUNITY
Start: 2019-03-06 | End: 2019-12-23

## 2019-03-06 RX ORDER — DIVALPROEX SODIUM 500 MG/1
1500 TABLET, EXTENDED RELEASE ORAL DAILY
COMMUNITY
Start: 2019-03-06 | End: 2019-03-27

## 2019-03-06 RX ORDER — AMOXICILLIN 250 MG
1 CAPSULE ORAL DAILY
COMMUNITY
Start: 2019-03-06 | End: 2019-03-27

## 2019-03-06 RX ORDER — IPRATROPIUM BROMIDE AND ALBUTEROL SULFATE 2.5; .5 MG/3ML; MG/3ML
1 SOLUTION RESPIRATORY (INHALATION) 3 TIMES DAILY
COMMUNITY
Start: 2019-03-06 | End: 2019-10-24

## 2019-03-06 RX ORDER — FLUTICASONE PROPIONATE 50 MCG
2 SPRAY, SUSPENSION (ML) NASAL DAILY
COMMUNITY
Start: 2019-03-06 | End: 2019-07-24

## 2019-03-06 RX ORDER — LOPERAMIDE HYDROCHLORIDE 2 MG/1
2 TABLET ORAL 4 TIMES DAILY PRN
COMMUNITY
Start: 2019-03-06 | End: 2020-03-24

## 2019-03-06 RX ORDER — VENLAFAXINE HYDROCHLORIDE 150 MG/1
150 CAPSULE, EXTENDED RELEASE ORAL 2 TIMES DAILY
COMMUNITY
Start: 2019-03-06 | End: 2019-08-15

## 2019-03-06 RX ORDER — PHENOL 1.4 %
10 AEROSOL, SPRAY (ML) MUCOUS MEMBRANE AT BEDTIME
COMMUNITY
Start: 2019-03-06 | End: 2019-04-26

## 2019-03-06 RX ORDER — ACAMPROSATE CALCIUM 333 MG/1
666 TABLET, DELAYED RELEASE ORAL 3 TIMES DAILY
COMMUNITY
Start: 2019-03-06 | End: 2019-08-15

## 2019-03-06 RX ORDER — BUSPIRONE HYDROCHLORIDE 30 MG/1
15 TABLET ORAL 3 TIMES DAILY
COMMUNITY
Start: 2019-03-06 | End: 2020-04-27

## 2019-03-06 RX ORDER — CALCIUM CARBONATE 500 MG/1
2 TABLET, CHEWABLE ORAL 4 TIMES DAILY PRN
COMMUNITY
Start: 2019-03-06 | End: 2020-06-03

## 2019-03-06 RX ORDER — ACETAMINOPHEN 500 MG
1000 TABLET ORAL 3 TIMES DAILY
COMMUNITY
Start: 2019-03-06 | End: 2019-05-13

## 2019-03-06 NOTE — PATIENT INSTRUCTIONS
Medication changes from today:  Acamprosate (for alcohol cravings): Start taking 2 tablets three times per day. You can take this with your Gabapentin and your Acetaminophen.     Flonase (for cat allergies): Start using 2 sprays in each nostril once per day.     Inhaler: Start using 1 puff two times per day.    Naproxen (for pain): Try using one-half tablet as needed for pain instead of a whole pill. Take with food or after you have eaten. Please use this instead of Ibuprofen.     Continue to use Hydroxyzine for anxiety. This will replace the Propranolol medicine.
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
## 2019-03-11 ENCOUNTER — TELEPHONE (OUTPATIENT)
Dept: FAMILY MEDICINE | Facility: CLINIC | Age: 59
End: 2019-03-11

## 2019-03-11 NOTE — TELEPHONE ENCOUNTER
[facility] min. TCB

## 2019-03-12 ENCOUNTER — TELEPHONE (OUTPATIENT)
Dept: FAMILY MEDICINE | Facility: CLINIC | Age: 59
End: 2019-03-12
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
## 2019-03-13 ENCOUNTER — TELEPHONE (OUTPATIENT)
Dept: FAMILY MEDICINE | Facility: CLINIC | Age: 59
End: 2019-03-13

## 2019-03-13 ENCOUNTER — TRANSFERRED RECORDS (OUTPATIENT)
Dept: HEALTH INFORMATION MANAGEMENT | Facility: CLINIC | Age: 59
End: 2019-03-13

## 2019-03-13 ENCOUNTER — OFFICE VISIT (OUTPATIENT)
Dept: FAMILY MEDICINE | Facility: CLINIC | Age: 59
End: 2019-03-13

## 2019-03-13 VITALS
HEART RATE: 117 BPM | SYSTOLIC BLOOD PRESSURE: 163 MMHG | OXYGEN SATURATION: 100 % | TEMPERATURE: 97.4 F | RESPIRATION RATE: 22 BRPM | DIASTOLIC BLOOD PRESSURE: 130 MMHG

## 2019-03-13 DIAGNOSIS — R11.2 INTRACTABLE VOMITING WITH NAUSEA, UNSPECIFIED VOMITING TYPE: Primary | ICD-10-CM

## 2019-03-13 LAB — GLUCOSE CASUAL: 122 MG/DL (ref 51–200)

## 2019-03-13 NOTE — PROGRESS NOTES
"Pt is a 58 year old female last seen on 3/5/19 here today for:     Vomiting - off an on all day yesterday and today  Dry heaves now  Not much in terms of keeping water down  No fevers  No diarrhea  No hematemesis   No melena  +Abd pain - \"all over\"    Last drink was 2 months ago    Past Medical History:   Diagnosis Date     Alcohol withdrawal seizure (H) 02/25/2017     Chronic obstructive pulmonary disease, unspecified COPD type (H) 2/23/2017     COPD (chronic obstructive pulmonary disease) (H)      Depressive disorder       No past surgical history on file.   Current Outpatient Medications   Medication Sig Dispense Refill     acamprosate (CAMPRAL) 333 MG EC tablet Take 2 tablets (666 mg) by mouth 3 times daily       acetaminophen (TYLENOL) 500 MG tablet Take 2 tablets (1,000 mg) by mouth 3 times daily       albuterol (PROAIR HFA/PROVENTIL HFA/VENTOLIN HFA) 108 (90 Base) MCG/ACT inhaler Inhale 2 puffs into the lungs every 6 hours as needed for shortness of breath / dyspnea or wheezing 1 Inhaler 3     busPIRone (BUSPAR) 30 MG tablet Take 0.5 tablets (15 mg) by mouth 3 times daily       calcium carbonate (TUMS) 500 MG chewable tablet Take 2 tablets (1,000 mg) by mouth 4 times daily as needed for heartburn       divalproex sodium extended-release (DEPAKOTE ER) 500 MG 24 hr tablet Take 3 tablets (1,500 mg) by mouth daily       fluticasone (FLONASE) 50 MCG/ACT nasal spray Spray 2 sprays in nostril daily       fluticasone-salmeterol (AIRDUO RESPICLICK) 232-14 MCG/ACT inhaler Inhale 1 puff into the lungs 2 times daily       furosemide (LASIX) 20 MG tablet Take 1 tablet (20 mg) by mouth daily       gabapentin (NEURONTIN) 300 MG capsule Take 2 capsules (600 mg) by mouth 3 times daily       hydrOXYzine (ATARAX) 25 MG tablet Take 1 tablet (25 mg) by mouth 4 times daily as needed for anxiety       ipratropium - albuterol 0.5 mg/2.5 mg/3 mL (DUONEB) 0.5-2.5 (3) MG/3ML neb solution Take 1 vial (3 mLs) by nebulization 3 times " daily       levETIRAcetam (KEPPRA) 500 MG tablet Take 1 tablet (500 mg) by mouth 2 times daily 60 tablet 3     loperamide (IMODIUM A-D) 2 MG tablet Take 1 tablet (2 mg) by mouth 4 times daily as needed for diarrhea       melatonin 10 MG TABS tablet Take 1 tablet (10 mg) by mouth At Bedtime , additional tablet as needed for late night awakenings       naltrexone (DEPADE/REVIA) 50 MG tablet Take 1 tablet (50 mg) by mouth daily 90 tablet 0     naproxen (NAPROSYN) 500 MG tablet Take 0.5-1 tablets (250-500 mg) by mouth 2 times daily as needed (for pain) with meals       omeprazole (PRILOSEC) 20 MG DR capsule Take 1 capsule (20 mg) by mouth 2 times daily 180 capsule 3     ondansetron (ZOFRAN) 4 MG tablet Take 1 tablet (4 mg) by mouth every 8 hours as needed for nausea 30 tablet 0     polyethylene glycol (MIRALAX/GLYCOLAX) powder Take 17 g (1 capful) by mouth daily as needed for constipation       prazosin (MINIPRESS) 2 MG capsule Take 1 capsule (2 mg) by mouth At Bedtime 30 capsule 1     QUEtiapine (SEROQUEL) 100 MG tablet Take 1 tablet (100 mg) by mouth At Bedtime 90 tablet 3     senna-docusate (SENOKOT-S/PERICOLACE) 8.6-50 MG tablet Take 1 tablet by mouth daily       venlafaxine (EFFEXOR-XR) 150 MG 24 hr capsule Take 1 capsule (150 mg) by mouth 2 times daily        Allergies   Allergen Reactions     Nkda [No Known Drug Allergies]         ROS:   Gen- no weight change, no fevers/chills   ENT- + URI symptoms   Cardiac - no palpitations, no chest pain   Respiratory - no shortness of breath , no wheezing   GI - + nausea, + vomiting, no diarrhea, no constipation   Remainder of ROS negative.     Exam:   BP (!) 163/130   Pulse 117   Temp 97.4  F (36.3  C) (Oral)   Resp 22   SpO2 100%    Alert and oriented x 3; +tearful, retching in office    ABd: soft, + bowel sounds, diffuse mild tenderness/nondistended, no rebound/guarding   Derm: no rashes     (R11.2) Intractable vomiting with nausea, unspecified vomiting type  (primary  encounter diagnosis)  Comment:   Plan: pt declined IM Tigan or PO Zofran trial; continues to retch and dry heave in office. Pt requesting to go to the ED; transport arranged; spoke to doc at [facility] ED for signout      Nikolay Cook MD  March 13, 2019  10:26 AM

## 2019-03-14 ENCOUNTER — TELEPHONE (OUTPATIENT)
Dept: FAMILY MEDICINE | Facility: CLINIC | Age: 59
End: 2019-03-14

## 2019-03-14 NOTE — TELEPHONE ENCOUNTER
Date of discharge: 03/11/2019  Facility of discharge: [facility]  Patient concerns about condition: No concerns at this time.  Patient concerns about medications: No concerns at this time.  Full med reconciliation will be completed at clinic visit.  Patient concerns about transitioning: No concerns at this time.  Clinic office visit appointment date: 03/13/2019  Patient reminded to bring all medications (prescription and over-the-counter) to clinic appointment: Yes    Using the date of discharge as day 1, the 30th day post discharge is 04/11/2019.
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
## 2019-03-18 ENCOUNTER — TELEPHONE (OUTPATIENT)
Dept: FAMILY MEDICINE | Facility: CLINIC | Age: 59
End: 2019-03-18

## 2019-03-18 ENCOUNTER — TRANSFERRED RECORDS (OUTPATIENT)
Dept: HEALTH INFORMATION MANAGEMENT | Facility: CLINIC | Age: 59
End: 2019-03-18

## 2019-03-18 NOTE — TELEPHONE ENCOUNTER
Outgoing f/u call made to patient f/u recent ER visit 3/18/19 and assist scheduling clinic visit. LMTCB

## 2019-03-18 NOTE — TELEPHONE ENCOUNTER
Cyn,Counselor called to report concerning behavior/ symptoms presented when patient arrived for group session today (outpatient treatment). C/o extremity weakness, did not specify right or left- unable to hold cup of coffee well, spilled coffee. Kept taking coat off and putting it on again repeatedly a few times. Unsteady on feet with slurred speech. Appropriate responsiveness to questions from counselor. Lisa informs counselor she slept yesterday from 3 pm to 6 am today. Arrived to group session this morning via [facility]. Advised and recommend Lisa be taken to ED for further evaluation. Cyn will speak with her supervisor and arrange for patient to be taken to ED. Rochelle LONGO

## 2019-03-19 ENCOUNTER — TELEPHONE (OUTPATIENT)
Dept: FAMILY MEDICINE | Facility: CLINIC | Age: 59
End: 2019-03-19

## 2019-03-19 NOTE — TELEPHONE ENCOUNTER
I got a call from Rosa from [facility] about the pt. She wanted to know if we can call the pt medical insurance, and have the pt restricted to only  only.  She stated that she wanted to have this done so that the pt is not able to get benzo from her psychiatrist, or other providers.  It seems like the pt is getting other benzo or medication from other providers, and it is causing the pt to be hospitalized.  I told Rosa that I will send this request to , and see what she thinks, and wants to do. I told her that I will give her a call back on what she decides.

## 2019-03-20 ENCOUNTER — TELEPHONE (OUTPATIENT)
Dept: FAMILY MEDICINE | Facility: CLINIC | Age: 59
End: 2019-03-20

## 2019-03-20 NOTE — TELEPHONE ENCOUNTER
I called to check up on the pt and help the pt setup a hospital follow up.  The pt did not answer her phone, so I left a vm for the pt to give me a call back.

## 2019-03-21 ENCOUNTER — TRANSFERRED RECORDS (OUTPATIENT)
Dept: HEALTH INFORMATION MANAGEMENT | Facility: CLINIC | Age: 59
End: 2019-03-21

## 2019-03-21 ENCOUNTER — TELEPHONE (OUTPATIENT)
Dept: FAMILY MEDICINE | Facility: CLINIC | Age: 59
End: 2019-03-21

## 2019-03-21 NOTE — TELEPHONE ENCOUNTER
I called to check up on the pt, and help the pt setup a hospital follow up.  The pt did not answer her phone, so I left a vm for the pt to give me a call back.

## 2019-03-22 ENCOUNTER — TELEPHONE (OUTPATIENT)
Dept: FAMILY MEDICINE | Facility: CLINIC | Age: 59
End: 2019-03-22

## 2019-03-25 ENCOUNTER — TELEPHONE (OUTPATIENT)
Dept: FAMILY MEDICINE | Facility: CLINIC | Age: 59
End: 2019-03-25

## 2019-03-25 NOTE — TELEPHONE ENCOUNTER
Out going call made to patient f/u on recent ER visit. LMTCB f/u and assist her with finding chemical dependency program.  Please see ER visit note 3/21/19.

## 2019-03-27 ENCOUNTER — OFFICE VISIT (OUTPATIENT)
Dept: FAMILY MEDICINE | Facility: CLINIC | Age: 59
End: 2019-03-27

## 2019-03-27 ENCOUNTER — OFFICE VISIT (OUTPATIENT)
Dept: PHARMACY | Facility: CLINIC | Age: 59
End: 2019-03-27

## 2019-03-27 VITALS
RESPIRATION RATE: 18 BRPM | WEIGHT: 125 LBS | DIASTOLIC BLOOD PRESSURE: 97 MMHG | BODY MASS INDEX: 23.6 KG/M2 | HEART RATE: 95 BPM | OXYGEN SATURATION: 99 % | HEIGHT: 61 IN | SYSTOLIC BLOOD PRESSURE: 138 MMHG | TEMPERATURE: 98.1 F

## 2019-03-27 DIAGNOSIS — G40.909 NONINTRACTABLE EPILEPSY WITHOUT STATUS EPILEPTICUS, UNSPECIFIED EPILEPSY TYPE (H): ICD-10-CM

## 2019-03-27 DIAGNOSIS — I10 ESSENTIAL HYPERTENSION: ICD-10-CM

## 2019-03-27 DIAGNOSIS — Z71.89 ENCOUNTER FOR MEDICATION REVIEW AND COUNSELING: Primary | ICD-10-CM

## 2019-03-27 DIAGNOSIS — Z09 HOSPITAL DISCHARGE FOLLOW-UP: Primary | ICD-10-CM

## 2019-03-27 RX ORDER — AMLODIPINE BESYLATE 10 MG/1
10 TABLET ORAL DAILY
COMMUNITY
Start: 2019-03-12 | End: 2019-03-27

## 2019-03-27 RX ORDER — DIVALPROEX SODIUM 500 MG/1
1500 TABLET, EXTENDED RELEASE ORAL EVERY MORNING
Qty: 90 TABLET | Refills: 1 | Status: SHIPPED | OUTPATIENT
Start: 2019-03-27 | End: 2019-08-15

## 2019-03-27 RX ORDER — POLYVINYL ALCOHOL 14 MG/ML
1 SOLUTION/ DROPS OPHTHALMIC PRN
COMMUNITY
End: 2021-08-31

## 2019-03-27 RX ORDER — ONDANSETRON 4 MG/1
4 TABLET, FILM COATED ORAL EVERY 8 HOURS PRN
Qty: 15 TABLET | Refills: 0 | Status: SHIPPED | OUTPATIENT
Start: 2019-03-27 | End: 2019-06-26

## 2019-03-27 ASSESSMENT — MIFFLIN-ST. JEOR: SCORE: 1088.5
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
## 2019-03-28 ENCOUNTER — MEDICAL CORRESPONDENCE (OUTPATIENT)
Dept: HEALTH INFORMATION MANAGEMENT | Facility: CLINIC | Age: 59
End: 2019-03-28

## 2019-04-01 NOTE — PROGRESS NOTES
I have personally reviewed the history and examination as documented by Dr. Huff.  I was present during key portions of the visit and agree with the assessment and plan as documented for 58 yr old female with  alcohol dependence, benzodiazepine dependence, COPD, asthma, CHF, HTN, stroke here for hospital follow-up s/p admission for altered mental status. Is off benzos now. Lengthy visit w/ our  regarding limited remaining housing options. Precautions given. Anticipatory guidance given.     Nikolay Cook MD  April 1, 2019  9:51 AM

## 2019-04-05 ENCOUNTER — COMMUNICATION - HEALTHEAST (OUTPATIENT)
Dept: RESPIRATORY THERAPY | Facility: CLINIC | Age: 59
End: 2019-04-05

## 2019-04-26 ENCOUNTER — OFFICE VISIT (OUTPATIENT)
Dept: FAMILY MEDICINE | Facility: CLINIC | Age: 59
End: 2019-04-26

## 2019-04-26 VITALS
WEIGHT: 128.6 LBS | HEIGHT: 61 IN | DIASTOLIC BLOOD PRESSURE: 78 MMHG | RESPIRATION RATE: 20 BRPM | HEART RATE: 111 BPM | SYSTOLIC BLOOD PRESSURE: 132 MMHG | BODY MASS INDEX: 24.28 KG/M2 | TEMPERATURE: 98.2 F | OXYGEN SATURATION: 97 %

## 2019-04-26 DIAGNOSIS — M79.671 RIGHT FOOT PAIN: ICD-10-CM

## 2019-04-26 DIAGNOSIS — Z76.0 ENCOUNTER FOR MEDICATION REFILL: Primary | ICD-10-CM

## 2019-04-26 RX ORDER — ALBUTEROL SULFATE 90 UG/1
2 AEROSOL, METERED RESPIRATORY (INHALATION) EVERY 6 HOURS PRN
Qty: 8.5 G | Refills: 11 | Status: SHIPPED | OUTPATIENT
Start: 2019-04-26 | End: 2019-07-05

## 2019-04-26 RX ORDER — PHENOL 1.4 %
10 AEROSOL, SPRAY (ML) MUCOUS MEMBRANE AT BEDTIME
Qty: 90 TABLET | Refills: 1 | Status: SHIPPED | OUTPATIENT
Start: 2019-04-26 | End: 2020-03-26

## 2019-04-26 RX ORDER — CYCLOBENZAPRINE HCL 10 MG
10 TABLET ORAL 3 TIMES DAILY PRN
Qty: 30 TABLET | Refills: 0 | Status: SHIPPED | OUTPATIENT
Start: 2019-04-26 | End: 2019-05-13

## 2019-04-26 RX ORDER — FLUTICASONE PROPIONATE AND SALMETEROL 232; 14 UG/1; UG/1
1 POWDER, METERED RESPIRATORY (INHALATION) 2 TIMES DAILY
Qty: 1 INHALER | Refills: 1 | Status: SHIPPED | OUTPATIENT
Start: 2019-04-26 | End: 2019-07-11

## 2019-04-26 RX ORDER — ACETAMINOPHEN 500 MG
500-1000 TABLET ORAL EVERY 8 HOURS PRN
Qty: 90 TABLET | Refills: 1 | Status: SHIPPED | OUTPATIENT
Start: 2019-04-26 | End: 2019-05-13

## 2019-04-26 ASSESSMENT — MIFFLIN-ST. JEOR: SCORE: 1102.77

## 2019-04-26 NOTE — PROGRESS NOTES
Preceptor Attestation:  Patient's case reviewed and discussed with Teri Salas MD resident and I evaluated the patient. I agree with written assessment and plan of care.  Supervising Physician:  MARIA EUGENIA DE LA PAZ MD  [facility]

## 2019-04-26 NOTE — PROGRESS NOTES
The pt wanted to talk to me about her SNAP form.  I had helped the pt fill out the SNAP form, but she has moved out and has not turned it in.  The pt still has the completed form, so I told her she can walk it to the county or mail it to them.  Pt stated that she will send it to them.

## 2019-04-29 ENCOUNTER — OFFICE VISIT (OUTPATIENT)
Dept: FAMILY MEDICINE | Facility: CLINIC | Age: 59
End: 2019-04-29

## 2019-04-29 VITALS
BODY MASS INDEX: 25.68 KG/M2 | TEMPERATURE: 98.3 F | RESPIRATION RATE: 16 BRPM | HEIGHT: 60 IN | HEART RATE: 83 BPM | DIASTOLIC BLOOD PRESSURE: 90 MMHG | WEIGHT: 130.8 LBS | SYSTOLIC BLOOD PRESSURE: 130 MMHG | OXYGEN SATURATION: 99 %

## 2019-04-29 DIAGNOSIS — M25.571 PAIN IN JOINT, ANKLE AND FOOT, RIGHT: Primary | ICD-10-CM

## 2019-04-29 DIAGNOSIS — Z76.0 ENCOUNTER FOR MEDICATION REFILL: ICD-10-CM

## 2019-04-29 RX ORDER — IBUPROFEN 400 MG/1
400 TABLET, FILM COATED ORAL EVERY 6 HOURS PRN
Qty: 30 TABLET | Refills: 3 | Status: SHIPPED | OUTPATIENT
Start: 2019-04-29 | End: 2019-05-13

## 2019-04-29 ASSESSMENT — PATIENT HEALTH QUESTIONNAIRE - PHQ9: SUM OF ALL RESPONSES TO PHQ QUESTIONS 1-9: 9

## 2019-04-29 ASSESSMENT — MIFFLIN-ST. JEOR: SCORE: 1108.55

## 2019-04-29 NOTE — PATIENT INSTRUCTIONS
I have an xray ordered for you to have performed at a different site.   Alternate between tylenol and ibuprofen for pain   Use heat for ankle swelling
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
## 2019-04-29 NOTE — PROGRESS NOTES
Preceptor Attestation:   Patient seen, evaluated and discussed with the resident. I have verified the content of the note, which accurately reflects my assessment of the patient and the plan of care.  Supervising Physician:Crescencio De La Cruz MD  [facility]

## 2019-04-29 NOTE — NURSING NOTE
Mammogram, Colonoscopy or fit test referral--offer pt decline since in pain  PHQ9--given to pt to fill out for MD to fill out

## 2019-04-29 NOTE — LETTER
May 3, 2019      Lisa Scott  [address] [place] [address]        Dear Lisa,    Please see below for your test results.    Xray does not show ay fracture, youmay continue current plan of care.     If you have any questions, please call the clinic to make an appointment.    Sincerely,    Elmer Doan MD

## 2019-05-01 ENCOUNTER — ALLIED HEALTH/NURSE VISIT (OUTPATIENT)
Dept: FAMILY MEDICINE | Facility: CLINIC | Age: 59
End: 2019-05-01

## 2019-05-01 DIAGNOSIS — M54.2 CERVICALGIA: Primary | ICD-10-CM

## 2019-05-01 DIAGNOSIS — M25.571 PAIN IN JOINT, ANKLE AND FOOT, RIGHT: Primary | ICD-10-CM

## 2019-05-01 RX ORDER — GABAPENTIN 300 MG/1
600 CAPSULE ORAL 3 TIMES DAILY
Qty: 90 CAPSULE | Refills: 3 | Status: SHIPPED | OUTPATIENT
Start: 2019-05-01 | End: 2019-07-05

## 2019-05-07 ENCOUNTER — COMMUNICATION - HEALTHEAST (OUTPATIENT)
Dept: RESPIRATORY THERAPY | Facility: CLINIC | Age: 59
End: 2019-05-07

## 2019-05-13 ENCOUNTER — OFFICE VISIT (OUTPATIENT)
Dept: FAMILY MEDICINE | Facility: CLINIC | Age: 59
End: 2019-05-13

## 2019-05-13 VITALS
OXYGEN SATURATION: 98 % | HEART RATE: 97 BPM | RESPIRATION RATE: 18 BRPM | HEIGHT: 61 IN | TEMPERATURE: 97.5 F | SYSTOLIC BLOOD PRESSURE: 159 MMHG | WEIGHT: 127.6 LBS | BODY MASS INDEX: 24.09 KG/M2 | DIASTOLIC BLOOD PRESSURE: 115 MMHG

## 2019-05-13 DIAGNOSIS — M75.01 ADHESIVE CAPSULITIS OF BOTH SHOULDERS: Primary | ICD-10-CM

## 2019-05-13 DIAGNOSIS — M75.02 ADHESIVE CAPSULITIS OF BOTH SHOULDERS: Primary | ICD-10-CM

## 2019-05-13 DIAGNOSIS — F29 PSYCHOSIS, UNSPECIFIED PSYCHOSIS TYPE (H): ICD-10-CM

## 2019-05-13 DIAGNOSIS — N28.9 RENAL INSUFFICIENCY: ICD-10-CM

## 2019-05-13 DIAGNOSIS — M54.30 SCIATICA, UNSPECIFIED LATERALITY: ICD-10-CM

## 2019-05-13 DIAGNOSIS — F51.01 PRIMARY INSOMNIA: ICD-10-CM

## 2019-05-13 DIAGNOSIS — I10 ESSENTIAL HYPERTENSION: ICD-10-CM

## 2019-05-13 LAB
BUN SERPL-MCNC: 10 MG/DL (ref 7–30)
CALCIUM SERPL-MCNC: 9.7 MG/DL (ref 8.5–10.4)
CHLORIDE SERPLBLD-SCNC: 98 MMOL/L (ref 94–109)
CO2 SERPL-SCNC: 26 MMOL/L (ref 20–32)
CREAT SERPL-MCNC: 0.7 MG/DL (ref 0.6–1.3)
EGFR CALCULATED (BLACK REFERENCE): >90 ML/MIN
EGFR CALCULATED (NON BLACK REFERENCE): >90 ML/MIN
GLUCOSE SERPL-MCNC: 93 MG/DL (ref 60–109)
POTASSIUM SERPL-SCNC: 3.5 MMOL/L (ref 3.4–5.3)
SODIUM SERPL-SCNC: 134 MMOL/L (ref 133–144)

## 2019-05-13 RX ORDER — IBUPROFEN 400 MG/1
400 TABLET, FILM COATED ORAL EVERY 6 HOURS PRN
Qty: 90 TABLET | Refills: 11 | Status: SHIPPED | OUTPATIENT
Start: 2019-05-13 | End: 2019-10-23

## 2019-05-13 RX ORDER — ZOLPIDEM TARTRATE 5 MG/1
5 TABLET ORAL
Qty: 15 TABLET | Refills: 0 | Status: SHIPPED | OUTPATIENT
Start: 2019-05-13 | End: 2019-06-26

## 2019-05-13 RX ORDER — ACETAMINOPHEN 500 MG
500-1000 TABLET ORAL EVERY 8 HOURS PRN
Qty: 90 TABLET | Refills: 11 | Status: SHIPPED | OUTPATIENT
Start: 2019-05-13 | End: 2020-02-25

## 2019-05-13 RX ORDER — AMLODIPINE BESYLATE 10 MG/1
10 TABLET ORAL DAILY
Qty: 30 TABLET | Refills: 3 | Status: SHIPPED | OUTPATIENT
Start: 2019-05-13 | End: 2019-08-15

## 2019-05-13 RX ORDER — ZOLPIDEM TARTRATE 5 MG/1
5 TABLET ORAL
Qty: 15 TABLET | Refills: 0 | Status: SHIPPED | OUTPATIENT
Start: 2019-05-13 | End: 2019-05-13

## 2019-05-13 RX ORDER — QUETIAPINE FUMARATE 200 MG/1
200 TABLET, FILM COATED ORAL AT BEDTIME
Qty: 30 TABLET | Refills: 3 | Status: SHIPPED | OUTPATIENT
Start: 2019-05-13 | End: 2019-08-15

## 2019-05-13 ASSESSMENT — MIFFLIN-ST. JEOR: SCORE: 1095.29

## 2019-05-13 NOTE — PATIENT INSTRUCTIONS
Referral for ( TEST )  :      Physical Therapy   LOCATION/PLACE/Provider :    [facility] [place]  DATE & TIME :     5- at 1:40  PHONE :     [phone]  FAX :     [phone]  Appointment made by clinic staff/:    Cheryl

## 2019-05-13 NOTE — PROGRESS NOTES
"Pt is a 59 year old female last seen on 4/29/19 by Dr Ryan Doan for foot pain here for follow up of:     Was seen by me for nausea/vomiting -> suspected to be overdose from lorazepam so has been weaned off    Needs med refills:  Ibuprofen 400 - for sciatica and shoulder pain   Tylenol -extra strength  Tizanidine  Ambien - nightly - on this for 1 yr now  Seroquel - would like to change to 200 mg because she is taking more now    HPI:   Back pain:   Location: midline - shoots down both buttocks and down both legs to ankles   Duration: years - worse x past 2 months   Radiation: yes, see above   Numbness/ Tingling? YES - \"feet are numb 50% of the time\"   Weakness? YES   Flexion or Extension bias: extension   Red flags? No   Meds tried? Multiple meds -> tylenol, ibuprofenm, tizanidine, gabapentin   PT? Last PT was 2 yrs ago;    Imaging? None recently    HPI:   R Shoulder pain:   Location: diffuse -> actually feels hardware coming out  Duration: shoulder replacement 15 yrs ago   Injury? Inciting activity? No new injury   Radiation: up neck and down arm, but describes a permanent neck injury as well   Numbness/tingling? No   Weakness? YES   Instability? NO   Clicking/ catching? YES    Imaging? None recent   Treatment? Years ago did PT    HTN - off meds   BP Readings from Last 6 Encounters:   05/13/19 (!) 159/115   04/29/19 130/90   04/26/19 132/78   03/27/19 (!) 138/97   03/13/19 (!) 163/130   03/06/19 106/72       Past Medical History:   Diagnosis Date     Alcohol withdrawal seizure (H) 02/25/2017     Chronic obstructive pulmonary disease, unspecified COPD type (H) 2/23/2017     COPD (chronic obstructive pulmonary disease) (H)      Depressive disorder       Current Outpatient Medications   Medication Sig Dispense Refill     acamprosate (CAMPRAL) 333 MG EC tablet Take 2 tablets (666 mg) by mouth 3 times daily       acetaminophen (TYLENOL) 500 MG tablet Take 1-2 tablets (500-1,000 mg) by mouth every 8 hours as needed " for mild pain 90 tablet 1     acetaminophen (TYLENOL) 500 MG tablet Take 2 tablets (1,000 mg) by mouth 3 times daily       albuterol (PROAIR HFA/PROVENTIL HFA/VENTOLIN HFA) 108 (90 Base) MCG/ACT inhaler Inhale 2 puffs into the lungs every 6 hours as needed for shortness of breath / dyspnea or wheezing 8.5 g 11     busPIRone (BUSPAR) 30 MG tablet Take 0.5 tablets (15 mg) by mouth 3 times daily       calcium carbonate (TUMS) 500 MG chewable tablet Take 2 tablets (1,000 mg) by mouth 4 times daily as needed for heartburn       cyclobenzaprine (FLEXERIL) 10 MG tablet Take 1 tablet (10 mg) by mouth 3 times daily as needed for muscle spasms 30 tablet 0     divalproex sodium extended-release (DEPAKOTE ER) 500 MG 24 hr tablet Take 3 tablets (1,500 mg) by mouth every morning 90 tablet 1     fluticasone (FLONASE) 50 MCG/ACT nasal spray Spray 2 sprays in nostril daily       fluticasone-salmeterol (AIRDUO RESPICLICK) 232-14 MCG/ACT inhaler Inhale 1 puff into the lungs 2 times daily 1 Inhaler 1     gabapentin (NEURONTIN) 300 MG capsule Take 2 capsules (600 mg) by mouth 3 times daily 90 capsule 3     hydrOXYzine (ATARAX) 25 MG tablet Take 1 tablet (25 mg) by mouth 4 times daily as needed for anxiety       ibuprofen (ADVIL/MOTRIN) 400 MG tablet Take 1 tablet (400 mg) by mouth every 6 hours as needed for moderate pain 30 tablet 3     ipratropium - albuterol 0.5 mg/2.5 mg/3 mL (DUONEB) 0.5-2.5 (3) MG/3ML neb solution Take 1 vial (3 mLs) by nebulization 3 times daily       levETIRAcetam (KEPPRA) 500 MG tablet Take 1 tablet (500 mg) by mouth 2 times daily 60 tablet 3     loperamide (IMODIUM A-D) 2 MG tablet Take 1 tablet (2 mg) by mouth 4 times daily as needed for diarrhea       Melatonin 10 MG TABS tablet Take 1 tablet (10 mg) by mouth At Bedtime , additional tablet as needed for late night awakenings 90 tablet 1     naltrexone (DEPADE/REVIA) 50 MG tablet Take 1 tablet (50 mg) by mouth daily 90 tablet 0     naproxen (NAPROSYN) 500 MG  "tablet Take 0.5-1 tablets (250-500 mg) by mouth 2 times daily as needed (for pain) with meals       omeprazole (PRILOSEC) 20 MG DR capsule Take 1 capsule (20 mg) by mouth 2 times daily 180 capsule 3     polyethylene glycol (MIRALAX/GLYCOLAX) powder Take 17 g (1 capful) by mouth daily as needed for constipation       polyvinyl alcohol (LIQUIFILM TEARS) 1.4 % ophthalmic solution Place 1 drop into both eyes as needed for dry eyes       prazosin (MINIPRESS) 2 MG capsule Take 1 capsule (2 mg) by mouth At Bedtime 30 capsule 1     QUEtiapine (SEROQUEL) 100 MG tablet Take 1 tablet (100 mg) by mouth At Bedtime 90 tablet 3     venlafaxine (EFFEXOR-XR) 150 MG 24 hr capsule Take 1 capsule (150 mg) by mouth 2 times daily        Allergies   Allergen Reactions     Nkda [No Known Drug Allergies]       ROS:   Gen- no fevers/chills   Rheum - no morning stiffness   Derm - no rash/ redness   Neuro - see HPI   Remainder of ROS negative.     Exam:   BP (!) 159/115   Pulse 97   Temp 97.5  F (36.4  C) (Oral)   Resp 18   Ht 1.54 m (5' 0.63\")   Wt 57.9 kg (127 lb 9.6 oz)   SpO2 98%   BMI 24.40 kg/m      BACK:   ROM: flexion -full /extension -limited /lateral rotation- full /side bend- full   Bony tenderness: sacrum  Paraspinal tenderness: None.   Sensation: intact in b/l lower extremities; subjective numbness and dec sensation in both thighs and feet   Strength: 5/5 w/ dorsiflexion/ plantarflexion/ inversion/ eversion/ knee flexion/ extension  Maneuvers: +Slump b/l Neg SARAH   Neuro: DTR's 2+. Babinski's downgoing.     R Shoulder:    Inspection: asymmetry? 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 Precautions given  Plan: QUEtiapine (SEROQUEL) 200 MG tablet            (M75.01,  M75.02) Adhesive capsulitis of both shoulders  Comment: diagnosis is actually end-stage shoulder OA; discussed tx options; she will try some PT; if no better, consider injection vs surgical eval; recommended she avoid nsaids til we recheck her kidney function  Plan: acetaminophen (TYLENOL) 500 MG tablet,         ibuprofen (ADVIL/MOTRIN) 400 MG tablet, XR         Shoulder Right 2 Views, PHYSICAL THERAPY         REFERRAL, CANCELED: XR Shoulder Right G/E 3         Views            (M54.30) Sciatica, unspecified laterality  Comment: see above; will rehab  Plan: acetaminophen (TYLENOL) 500 MG tablet,         ibuprofen (ADVIL/MOTRIN) 400 MG tablet,         tiZANidine (ZANAFLEX) 4 MG tablet, XR Lumbar         Spine 2-3 Views*, PHYSICAL THERAPY REFERRAL            (N28.9) Renal insufficiency  Comment: last Cr elevated; BP very high today; meds restarted; will avoid nsaids and f/u in 1 wk  Plan: Basic Metabolic Panel ([facility]) - Results < 1 hr            (F51.01) Primary insomnia  Comment: low dose, small supply given; precautions given  Plan: zolpidem (AMBIEN) 5 MG tablet            (I10) Essential hypertension  Comment:  Plan: very high today; restarted her amlodipine; recheck in 1 wk    >60 min spent in direct care/ counseling w/ pt today    Nikolay Cook MD  May 13, 2019  10:23 AM

## 2019-05-13 NOTE — NURSING NOTE
Pt does not want to address Health maintenance today ( mammogram, colonoscopy, pap smear). Pt is willing to schedule a physical to address health maintenance.

## 2019-05-20 ENCOUNTER — CARE COORDINATION (OUTPATIENT)
Dept: FAMILY MEDICINE | Facility: CLINIC | Age: 59
End: 2019-05-20

## 2019-05-20 ENCOUNTER — OFFICE VISIT (OUTPATIENT)
Dept: FAMILY MEDICINE | Facility: CLINIC | Age: 59
End: 2019-05-20

## 2019-05-20 VITALS
TEMPERATURE: 97.3 F | BODY MASS INDEX: 25.86 KG/M2 | SYSTOLIC BLOOD PRESSURE: 154 MMHG | RESPIRATION RATE: 16 BRPM | HEART RATE: 84 BPM | HEIGHT: 61 IN | OXYGEN SATURATION: 100 % | DIASTOLIC BLOOD PRESSURE: 84 MMHG | WEIGHT: 137 LBS

## 2019-05-20 DIAGNOSIS — I10 ESSENTIAL HYPERTENSION: Primary | ICD-10-CM

## 2019-05-20 RX ORDER — HYDROCHLOROTHIAZIDE 12.5 MG/1
25 TABLET ORAL DAILY
Qty: 30 TABLET | Refills: 3 | Status: SHIPPED | OUTPATIENT
Start: 2019-05-20 | End: 2019-06-25

## 2019-05-20 ASSESSMENT — MIFFLIN-ST. JEOR: SCORE: 1139.19

## 2019-05-20 NOTE — NURSING NOTE
Started to discuss health maintenance patient started crying with many things going on in life currently. Yumiko our Care Coordinator is informed per patient and knows whats going on.

## 2019-05-20 NOTE — PROGRESS NOTES
Met pt after her visit with Lizbeth Butt. Pt reportedly frustrated by her housing situation and does not know about her status on the public housing list. SW offered to reach out and had Pt. Sign an PAULINO to speak with [facility]. Pt also lest message for an [employer] ([phone]) regarding her case against her housemate.  No other needs at this time.

## 2019-05-20 NOTE — PROGRESS NOTES
Pt is a 59 year old female last seen on 5/13/19 here today for:     HTN - diastolic greatly improved; No CP/SOB/palp  BP Readings from Last 6 Encounters:   05/20/19 154/84   05/13/19 (!) 159/115   04/29/19 130/90   04/26/19 132/78   03/27/19 (!) 138/97   03/13/19 (!) 163/130     Msk - did not setup PT  Pain is stable on advil, tylenol, tizanidine    Past Medical History:   Diagnosis Date     Alcohol withdrawal seizure (H) 02/25/2017     Chronic obstructive pulmonary disease, unspecified COPD type (H) 2/23/2017     COPD (chronic obstructive pulmonary disease) (H)      Depressive disorder       History reviewed. No pertinent surgical history.   Current Outpatient Medications   Medication Sig Dispense Refill     acamprosate (CAMPRAL) 333 MG EC tablet Take 2 tablets (666 mg) by mouth 3 times daily       acetaminophen (TYLENOL) 500 MG tablet Take 1-2 tablets (500-1,000 mg) by mouth every 8 hours as needed for mild pain 90 tablet 11     albuterol (PROAIR HFA/PROVENTIL HFA/VENTOLIN HFA) 108 (90 Base) MCG/ACT inhaler Inhale 2 puffs into the lungs every 6 hours as needed for shortness of breath / dyspnea or wheezing 8.5 g 11     amLODIPine (NORVASC) 10 MG tablet Take 1 tablet (10 mg) by mouth daily 30 tablet 3     busPIRone (BUSPAR) 30 MG tablet Take 0.5 tablets (15 mg) by mouth 3 times daily       calcium carbonate (TUMS) 500 MG chewable tablet Take 2 tablets (1,000 mg) by mouth 4 times daily as needed for heartburn       divalproex sodium extended-release (DEPAKOTE ER) 500 MG 24 hr tablet Take 3 tablets (1,500 mg) by mouth every morning 90 tablet 1     fluticasone (FLONASE) 50 MCG/ACT nasal spray Spray 2 sprays in nostril daily       gabapentin (NEURONTIN) 300 MG capsule Take 2 capsules (600 mg) by mouth 3 times daily 90 capsule 3     ibuprofen (ADVIL/MOTRIN) 400 MG tablet Take 1 tablet (400 mg) by mouth every 6 hours as needed for moderate pain 90 tablet 11     ipratropium - albuterol 0.5 mg/2.5 mg/3 mL (DUONEB) 0.5-2.5  (3) MG/3ML neb solution Take 1 vial (3 mLs) by nebulization 3 times daily       levETIRAcetam (KEPPRA) 500 MG tablet Take 1 tablet (500 mg) by mouth 2 times daily 60 tablet 3     Melatonin 10 MG TABS tablet Take 1 tablet (10 mg) by mouth At Bedtime , additional tablet as needed for late night awakenings 90 tablet 1     omeprazole (PRILOSEC) 20 MG DR capsule Take 1 capsule (20 mg) by mouth 2 times daily 180 capsule 3     polyethylene glycol (MIRALAX/GLYCOLAX) powder Take 17 g (1 capful) by mouth daily as needed for constipation       polyvinyl alcohol (LIQUIFILM TEARS) 1.4 % ophthalmic solution Place 1 drop into both eyes as needed for dry eyes       prazosin (MINIPRESS) 2 MG capsule Take 1 capsule (2 mg) by mouth At Bedtime 30 capsule 1     QUEtiapine (SEROQUEL) 200 MG tablet Take 1 tablet (200 mg) by mouth At Bedtime 30 tablet 3     tiZANidine (ZANAFLEX) 4 MG tablet Take 1 tablet (4 mg) by mouth 3 times daily as needed for muscle spasms 90 tablet 3     venlafaxine (EFFEXOR-XR) 150 MG 24 hr capsule Take 1 capsule (150 mg) by mouth 2 times daily       zolpidem (AMBIEN) 5 MG tablet Take 1 tablet (5 mg) by mouth nightly as needed for sleep 15 tablet 0     fluticasone-salmeterol (AIRDUO RESPICLICK) 232-14 MCG/ACT inhaler Inhale 1 puff into the lungs 2 times daily 1 Inhaler 1     hydrOXYzine (ATARAX) 25 MG tablet Take 1 tablet (25 mg) by mouth 4 times daily as needed for anxiety       loperamide (IMODIUM A-D) 2 MG tablet Take 1 tablet (2 mg) by mouth 4 times daily as needed for diarrhea       naltrexone (DEPADE/REVIA) 50 MG tablet Take 1 tablet (50 mg) by mouth daily (Patient not taking: Reported on 5/13/2019) 90 tablet 0      Allergies   Allergen Reactions     Nkda [No Known Drug Allergies]       ROS:   Gen- no weight change, no fevers/chills   Head/ Eyes- no blurred vision, no headaches   Cardiac - no palpitations, no chest pain   Respiratory - no shortness of breath , no wheezing     Remainder of ROS negative.  "    Exam:   /84   Pulse 84   Temp 97.3  F (36.3  C) (Oral)   Resp 16   Ht 1.542 m (5' 0.71\")   Wt 62.1 kg (137 lb)   SpO2 100%   BMI 26.13 kg/m     Alert and oriented x 3; No acute distress       (I10) Essential hypertension  (primary encounter diagnosis)  Comment: added hydrochlorothiazide; f/u in 4 wks  Plan: hydrochlorothiazide (HYDRODIURIL) 12.5 MG    tablet            Nikolay Cook MD  May 20, 2019  9:16 AM    "

## 2019-05-31 ENCOUNTER — TRANSFERRED RECORDS (OUTPATIENT)
Dept: HEALTH INFORMATION MANAGEMENT | Facility: CLINIC | Age: 59
End: 2019-05-31

## 2019-06-05 ENCOUNTER — COMMUNICATION - HEALTHEAST (OUTPATIENT)
Dept: RESPIRATORY THERAPY | Facility: CLINIC | Age: 59
End: 2019-06-05

## 2019-06-19 ENCOUNTER — OFFICE VISIT (OUTPATIENT)
Dept: FAMILY MEDICINE | Facility: CLINIC | Age: 59
End: 2019-06-19
Payer: COMMERCIAL

## 2019-06-19 ENCOUNTER — RECORDS - HEALTHEAST (OUTPATIENT)
Dept: ADMINISTRATIVE | Facility: OTHER | Age: 59
End: 2019-06-19

## 2019-06-19 ENCOUNTER — CARE COORDINATION (OUTPATIENT)
Dept: FAMILY MEDICINE | Facility: CLINIC | Age: 59
End: 2019-06-19

## 2019-06-19 VITALS
HEIGHT: 61 IN | RESPIRATION RATE: 18 BRPM | HEART RATE: 78 BPM | DIASTOLIC BLOOD PRESSURE: 93 MMHG | SYSTOLIC BLOOD PRESSURE: 163 MMHG | TEMPERATURE: 98.1 F | OXYGEN SATURATION: 99 % | WEIGHT: 138.8 LBS | BODY MASS INDEX: 26.21 KG/M2

## 2019-06-19 DIAGNOSIS — Z12.11 SCREENING FOR COLON CANCER: ICD-10-CM

## 2019-06-19 DIAGNOSIS — Z12.39 SCREENING FOR BREAST CANCER: ICD-10-CM

## 2019-06-19 DIAGNOSIS — R55 SYNCOPE, UNSPECIFIED SYNCOPE TYPE: Primary | ICD-10-CM

## 2019-06-19 ASSESSMENT — MIFFLIN-ST. JEOR: SCORE: 1158.58

## 2019-06-19 NOTE — NURSING NOTE
6/18/2019 PCS Previsit Plan    Due For:  Hepatitis C Screening  Mammogram referral - Agreed   Colonoscopy or fit test - Fit Test  Advance Directive--offer      Gladys Strange, CMA

## 2019-06-19 NOTE — PROGRESS NOTES
Spoke w/ pt via phone after her visit. Pt wanted to know how she could get a CADI waiver. SW said pt would need to contact [employer] [facility] @ [phone] and answer questions in order to be scheduled [employer] assessment to gauge eligibility for CADI waiver.      Pt also requested SW contact a Zad @ [facility] ([phone]) to see what eligibility criteria would be for residence. LVM @ to listed number to call back.

## 2019-06-19 NOTE — PATIENT INSTRUCTIONS
Referral for ( TEST )  :      Mammogram Screening  LOCATION/PLACE/Provider :    [facility] [place]  DATE & TIME :     Location will call patient.   PHONE :     [phone]  FAX :     [phone]  ADDITIONAL INFORMATION :       Appointment made by clinic staff/:    Ivory

## 2019-06-19 NOTE — PROGRESS NOTES
Met pt before her appt and aided in faxing paperwork to her financial worker in [facility].

## 2019-06-19 NOTE — PROGRESS NOTES
"Pt is a 59 year old female last seen on 5/20/19 here today for:     1) HTN - never started new BP med  BP Readings from Last 6 Encounters:   06/19/19 (!) 163/93   05/20/19 154/84   05/13/19 (!) 159/115   04/29/19 130/90   04/26/19 132/78   03/27/19 (!) 138/97     2) Msk - attended PT for low back and shoulders    3) syncope - passed out washing dishes; felt totally fine and then was on the ground; No pre-syncopal warning  Hit her head  Then got up quickly, then it happened again. This time felt like it was going to happen again   Today feels \"funny\"    Orthostatics:  Lying -   /102   HR -72  Sitting -  /105 HR -77   Standing - /106 HR- 82    Past Medical History:   Diagnosis Date     Alcohol withdrawal seizure (H) 02/25/2017     Chronic obstructive pulmonary disease, unspecified COPD type (H) 2/23/2017     COPD (chronic obstructive pulmonary disease) (H)      Depressive disorder       No past surgical history on file.   Current Outpatient Medications   Medication Sig Dispense Refill     acamprosate (CAMPRAL) 333 MG EC tablet Take 2 tablets (666 mg) by mouth 3 times daily       acetaminophen (TYLENOL) 500 MG tablet Take 1-2 tablets (500-1,000 mg) by mouth every 8 hours as needed for mild pain 90 tablet 11     albuterol (PROAIR HFA/PROVENTIL HFA/VENTOLIN HFA) 108 (90 Base) MCG/ACT inhaler Inhale 2 puffs into the lungs every 6 hours as needed for shortness of breath / dyspnea or wheezing 8.5 g 11     amLODIPine (NORVASC) 10 MG tablet Take 1 tablet (10 mg) by mouth daily 30 tablet 3     busPIRone (BUSPAR) 30 MG tablet Take 0.5 tablets (15 mg) by mouth 3 times daily       calcium carbonate (TUMS) 500 MG chewable tablet Take 2 tablets (1,000 mg) by mouth 4 times daily as needed for heartburn       divalproex sodium extended-release (DEPAKOTE ER) 500 MG 24 hr tablet Take 3 tablets (1,500 mg) by mouth every morning 90 tablet 1     fluticasone (FLONASE) 50 MCG/ACT nasal spray Spray 2 sprays in nostril daily  "      fluticasone-salmeterol (AIRDUO RESPICLICK) 232-14 MCG/ACT inhaler Inhale 1 puff into the lungs 2 times daily 1 Inhaler 1     gabapentin (NEURONTIN) 300 MG capsule Take 2 capsules (600 mg) by mouth 3 times daily 90 capsule 3     hydrochlorothiazide (HYDRODIURIL) 12.5 MG tablet Take 2 tablets (25 mg) by mouth daily 30 tablet 3     hydrOXYzine (ATARAX) 25 MG tablet Take 1 tablet (25 mg) by mouth 4 times daily as needed for anxiety       ibuprofen (ADVIL/MOTRIN) 400 MG tablet Take 1 tablet (400 mg) by mouth every 6 hours as needed for moderate pain 90 tablet 11     ipratropium - albuterol 0.5 mg/2.5 mg/3 mL (DUONEB) 0.5-2.5 (3) MG/3ML neb solution Take 1 vial (3 mLs) by nebulization 3 times daily       levETIRAcetam (KEPPRA) 500 MG tablet Take 1 tablet (500 mg) by mouth 2 times daily 60 tablet 3     loperamide (IMODIUM A-D) 2 MG tablet Take 1 tablet (2 mg) by mouth 4 times daily as needed for diarrhea       Melatonin 10 MG TABS tablet Take 1 tablet (10 mg) by mouth At Bedtime , additional tablet as needed for late night awakenings 90 tablet 1     naltrexone (DEPADE/REVIA) 50 MG tablet Take 1 tablet (50 mg) by mouth daily (Patient not taking: Reported on 5/13/2019) 90 tablet 0     omeprazole (PRILOSEC) 20 MG DR capsule Take 1 capsule (20 mg) by mouth 2 times daily 180 capsule 3     polyethylene glycol (MIRALAX/GLYCOLAX) powder Take 17 g (1 capful) by mouth daily as needed for constipation       polyvinyl alcohol (LIQUIFILM TEARS) 1.4 % ophthalmic solution Place 1 drop into both eyes as needed for dry eyes       prazosin (MINIPRESS) 2 MG capsule Take 1 capsule (2 mg) by mouth At Bedtime 30 capsule 1     QUEtiapine (SEROQUEL) 200 MG tablet Take 1 tablet (200 mg) by mouth At Bedtime 30 tablet 3     tiZANidine (ZANAFLEX) 4 MG tablet Take 1 tablet (4 mg) by mouth 3 times daily as needed for muscle spasms 90 tablet 3     venlafaxine (EFFEXOR-XR) 150 MG 24 hr capsule Take 1 capsule (150 mg) by mouth 2 times daily        "zolpidem (AMBIEN) 5 MG tablet Take 1 tablet (5 mg) by mouth nightly as needed for sleep 15 tablet 0      Allergies   Allergen Reactions     Nkda [No Known Drug Allergies]         ROS:   Gen- no weight change, no fevers/chills   Head/ Eyes- no blurred vision, no headaches   ENT- no cough, no congestion, no URI symptoms   Cardiac - no palpitations, no chest pain; see HPI  Respiratory - no shortness of breath , no wheezing   Neuro - no numbness, no tingling; see HPI  Remainder of ROS negative.     Exam:   BP (!) 163/93   Pulse 78   Temp 98.1  F (36.7  C) (Oral)   Resp 18   Ht 1.56 m (5' 1.42\")   Wt 63 kg (138 lb 12.8 oz)   SpO2 99%   BMI 25.87 kg/m     Alert and oriented x 3; No acute distress   Neck: No carotid bruits  Resp: clear to auscultation bilaterally, no wheezing/ronchi   CV: rate/rhythm regular, no murmurs/rubs/gallops   Extrem: no clubbing/cyanosis/edema   Neuro: no focal deficits       EKG - no arrhythmia noted      (R55) Syncope, unspecified syncope type  (primary encounter diagnosis)  Comment: unclear etiology; may be vasovagal; did have significant drop from lying to sitting on BP but not sitting to standing; discussed options of labs/ holter/ echo/ carotids but she declined; precautions given; f/u in 1 wk  Plan: EKG 12-lead complete w/read - Clinics,         CANCELED: CBC with Diff Plt ([facility]), CANCELED:        Basic Metabolic Panel ([facility]) - Results < 1 hr            (Z12.31) Screening for breast cancer  Comment:   Plan: MA SCREENING DIGITAL BILAT            (Z12.11) Screening for colon cancer  Comment:   Plan: Fecal Occult Blood - FIT, iFOB ([facility])           Nikolay Cook MD  June 20, 2019  12:03 PM      "

## 2019-06-21 ENCOUNTER — TELEPHONE (OUTPATIENT)
Dept: ORTHOPEDICS | Facility: CLINIC | Age: 59
End: 2019-06-21

## 2019-06-21 ENCOUNTER — TELEPHONE (OUTPATIENT)
Dept: FAMILY MEDICINE | Facility: CLINIC | Age: 59
End: 2019-06-21

## 2019-06-21 DIAGNOSIS — I10 ESSENTIAL HYPERTENSION: ICD-10-CM

## 2019-06-21 NOTE — TELEPHONE ENCOUNTER
"Incoming call received from [facility] requesting \"Dr. Cook contact [facility]'s to approve prescriptions and submit paperwork allowing Dr. Le to treat\".    Out going f/u call made to [facility] to obtain additional information. Anna reports patient presented with 6 prescriptions today unfortunately, her insurance is not able [facility], due to be [facility].  I Requested [facility] fax over prescriptions for Dr. Cook to review on Monday.    Additional call made to Dr. Le's office, Detailed message left requesting a copy of medications prescribed today be faxed over for Dr. Cook to review. Message also included patient is restricted to Dr. Cook and will ask for Dr. Le to be added to [facility]'s team if appropriate.  Patient has been notified we are unable to process the approval today but this will be addressed on Monday by Dr. Cook.  Dicussed with Leoncio (Triage) whom also confirms we are unable to assist and will have to wait for Dr. Cook to review and approve.      "

## 2019-06-21 NOTE — TELEPHONE ENCOUNTER
[facility] phone call message- medication clarification/question:    Full Medication Name: lasix   Strength:     Have you contacted your pharmacy about this refill request?     If  Yes,  which pharmacy?    When did you contact the pharmacy?    Additional comments/concerns from call to pharmacy:    Reason for call to clinic: Patient is calling stating she was recently seen this week and was suppose to get medication above but she contacted the pharmacy and they never got it. So she is calling the clinic to see if Dr. Cook is going to send in Rx for her as she is going to go in today to  all her medications. Please call and advise.       Pharmacy confirmed as [id number] - [place], [place] - [address] AT [facility]: Yes    OK to leave a message on voice mail?     Primary language: English      needed? No    Call taken on June 21, 2019 at 9:48 AM by Dangelo Arias

## 2019-06-24 NOTE — TELEPHONE ENCOUNTER
Patient called back wondering if Dr. Cook has received her message as she needs the medication as soon as possible. She's been checking the pharmacy and they still haven't received anything.

## 2019-06-24 NOTE — TELEPHONE ENCOUNTER
Patient called today, just in case Lizbeth has not got to PCP about it yet. Patient would like PCP to okay the medications so she can pick them up. Patient also mention that pharmacy has not received the [place] PCP mention about sending to them previously. Patient would like PCP to call her back when he gets the message

## 2019-06-25 ENCOUNTER — TELEPHONE (OUTPATIENT)
Dept: ORTHOPEDICS | Facility: CLINIC | Age: 59
End: 2019-06-25

## 2019-06-25 RX ORDER — HYDROCHLOROTHIAZIDE 12.5 MG/1
25 TABLET ORAL DAILY
Qty: 60 TABLET | Refills: 3 | Status: SHIPPED | OUTPATIENT
Start: 2019-06-25 | End: 2019-07-30

## 2019-06-25 NOTE — TELEPHONE ENCOUNTER
[facility] phone call message- medication clarification/question:    Full Medication Name: Tazanidine, Zolpidem, and Lorazepam  Dose:     Question: Patient states she needs Dr. Cook to approve all medication listed above from her psychiatrist. She states provider name is Dr. Pastor Le and phone number is [phone]. Please call and advise.     Pharmacy confirmed as [id number] - [place], [place] - [address]  AT [facility]: Yes    OK to leave a message on voice mail? Yes    Primary language: English      needed? No    Call taken on June 25, 2019 at 2:41 PM by Ciara Rodriguez

## 2019-06-25 NOTE — TELEPHONE ENCOUNTER
[facility] Medicine phone call message- medication clarification/question:    Full Medication Name: ZOLPIDEM   Dose: 5MH    Question: Pharmacist called  Stating she needs confirmation on 2 prescription that needed to be ok with Dr. Cook. Second medication was the LORAZEPAM. Please call and advise these wee ok by provider.      [id number] - [place], [place] - [address] AT [facility]: Yes    OK to leave a message on voice mail? Yes    Primary language: English      needed? No    Call taken on June 25, 2019 at 11:41 AM by Kaycee Arias

## 2019-06-25 NOTE — TELEPHONE ENCOUNTER
Lizbeth and I discussed yesterday and Lisa is ok to receive scripts from Dr Le provided we have records of the prescriptions in order to update her med list and confirm her regimen.      I left a voicemail this morning at [facility] confirming that she could get these prescriptions and left my cell phone #.     Team - please inform pt that the pharmacy opens at 9AM and she should be able to get scripts today.  Thanks!    Nikolay Cook MD  June 25, 2019  7:19 AM

## 2019-06-25 NOTE — TELEPHONE ENCOUNTER
[facility] Family Medicine phone call message- medication clarification/question:    Full Medication Name: LASIX   Dose:     Question: Patient states she has been waiting for a prescription for the medication listed above that is a water pill. She states she has been waiting for a call and has not heard back. Please call and advise.      [facility] as [id number] - [place], [place] - [address] AT [facility]: Yes    OK to leave a message on voice mail? Yes    Primary language: English      needed? No    Call taken on June 25, 2019 at 2:49 PM by Ciara Rodriguez

## 2019-06-26 ENCOUNTER — OFFICE VISIT (OUTPATIENT)
Dept: FAMILY MEDICINE | Facility: CLINIC | Age: 59
End: 2019-06-26
Payer: COMMERCIAL

## 2019-06-26 ENCOUNTER — TELEPHONE (OUTPATIENT)
Dept: FAMILY MEDICINE | Facility: CLINIC | Age: 59
End: 2019-06-26

## 2019-06-26 VITALS
WEIGHT: 137 LBS | DIASTOLIC BLOOD PRESSURE: 103 MMHG | SYSTOLIC BLOOD PRESSURE: 159 MMHG | HEIGHT: 61 IN | TEMPERATURE: 99.1 F | OXYGEN SATURATION: 96 % | BODY MASS INDEX: 25.86 KG/M2 | HEART RATE: 73 BPM | RESPIRATION RATE: 24 BRPM

## 2019-06-26 DIAGNOSIS — R56.9 ALCOHOL RELATED SEIZURE (H): ICD-10-CM

## 2019-06-26 DIAGNOSIS — F31.60 BIPOLAR DISORDER, MIXED (H): ICD-10-CM

## 2019-06-26 DIAGNOSIS — F09 MILD COGNITIVE DISORDER: ICD-10-CM

## 2019-06-26 DIAGNOSIS — F10.10 ALCOHOL ABUSE: ICD-10-CM

## 2019-06-26 DIAGNOSIS — I10 ESSENTIAL HYPERTENSION: Primary | ICD-10-CM

## 2019-06-26 DIAGNOSIS — M54.30 SCIATICA, UNSPECIFIED LATERALITY: ICD-10-CM

## 2019-06-26 DIAGNOSIS — F51.01 PRIMARY INSOMNIA: ICD-10-CM

## 2019-06-26 RX ORDER — ZOLPIDEM TARTRATE 5 MG/1
5 TABLET ORAL
Qty: 30 TABLET | Refills: 0 | Status: SHIPPED | OUTPATIENT
Start: 2019-06-26 | End: 2019-07-17

## 2019-06-26 ASSESSMENT — MIFFLIN-ST. JEOR: SCORE: 1144.19

## 2019-06-26 NOTE — TELEPHONE ENCOUNTER
[facility] Medicine phone call message- medication clarification/question:    Full Medication Name: Lorazepam   Dose:     Question: Caller states he spoke with patient and patient stated that she was prescribed Lorazepam by her primary provider. Caller states he would like to speak with Dr. Cook to verify information. Please call and advise.      Pharmacy confirmed as [id number] - [place], [place] - [address] AT [facility]: Yes    OK to leave a message on voice mail? Yes    Primary language: English      needed? No    Call taken on June 26, 2019 at 2:35 PM by Ciara Rodriguez
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
## 2019-06-26 NOTE — PROGRESS NOTES
I have personally reviewed the history and examination as documented by Dr. Bojorquez.  I was present during key portions of the visit and agree with the assessment and plan as documented for 59 yr old female here for follow-up of pre-syncope, HTN, and mood.  Will call Dr Le's office at [phone] tomorrow to review medication regimen. BP regimen reviewed. Precautions given. Anticipatory guidance given.     Nikolay Cook MD  June 26, 2019  10:45 AM

## 2019-06-26 NOTE — TELEPHONE ENCOUNTER
Spoke with  and verified patient was not prescribed lorazepam by  during today's visit nor was it approved. Attempted to contact On[facility], no answer. Left VM to call clinic back. Sherif LONGO
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
## 2019-06-26 NOTE — TELEPHONE ENCOUNTER
Onofre returned phone call. Advised lorazepam is not approved by  and awaiting conversation between patient's psychiatrist and . Onofre is in agreement and shared concerns for patient being prescribed lorazepam. Will discuss with , deidre Adhikari to inform patient lorazepam will not be prescribed. Onofre verbalized understanding. Sherif LONGO

## 2019-06-27 NOTE — TELEPHONE ENCOUNTER
Called her psychiatrist office and was unable to speak to anybody. Left a voicemail requesting a callback from Dr Le. The machine said expect a callback in 48 hrs.    Nikolay Cook MD  June 27, 2019  1:30 PM

## 2019-06-28 NOTE — TELEPHONE ENCOUNTER
Left another voicemail for Dr Le to call back. I left my direct cell #.    Nikolay Cook MD  June 28, 2019  1:24 PM

## 2019-06-28 NOTE — TELEPHONE ENCOUNTER
Spoke to Dr Le regarding Lisa.  He was in agreement that further discussion would be needed with Lisa regarding her benzo prescription and our concerns. We would therefore NOT be filling the prescription for lorazepam and she will need to see Dr Le for follow-up on this matter.     If he feels strongly that this is the only medication option for her, then our office would need to submit paperwork to her insurance allowing him to prescribe the medication as she is currently restricted to me and I do NOT feel comfortable preacribing benzos for her.    Nikolay Cook MD  June 28, 2019  4:07 PM

## 2019-06-28 NOTE — TELEPHONE ENCOUNTER
Dr. Cisse was returning a call to Dr. Cook regarding the patient. Notified provider that Dr. Cook will be in this afternoon. Please call and advise.

## 2019-07-03 ENCOUNTER — COMMUNICATION - HEALTHEAST (OUTPATIENT)
Dept: RESPIRATORY THERAPY | Facility: CLINIC | Age: 59
End: 2019-07-03

## 2019-07-05 ENCOUNTER — TELEPHONE (OUTPATIENT)
Dept: ORTHOPEDICS | Facility: CLINIC | Age: 59
End: 2019-07-05

## 2019-07-05 ENCOUNTER — CARE COORDINATION (OUTPATIENT)
Dept: FAMILY MEDICINE | Facility: CLINIC | Age: 59
End: 2019-07-05

## 2019-07-05 ENCOUNTER — OFFICE VISIT (OUTPATIENT)
Dept: FAMILY MEDICINE | Facility: CLINIC | Age: 59
End: 2019-07-05
Payer: COMMERCIAL

## 2019-07-05 ENCOUNTER — RECORDS - HEALTHEAST (OUTPATIENT)
Dept: ADMINISTRATIVE | Facility: OTHER | Age: 59
End: 2019-07-05

## 2019-07-05 VITALS
WEIGHT: 137.8 LBS | RESPIRATION RATE: 18 BRPM | SYSTOLIC BLOOD PRESSURE: 130 MMHG | DIASTOLIC BLOOD PRESSURE: 90 MMHG | HEART RATE: 73 BPM | OXYGEN SATURATION: 99 % | BODY MASS INDEX: 26.01 KG/M2 | HEIGHT: 61 IN | TEMPERATURE: 97.7 F

## 2019-07-05 DIAGNOSIS — J42 CHRONIC BRONCHITIS, UNSPECIFIED CHRONIC BRONCHITIS TYPE (H): ICD-10-CM

## 2019-07-05 DIAGNOSIS — M19.111 POST-TRAUMATIC OSTEOARTHRITIS OF RIGHT SHOULDER: ICD-10-CM

## 2019-07-05 DIAGNOSIS — J42 CHRONIC BRONCHITIS, UNSPECIFIED CHRONIC BRONCHITIS TYPE (H): Primary | ICD-10-CM

## 2019-07-05 DIAGNOSIS — M54.2 CERVICALGIA: ICD-10-CM

## 2019-07-05 DIAGNOSIS — I10 ESSENTIAL HYPERTENSION: Primary | ICD-10-CM

## 2019-07-05 RX ORDER — GABAPENTIN 300 MG/1
600 CAPSULE ORAL 3 TIMES DAILY
Qty: 180 CAPSULE | Refills: 3 | Status: SHIPPED | OUTPATIENT
Start: 2019-07-05 | End: 2019-11-20

## 2019-07-05 RX ORDER — ALBUTEROL SULFATE 90 UG/1
2 AEROSOL, METERED RESPIRATORY (INHALATION) EVERY 6 HOURS PRN
Qty: 8.5 G | Refills: 11 | Status: SHIPPED | OUTPATIENT
Start: 2019-07-05 | End: 2019-08-15

## 2019-07-05 ASSESSMENT — MIFFLIN-ST. JEOR: SCORE: 1143.47

## 2019-07-05 NOTE — PROGRESS NOTES
Pt confirmed that she contacted [facility] 1 week ago to schedule an assessment to see if pt would be eligible for services. [facility]  has not called her yet to arrange a visit. LARRY advised pt that schedulers take a while to reach out and to wait a few weeks before reaching out.     SW said her friend gave her another lead another group home, but was unsure of the name and contact. LARRY told pt to let him know what happens and if pt would him to reach out to a group home previously mentioned.     Helped pt complete a [place] [employer] Application Form for [place] benefits. Faxed application to [facility] ([phone]) and gave pt the original copy.

## 2019-07-05 NOTE — Clinical Note
Kylee Gallegos to bother you. This is a patient of mine at [facility] I would like seen in fellows clinic for an U/S-guided shoulder injection. Can you help get her scheduled?  Thank you!Nikolay

## 2019-07-05 NOTE — TELEPHONE ENCOUNTER
[facility] phone call message - order or referral request for patient:     Order or referral being requested: Nebulizer part      Additional Comments: Patient states she needs a order for a part of her nebulizer. She states she is missing the part that connects to the tube. Please call and advise.     OK to leave a message on voice mail? Yes    Primary language: English      needed? No    Call taken on July 5, 2019 at 3:33 PM by Ciara Rodriguez

## 2019-07-05 NOTE — PROGRESS NOTES
Pt is a 59 year old female last seen on 6/26/19 by Dr [facility] here today for:     1) syncope follow-up - no episodes since then  Denies CP/SOB/palp    2) HTN - greatly improved on proper dosing of diuretic  BP Readings from Last 6 Encounters:   07/05/19 130/90   06/26/19 (!) 159/103   06/19/19 (!) 163/93   05/20/19 154/84   05/13/19 (!) 159/115   04/29/19 130/90     3) Mood - has decided against lorazepam altogether  Since she made that decision, she feels so relieved      4) Colon Ca screening - FIT test pending  5) Breast CA screening - needs to schedule mammogram  6) R shoulder - severe humeral head deformity and OA on previous xray; has tried PT in the past w/o relief      Past Medical History:   Diagnosis Date     Alcohol withdrawal seizure (H) 02/25/2017     Chronic obstructive pulmonary disease, unspecified COPD type (H) 2/23/2017     COPD (chronic obstructive pulmonary disease) (H)      Depressive disorder       History reviewed. No pertinent surgical history.   Current Outpatient Medications   Medication Sig Dispense Refill     acetaminophen (TYLENOL) 500 MG tablet Take 1-2 tablets (500-1,000 mg) by mouth every 8 hours as needed for mild pain 90 tablet 11     albuterol (PROAIR HFA/PROVENTIL HFA/VENTOLIN HFA) 108 (90 Base) MCG/ACT inhaler Inhale 2 puffs into the lungs every 6 hours as needed for shortness of breath / dyspnea or wheezing 8.5 g 11     fluticasone (FLONASE) 50 MCG/ACT nasal spray Spray 2 sprays in nostril daily       fluticasone-salmeterol (AIRDUO RESPICLICK) 232-14 MCG/ACT inhaler Inhale 1 puff into the lungs 2 times daily 1 Inhaler 1     gabapentin (NEURONTIN) 300 MG capsule Take 2 capsules (600 mg) by mouth 3 times daily 90 capsule 3     hydrochlorothiazide (HYDRODIURIL) 12.5 MG tablet Take 2 tablets (25 mg) by mouth daily 60 tablet 3     hydrOXYzine (ATARAX) 25 MG tablet Take 1 tablet (25 mg) by mouth 4 times daily as needed for anxiety       ibuprofen (ADVIL/MOTRIN) 400 MG tablet Take  1 tablet (400 mg) by mouth every 6 hours as needed for moderate pain 90 tablet 11     ipratropium - albuterol 0.5 mg/2.5 mg/3 mL (DUONEB) 0.5-2.5 (3) MG/3ML neb solution Take 1 vial (3 mLs) by nebulization 3 times daily       loperamide (IMODIUM A-D) 2 MG tablet Take 1 tablet (2 mg) by mouth 4 times daily as needed for diarrhea       Melatonin 10 MG TABS tablet Take 1 tablet (10 mg) by mouth At Bedtime , additional tablet as needed for late night awakenings 90 tablet 1     omeprazole (PRILOSEC) 20 MG DR capsule Take 1 capsule (20 mg) by mouth 2 times daily 180 capsule 3     polyethylene glycol (MIRALAX/GLYCOLAX) powder Take 17 g (1 capful) by mouth daily as needed for constipation       polyvinyl alcohol (LIQUIFILM TEARS) 1.4 % ophthalmic solution Place 1 drop into both eyes as needed for dry eyes       prazosin (MINIPRESS) 2 MG capsule Take 1 capsule (2 mg) by mouth At Bedtime 30 capsule 1     QUEtiapine (SEROQUEL) 200 MG tablet Take 1 tablet (200 mg) by mouth At Bedtime 30 tablet 3     tiZANidine (ZANAFLEX) 4 MG tablet Take 1 tablet (4 mg) by mouth 3 times daily as needed for muscle spasms 90 tablet 3     venlafaxine (EFFEXOR-XR) 150 MG 24 hr capsule Take 1 capsule (150 mg) by mouth 2 times daily       zolpidem (AMBIEN) 5 MG tablet Take 1 tablet (5 mg) by mouth nightly as needed for sleep 30 tablet 0     acamprosate (CAMPRAL) 333 MG EC tablet Take 2 tablets (666 mg) by mouth 3 times daily       amLODIPine (NORVASC) 10 MG tablet Take 1 tablet (10 mg) by mouth daily (Patient not taking: Reported on 6/26/2019) 30 tablet 3     busPIRone (BUSPAR) 30 MG tablet Take 0.5 tablets (15 mg) by mouth 3 times daily       calcium carbonate (TUMS) 500 MG chewable tablet Take 2 tablets (1,000 mg) by mouth 4 times daily as needed for heartburn       divalproex sodium extended-release (DEPAKOTE ER) 500 MG 24 hr tablet Take 3 tablets (1,500 mg) by mouth every morning (Patient not taking: Reported on 6/26/2019) 90 tablet 1      "levETIRAcetam (KEPPRA) 500 MG tablet Take 1 tablet (500 mg) by mouth 2 times daily (Patient not taking: Reported on 6/26/2019) 60 tablet 3      Allergies   Allergen Reactions     Nkda [No Known Drug Allergies]         ROS:   Gen- no weight change, no fevers/chills   Head/ Eyes- no blurred vision, no headaches   ENT- no cough, no congestion, no URI symptoms   Cardiac - no palpitations, no chest pain   Respiratory - no shortness of breath , no wheezing   GI - no nausea, no vomiting, no diarrhea, no constipation   Urinary - no dysuria, no polyuria   Neuro - no numbness, no tingling   Remainder of ROS negative.     Exam:   /90   Pulse 73   Temp 97.7  F (36.5  C) (Oral)   Resp 18   Ht 1.543 m (5' 0.75\")   Wt 62.5 kg (137 lb 12.8 oz)   SpO2 99%   BMI 26.25 kg/m     Alert and oriented x 3; No acute distress   Resp: clear to auscultation bilaterally, no wheezing/ronchi   CV: rate/rhythm regular, no murmurs/rubs/gallops   Extrem: no clubbing/cyanosis/edema   Neuro: no focal deficits   Derm: no rashes       (I10) Essential hypertension  (primary encounter diagnosis)  Comment:   Plan: stable/ improved; cont current regimen    (M54.2) Cervicalgia  Comment: med refilled  Plan: gabapentin (NEURONTIN) 300 MG capsule            (J42) Chronic bronchitis, unspecified chronic bronchitis type (H)  Comment: refilled  Plan: albuterol (PROAIR HFA/PROVENTIL HFA/VENTOLIN         HFA) 108 (90 Base) MCG/ACT inhaler            (M19.111) Post-traumatic osteoarthritis of right shoulder  Comment: will help setup U/S-guided injection in fellows clinic [facility]  Plan: US Joint Injection Aspiration Major Right           Nikolay Cook MD  July 5, 2019  2:13 PM    "

## 2019-07-07 NOTE — TELEPHONE ENCOUNTER
DME order placed for nebulizer tubing.    Team - please help pt with getting necessary equipment.    Nikolay Cook MD  July 7, 2019  3:55 PM

## 2019-07-08 NOTE — TELEPHONE ENCOUNTER
I found paper Rx at [facility] printer, had Dr. Rivera sign and faxed over Rx over to the [address] [place] [address] pharmacy.

## 2019-07-09 NOTE — PATIENT INSTRUCTIONS
Referral for :     US joint injection Aspiration    LOCATION/PLACE/Provider :    Faxed referral, location will call.  DATE & TIME :      PHONE :    [phone]  FAX :    [phone]  Appointment made by clinic staff/:    Ivory

## 2019-07-10 ENCOUNTER — DOCUMENTATION ONLY (OUTPATIENT)
Dept: CARE COORDINATION | Facility: CLINIC | Age: 59
End: 2019-07-10

## 2019-07-11 DIAGNOSIS — J42 CHRONIC BRONCHITIS, UNSPECIFIED CHRONIC BRONCHITIS TYPE (H): Primary | ICD-10-CM

## 2019-07-11 DIAGNOSIS — F51.01 PRIMARY INSOMNIA: ICD-10-CM

## 2019-07-15 ENCOUNTER — TELEPHONE (OUTPATIENT)
Dept: ORTHOPEDICS | Facility: CLINIC | Age: 59
End: 2019-07-15

## 2019-07-15 ENCOUNTER — TELEPHONE (OUTPATIENT)
Dept: FAMILY MEDICINE | Facility: CLINIC | Age: 59
End: 2019-07-15

## 2019-07-15 DIAGNOSIS — F51.01 PRIMARY INSOMNIA: ICD-10-CM

## 2019-07-15 RX ORDER — ZOLPIDEM TARTRATE 5 MG/1
5 TABLET ORAL
Qty: 30 TABLET | OUTPATIENT
Start: 2019-07-15

## 2019-07-15 RX ORDER — FLUTICASONE PROPIONATE AND SALMETEROL 232; 14 UG/1; UG/1
1 POWDER, METERED RESPIRATORY (INHALATION) 2 TIMES DAILY
Qty: 1 INHALER | Refills: 11 | Status: SHIPPED | OUTPATIENT
Start: 2019-07-15 | End: 2019-07-30

## 2019-07-15 NOTE — TELEPHONE ENCOUNTER
Call received from pt, who reportedly needs to fax additional documents to [facility]. Agreed to meet w/ pt on 7/16 to fax docs to [facility].

## 2019-07-15 NOTE — TELEPHONE ENCOUNTER
Prior Authorization needed on:  7/15/19    Medication:  ALBUTEROL [id number]  Dose:  18GM    Pharmacy confirmed as   [id number] - [place], [place] - [address] [place] [address]  Phone: [phone] Fax: [phone]  : Yes    Insurance Name:    Insurance Phone: [phone]  Insurance Patient ID: [id number]    Alternatives Suggested:      DINAH LAL July 15, 2019 at 10:05 AM

## 2019-07-15 NOTE — TELEPHONE ENCOUNTER
[facility] phone call message - order or referral request for patient:     Order or referral being requested: referral for [facility].       Additional Comments: Caller states patient needs orders from Dr. Cook in order for her to get her Cortizone shot, and that patient can not access transportation without a referral form pcp. Also please specify  Date range for for patient. Call and advise.    OK to leave a message on voice mail? Yes    Primary language: English      needed? No    Call taken on July 15, 2019 at 4:25 PM by Kaycee Arias

## 2019-07-15 NOTE — TELEPHONE ENCOUNTER
LVM for pt that script for Ambien was sent to pharmacy and for her to pick it up, per provider.  NURY Avendaño

## 2019-07-15 NOTE — TELEPHONE ENCOUNTER
Call received from pt, who said that phone number for [facility] through [facility] was not working ([phone]). SW gave pt contact info to [facility] ([phone]). Pt thanks SW and said she would try and reach out to them.     Pt also said she would be meeting w/ a [facility]  in 6 to 8 weeks, but was unsure of the exact date.

## 2019-07-15 NOTE — TELEPHONE ENCOUNTER
Pt was given a prescription on 6/26/19 which has not been filled yet.    Nikolay Cook MD  July 15, 2019  8:55 AM

## 2019-07-16 NOTE — TELEPHONE ENCOUNTER
Met w/ pt in clinic and faxed housing and financial paperwork to [employer] [facility] ([phone]).

## 2019-07-17 ENCOUNTER — TELEPHONE (OUTPATIENT)
Dept: FAMILY MEDICINE | Facility: CLINIC | Age: 59
End: 2019-07-17

## 2019-07-17 RX ORDER — ZOLPIDEM TARTRATE 5 MG/1
5 TABLET ORAL
Qty: 30 TABLET | Refills: 0 | Status: SHIPPED | OUTPATIENT
Start: 2019-07-24 | End: 2019-07-17

## 2019-07-17 RX ORDER — ZOLPIDEM TARTRATE 5 MG/1
5 TABLET ORAL
Qty: 30 TABLET | Refills: 0 | Status: SHIPPED | OUTPATIENT
Start: 2019-07-24 | End: 2019-11-13

## 2019-07-17 NOTE — TELEPHONE ENCOUNTER
Patient states she spoke with [facility] who stated they need the ok from Dr. Cook in order fill the medication Zolpidem. Please call and advise.

## 2019-07-17 NOTE — TELEPHONE ENCOUNTER
Patient is calling stating that [facility] is stating that they didn't get the Rx and that Dr. Cook did not okay Rx. Please call and advise.

## 2019-07-17 NOTE — TELEPHONE ENCOUNTER
[facility] phone call message- medication clarification/question:    Full Medication Name: fluticasone-salmeterol (AIRDUO RESPICLICK) 232-14 MCG/ACT inhaler       Question: Patient states that the pharmacy states they have not received a prescription for the mediation listed above. Please call and advise.      Pharmacy confirmed as [id number] - [place], [place] - [address] AT [facility]: Yes    OK to leave a message on voice mail? Yes    Primary language: English      needed? No    Call taken on July 17, 2019 at 4:45 PM by Ciara Rodriguez

## 2019-07-17 NOTE — TELEPHONE ENCOUNTER
"Called pharmacy to discuss. Patient already picked up prescription from 06/26, was picked up on 06/26. Pharmacy staff said okay to send new prescription with a \"do not fill until date\" on the prescription. Recommended \"Do not fill until 07/24/2019). Will route to PCP for review and to prescribe if deemed appropriate. Sherif LONGO  "

## 2019-07-17 NOTE — TELEPHONE ENCOUNTER
Faxed referral form to [facility] @ [facility] ([phone]) pt to attend 7/25 appt w/ [facility]

## 2019-07-17 NOTE — TELEPHONE ENCOUNTER
Controlled Substance Printed Prescription Disposal   Date: 07/17/19  Time: 1605  [facility] prescription number: [id number]  Medication name: Zolpidem   Strength: 5mg tablet  Prescription quantity: 30  Reason for disposal: printed incorrect start date per   Prescription shredded: Yes Witnessed by: QING ByrdRN

## 2019-07-18 NOTE — TELEPHONE ENCOUNTER
"Unable to speak with pharmacy staff. Called x2 and each time a VM stated \"we are experiencing technical difficulties, please call back at a later time\" and automatically disconnects call. Will attempt again later today. Sherif LONGO  "

## 2019-07-18 NOTE — TELEPHONE ENCOUNTER
Called pt to let her know her referral to [facility] was processed and she should be cleared to arrange transportation to her 7/25 appt. Pt understood  And said she would schedule transportation.    Pt was concerned she was unable to  her [facility]. LARRY told pt  The zolpidem was a future order and would be available on 7/24/2019.     Pt was concerned she could not  her fluticasone-salmeterol inhaler either. LARRY told that order was placed 7/15/2019 and the prescription was resent today. Pt understood and said she would try to  her inhaler.

## 2019-07-18 NOTE — TELEPHONE ENCOUNTER
Called Piotr @ [facility] to confirm receipt of referral form. Piotr the form was receipt and was being processed. Pt is cleared to attend her 7/25 appt @ the [facility].

## 2019-07-24 ENCOUNTER — TELEPHONE (OUTPATIENT)
Dept: FAMILY MEDICINE | Facility: CLINIC | Age: 59
End: 2019-07-24

## 2019-07-24 DIAGNOSIS — J30.2 SEASONAL ALLERGIC RHINITIS, UNSPECIFIED TRIGGER: Primary | ICD-10-CM

## 2019-07-24 NOTE — TELEPHONE ENCOUNTER
Message to physician: Patient called and request refill     Date of last visit: 7/5/2019     Date of next visit if scheduled: Visit date 08/07/2019    Last Comprehensive Metabolic Panel:  Sodium   Date Value Ref Range Status   05/13/2019 134.0 133.0 - 144.0 mmol/L Final     Potassium   Date Value Ref Range Status   05/13/2019 3.5 3.4 - 5.3 mmol/L Final     Chloride   Date Value Ref Range Status   05/13/2019 98.0 94.0 - 109.0 mmol/L Final     Carbon Dioxide   Date Value Ref Range Status   05/13/2019 26.0 20.0 - 32.0 mmol/L Final     Glucose   Date Value Ref Range Status   05/13/2019 93.0 60.0 - 109.0 mg/dL Final     Urea Nitrogen   Date Value Ref Range Status   05/13/2019 10.0 7.0 - 30.0 mg/dL Final     Creatinine   Date Value Ref Range Status   05/13/2019 0.7 0.6 - 1.3 mg/dL Final     GFR Estimate   Date Value Ref Range Status   01/12/2017 40 (L) >60 mL/min/1.73m2 Final     Calcium   Date Value Ref Range Status   05/13/2019 9.7 8.5 - 10.4 mg/dL Final       BP Readings from Last 3 Encounters:   07/05/19 130/90   06/26/19 (!) 159/103   06/19/19 (!) 163/93       Lab Results   Component Value Date    A1C 4.9 11/20/2014    A1C 5.7 03/16/2011                Please complete refill and CLOSE ENCOUNTER.  Closing the encounter signifies the refill is complete.

## 2019-07-24 NOTE — TELEPHONE ENCOUNTER
[facility] phone call message- patient requesting a refill:    Full Medication Name: fluticasone-salmeterol    Dose:     Pharmacy confirmed as   [id number] - [place], [place] - [address] [place] [address]  Phone: [phone] Fax: [phone]  : Yes    Additional Comments: patient needs refill     OK to leave a message on voice mail? Yes    Primary language: English      needed? No    Call taken on July 24, 2019 at 12:35 PM by Jeronimo Gauthier

## 2019-07-24 NOTE — TELEPHONE ENCOUNTER
After review medication list the Fluticason -salmeteron inhaler approved and sent to [facility] and I did called to check on the order was sent in 07/15/19 per pharmacy they did received the Fluticason and was sold to the patient 07/15/2019 and the patient was looking for refill of the Flonase spray and I did put through as refill send to the provider to approve.I also called patient back with this information.

## 2019-07-25 ENCOUNTER — OFFICE VISIT (OUTPATIENT)
Dept: ORTHOPEDICS | Facility: CLINIC | Age: 59
End: 2019-07-25
Payer: COMMERCIAL

## 2019-07-25 ENCOUNTER — ANCILLARY PROCEDURE (OUTPATIENT)
Dept: GENERAL RADIOLOGY | Facility: CLINIC | Age: 59
End: 2019-07-25
Attending: FAMILY MEDICINE
Payer: COMMERCIAL

## 2019-07-25 VITALS
DIASTOLIC BLOOD PRESSURE: 86 MMHG | HEIGHT: 61 IN | WEIGHT: 138.1 LBS | BODY MASS INDEX: 26.07 KG/M2 | SYSTOLIC BLOOD PRESSURE: 133 MMHG | HEART RATE: 71 BPM

## 2019-07-25 DIAGNOSIS — M25.511 CHRONIC RIGHT SHOULDER PAIN: ICD-10-CM

## 2019-07-25 DIAGNOSIS — M25.511 CHRONIC RIGHT SHOULDER PAIN: Primary | ICD-10-CM

## 2019-07-25 DIAGNOSIS — G89.29 CHRONIC RIGHT SHOULDER PAIN: ICD-10-CM

## 2019-07-25 DIAGNOSIS — G89.29 CHRONIC RIGHT SHOULDER PAIN: Primary | ICD-10-CM

## 2019-07-25 ASSESSMENT — MIFFLIN-ST. JEOR: SCORE: 1144.83

## 2019-07-25 NOTE — PROGRESS NOTES
Subjective:   Lisa Scott is a 59 year old right-hand-dominant female who complains of right shoulder pain. She was referred by Dr. Cook for right glenohumeral joint CSI under ultrasound guidance. She states that she broke her shoulder about 15 years ago and had a surgical fixation.  Had hardware removed years ago.  She also has a history of left shoulder replacement secondary to an injury as well.  She does have better movement in her left shoulder when compared to her right.    She is unable to really explain to me the details involving her previous surgeries and injury secondary to a history of alcohol use.  She reports that she is been sober from alcohol since January of this year.  She has significant pain with any movement past 90 degrees of flexion or abduction.  She feels a lot of crepitus when she is moving her arm, and it feels to limit her.  She remembers being told that she was going to need another surgery to fix her shoulder, and is interested in this.  She has not seen a surgeon for many years.  She denies any numbness or tingling down her arm.  She has weakness secondary to pain and limited mobility.  No neck pain.    Background:   Date of injury: None   Duration of symptoms: 15 years  Mechanism of Injury: Chronic; Unknown   Aggravating factors: Use of the arm, overhead motion  Relieving Factors: ice  Prior Evaluation: Prior Physician Evalutation: Dr. 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  She  has no past surgical history on file.  Her family history includes Coronary Artery Disease in her paternal grandmother; Diabetes in her father and paternal grandmother; Hypertension in her father, mother, and paternal grandmother.  She reports that she  "has quit smoking. She has never used smokeless tobacco. She reports that she drinks alcohol. She reports that she does not use drugs.    ALLERGIES: She is allergic to nkda [no known drug allergies].    CURRENT MEDICATIONS: She has a current medication list which includes the following prescription(s): acamprosate, acetaminophen, albuterol, buspirone hcl, calcium carbonate, fluticasone, fluticasone-salmeterol, gabapentin, hydrochlorothiazide, hydroxyzine, ibuprofen, ipratropium - albuterol 0.5 mg/2.5 mg/3 ml, loperamide, melatonin, omeprazole, order for dme, polyethylene glycol, polyvinyl alcohol, prazosin, quetiapine, tizanidine, venlafaxine, zolpidem, amlodipine, divalproex sodium extended-release, and levetiracetam.     REVIEW OF SYSTEMS: 6 point review of systems is negative except as noted above.     Exam:   /86   Pulse 71   Ht 1.543 m (5' 0.75\")   Wt 62.6 kg (138 lb 1.6 oz)   BMI 26.31 kg/m      CONSTITUTIONAL: healthy, alert and no distress  HEENT: Normocephalic. No masses, lesions, tenderness or abnormalities.  Poor dentition.  EOMI.  Sclera clear.  SKIN: She has 2 healing bite scabs over her right shoulder.  No other lesions or rashes.  GAIT: normal  NEUROLOGIC: Non-focal  PSYCHIATRIC: affect normal/bright.    MUSCULOSKELETAL:   Right shoulder: No obvious deformity or bruising.  Active range of motion to 90 with flexion and abduction.  External rotation approximately 10 and internal rotation to back pocket.  Passive range of motion with a hard endpoint approximately 5-10 degrees past active range of motion limits.  Strength 4/5 with resisted flexion.  Strength 5/5 with internal and external rotation.  Negative empty can, but does produce pain.  Tenderness to palpation over the glenohumeral joint line as well as the AC joint.  Positive Hung and Neer's test.  No tenderness over the clavicle or SC joint.  Sensation to light touch intact to the distal right arm.  Radial pulse 2+.  Left shoulder: No " obvious deformity or bruising.  AROM 160 with flexion and near full abduction.  Internal rotation to T8 and external rotation approximately 15 degrees.  Strength 5/5 with flexion, internal and external rotation.  No significant tenderness over the SC joint.  Mild tenderness over the AC joint.     Assessment/Plan:   Lisa Scott is a 59-year-old female who presents with chronic right shoulder pain secondary to an injury approximately 15 years ago.  Significant limitations of range of motion on exam.  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    X-RAY INTERPRETATION:   X-Ray of the Right Shoulder: 3-view, ap, y, axillary were obtained today and demonstrate a significant deformity of the humeral head and flattening of the glenoid.  Sclerosis of the acromion and significant narrowing of the subacromial space appreciated.    X-ray findings were discussed and shown to the patient in the office today.  Patient was interested in speaking with the surgeon after seeing her x-ray results and having a discussion of possible surgical options given her significant deformity of her humeral head.  Discussed that we would like to wait on a CSI into her shoulder joint until she speaks with the surgeons.  Discussed risks and benefits of CSI's, and that likely this may not provide much benefit given her severe degeneration.  All her questions were answered.    -Patient scheduled to see orthopedic surgeon to discuss possible surgical fixation  -Will follow-up after speaking with them    Discussed with Dr. Becerra.    Allyn Sanches DO  Primary Care Sports Medicine Fellow

## 2019-07-25 NOTE — LETTER
7/25/2019      RE: Lisa Scott  [address]  [place] [address]        Subjective:   Lisa Scott is a 59 year old right-hand-dominant female who complains of right shoulder pain. She was referred by Dr. Cook for right glenohumeral joint CSI under ultrasound guidance. She states that she broke her shoulder about 15 years ago and had a surgical fixation.  Had hardware removed years ago.  She also has a history of left shoulder replacement secondary to an injury as well.  She does have better movement in her left shoulder when compared to her right.    She is unable to really explain to me the details involving her previous surgeries and injury secondary to a history of alcohol use.  She reports that she is been sober from alcohol since January of this year.  She has significant pain with any movement past 90 degrees of flexion or abduction.  She feels a lot of crepitus when she is moving her arm, and it feels to limit her.  She remembers being told that she was going to need another surgery to fix her shoulder, and is interested in this.  She has not seen a surgeon for many years.  She denies any numbness or tingling down her arm.  She has weakness secondary to pain and limited mobility.  No neck pain.    Background:   Date of injury: None   Duration of symptoms: 15 years  Mechanism of Injury: Chronic; Unknown   Aggravating factors: Use of the arm, overhead motion  Relieving Factors: ice  Prior Evaluation: Prior Physician Evalutation: Dr. 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  She  has no past surgical history on file.  Her family history includes Coronary Artery Disease in her paternal grandmother; Diabetes in her father and paternal grandmother;  "Hypertension in her father, mother, and paternal grandmother.  She reports that she has quit smoking. She has never used smokeless tobacco. She reports that she drinks alcohol. She reports that she does not use drugs.    ALLERGIES: She is allergic to nkda [no known drug allergies].    CURRENT MEDICATIONS: She has a current medication list which includes the following prescription(s): acamprosate, acetaminophen, albuterol, buspirone hcl, calcium carbonate, fluticasone, fluticasone-salmeterol, gabapentin, hydrochlorothiazide, hydroxyzine, ibuprofen, ipratropium - albuterol 0.5 mg/2.5 mg/3 ml, loperamide, melatonin, omeprazole, order for dme, polyethylene glycol, polyvinyl alcohol, prazosin, quetiapine, tizanidine, venlafaxine, zolpidem, amlodipine, divalproex sodium extended-release, and levetiracetam.     REVIEW OF SYSTEMS: 6 point review of systems is negative except as noted above.     Exam:   /86   Pulse 71   Ht 1.543 m (5' 0.75\")   Wt 62.6 kg (138 lb 1.6 oz)   BMI 26.31 kg/m       CONSTITUTIONAL: healthy, alert and no distress  HEENT: Normocephalic. No masses, lesions, tenderness or abnormalities.  Poor dentition.  EOMI.  Sclera clear.  SKIN: She has 2 healing bite scabs over her right shoulder.  No other lesions or rashes.  GAIT: normal  NEUROLOGIC: Non-focal  PSYCHIATRIC: affect normal/bright.    MUSCULOSKELETAL:   Right shoulder: No obvious deformity or bruising.  Active range of motion to 90 with flexion and abduction.  External rotation approximately 10 and internal rotation to back pocket.  Passive range of motion with a hard endpoint approximately 5-10 degrees past active range of motion limits.  Strength 4/5 with resisted flexion.  Strength 5/5 with internal and external rotation.  Negative empty can, but does produce pain.  Tenderness to palpation over the glenohumeral joint line as well as the AC joint.  Positive Hung and Neer's test.  No tenderness over the clavicle or SC joint.  Sensation " to light touch intact to the distal right arm.  Radial pulse 2+.  Left shoulder: No obvious deformity or bruising.  AROM 160 with flexion and near full abduction.  Internal rotation to T8 and external rotation approximately 15 degrees.  Strength 5/5 with flexion, internal and external rotation.  No significant tenderness over the SC joint.  Mild tenderness over the AC joint.     Assessment/Plan:   Lisa Scott is a 59-year-old female who presents with chronic right shoulder pain secondary to an injury approximately 15 years ago.  Significant limitations of range of motion on exam.  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    X-RAY INTERPRETATION:   X-Ray of the Right Shoulder: 3-view, ap, y, axillary were obtained today and demonstrate a significant deformity of the humeral head and flattening of the glenoid.  Sclerosis of the acromion and significant narrowing of the subacromial space appreciated.    X-ray findings were discussed and shown to the patient in the office today.  Patient was interested in speaking with the surgeon after seeing her x-ray results and having a discussion of possible surgical options given her significant deformity of her humeral head.  Discussed that we would like to wait on a CSI into her shoulder joint until she speaks with the surgeons.  Discussed risks and benefits of CSI's, and that likely this may not provide much benefit given her severe degeneration.  All her questions were answered.    -Patient scheduled to see orthopedic surgeon to discuss possible surgical fixation  -Will follow-up after speaking with them    Discussed with Dr. Becerra.    Allyn Sanches DO  Primary Care Sports Medicine Fellow      Attending Note:   I have personally examined this patient and have reviewed the clinical presentation and progress note with the fellow. I agree with the treatment plan as outlined. The plan  was formulated with the fellow on the day of the patient's visit. I have reviewed all imaging with the fellow and agree with the findings in the documentation.     Trish Becerra MD, CAQ, CCD  [facility]  Sports Medicine and Bone Health    Allyn Sanches, DO

## 2019-07-25 NOTE — PROGRESS NOTES
Attending Note:   I have personally examined this patient and have reviewed the clinical presentation and progress note with the fellow. I agree with the treatment plan as outlined. The plan was formulated with the fellow on the day of the patient's visit. I have reviewed all imaging with the fellow and agree with the findings in the documentation.     Trish Becerra MD, CAQ, CCD  [facility]  Sports Medicine and Bone Health

## 2019-07-27 RX ORDER — FLUTICASONE PROPIONATE 50 MCG
2 SPRAY, SUSPENSION (ML) NASAL DAILY
Qty: 9.9 ML | Refills: 11 | Status: SHIPPED | OUTPATIENT
Start: 2019-07-27 | End: 2019-07-30

## 2019-07-30 ENCOUNTER — TELEPHONE (OUTPATIENT)
Dept: ORTHOPEDICS | Facility: CLINIC | Age: 59
End: 2019-07-30

## 2019-07-30 ENCOUNTER — TELEPHONE (OUTPATIENT)
Dept: FAMILY MEDICINE | Facility: CLINIC | Age: 59
End: 2019-07-30

## 2019-07-30 DIAGNOSIS — J42 CHRONIC BRONCHITIS, UNSPECIFIED CHRONIC BRONCHITIS TYPE (H): Primary | ICD-10-CM

## 2019-07-30 DIAGNOSIS — J30.2 SEASONAL ALLERGIC RHINITIS, UNSPECIFIED TRIGGER: ICD-10-CM

## 2019-07-30 DIAGNOSIS — J42 CHRONIC BRONCHITIS, UNSPECIFIED CHRONIC BRONCHITIS TYPE (H): ICD-10-CM

## 2019-07-30 DIAGNOSIS — I10 ESSENTIAL HYPERTENSION: ICD-10-CM

## 2019-07-30 RX ORDER — HYDROCHLOROTHIAZIDE 12.5 MG/1
25 TABLET ORAL DAILY
Qty: 60 TABLET | Refills: 11 | Status: SHIPPED | OUTPATIENT
Start: 2019-07-30 | End: 2019-08-15

## 2019-07-30 RX ORDER — FLUTICASONE PROPIONATE AND SALMETEROL 232; 14 UG/1; UG/1
1 POWDER, METERED RESPIRATORY (INHALATION) 2 TIMES DAILY
Qty: 1 INHALER | Refills: 11 | Status: SHIPPED | OUTPATIENT
Start: 2019-07-30 | End: 2019-08-01

## 2019-07-30 RX ORDER — FLUTICASONE PROPIONATE 50 MCG
2 SPRAY, SUSPENSION (ML) NASAL DAILY
Qty: 9.9 ML | Refills: 11 | Status: SHIPPED | OUTPATIENT
Start: 2019-07-30 | End: 2020-05-20

## 2019-07-30 NOTE — TELEPHONE ENCOUNTER
Message to physician: ALTERNATIVE FLUTICASON (FLONASE NOT COVER BY INSURANCE    Date of last visit: 7/5/2019     Date of next visit if scheduled: Visit date 08/07/2019    Last Comprehensive Metabolic Panel:  Sodium   Date Value Ref Range Status   05/13/2019 134.0 133.0 - 144.0 mmol/L Final     Potassium   Date Value Ref Range Status   05/13/2019 3.5 3.4 - 5.3 mmol/L Final     Chloride   Date Value Ref Range Status   05/13/2019 98.0 94.0 - 109.0 mmol/L Final     Carbon Dioxide   Date Value Ref Range Status   05/13/2019 26.0 20.0 - 32.0 mmol/L Final     Glucose   Date Value Ref Range Status   05/13/2019 93.0 60.0 - 109.0 mg/dL Final     Urea Nitrogen   Date Value Ref Range Status   05/13/2019 10.0 7.0 - 30.0 mg/dL Final     Creatinine   Date Value Ref Range Status   05/13/2019 0.7 0.6 - 1.3 mg/dL Final     GFR Estimate   Date Value Ref Range Status   01/12/2017 40 (L) >60 mL/min/1.73m2 Final     Calcium   Date Value Ref Range Status   05/13/2019 9.7 8.5 - 10.4 mg/dL Final       BP Readings from Last 3 Encounters:   07/25/19 133/86   07/05/19 130/90   06/26/19 (!) 159/103       Lab Results   Component Value Date    A1C 4.9 11/20/2014    A1C 5.7 03/16/2011                Please complete refill and CLOSE ENCOUNTER.  Closing the encounter signifies the refill is complete.

## 2019-07-30 NOTE — TELEPHONE ENCOUNTER
[facility] Family Medicine phone call message- patient requesting a refill:    Full Medication Name: hydrochlorothiazide    Dose:     Pharmacy confirmed as   [id number] - [place], [place] - [address] AT [facility] & [address] [place] [address]  Phone: [phone] Fax: [phone]  : Yes    Additional Comments: Patient is out and needs refill     OK to leave a message on voice mail? Yes    Primary language: English      needed? No    Call taken on July 30, 2019 at 1:48 PM by Jeronimo Gauthier

## 2019-07-30 NOTE — TELEPHONE ENCOUNTER
[facility] Family Medicine phone call message- medication clarification/question:    Full Medication Name: fluticasone-salmeterol inhaler     Dose:     Question: Patient states that pharmacy would not fill this medication no more as insurance will no longer cover this. Patient would like to request for a different powder form inhaler so she can get it cover from insurance. Patient states she really need her inhaler.    Pharmacy confirmed as [facility] #[id number] - [place], [place] - [address] AT [facility]: Yes    OK to leave a message on voice mail? Yes    Primary language: English      needed? No    Call taken on July 30, 2019 at 1:47 PM by Jeronimo Gauthier

## 2019-07-30 NOTE — TELEPHONE ENCOUNTER
Patient is calling stating that she did not get her nasal spray as her insurance did not cover for the Fluticason so she only got the flonase spray. She also needs her BP medication refill the hydrochlorothiazide she only has 1 pill left and won't anymore for tomorrow. Please call and advise.

## 2019-07-31 NOTE — TELEPHONE ENCOUNTER
Patient is calling back about message still waiting for a call back. She states it's been a week and that she called and been 3 days she haven't heard back, she doesn't want to end up at the hospital. Please call and advise.

## 2019-08-01 RX ORDER — FLUTICASONE PROPIONATE AND SALMETEROL XINAFOATE 230; 21 UG/1; UG/1
2 AEROSOL, METERED RESPIRATORY (INHALATION) 2 TIMES DAILY
Qty: 1 INHALER | Refills: 11 | Status: SHIPPED | OUTPATIENT
Start: 2019-08-01 | End: 2020-08-20

## 2019-08-02 NOTE — TELEPHONE ENCOUNTER
Patient is calling about the message request, she is stating that  is not covering it and she has been waiting for a month. She states her lungs are getting really bad. She is wondering is she can get advair or spriva inhaler, stating she wants powder form. Please call and advise.

## 2019-08-04 ENCOUNTER — TRANSFERRED RECORDS (OUTPATIENT)
Dept: HEALTH INFORMATION MANAGEMENT | Facility: CLINIC | Age: 59
End: 2019-08-04

## 2019-08-07 ENCOUNTER — TELEPHONE (OUTPATIENT)
Dept: FAMILY MEDICINE | Facility: CLINIC | Age: 59
End: 2019-08-07

## 2019-08-15 ENCOUNTER — OFFICE VISIT (OUTPATIENT)
Dept: FAMILY MEDICINE | Facility: CLINIC | Age: 59
End: 2019-08-15
Payer: COMMERCIAL

## 2019-08-15 ENCOUNTER — TELEPHONE (OUTPATIENT)
Dept: FAMILY MEDICINE | Facility: CLINIC | Age: 59
End: 2019-08-15

## 2019-08-15 VITALS
HEIGHT: 61 IN | WEIGHT: 134 LBS | DIASTOLIC BLOOD PRESSURE: 104 MMHG | TEMPERATURE: 97.8 F | OXYGEN SATURATION: 97 % | HEART RATE: 77 BPM | RESPIRATION RATE: 16 BRPM | BODY MASS INDEX: 25.3 KG/M2 | SYSTOLIC BLOOD PRESSURE: 156 MMHG

## 2019-08-15 DIAGNOSIS — I10 ESSENTIAL HYPERTENSION: ICD-10-CM

## 2019-08-15 DIAGNOSIS — J44.1 COPD EXACERBATION (H): ICD-10-CM

## 2019-08-15 DIAGNOSIS — E87.6 HYPOKALEMIA: ICD-10-CM

## 2019-08-15 DIAGNOSIS — M54.30 SCIATICA, UNSPECIFIED LATERALITY: ICD-10-CM

## 2019-08-15 DIAGNOSIS — F29 PSYCHOSIS, UNSPECIFIED PSYCHOSIS TYPE (H): ICD-10-CM

## 2019-08-15 DIAGNOSIS — J42 CHRONIC BRONCHITIS, UNSPECIFIED CHRONIC BRONCHITIS TYPE (H): ICD-10-CM

## 2019-08-15 DIAGNOSIS — R60.0 EDEMA OF BOTH LEGS: Primary | ICD-10-CM

## 2019-08-15 DIAGNOSIS — E87.1 HYPONATREMIA: ICD-10-CM

## 2019-08-15 LAB
BUN SERPL-MCNC: 9 MG/DL (ref 7–30)
CALCIUM SERPL-MCNC: 9 MG/DL (ref 8.5–10.4)
CHLORIDE SERPLBLD-SCNC: 89 MMOL/L (ref 94–109)
CO2 SERPL-SCNC: 27 MMOL/L (ref 20–32)
CREAT SERPL-MCNC: 0.7 MG/DL (ref 0.6–1.3)
EGFR CALCULATED (BLACK REFERENCE): >90 ML/MIN
EGFR CALCULATED (NON BLACK REFERENCE): >90 ML/MIN
GLUCOSE SERPL-MCNC: 93 MG/DL (ref 60–109)
POTASSIUM SERPL-SCNC: 2.7 MMOL/L (ref 3.4–5.3)
SODIUM SERPL-SCNC: 130 MMOL/L (ref 133–144)

## 2019-08-15 RX ORDER — PREDNISONE 20 MG/1
20 TABLET ORAL DAILY
Qty: 5 TABLET | Refills: 0 | Status: SHIPPED | OUTPATIENT
Start: 2019-08-15 | End: 2019-08-19

## 2019-08-15 RX ORDER — FLUTICASONE PROPIONATE AND SALMETEROL XINAFOATE 230; 21 UG/1; UG/1
2 AEROSOL, METERED RESPIRATORY (INHALATION) 2 TIMES DAILY
Qty: 1 INHALER | Refills: 11 | Status: CANCELLED | OUTPATIENT
Start: 2019-08-15

## 2019-08-15 RX ORDER — FUROSEMIDE 20 MG
20 TABLET ORAL DAILY
Qty: 20 TABLET | Refills: 0 | Status: SHIPPED | OUTPATIENT
Start: 2019-08-15 | End: 2019-08-28 | Stop reason: ALTCHOICE

## 2019-08-15 RX ORDER — QUETIAPINE FUMARATE 200 MG/1
200 TABLET, FILM COATED ORAL AT BEDTIME
Qty: 30 TABLET | Refills: 3 | Status: SHIPPED | OUTPATIENT
Start: 2019-08-15 | End: 2019-08-28

## 2019-08-15 RX ORDER — ALBUTEROL SULFATE 90 UG/1
2 AEROSOL, METERED RESPIRATORY (INHALATION) EVERY 6 HOURS PRN
Qty: 8.5 G | Refills: 11 | Status: SHIPPED | OUTPATIENT
Start: 2019-08-15 | End: 2019-10-23

## 2019-08-15 RX ORDER — POTASSIUM CHLORIDE 1500 MG/1
20 TABLET, EXTENDED RELEASE ORAL 2 TIMES DAILY
Qty: 60 TABLET | Refills: 0 | Status: SHIPPED | OUTPATIENT
Start: 2019-08-15 | End: 2019-08-28

## 2019-08-15 ASSESSMENT — MIFFLIN-ST. JEOR: SCORE: 1126.23

## 2019-08-15 NOTE — TELEPHONE ENCOUNTER
[facility] phone call message-patient reporting a symptom:     Symptom: has very bad edema    Same Day Visit Offered: patient is restricted to dr. cook    Additional comments: Patient called asking to see if Dr. Cook is in today, she states she is having very bad edema and not sure what she is suppose to do. Told her will have a nurse call her back about that and not sure if she is able to see another doctor so will have to check on that. Please call and advise.     OK to leave message on voice mail?     Primary language: English      needed? No    Call taken on August 15, 2019 at 8:08 AM by Dangelo Arias

## 2019-08-15 NOTE — RESULT ENCOUNTER NOTE
I called the patient and left message for her to call about new medicine instructions, need to also start a potassium supplement in addition to her lasix.    Angela Ontiveros MD

## 2019-08-15 NOTE — TELEPHONE ENCOUNTER
Called patient. Patient experiencing bilateral LE edema since discharging from hospital. Patient restricted but is able to see faculty only. Scheduled appointment for today with faculty provider. Will keep hospital follow up visit with Dr.Kapur. Sherif LONGO

## 2019-08-15 NOTE — PROGRESS NOTES
"Hospitalization Follow-up Visit         HPI       Hospital Follow-up Visit:    Hospital:  [facility]   Date of Admission: 8/4/2019  Date of Discharge: 8/7/2019  Reason(s) for Admission: nausea/vomitting with Hyponatremia and Hypokalemia            Problems taking medications regularly:  None       Post Discharge Medication Reconciliation: discharge medications reconciled, continue medications without change.       Problems adhering to non-medication therapy:  None       Medications reviewed by: by myself. Findings include reducing venlafaxine, .    Summary of hospitalization:  [facility] discharge summary reviewed  Diagnostic Tests/Treatments reviewed.  Follow up needed: none  Other Healthcare Providers Involved in Patient s Care:         None  Update since discharge: worsened.   Plan of care communicated with patient                       Review of Systems:   CONSTITUTIONAL: no fatigue, no unexpected change in weight  SKIN: no worrisome rashes, no worrisome moles, no worrisome lesions  RESP:copd flare, coughing, wheezing, feels tight  CV: no chest pain, no palpitations, worsening peripheral edema x 3 days  GI: no nausea, no vomiting, no constipation, no diarrhea              Physical Exam:     Vitals:    08/15/19 1115   BP: (!) 156/104   Pulse: 77   Resp: 16   Temp: 97.8  F (36.6  C)   TempSrc: Oral   SpO2: 97%   Weight: 60.8 kg (134 lb)   Height: 1.543 m (5' 0.75\")     Body mass index is 25.53 kg/m .     Wt Readings from Last 5 Encounters:   08/15/19 60.8 kg (134 lb)   07/25/19 62.6 kg (138 lb 1.6 oz)   07/05/19 62.5 kg (137 lb 12.8 oz)   06/26/19 62.1 kg (137 lb)   06/19/19 63 kg (138 lb 12.8 oz)         GENERAL: healthy, alert and no distress  NECK: no tenderness, no adenopathy, no asymmetry, no masses, no stiffness; thyroid- normal to palpation  RESP: expiratory wheezes throughout and inspiratory wheezes   CV: regular rates and rhythm, normal S1 S2, no S3 or S4 and no murmur, no click or rub -  MS: 2-3+ " edema to the midshins bilaterally, 3-4+ at the ankles and feet  SKIN: no suspicious lesions, scattered scabs  PSYCH: Alert and oriented times 3; speech- coherent , normal rate and volume; able to articulate logical thoughts, able to abstract reason, no tangential thoughts, no hallucinations or delusions, affect- normal  LYMPHATICS: ant. cervical- normal, post. cervical- normal, axillary- normal, supraclavicular- normal, inguinal- normal         Results:     Results from the last 24 hours   Results for orders placed or performed in visit on 08/15/19 (from the past 24 hour(s))   Basic Metabolic Panel ([facility])  - Results < 1 hr   Result Value Ref Range    Glucose 93.0 60.0 - 109.0 mg/dL    Urea Nitrogen 9.0 7.0 - 30.0 mg/dL    Creatinine 0.7 0.6 - 1.3 mg/dL    Sodium 130.0 (L) 133.0 - 144.0 mmol/L    Potassium 2.7 (LL) 3.4 - 5.3 mmol/L    Chloride 89.0 (L) 94.0 - 109.0 mmol/L    Carbon Dioxide 27.0 20.0 - 32.0 mmol/L    Calcium 9.0 8.5 - 10.4 mg/dL    eGFR Calculated (Non Black Reference) >90 >60.0 mL/min    eGFR Calculated (Black Reference) >90 >60.0 mL/min    Narrative    Critical Value was reported to and read back by Dr. Ontiveros at 8/15/2019 2:01   PM. --HHer, CMA       Assessment and Plan      Lisa was seen today for edema, medication reconciliation, referral and refill request.    Diagnoses and all orders for this visit:    Edema of both legs  -     furosemide (LASIX) 20 MG tablet; Take 1 tablet (20 mg) by mouth daily  -     Basic Metabolic Panel ([facility])  - Results < 1 hr; Future  -     Basic Metabolic Panel ([facility])  - Results < 1 hr    Psychosis, unspecified psychosis type (H)  -     QUEtiapine (SEROQUEL) 200 MG tablet; Take 1 tablet (200 mg) by mouth At Bedtime  -     Basic Metabolic Panel ([facility])  - Results < 1 hr; Future  -     Basic Metabolic Panel ([facility])  - Results < 1 hr    Chronic bronchitis, unspecified chronic bronchitis type (H)  -     albuterol (PROAIR HFA/PROVENTIL HFA/VENTOLIN HFA) 108 (90  Base) MCG/ACT inhaler; Inhale 2 puffs into the lungs every 6 hours as needed for shortness of breath / dyspnea or wheezing  -     tiotropium (SPIRIVA RESPIMAT) 2.5 MCG/ACT inhaler; Inhale 2 puffs into the lungs daily  -     Basic Metabolic Panel ([facility])  - Results < 1 hr; Future  -     Basic Metabolic Panel ([facility])  - Results < 1 hr    COPD exacerbation (H)  -     predniSONE (DELTASONE) 20 MG tablet; Take 1 tablet (20 mg) by mouth daily for 5 days    Hyponatremia  -     Cancel: Basic Metabolic Panel ([facility])  - Results < 1 hr    Sciatica, unspecified laterality  -     tiZANidine (ZANAFLEX) 4 MG tablet; Take 1 tablet (4 mg) by mouth 3 times daily as needed for muscle spasms    Hypokalemia  -     potassium chloride ER (K-DUR/KLOR-CON M) 20 MEQ CR tablet; Take 1 tablet (20 mEq) by mouth 2 times daily    Essential hypertension        (R60.0) Edema of both legs  (primary encounter diagnosis)  Comment: appears hypervolemic hyponatremic at this point, no sign of venous issues, has wheezing but otherwise does not have known cardiac dz. more pulmonary perhaps more right heart issues  Plan: furosemide (LASIX) 20 MG tablet, Basic         Metabolic Panel ([facility])  - Results < 1 hr        Will watch closely with no known cardiac hx. May consider echo, may have more right heart failure from pulmonary dz.  Hospital ECG did show some changes from 2018 so recommend close f/u and consider if other cardiomyopathy also involved, possibly non-ischemic as neg trop.  Has f/u next week with PCP.      (F29) Psychosis, unspecified psychosis type (H)  Comment: stable just wanting refill  Plan: QUEtiapine (SEROQUEL) 200 MG tablet, Basic         Metabolic Panel ([facility])  - Results < 1 hr            (J42) Chronic bronchitis, unspecified chronic bronchitis type (H)  Comment: acute flare, patient states similar to previous, can't use the new advair inhaler, prefers powder, easier to use.  Unsure if covered by insurance, patient agrees to restart  spiriva  Plan: albuterol (PROAIR HFA/PROVENTIL HFA/VENTOLIN         HFA) 108 (90 Base) MCG/ACT inhaler, tiotropium         (SPIRIVA RESPIMAT) 2.5 MCG/ACT inhaler, Basic         Metabolic Panel ([facility])  - Results < 1 hr            (J44.1) COPD exacerbation (H)  Comment: will start oral as unsure as to ability to use ICS and which are covered  Plan: predniSONE (DELTASONE) 20 MG tablet            (E87.1) Hyponatremia  Comment: hypervolemic (possibly SIADH from effexor and that has been stopped by patient)  Plan: CANCELED: Basic Metabolic Panel ([facility])  -         Results < 1 hr        Will see if improves with diuresis, consider cardiac echo, recheck bmp next week.    (M54.30) Sciatica, unspecified laterality  Comment: requesting refill  Plan: tiZANidine (ZANAFLEX) 4 MG tablet        stable    (E87.6) Hypokalemia  Comment: marked again  Plan: start potassium replacement as will be diuresing as well  Consider eval for hyperaldo?  As BP also elevated    (I10) Essential hypertension  Comment: patient states she stopped bp meds, and in hospital had no hypertension  Plan: had her previous BP meds stopped (unsure when by patient) but not restarted at last admission, may consider restarting --hx of betablocker and hx of amlodipine (avoid due to edema).  Recheck next week.        E&M code to be billed if TCM cannot be: 92489          Options for treatment and follow-up care were reviewed with the patient  Lisa Scott   engaged in the decision making process and verbalized understanding of the options discussed and agreed with the final plan.      Angela Ontiveros MD

## 2019-08-16 ENCOUNTER — TELEPHONE (OUTPATIENT)
Dept: FAMILY MEDICINE | Facility: CLINIC | Age: 59
End: 2019-08-16

## 2019-08-16 ENCOUNTER — TELEPHONE (OUTPATIENT)
Dept: ORTHOPEDICS | Facility: CLINIC | Age: 59
End: 2019-08-16

## 2019-08-16 NOTE — TELEPHONE ENCOUNTER
[facility] phone call message- general phone call:    Reason for call: Patient calling back in regards to the low potasium level voice message she received yesterday. Patient would like someone to call her back to discuss further on what to do.    Return call needed: Yes    OK to leave a message on voice mail? Yes    Primary language: English      needed? No    Call taken on August 16, 2019 at 2:44 PM by Jeronimo Gauthier

## 2019-08-16 NOTE — TELEPHONE ENCOUNTER
Called patient to discuss. Advised of potassium sent to pharmacy. To take one tablet by mouth twice daily. Advised to continue taking until upcoming appointment and can reassess along with lasix prescription. Patient verbalized understanding. Sherif LONGO

## 2019-08-19 ENCOUNTER — TELEPHONE (OUTPATIENT)
Dept: ORTHOPEDICS | Facility: CLINIC | Age: 59
End: 2019-08-19

## 2019-08-19 ENCOUNTER — TELEPHONE (OUTPATIENT)
Dept: FAMILY MEDICINE | Facility: CLINIC | Age: 59
End: 2019-08-19

## 2019-08-19 DIAGNOSIS — J44.1 COPD EXACERBATION (H): ICD-10-CM

## 2019-08-19 NOTE — TELEPHONE ENCOUNTER
Message to physician: SEE A PHONE CALLED NOTE    Date of last visit: 8/15/2019     Date of next visit if scheduled: Visit date 08/21/2019    Last Comprehensive Metabolic Panel:  Sodium   Date Value Ref Range Status   08/15/2019 130.0 (L) 133.0 - 144.0 mmol/L Final     Potassium   Date Value Ref Range Status   08/15/2019 2.7 (LL) 3.4 - 5.3 mmol/L Final     Chloride   Date Value Ref Range Status   08/15/2019 89.0 (L) 94.0 - 109.0 mmol/L Final     Carbon Dioxide   Date Value Ref Range Status   08/15/2019 27.0 20.0 - 32.0 mmol/L Final     Glucose   Date Value Ref Range Status   08/15/2019 93.0 60.0 - 109.0 mg/dL Final     Urea Nitrogen   Date Value Ref Range Status   08/15/2019 9.0 7.0 - 30.0 mg/dL Final     Creatinine   Date Value Ref Range Status   08/15/2019 0.7 0.6 - 1.3 mg/dL Final     GFR Estimate   Date Value Ref Range Status   01/12/2017 40 (L) >60 mL/min/1.73m2 Final     Calcium   Date Value Ref Range Status   08/15/2019 9.0 8.5 - 10.4 mg/dL Final       BP Readings from Last 3 Encounters:   08/15/19 (!) 156/104   07/25/19 133/86   07/05/19 130/90       Lab Results   Component Value Date    A1C 4.9 11/20/2014    A1C 5.7 03/16/2011                Please complete refill and CLOSE ENCOUNTER.  Closing the encounter signifies the refill is complete.
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
## 2019-08-20 RX ORDER — PREDNISONE 20 MG/1
20 TABLET ORAL DAILY
Qty: 5 TABLET | Refills: 0 | Status: SHIPPED | OUTPATIENT
Start: 2019-08-20 | End: 2019-09-03

## 2019-08-20 NOTE — TELEPHONE ENCOUNTER
Team - I noted that the only medication ordered for refill was her prednisone. If that was the only script she needs refilled, it has been sent. If she needs ALL her meds refilled, please schedule her for 40 min visit with me tomorrow (11:20 +11:40 slot). Please clarify w/ pt and schedule if needed. Thank you!    Nikolay Cook MD  August 20, 2019  8:30 AM

## 2019-08-23 ENCOUNTER — TELEPHONE (OUTPATIENT)
Dept: FAMILY MEDICINE | Facility: CLINIC | Age: 59
End: 2019-08-23

## 2019-08-23 DIAGNOSIS — J42 CHRONIC BRONCHITIS, UNSPECIFIED CHRONIC BRONCHITIS TYPE (H): Primary | ICD-10-CM

## 2019-08-23 NOTE — TELEPHONE ENCOUNTER
Spiriva requires PA by insurance. Insurance will cover Tudorza without PA. Discussed w/ Dr. Cook.      Please call and inform patient.     Erica Fernando, PharmD, CDE  [facility]  Phone: [phone]  August 23, 2019 at 3:40 PM

## 2019-08-28 ENCOUNTER — OFFICE VISIT (OUTPATIENT)
Dept: FAMILY MEDICINE | Facility: CLINIC | Age: 59
End: 2019-08-28
Payer: COMMERCIAL

## 2019-08-28 VITALS
SYSTOLIC BLOOD PRESSURE: 153 MMHG | HEIGHT: 61 IN | HEART RATE: 81 BPM | OXYGEN SATURATION: 100 % | RESPIRATION RATE: 16 BRPM | WEIGHT: 136 LBS | DIASTOLIC BLOOD PRESSURE: 102 MMHG | BODY MASS INDEX: 25.68 KG/M2

## 2019-08-28 DIAGNOSIS — Z13.9 SCREENING FOR CONDITION: ICD-10-CM

## 2019-08-28 DIAGNOSIS — I10 ESSENTIAL HYPERTENSION: Primary | ICD-10-CM

## 2019-08-28 DIAGNOSIS — F29 PSYCHOSIS, UNSPECIFIED PSYCHOSIS TYPE (H): ICD-10-CM

## 2019-08-28 DIAGNOSIS — E87.6 HYPOKALEMIA: ICD-10-CM

## 2019-08-28 LAB
BUN SERPL-MCNC: 14 MG/DL (ref 7–30)
CALCIUM SERPL-MCNC: 9.3 MG/DL (ref 8.5–10.4)
CHLORIDE SERPLBLD-SCNC: 102 MMOL/L (ref 94–109)
CHOLEST SERPL-MCNC: 195 MG/DL
CHOLEST/HDLC SERPL: 2.4 RATIO
CO2 SERPL-SCNC: 24 MMOL/L (ref 20–32)
CREAT SERPL-MCNC: 0.7 MG/DL (ref 0.6–1.3)
EGFR CALCULATED (BLACK REFERENCE): >90 ML/MIN
EGFR CALCULATED (NON BLACK REFERENCE): >90 ML/MIN
GLUCOSE SERPL-MCNC: 96 MG/DL (ref 60–109)
HDLC SERPL-MCNC: 82 MG/DL
LDLC SERPL CALC-MCNC: 96 MG/DL (ref 0–99)
POTASSIUM SERPL-SCNC: 3.6 MMOL/L (ref 3.4–5.3)
SODIUM SERPL-SCNC: 139 MMOL/L (ref 133–144)
TRIGL SERPL-MCNC: 87 MG/DL
VLDL-CHOLESTEROL: 17 MG/DL (ref 7–32)

## 2019-08-28 RX ORDER — ONDANSETRON 4 MG/1
4 TABLET, FILM COATED ORAL DAILY PRN
Refills: 0 | COMMUNITY
Start: 2019-08-23 | End: 2019-11-15

## 2019-08-28 RX ORDER — QUETIAPINE FUMARATE 100 MG/1
100 TABLET, FILM COATED ORAL DAILY PRN
Refills: 2 | COMMUNITY
Start: 2019-08-23 | End: 2019-11-13

## 2019-08-28 RX ORDER — POTASSIUM CHLORIDE 1500 MG/1
20 TABLET, EXTENDED RELEASE ORAL 2 TIMES DAILY
Qty: 60 TABLET | Refills: 0 | Status: SHIPPED | OUTPATIENT
Start: 2019-08-28 | End: 2020-02-25

## 2019-08-28 RX ORDER — HYDROCHLOROTHIAZIDE 12.5 MG/1
12.5 TABLET ORAL DAILY
Qty: 30 TABLET | Refills: 0 | Status: SHIPPED | OUTPATIENT
Start: 2019-08-28 | End: 2019-09-11

## 2019-08-28 RX ORDER — QUETIAPINE FUMARATE 200 MG/1
200 TABLET, FILM COATED ORAL AT BEDTIME
Qty: 30 TABLET | Refills: 3 | Status: SHIPPED | OUTPATIENT
Start: 2019-08-28 | End: 2019-11-13

## 2019-08-28 ASSESSMENT — MIFFLIN-ST. JEOR: SCORE: 1133.39

## 2019-08-28 NOTE — Clinical Note
I saw Lisa this week- I told her it's best to see a consistent doctor or at least a consistent team (since her medical history is so complex :)

## 2019-08-30 ENCOUNTER — TELEPHONE (OUTPATIENT)
Dept: FAMILY MEDICINE | Facility: CLINIC | Age: 59
End: 2019-08-30

## 2019-08-30 NOTE — RESULT ENCOUNTER NOTE
Called and spoke to pt regarding lab results and recommendation. Pt states understanding and have no further concern for MD at this time. --BOGDANer, CMA
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
## 2019-08-30 NOTE — TELEPHONE ENCOUNTER
Patient called stating she came in to drop off a form with all her medications that needed an okay from a doctor here to approve her medications from her psych. Doctor. Told her that a message has been sent and that she will have to wait for Dr. Cook. She states there is other doctors that could take care of this for her, told her that those doctors are not in clinic today and there is one but he is assisting other doctors in clinic with other patients being seen. She states she might have to change psych. Doctor if she is going to run into this problem again. Told her I understand her frustration but she will have to wait on the okay for her medication, pt hung up on call. Please call and advise.

## 2019-08-30 NOTE — RESULT ENCOUNTER NOTE
The 10-year ASCVD risk score ([place] BROWN Jr., et al., 2013) is: 3.3%    Could you call the patient with the following message? Thank you!    Dear Lisa,    We checked your cholesterol and overall it is pretty good. At this time your heart risk is not high enough to warrant a cholesterol medicine. We should probably recheck this every couple years.     Sincerely,  Dr. Lora Atkins

## 2019-08-30 NOTE — TELEPHONE ENCOUNTER
Patient walked in requested that medications be Ok'd by providers due restrictions.     Medications:  Bupropion XL 150mg   Seroquel 200mg  Buspirone 30mg  Zolpidem 10mg  Tizanidine 4mg  Prazosin 2mg    08/30/19 cv

## 2019-09-03 NOTE — TELEPHONE ENCOUNTER
Pt w/ no-show with me on 8/21/19.  She then saw Dr Atkins on 8/28/19 and could have requested medications at that visit as well. She should understand by now that she needs to make and keep appointments with me for her medication issues. Has an appt pending tomorrow.  If she calls, please remind her to keep her appt for tomorrow morning.    Nikolay Cook MD  September 3, 2019  7:28 AM

## 2019-09-03 NOTE — TELEPHONE ENCOUNTER
Called to remind pt of appt for tomorrow with Joyce. She was unsure if she is going make it but said she will be here. Fwd to provider.  NURY Avendaño

## 2019-09-04 ENCOUNTER — TELEPHONE (OUTPATIENT)
Dept: FAMILY MEDICINE | Facility: CLINIC | Age: 59
End: 2019-09-04

## 2019-09-04 NOTE — TELEPHONE ENCOUNTER
"Patient called to say she forgot she had an appointment today with PCP. Patient state that all PCP need to do is \"OK\" the Rx to phaymacy and that PCP does not even need to be the one to \"OK\" the medication. Patient states anyone can okay the medication and would like it done today. Advise patient of PCP message that any medication issues needs to be seen with PCP. Patient state she does not need to see PCP because anyone can \"OK\" her med. Offer patient an appointment and patient declines as she \"can not wait longer\" and wants the medication okay by today. Told patient our message take up to 2 day turn-around time. Patient insist a different provider okays the medications as PCP did not prescribe the medication and all they have to do is look at patient medication list and sign off. Patient hung up the phone after saying \"can you make sure someone get this done today, bye\".  "

## 2019-09-06 ENCOUNTER — COMMUNICATION - HEALTHEAST (OUTPATIENT)
Dept: RESPIRATORY THERAPY | Facility: CLINIC | Age: 59
End: 2019-09-06

## 2019-09-09 ENCOUNTER — TELEPHONE (OUTPATIENT)
Dept: FAMILY MEDICINE | Facility: CLINIC | Age: 59
End: 2019-09-09

## 2019-09-09 NOTE — TELEPHONE ENCOUNTER
Spoke w/ pt who verified that her MA was inactive. Pt said she went down to [facility] on 9/6 to complete another application, but was frustrated that it was still inactive.     Pt gave SW contact info for Aleksandar KRAUSE, pt's financial worker ([phone]). SW called and LVM.

## 2019-09-10 NOTE — TELEPHONE ENCOUNTER
Incoming call form pt. LARRY notified pt that he has not hear from Aleksandar KRAUSE through he left a  yesterday. SW is frustrated she cannot get her medication. LARRY gave pt [facility] # ([phone]) so she can check on her MA status. Pt suspects she may have missed paperwork about he monthly income. Pt said she would get Aleksandar KRAUSE's fax, come to clinic and gave paperwork faxed to Aleksandar.

## 2019-09-11 ENCOUNTER — TELEPHONE (OUTPATIENT)
Dept: FAMILY MEDICINE | Facility: CLINIC | Age: 59
End: 2019-09-11

## 2019-09-11 DIAGNOSIS — I10 ESSENTIAL HYPERTENSION: ICD-10-CM

## 2019-09-11 RX ORDER — HYDROCHLOROTHIAZIDE 12.5 MG/1
12.5 TABLET ORAL DAILY
Qty: 30 TABLET | Refills: 11 | Status: SHIPPED | OUTPATIENT
Start: 2019-09-11 | End: 2020-02-25

## 2019-09-11 NOTE — TELEPHONE ENCOUNTER
[facility] phone call message- general phone call:    Reason for call: Caller state that patient does not have insurance and would like to know if patient can substitute Omeprazole to Zantac so she can buy OTC. Please call and advise.    Return call needed: Yes    OK to leave a message on voice mail? Yes    Primary language: English      needed? No    Call taken on September 11, 2019 at 12:51 PM by Jeronimo Gauthier

## 2019-09-11 NOTE — TELEPHONE ENCOUNTER
Juani returned call. Would like order faxed to her. Pended historical medication for PCP to sign. Patient has 150mg tablets of zantac. Routed to PCP and will fax to [phone] once completed. Sherif LONGO

## 2019-09-11 NOTE — TELEPHONE ENCOUNTER
Absolutely ok to take Zantac twice daily instead of omeprazole. Thanks!    Nikolay Cook MD  September 11, 2019  1:59 PM

## 2019-09-12 ENCOUNTER — TELEPHONE (OUTPATIENT)
Dept: FAMILY MEDICINE | Facility: CLINIC | Age: 59
End: 2019-09-12

## 2019-09-12 NOTE — TELEPHONE ENCOUNTER
Called Tremaine to check in on pt's MA status. Was told pt turned in renewal paperwork a few days ago and was still being processed. Not active yet.

## 2019-09-13 ENCOUNTER — TELEPHONE (OUTPATIENT)
Dept: FAMILY MEDICINE | Facility: CLINIC | Age: 59
End: 2019-09-13

## 2019-09-13 NOTE — TELEPHONE ENCOUNTER
[facility] phone call message- medication clarification/question:    Full Medication Name: hydrochlorothiazide (HYDRODIURIL) 12.5 MG tablet       Question: Nurse called stating the patient was seen in the emergency department on 9/11/2019 at [facility]. She states they prescribed the medication   Hydrochlorothiazide for 25mg twice daily. Nurse wants to verify if this is correct since patient usually takes this medication for 12.5 mg. If possible nurse would like a call back today that way the patient can take this medication for the weekend and not miss taking it. Please call and advise.     Pharmacy confirmed as [id number] - [place], [place] - [address] AT [facility] & [facility]: Yes    OK to leave a message on voice mail? Yes    Primary language: English      needed? No    Call taken on September 13, 2019 at 2:05 PM by Ciara Rodriguez

## 2019-09-13 NOTE — TELEPHONE ENCOUNTER
Spoke with . Received VORB for patient to take 12.5mg once daily. Will not increase until patient is seen in clinic for ED follow-up appointment. Called Mya and left VM on confidential VM with directions to take hydrochlorothiazide 12.5mg once daily. Requested patient call to schedule ED follow up appointment as well. Sherif LONGO

## 2019-09-13 NOTE — TELEPHONE ENCOUNTER
Received VM from pt asking for Aleksandar KRAUSE's fax # so she could send more forms to him.     MARYSE joe/ [facility] fax #: [phone].

## 2019-09-13 NOTE — TELEPHONE ENCOUNTER
Mya returned call, requesting fax with new updated order. Advised can provide on Monday. Mya verbalized understanding. Sherif LONGO

## 2019-09-16 ENCOUNTER — TELEPHONE (OUTPATIENT)
Dept: FAMILY MEDICINE | Facility: CLINIC | Age: 59
End: 2019-09-16

## 2019-09-16 NOTE — TELEPHONE ENCOUNTER
Pt visit ED on 9/13, he second visit since 9/11. Called to f/u and check in as pt's MA still inactive. No answer. LVM.

## 2019-09-17 ENCOUNTER — TELEPHONE (OUTPATIENT)
Dept: FAMILY MEDICINE | Facility: CLINIC | Age: 59
End: 2019-09-17

## 2019-09-17 ENCOUNTER — TELEPHONE (OUTPATIENT)
Dept: ORTHOPEDICS | Facility: CLINIC | Age: 59
End: 2019-09-17

## 2019-09-17 NOTE — TELEPHONE ENCOUNTER
[facility] phone call message- general phone call:    Reason for call: Caller would like a call back to discuss if patient could be seen in clinic. Caller states insurance is inactive and since patient is restricted she advises patient be seen at primary clinic. Notified caller that without insurance patient would have to pay $200 upfront and remaining bill would get mailed. Caller states patient is homeless and would like to speak with a manager to verify if their is a way around it until the patients insurance is active again.   Please call and advise. Call back numbers [phone] or [phone]    Return call needed: Yes    OK to leave a message on voice mail? Yes    Primary language: English      needed? No    Call taken on September 17, 2019 at 1:50 PM by Ciara Rodriguez

## 2019-09-17 NOTE — TELEPHONE ENCOUNTER
"I spoke with ida and again stated that company policy is $200 down for those without insurance.  Ida was very manipulative in asking if I was aware how much she was costing the tax payers by going to the ER two times this month jayla.  I informed her I was aware.  I stated if the patient doesn't have insurance currently than she isn't currently restricted to our clinic and should be able to be seen anywhere.  She stated she has gone down that road before and its not true.  Patient is still restricted even without insurance.  I asked if she knew why the insurance had lapsed and she said no, I am just meeting her for the first time today. I asked what she needed to be seen for and she said her COPD and medications.  I informed her that the patient had just been seen on 8/28 so she should have medications.  I also informed her she no showed her appt on 9/4.  The called stated so you are telling me that you won't see her and I said unfortunately if she doesn't have insurance, nor the $200 down we can't.  The caller stated something to the fact of \"I am grateful that I don't work for a clinic that turns away homeless people\", thank you for your time and hung up on me.    "

## 2019-09-17 NOTE — TELEPHONE ENCOUNTER
Caller called stating she would like to speak with the manager. She states she wants to schedule an appointment for the patient but notified I'm unable to since patient does not have insurance unless patient is willing to pay $200 upfront. Caller states she doesn't understand why we would make the patient pay if she is homeless. Caller was demanding to speak with someone right now whether it be a provider or nurse. Notified caller I understand the frustration but I would have to take a message in order to have her called returned since manger and supervisor are unavailable. Caller was upset asked for my name and demanded again to be transferred to a nurse. Notified Faye of what was going on and call was warm transferred to Faye.

## 2019-09-18 ENCOUNTER — TELEPHONE (OUTPATIENT)
Dept: FAMILY MEDICINE | Facility: CLINIC | Age: 59
End: 2019-09-18

## 2019-09-19 ENCOUNTER — TELEPHONE (OUTPATIENT)
Dept: FAMILY MEDICINE | Facility: CLINIC | Age: 59
End: 2019-09-19

## 2019-09-19 NOTE — TELEPHONE ENCOUNTER
Called received from Wen, a nurse at [facility]. Wen said she has been giving pt 25 mg of hydrochlorothiazide twice a day, not the prescribed 12.5 mg once day. Wen said she has been doing this because pt felt dizzy w/ 12.5 mg. Pt reportedly doing better with the increased dose.     Wen wanted to know how pt's PCP felt about this increased does of hydrochlorothiazide and requested feedback.

## 2019-09-19 NOTE — TELEPHONE ENCOUNTER
LIDIA letting pt know that LARRY MURILLO w/ Aleksandar again @ [phone]. LARRY encouraged pt to contact [facility] info line @ [phone] and check her MA status.

## 2019-09-19 NOTE — TELEPHONE ENCOUNTER
VM left by pt requesting SW contact Aleksandar KRAUSE to check on MA status as pt has still be unable to reach him.

## 2019-09-23 ENCOUNTER — TELEPHONE (OUTPATIENT)
Dept: FAMILY MEDICINE | Facility: CLINIC | Age: 59
End: 2019-09-23

## 2019-09-25 NOTE — TELEPHONE ENCOUNTER
Called to discuss clinic policy. LM to return call to either myself or Faye Arreguin. [facility]

## 2019-09-30 ENCOUNTER — TELEPHONE (OUTPATIENT)
Dept: FAMILY MEDICINE | Facility: CLINIC | Age: 59
End: 2019-09-30

## 2019-09-30 NOTE — TELEPHONE ENCOUNTER
Spoke w/ pt, who is frustrated that her MA is still inactive. Pt said Aleksandar said her income verification was not good enough and more info was need, like a bank statement. Pt to work on getting a bank statement.

## 2019-10-05 ENCOUNTER — COMMUNICATION - HEALTHEAST (OUTPATIENT)
Dept: RESPIRATORY THERAPY | Facility: CLINIC | Age: 59
End: 2019-10-05

## 2019-10-08 ENCOUNTER — TELEPHONE (OUTPATIENT)
Dept: FAMILY MEDICINE | Facility: CLINIC | Age: 59
End: 2019-10-08

## 2019-10-08 NOTE — TELEPHONE ENCOUNTER
Pt reportedly has received a bank statement that she can fax to Aleksandar. Pt said she was going to a crisis center today to fax a that info. SW reminded pt to write her case # down of the statement so Aleksandar can receive it.     Pt also said that she is being kicked out of her house since her landlords daughter is moving in. Pt asked how she could get into [facility]'s respite care unit. LARRY sad patients need to visit the ED associated with [facility] to be admitted. Pt reported feeling very sick this past month due to stress. LARRY encouraged pt to visit ED as they don't require insurance for care.

## 2019-10-20 ENCOUNTER — COMMUNICATION - HEALTHEAST (OUTPATIENT)
Dept: RESPIRATORY THERAPY | Facility: CLINIC | Age: 59
End: 2019-10-20

## 2019-10-21 ENCOUNTER — TELEPHONE (OUTPATIENT)
Dept: FAMILY MEDICINE | Facility: CLINIC | Age: 59
End: 2019-10-21

## 2019-10-22 ENCOUNTER — COMMUNICATION - HEALTHEAST (OUTPATIENT)
Dept: RESPIRATORY THERAPY | Facility: CLINIC | Age: 59
End: 2019-10-22

## 2019-10-23 ENCOUNTER — TELEPHONE (OUTPATIENT)
Dept: FAMILY MEDICINE | Facility: CLINIC | Age: 59
End: 2019-10-23

## 2019-10-23 ENCOUNTER — OFFICE VISIT (OUTPATIENT)
Dept: FAMILY MEDICINE | Facility: CLINIC | Age: 59
End: 2019-10-23
Payer: COMMERCIAL

## 2019-10-23 VITALS
BODY MASS INDEX: 26.43 KG/M2 | RESPIRATION RATE: 22 BRPM | SYSTOLIC BLOOD PRESSURE: 148 MMHG | WEIGHT: 138.2 LBS | HEART RATE: 75 BPM | DIASTOLIC BLOOD PRESSURE: 88 MMHG | TEMPERATURE: 97.9 F | OXYGEN SATURATION: 95 %

## 2019-10-23 DIAGNOSIS — J42 CHRONIC BRONCHITIS, UNSPECIFIED CHRONIC BRONCHITIS TYPE (H): ICD-10-CM

## 2019-10-23 DIAGNOSIS — K21.9 GASTROESOPHAGEAL REFLUX DISEASE WITHOUT ESOPHAGITIS: ICD-10-CM

## 2019-10-23 DIAGNOSIS — Z09 HOSPITAL DISCHARGE FOLLOW-UP: Primary | ICD-10-CM

## 2019-10-23 DIAGNOSIS — M54.30 SCIATICA, UNSPECIFIED LATERALITY: ICD-10-CM

## 2019-10-23 DIAGNOSIS — E87.6 HYPOKALEMIA: ICD-10-CM

## 2019-10-23 DIAGNOSIS — M75.01 ADHESIVE CAPSULITIS OF BOTH SHOULDERS: ICD-10-CM

## 2019-10-23 DIAGNOSIS — E87.1 HYPONATREMIA: ICD-10-CM

## 2019-10-23 DIAGNOSIS — M75.02 ADHESIVE CAPSULITIS OF BOTH SHOULDERS: ICD-10-CM

## 2019-10-23 DIAGNOSIS — I10 ESSENTIAL HYPERTENSION: ICD-10-CM

## 2019-10-23 LAB
BUN SERPL-MCNC: 17 MG/DL (ref 7–30)
CALCIUM SERPL-MCNC: 9 MG/DL (ref 8.5–10.4)
CHLORIDE SERPLBLD-SCNC: 105 MMOL/L (ref 94–109)
CO2 SERPL-SCNC: 24 MMOL/L (ref 20–32)
CREAT SERPL-MCNC: 0.8 MG/DL (ref 0.6–1.3)
EGFR CALCULATED (BLACK REFERENCE): >90 ML/MIN
EGFR CALCULATED (NON BLACK REFERENCE): 78.6 ML/MIN
GLUCOSE SERPL-MCNC: 119 MG/DL (ref 60–109)
POTASSIUM SERPL-SCNC: 4 MMOL/L (ref 3.4–5.3)
SODIUM SERPL-SCNC: 140 MMOL/L (ref 133–144)

## 2019-10-23 RX ORDER — IBUPROFEN 400 MG/1
400 TABLET, FILM COATED ORAL EVERY 6 HOURS PRN
Qty: 90 TABLET | Refills: 11 | Status: SHIPPED | OUTPATIENT
Start: 2019-10-23 | End: 2019-11-20

## 2019-10-23 RX ORDER — LISINOPRIL 5 MG/1
5 TABLET ORAL DAILY
Qty: 30 TABLET | Refills: 3 | Status: SHIPPED | OUTPATIENT
Start: 2019-10-23 | End: 2019-12-03

## 2019-10-23 RX ORDER — ALBUTEROL SULFATE 90 UG/1
2 AEROSOL, METERED RESPIRATORY (INHALATION) EVERY 6 HOURS PRN
Qty: 2 INHALER | Refills: 3 | Status: SHIPPED | OUTPATIENT
Start: 2019-10-23 | End: 2020-02-08

## 2019-10-23 NOTE — TELEPHONE ENCOUNTER
Message to physician:     Date of last visit: Visit date not found     Date of next visit if scheduled: Visit date not found       Last Comprehensive Metabolic Panel:  Sodium   Date Value Ref Range Status   10/23/2019 140.0 133.0 - 144.0 mmol/L Final     Potassium   Date Value Ref Range Status   10/23/2019 4.0 3.4 - 5.3 mmol/L Final     Chloride   Date Value Ref Range Status   10/23/2019 105.0 94.0 - 109.0 mmol/L Final     Carbon Dioxide   Date Value Ref Range Status   10/23/2019 24.0 20.0 - 32.0 mmol/L Final     Glucose   Date Value Ref Range Status   10/23/2019 119.0 (H) 60.0 - 109.0 mg/dL Final     Urea Nitrogen   Date Value Ref Range Status   10/23/2019 17.0 7.0 - 30.0 mg/dL Final     Creatinine   Date Value Ref Range Status   10/23/2019 0.8 0.6 - 1.3 mg/dL Final     GFR Estimate   Date Value Ref Range Status   01/12/2017 40 (L) >60 mL/min/1.73m2 Final     Calcium   Date Value Ref Range Status   10/23/2019 9.0 8.5 - 10.4 mg/dL Final       BP Readings from Last 3 Encounters:   10/23/19 (!) 148/88   08/28/19 (!) 153/102   08/15/19 (!) 156/104       Lab Results   Component Value Date    A1C 4.9 11/20/2014    A1C 5.7 03/16/2011                Please complete refill and CLOSE ENCOUNTER.  Closing the encounter signifies the refill is complete.

## 2019-10-23 NOTE — TELEPHONE ENCOUNTER
[facility] phone call message- patient requesting a refill:    Full Medication Name: ibuprofen    Dose: 400 MG    Pharmacy confirmed as   [id number] - [place], [place] - [address] [place] [address]  Phone: [phone] Fax: [phone]  : Yes    Additional Comments: Patient would like refill for this medication     OK to leave a message on voice mail? Yes    Primary language: English      needed? No    Call taken on October 23, 2019 at 12:00 PM by Jeronimo Gauthier

## 2019-10-23 NOTE — LETTER
October 23, 2019      Lisa Scott  [address] [place] [address]        Dear Lisa,    Your sodium and potassium are back to normal.    Please see below for your test results.    Resulted Orders   Basic Metabolic Panel ([facility]) - Results < 1 hr   Result Value Ref Range    Glucose 119.0 (H) 60.0 - 109.0 mg/dL    Urea Nitrogen 17.0 7.0 - 30.0 mg/dL    Creatinine 0.8 0.6 - 1.3 mg/dL    Sodium 140.0 133.0 - 144.0 mmol/L    Potassium 4.0 3.4 - 5.3 mmol/L    Chloride 105.0 94.0 - 109.0 mmol/L    Carbon Dioxide 24.0 20.0 - 32.0 mmol/L    Calcium 9.0 8.5 - 10.4 mg/dL    eGFR Calculated (Non Black Reference) 78.6 >60.0 mL/min    eGFR Calculated (Black Reference) >90 >60.0 mL/min       If you have any questions, please call the clinic to make an appointment.    Sincerely,    Nikolay Cook MD

## 2019-10-23 NOTE — PROGRESS NOTES
Hospitalization Follow-up Visit         [facility]       Hospital Follow-up Visit:    Hospital:  [facility]   Date of Admission: 10/16/19  Date of Discharge: 10/19/19  Reason(s) for Admission: Hyponatremia, Hypokalemia, COPD            Problems taking medications regularly:  None       Post Discharge Medication Reconciliation: discharge medications reconciled, continue medications without change.       Problems adhering to non-medication therapy:  None       Medications reviewed by: by myself. Findings include being off hydrochlorothiazide; has started lasix; BP not at goal today.    Summary of hospitalization:  [facility] discharge summary reviewed  Diagnostic Tests/Treatments reviewed.  Follow up needed: BMP today  Other Healthcare Providers Involved in Patient s Care:         None  Update since discharge: improved.   Plan of care communicated with patient            [facility] Medicine Discharge Summary    Primary Care Physician: Nikolay Cook MD  Discharge Provider: Nikunj Lyon   Consult/s: none  Admission Date: 10/16/2019.   Admission Diagnoses:   Hypokalemia [E87.6]  Hyponatremia [E87.1]  COPD exacerbation (H) [J44.1]   Discharge Date: 10/19/19  Disposition: current living arrangement  Condition at Discharge: Good  Code Status: Full Code  Principal Diagnosis: Hyponatremia  Discharge Diagnoses:   Principal Problem:  Hyponatremia  Active Problems:  COPD exacerbation (H)    Reason for Admission:   Lisa Scott is a 59 y.o. female who presented on the day of admission with shortness of breath productive cough and chest congestion.    Hospital Summary:   Lisa Scott is a 59 y.o. female admitted on 10/16/2019 for COPD exacerbation, hypokalemia and hyponatremia. ED IV established and patient given 40 mg of prednisone, DuoNeb, and albuterol nebs x2. Chest x-ray showed no abnormalities, CBC, BMP, BNP, influenza notable only for a potassium of 2.4, sodium of 124. Patient admitted for correction of  electrolyte abnormalities and COPD exacerbation cares. Urine osmolality and serum osmolality within normal limits. Patient shortness of breath and productive cough improved on prednisone and levofloxacin. Hyponatremia improved on IV normal saline, and hypokalemia improved on potassium replacement protocol. IV normal saline discontinued and patient placed on free water restriction to 1500 cc, with initial drop in sodium. Patient was started on Lasix in conjunction with free water restriction with improvement in sodium level. Patient discharged home with resumption of PTA medicines and a new prescription for Lasix 20 mg.    Discharge Medications:     Medication List     START taking these medications   furosemide 20 MG tablet  Commonly known as: LASIX  Take 1 tablet (20 mg total) by mouth daily.    levoFLOXacin 750 MG tablet  Commonly known as: LEVAQUIN  Take 1 tablet (750 mg total) by mouth Daily at 6:00 am for 2 days.      CHANGE how you take these medications   ipratropium-albuterol 0.5-2.5 mg/3 mL nebulizer  Commonly known as: DUO-NEB  Take 7 mL by nebulization 4 (four) times a day as needed.  What changed: how much to take    predniSONE 20 MG tablet  Commonly known as: DELTASONE  Take 40 mg by mouth daily with breakfast.  What changed:     how much to take    when to take this    additional instructions    Discharge Instructions:  Follow up contact phone number for patient:[phone]   Follow up appointment with Primary Care Physician: Nikolay Cook MD within in the next few days  Follow up appointment with Specialist: none  Follow up test / procedure to do as outpatient: BMP, PFTs   Diet: regular diet  Activity: activity as tolerated  Restrictions: Free water restriction of 1500ccs  Wound / drain care:none needed  The following tests have been done with results pending: None      HTN -    BP Readings from Last 6 Encounters:   10/23/19 (!) 148/88   08/28/19 (!) 153/102   08/15/19 (!) 156/104   07/25/19 133/86    07/05/19 130/90   06/26/19 (!) 159/103     Mood -            Review of Systems:   CONSTITUTIONAL: no fatigue, no unexpected change in weight  SKIN: no worrisome rashes, no worrisome moles, no worrisome lesions  EYES: no acute vision problems or changes  ENT: no ear problems, no mouth problems, no throat problems  RESP:continues to gave intermittent shortness of breath - relieved w/ inhaler; using nebs 4x/day  CV: no chest pain, no palpitations, no new or worsening peripheral edema  GI: no nausea, no vomiting, no constipation, no diarrhea  NEURO: no weakness, no dizziness, no syncope, no headaches  PSYCHIATRIC: feels anxious and scared            Physical Exam:     Vitals:    10/23/19 1028   BP: (!) 148/88   Pulse: 75   Resp: 22   Temp: 97.9  F (36.6  C)   TempSrc: Oral   SpO2: 95%   Weight: 62.7 kg (138 lb 3.2 oz)     Body mass index is 26.43 kg/m .    GENERAL: healthy, alert, well nourished, well hydrated, no distress  RESP: lungs w/ scattered exp wheezes  CV: regular rates and rhythm, normal S1 S2, no S3 or S4 and no murmur, no click or rub -  MS: extremities- no gross deformities noted, no edema         Results:     BMP pending    Assessment and Plan     (Z09) Hospital discharge follow-up  (primary encounter diagnosis)  Comment:   Plan: improved clinically; compliant w/ discharge instructions; repeat BMP today; Will setup PFTs  I am unsure what controller inhaler she has for her COPD. Per Med list, it is Tudorza but Lisa denies taking this. Is using nebs 4x/day w/ relief; I recommended she schedule a visit w/ our clinical pharmacist tomorrow (appt made for 2:30p) to review her COPD regimen    (E87.1) Hyponatremia  Comment: free-water restriction reviewed; is on Lasix  Plan: Basic Metabolic Panel ([facility]) - Results < 1 hr          (E87.6) Hypokalemia  Comment: hydrochlorothiazide discontinued at hospital admission; will add Lisinopril for BP control and should help with low K; recommended she follow-up with me  in 3 weeks for BP/lab check (I am away next 2 weeks for paternity leave); she can schedule w/ another attending sooner if needed  Plan: Basic Metabolic Panel ([facility]) - Results < 1 hr          (I10) Essential hypertension  Comment: see above; added Lisinopril; cont Lasix  Plan: lisinopril (PRINIVIL/ZESTRIL) 5 MG tablet          (K21.9) Gastroesophageal reflux disease without esophagitis  Comment:   Plan: omeprazole (PRILOSEC) 20 MG DR capsule          (J42) Chronic bronchitis, unspecified chronic bronchitis type (H)  Comment: see above; formal PFTs ordered; PharmD appt tomorrow for reconciliation of controller med as pt unsure and did not bring with her today  Plan: albuterol (PROAIR HFA/PROVENTIL HFA/VENTOLIN         HFA) 108 (90 Base) MCG/ACT inhaler, Pulmonary         Function Test (PFT) Referral            E&M code to be billed if TCM cannot be: 36051    Type of decision making: Moderate complexity (65353)      Nikolay Cook MD  October 23, 2019  11:17 AM

## 2019-10-23 NOTE — TELEPHONE ENCOUNTER
Face to face meeting per patient request to assist with completion of [employer] Statement of need.    Forms were reviewed and completed in clinic by Dr. Cook. Original copy provided to Veronique in clinic and copy sent to HIM for scan.

## 2019-10-23 NOTE — PATIENT INSTRUCTIONS
Referral for :     Pulmonary Function Test    LOCATION/PLACE/Provider :    [facility]  DATE & TIME :    Nov. 12th at 10:30 am   PHONE :     [phone]  FAX :    [phone]  Appointment made by clinic staff/:    Ivory

## 2019-10-24 ENCOUNTER — CARE COORDINATION (OUTPATIENT)
Dept: FAMILY MEDICINE | Facility: CLINIC | Age: 59
End: 2019-10-24

## 2019-10-24 ENCOUNTER — OFFICE VISIT (OUTPATIENT)
Dept: PHARMACY | Facility: PHYSICIAN GROUP | Age: 59
End: 2019-10-24
Payer: COMMERCIAL

## 2019-10-24 VITALS
DIASTOLIC BLOOD PRESSURE: 87 MMHG | RESPIRATION RATE: 20 BRPM | HEART RATE: 112 BPM | TEMPERATURE: 97.7 F | BODY MASS INDEX: 26.39 KG/M2 | OXYGEN SATURATION: 98 % | SYSTOLIC BLOOD PRESSURE: 129 MMHG | WEIGHT: 138 LBS

## 2019-10-24 DIAGNOSIS — J42 CHRONIC BRONCHITIS, UNSPECIFIED CHRONIC BRONCHITIS TYPE (H): ICD-10-CM

## 2019-10-24 DIAGNOSIS — J44.9 CHRONIC OBSTRUCTIVE PULMONARY DISEASE, UNSPECIFIED COPD TYPE (H): ICD-10-CM

## 2019-10-24 PROCEDURE — 99606 MTMS BY PHARM EST 15 MIN: CPT | Performed by: PHARMACIST

## 2019-10-24 PROCEDURE — 99607 MTMS BY PHARM ADDL 15 MIN: CPT | Performed by: PHARMACIST

## 2019-10-24 RX ORDER — IPRATROPIUM BROMIDE AND ALBUTEROL SULFATE 2.5; .5 MG/3ML; MG/3ML
1 SOLUTION RESPIRATORY (INHALATION) 4 TIMES DAILY PRN
COMMUNITY
Start: 2019-10-24 | End: 2019-11-30

## 2019-10-24 NOTE — PATIENT INSTRUCTIONS
Start Tudorza inhaler - one puff in the morning and one puff at night    Once you are able to start Tudorza, change the Duonebs to as needed    Start using Advair - 2 puffs in the morning and 2 puffs in the evening

## 2019-10-24 NOTE — Clinical Note
Just an FYI :) Maybe future covisit if you need help with other meds. We only talked about her inhalers...

## 2019-10-24 NOTE — PROGRESS NOTES
SUBJECTIVE/OBJECTIVE:                Lisa Scott is a 59 year old female coming in for a follow-up visit for Medication Therapy Management.  She was referred to me from Dr. Cook. Concern acutely for outlining inhaler regimen.     Chief Complaint: Follow up from [facility] visit on 3/27/19.     COPD: Current regimen: Duonebs QID, Albuterol PRN, morning and night. No medicines brought to clinic today, call to discharge pharmacy at hospital to determine what was sent home with patient. Received Symbicort inpatient (left overs sent home with patient), while Advair 230/21 dispensed from pharmacy. Used to have Spiriva, but changed due to insurance. Believes at one point had Tudorza, but not using currently - doesn't have.     Today's Vitals: /87   Pulse 112   Temp 97.7  F (36.5  C) (Oral)   Resp 20   Wt 138 lb (62.6 kg)   SpO2 98%   BMI 26.39 kg/m        ASSESSMENT:                COPD: Missing therapies seemingly due to misunderstanding by patient. Duplicated LABA-ICS, confusion.      PLAN:                Post Discharge Medication Reconciliation Status: unable to reconcile discharge medications due to time allocated today.    After review of patient's insurance formulary  1. Start Tudorza inhaler - one puff in the morning and one puff at night  2. Once you are able to start Tudorza, change the Duonebs to as needed  3. Start using Advair - 2 puffs in the morning and 2 puffs in the evening    I spent 30 minutes with this patient today. All changes were made via collaborative practice agreement with Dr. Cook.     Will follow up in 1 month.    The patient was given a summary of these recommendations as an after visit summary.    Erica Fernando, PharmD, CDE  [facility]  Phone: [phone]  October 30, 2019 at 11:36 PM

## 2019-10-25 ENCOUNTER — TELEPHONE (OUTPATIENT)
Dept: FAMILY MEDICINE | Facility: CLINIC | Age: 59
End: 2019-10-25

## 2019-10-25 ENCOUNTER — CARE COORDINATION (OUTPATIENT)
Dept: FAMILY MEDICINE | Facility: CLINIC | Age: 59
End: 2019-10-25

## 2019-10-25 ENCOUNTER — COMMUNICATION - HEALTHEAST (OUTPATIENT)
Dept: RESPIRATORY THERAPY | Facility: CLINIC | Age: 59
End: 2019-10-25

## 2019-10-25 NOTE — PROGRESS NOTES
Pt received notice that a woman she had protection against was being released from prison tomorrow. Pt was concerned about safety. Pt was told a court date of 11/4 would see if protection order would be extended. Pt said her landlord would not let her in, but pt does not trust her.      SW wanted SW to help contact crisis centers to see if she could have a bed for the night. Contacted [facility] ([phone]) and was told beds for both centers were full for the night.      SW called [facility] who said they had openings. Ptr declined this option and said she would trying stay at her house. Agreed to have SW check in the next day.

## 2019-10-30 ENCOUNTER — TELEPHONE (OUTPATIENT)
Dept: FAMILY MEDICINE | Facility: CLINIC | Age: 59
End: 2019-10-30

## 2019-10-30 NOTE — PROGRESS NOTES
Pt received notice that a woman she had protection against was being released from skilled nursing tomorrow. Pt was concerned about safety. Pt was told a court date of 11/4 would see if protection order would be extended. Pt said her landlord would not let her in, but pt does not trust her.     SW wanted SW to help contact crisis centers to see if she could have a bed for the night. Contacted [facility] ([phone]) and was told beds for both centers were full for the night.     SW called [facility] who said they had openings. Ptr declined this option and said she would trying stay at her house. Agreed to have SW check in the next day.

## 2019-11-01 ENCOUNTER — COMMUNICATION - HEALTHEAST (OUTPATIENT)
Dept: RESPIRATORY THERAPY | Facility: CLINIC | Age: 59
End: 2019-11-01

## 2019-11-03 ENCOUNTER — TRANSFERRED RECORDS (OUTPATIENT)
Dept: HEALTH INFORMATION MANAGEMENT | Facility: CLINIC | Age: 59
End: 2019-11-03

## 2019-11-04 ENCOUNTER — TELEPHONE (OUTPATIENT)
Dept: FAMILY MEDICINE | Facility: CLINIC | Age: 59
End: 2019-11-04

## 2019-11-04 NOTE — TELEPHONE ENCOUNTER
Call received from SW @ [facility] ([phone]). Pt currently admitted, but provider @ hospital unable to prescribe medication. Requested SW contact pt's restricted program and get temporary approval for Dr. Bekah Mac (NPI: [id number]) to prescribe.       LIDIA @ [phone] for Zana NOLEN pt's RR .

## 2019-11-04 NOTE — TELEPHONE ENCOUNTER
LARRY called pt who said she was still admitted to hospital. Unsure of discharge date. LARRY said he would call 11/5 to see if pt was still admitted.

## 2019-11-05 NOTE — TELEPHONE ENCOUNTER
Spoke w/ pt, who said she had been discharged. Pt was moving to new apartment @ [place] and wanted SW to call later to schedule HFU.     SW said he would call back @ 1:00 PM.

## 2019-11-12 ENCOUNTER — HOSPITAL ENCOUNTER (OUTPATIENT)
Dept: RESPIRATORY THERAPY | Facility: CLINIC | Age: 59
Discharge: HOME | End: 2019-11-12

## 2019-11-12 ENCOUNTER — TELEPHONE (OUTPATIENT)
Dept: FAMILY MEDICINE | Facility: CLINIC | Age: 59
End: 2019-11-12

## 2019-11-12 DIAGNOSIS — J44.9 COPD (CHRONIC OBSTRUCTIVE PULMONARY DISEASE) (H): ICD-10-CM

## 2019-11-13 ENCOUNTER — CARE COORDINATION (OUTPATIENT)
Dept: FAMILY MEDICINE | Facility: CLINIC | Age: 59
End: 2019-11-13

## 2019-11-13 ENCOUNTER — TELEPHONE (OUTPATIENT)
Dept: FAMILY MEDICINE | Facility: CLINIC | Age: 59
End: 2019-11-13

## 2019-11-13 ENCOUNTER — OFFICE VISIT (OUTPATIENT)
Dept: FAMILY MEDICINE | Facility: CLINIC | Age: 59
End: 2019-11-13
Payer: COMMERCIAL

## 2019-11-13 VITALS
TEMPERATURE: 97.5 F | DIASTOLIC BLOOD PRESSURE: 94 MMHG | OXYGEN SATURATION: 100 % | SYSTOLIC BLOOD PRESSURE: 138 MMHG | RESPIRATION RATE: 24 BRPM | HEART RATE: 92 BPM | BODY MASS INDEX: 24.43 KG/M2 | WEIGHT: 129.4 LBS | HEIGHT: 61 IN

## 2019-11-13 DIAGNOSIS — K59.00 CONSTIPATION, UNSPECIFIED CONSTIPATION TYPE: Primary | ICD-10-CM

## 2019-11-13 DIAGNOSIS — J42 CHRONIC BRONCHITIS, UNSPECIFIED CHRONIC BRONCHITIS TYPE (H): ICD-10-CM

## 2019-11-13 DIAGNOSIS — F29 PSYCHOSIS, UNSPECIFIED PSYCHOSIS TYPE (H): ICD-10-CM

## 2019-11-13 DIAGNOSIS — F51.01 PRIMARY INSOMNIA: ICD-10-CM

## 2019-11-13 RX ORDER — QUETIAPINE FUMARATE 100 MG/1
100 TABLET, FILM COATED ORAL DAILY PRN
Qty: 30 TABLET | Refills: 5 | Status: SHIPPED | OUTPATIENT
Start: 2019-11-13 | End: 2020-03-05

## 2019-11-13 RX ORDER — QUETIAPINE FUMARATE 200 MG/1
200 TABLET, FILM COATED ORAL AT BEDTIME
Qty: 30 TABLET | Refills: 5 | Status: SHIPPED | OUTPATIENT
Start: 2019-11-13 | End: 2020-03-24

## 2019-11-13 RX ORDER — ZOLPIDEM TARTRATE 5 MG/1
5 TABLET ORAL
Qty: 30 TABLET | Refills: 0 | Status: SHIPPED | OUTPATIENT
Start: 2019-11-13 | End: 2019-12-30

## 2019-11-13 ASSESSMENT — MIFFLIN-ST. JEOR: SCORE: 1109.33

## 2019-11-13 NOTE — PROGRESS NOTES
Pt is a 59 year old female last seen on 11/9/19 in ED here today for:     ED follow-up  Seen on 11/9 for abd pain and COPD (see below)  Admitted to [facility] from 11/3 to 11/4 for asthma/ COPD exacerbation  Feels that she was stressed from moving and was constipated  Took Miralax prn but has not had any money to buy juice to mix w/ miralax  Has been taking Senna as well  Abd pain is better    COPD - breathing is better  Has all her inhalers   Using Advair, Tudorza, nebs prn  Went to formal PFTs yesterday; results pending     Mood - Only medication she needs is the seroquel  Has appt w/ psych on Friday for first time in a long time     HTN - elevated today but overall still better   BP Readings from Last 6 Encounters:   11/13/19 (!) 138/94   10/24/19 129/87   10/23/19 (!) 148/88   08/28/19 (!) 153/102   08/15/19 (!) 156/104   07/25/19 133/86       From 11/9/19 ED note:  EMERGENCY DEPARTMENT ENCOUNTER     IMPRESSION  Abdominal pain, may be related to gastritis and constipation  History of COPD    MEDICAL DECISION MAKING   The patient was interviewed and examined. History in the chart was reviewed.  Patient evaluated for abdominal discomfort. She has had associated gastrointestinal symptoms of nausea vomiting and decreased bowel movements. She has been off her proton pump inhibitors. She also has history of COPD    On exam her vital signs are normal. Initially had slight tachycardia. She has COPD and has decreased breath sounds. She had some epigastric tenderness.    An IV was started and she was given symptomatic medication.  EKG showed normal sinus rhythm without evidence of ischemia  She was given IV fluid.  She received nebulizer treatment.  Laboratory investigation shows some baseline anemia and slightly elevated creatinine.  CT abdomen did not show any acute pathology other than heavy stool burden.    Patient felt better with the gastrointestinal symptomatic medication. Suspect her symptoms are related to  constipation possibly gastritis. She is stable for discharge home. She has proton pump inhibitor at home and I have recommended MiraLAX.  Acute abdominal exam did not show any acute tenderness. She was able to tolerate oral intake and had no further vomiting.       Past Medical History:   Diagnosis Date     Alcohol withdrawal seizure (H) 02/25/2017     Chronic obstructive pulmonary disease, unspecified COPD type (H) 2/23/2017     COPD (chronic obstructive pulmonary disease) (H)      Depressive disorder       No past surgical history on file.   Current Outpatient Medications   Medication Sig Dispense Refill     QUEtiapine (SEROQUEL) 100 MG tablet Take 1 tablet (100 mg) by mouth daily as needed (anxiety or panic attacks) 30 tablet 5     QUEtiapine (SEROQUEL) 200 MG tablet Take 1 tablet (200 mg) by mouth At Bedtime 30 tablet 5     zolpidem (AMBIEN) 5 MG tablet Take 1 tablet (5 mg) by mouth nightly as needed for sleep 30 tablet 0     acetaminophen (TYLENOL) 500 MG tablet Take 1-2 tablets (500-1,000 mg) by mouth every 8 hours as needed for mild pain 90 tablet 11     aclidinium (TUDORZA PRESSAIR) 400 MCG/ACT inhaler Inhale 1 puff into the lungs 2 times daily 1 Inhaler 11     albuterol (PROAIR HFA/PROVENTIL HFA/VENTOLIN HFA) 108 (90 Base) MCG/ACT inhaler Inhale 2 puffs into the lungs every 6 hours as needed for shortness of breath / dyspnea or wheezing 2 Inhaler 3     busPIRone (BUSPAR) 30 MG tablet Take 0.5 tablets (15 mg) by mouth 3 times daily       calcium carbonate (TUMS) 500 MG chewable tablet Take 2 tablets (1,000 mg) by mouth 4 times daily as needed for heartburn       fluticasone (FLONASE) 50 MCG/ACT nasal spray Spray 2 sprays in nostril daily 9.9 mL 11     fluticasone-salmeterol (ADVAIR-HFA) 230-21 MCG/ACT inhaler Inhale 2 puffs into the lungs 2 times daily 1 Inhaler 11     gabapentin (NEURONTIN) 300 MG capsule Take 2 capsules (600 mg) by mouth 3 times daily 180 capsule 3     hydrochlorothiazide (HYDRODIURIL)  12.5 MG tablet Take 1 tablet (12.5 mg) by mouth daily 30 tablet 11     hydrOXYzine (ATARAX) 25 MG tablet Take 1 tablet (25 mg) by mouth 4 times daily as needed for anxiety       ibuprofen (ADVIL/MOTRIN) 400 MG tablet Take 1 tablet (400 mg) by mouth every 6 hours as needed for moderate pain 90 tablet 11     ipratropium - albuterol 0.5 mg/2.5 mg/3 mL (DUONEB) 0.5-2.5 (3) MG/3ML neb solution Take 1 vial (3 mLs) by nebulization 4 times daily as needed for shortness of breath / dyspnea or wheezing       lisinopril (PRINIVIL/ZESTRIL) 5 MG tablet Take 1 tablet (5 mg) by mouth daily 30 tablet 3     loperamide (IMODIUM A-D) 2 MG tablet Take 1 tablet (2 mg) by mouth 4 times daily as needed for diarrhea       Melatonin 10 MG TABS tablet Take 1 tablet (10 mg) by mouth At Bedtime , additional tablet as needed for late night awakenings 90 tablet 1     omeprazole (PRILOSEC) 20 MG DR capsule Take 1 capsule (20 mg) by mouth 2 times daily 60 capsule 3     ondansetron (ZOFRAN) 4 MG tablet Take 4 mg by mouth daily as needed  0     order for DME DME - pt needs nebulizer tubing 1 Device 0     polyethylene glycol (MIRALAX/GLYCOLAX) powder Take 17 g (1 capful) by mouth daily as needed for constipation       polyvinyl alcohol (LIQUIFILM TEARS) 1.4 % ophthalmic solution Place 1 drop into both eyes as needed for dry eyes       potassium chloride ER (K-DUR/KLOR-CON M) 20 MEQ CR tablet Take 1 tablet (20 mEq) by mouth 2 times daily 60 tablet 0     prazosin (MINIPRESS) 2 MG capsule Take 1 capsule (2 mg) by mouth At Bedtime 30 capsule 1     ranitidine (ZANTAC) 150 MG tablet Take 1 tablet (150 mg) by mouth 2 times daily       tiZANidine (ZANAFLEX) 4 MG tablet Take 1 tablet (4 mg) by mouth 3 times daily as needed for muscle spasms 90 tablet 3      Allergies   Allergen Reactions     Nkda [No Known Drug Allergies]       ROS:   Gen- no weight change, no fevers/chills   Cardiac - no palpitations, no chest pain   Respiratory - no shortness of breath , no  "wheezing   GI - no nausea, no vomiting, no diarrhea, no constipation   Remainder of ROS negative.     Exam:   BP (!) 138/94   Pulse 92   Temp 97.5  F (36.4  C) (Oral)   Resp 24   Ht 1.549 m (5' 1\")   Wt 58.7 kg (129 lb 6.4 oz)   SpO2 100%   BMI 24.45 kg/m     Alert and oriented x 3; No acute distress       (K59.00) Constipation, unspecified constipation type  (primary encounter diagnosis)  Comment:   Plan: stable/improved; instructed on how to take miralax    (J42) Chronic bronchitis, unspecified chronic bronchitis type (H)  Comment:   Plan: stable/ improved on inhalers    (F29) Psychosis, unspecified psychosis type (H)  Comment: refilled; stable; seeing psych   Plan: QUEtiapine (SEROQUEL) 200 MG tablet, QUEtiapine        (SEROQUEL) 100 MG tablet            (F51.01) Primary insomnia  Comment: last refill was in 7/2019; instructed to continue using sparingly   Plan: zolpidem (AMBIEN) 5 MG tablet            Nikolay Cook MD  November 13, 2019  1:37 PM    "

## 2019-11-13 NOTE — TELEPHONE ENCOUNTER
Called to see if pt would make appt today. Pt verified she would be there and her ride would pick her up @ 12:30.

## 2019-11-14 NOTE — PROGRESS NOTES
Pt reportedly doing well since her recent move to [facility]. Pt has her own room and reports the building is secure, recurring key fob. Pt said she is still receiving threats, but decided to drop the case against her formed roommate since her recent move, citing being sick of the stress.     Pt provided contact info for Nita Conroy, her new  through [facility] (P:[phone], F: [phone]).     Pt said she was still waiting for her SNAP card and card afford to by [employer] to mix w/ her MiraLAX. Let pt know she could take it w/ water and gave her a bottle.

## 2019-11-15 ENCOUNTER — TELEPHONE (OUTPATIENT)
Dept: ORTHOPEDICS | Facility: CLINIC | Age: 59
End: 2019-11-15

## 2019-11-15 DIAGNOSIS — R11.0 NAUSEA: Primary | ICD-10-CM

## 2019-11-15 RX ORDER — ONDANSETRON 4 MG/1
4 TABLET, FILM COATED ORAL DAILY PRN
Qty: 30 TABLET | Refills: 3 | Status: SHIPPED | OUTPATIENT
Start: 2019-11-15 | End: 2019-12-11

## 2019-11-15 NOTE — TELEPHONE ENCOUNTER
[facility] phone call message- patient requesting a refill:    Full Medication Name: Ondasetron(zofran)    Dose: 4MG    Pharmacy confirmed as   [id number] - [place], [place] - [address] [place] [address]  Phone: [phone] Fax: [phone]  : Yes    Additional Comments: Patient states she is out and will need refills, if it can be refilled and sent to pharmacy in 2 hrs since she will be picking up other medications. Patient also states pharmacy will be calling about a lot of other medications that will be needing refills.       OK to leave a message on voice mail? Yes    Primary language: English      needed? No    Call taken on November 15, 2019 at 1:52 PM by Kaycee Arias

## 2019-11-18 ENCOUNTER — TELEPHONE (OUTPATIENT)
Dept: FAMILY MEDICINE | Facility: CLINIC | Age: 59
End: 2019-11-18

## 2019-11-18 NOTE — TELEPHONE ENCOUNTER
"[facility] phone call message- general phone call:    Reason for call: Patient wants PCP to know that she is unable to fill neither of her \"SEROQUEL\" medication until the 20th. Patient wants PCP to call back and give alternatives for patient to take until then.    Return call needed: Yes    OK to leave a message on voice mail? Yes    Primary language: English      needed? No    Call taken on November 18, 2019 at 11:14 AM by Jeronimo Gauthier"

## 2019-11-19 ENCOUNTER — COMMUNICATION - HEALTHEAST (OUTPATIENT)
Dept: RESPIRATORY THERAPY | Facility: CLINIC | Age: 59
End: 2019-11-19

## 2019-11-19 NOTE — PROGRESS NOTES
Pt is a 59 year old female last seen on 11/13/19 here today for:     1) itchy rash on R leg - started over 1 wk ago  Was outside recently but does not recall being in woods or near pets  Has her own sheets at facility    2) L ankle pain -   Banged into her nightstand  No acute sprain  Pain is medial and lateral      Past Medical History:   Diagnosis Date     Alcohol withdrawal seizure (H) 02/25/2017     Chronic obstructive pulmonary disease, unspecified COPD type (H) 2/23/2017     COPD (chronic obstructive pulmonary disease) (H)      Depressive disorder       History reviewed. No pertinent surgical history.   Current Outpatient Medications   Medication Sig Dispense Refill     acetaminophen (TYLENOL) 500 MG tablet Take 1-2 tablets (500-1,000 mg) by mouth every 8 hours as needed for mild pain 90 tablet 11     aclidinium (TUDORZA PRESSAIR) 400 MCG/ACT inhaler Inhale 1 puff into the lungs 2 times daily 1 Inhaler 11     albuterol (PROAIR HFA/PROVENTIL HFA/VENTOLIN HFA) 108 (90 Base) MCG/ACT inhaler Inhale 2 puffs into the lungs every 6 hours as needed for shortness of breath / dyspnea or wheezing 2 Inhaler 3     busPIRone (BUSPAR) 30 MG tablet Take 0.5 tablets (15 mg) by mouth 3 times daily       calcium carbonate (TUMS) 500 MG chewable tablet Take 2 tablets (1,000 mg) by mouth 4 times daily as needed for heartburn       fluticasone (FLONASE) 50 MCG/ACT nasal spray Spray 2 sprays in nostril daily 9.9 mL 11     fluticasone-salmeterol (ADVAIR-HFA) 230-21 MCG/ACT inhaler Inhale 2 puffs into the lungs 2 times daily 1 Inhaler 11     gabapentin (NEURONTIN) 300 MG capsule Take 2 capsules (600 mg) by mouth 3 times daily 180 capsule 3     hydrochlorothiazide (HYDRODIURIL) 12.5 MG tablet Take 1 tablet (12.5 mg) by mouth daily 30 tablet 11     hydrOXYzine (ATARAX) 25 MG tablet Take 1 tablet (25 mg) by mouth 4 times daily as needed for anxiety       ibuprofen (ADVIL/MOTRIN) 400 MG tablet Take 1 tablet (400 mg) by mouth every 6 hours  as needed for moderate pain 90 tablet 11     ipratropium - albuterol 0.5 mg/2.5 mg/3 mL (DUONEB) 0.5-2.5 (3) MG/3ML neb solution Take 1 vial (3 mLs) by nebulization 4 times daily as needed for shortness of breath / dyspnea or wheezing       lisinopril (PRINIVIL/ZESTRIL) 5 MG tablet Take 1 tablet (5 mg) by mouth daily 30 tablet 3     loperamide (IMODIUM A-D) 2 MG tablet Take 1 tablet (2 mg) by mouth 4 times daily as needed for diarrhea       Melatonin 10 MG TABS tablet Take 1 tablet (10 mg) by mouth At Bedtime , additional tablet as needed for late night awakenings 90 tablet 1     omeprazole (PRILOSEC) 20 MG DR capsule Take 1 capsule (20 mg) by mouth 2 times daily 60 capsule 3     ondansetron (ZOFRAN) 4 MG tablet Take 1 tablet (4 mg) by mouth daily as needed for nausea 30 tablet 3     order for DME DME - pt needs nebulizer tubing 1 Device 0     polyethylene glycol (MIRALAX/GLYCOLAX) powder Take 17 g (1 capful) by mouth daily as needed for constipation       polyvinyl alcohol (LIQUIFILM TEARS) 1.4 % ophthalmic solution Place 1 drop into both eyes as needed for dry eyes       potassium chloride ER (K-DUR/KLOR-CON M) 20 MEQ CR tablet Take 1 tablet (20 mEq) by mouth 2 times daily 60 tablet 0     prazosin (MINIPRESS) 2 MG capsule Take 1 capsule (2 mg) by mouth At Bedtime 30 capsule 1     QUEtiapine (SEROQUEL) 100 MG tablet Take 1 tablet (100 mg) by mouth daily as needed (anxiety or panic attacks) 30 tablet 5     QUEtiapine (SEROQUEL) 200 MG tablet Take 1 tablet (200 mg) by mouth At Bedtime 30 tablet 5     ranitidine (ZANTAC) 150 MG tablet Take 1 tablet (150 mg) by mouth 2 times daily       tiZANidine (ZANAFLEX) 4 MG tablet Take 1 tablet (4 mg) by mouth 3 times daily as needed for muscle spasms 90 tablet 3     zolpidem (AMBIEN) 5 MG tablet Take 1 tablet (5 mg) by mouth nightly as needed for sleep 30 tablet 0      Allergies   Allergen Reactions     Nkda [No Known Drug Allergies]         ROS:   Gen- no weight change, no  "fevers/chills   Cardiac - no palpitations, no chest pain   Respiratory - no shortness of breath , no wheezing   GI - + nausea - daily, no vomiting, no diarrhea, + constipation   Neuro - no numbness, no tingling   Remainder of ROS negative.     Exam:   BP (!) 163/109   Pulse 76   Temp 98.4  F (36.9  C) (Oral)   Resp 18   Ht 1.562 m (5' 1.5\")   Wt 60.1 kg (132 lb 9.6 oz)   SpO2 100%   BMI 24.65 kg/m       Alert and oriented x 3; No acute distress     L ankle:  full range of motion, +TTP over anteromedial tibia proximal to medial malleolus; No TTP at lateral malleolus   +TTP over post tib tendon   Mild soft tissue swelling     Derm: erythematous rash       (L25.9) Contact dermatitis, unspecified contact dermatitis type, unspecified trigger  (primary encounter diagnosis)  Comment: will tx w/ pred taper; precautions given  Plan: predniSONE (DELTASONE) 20 MG tablet            (S90.02XA) Contusion of left ankle, initial encounter  Comment:   Plan: compression/ ice; f/u in 2 wks; if no better, consider imaging    (M54.2) Cervicalgia  Comment: refilled; should help her sleep til she is able to refill seroquel in 2 days  Plan: gabapentin (NEURONTIN) 300 MG capsule            (K59.00) Constipation, unspecified constipation type  Comment: refilled  Plan: sennosides (SENOKOT) 8.6 MG tablet            Nikolay Cook MD  November 20, 2019  11:56 AM        "

## 2019-11-20 ENCOUNTER — OFFICE VISIT (OUTPATIENT)
Dept: FAMILY MEDICINE | Facility: CLINIC | Age: 59
End: 2019-11-20
Payer: COMMERCIAL

## 2019-11-20 ENCOUNTER — TELEPHONE (OUTPATIENT)
Dept: FAMILY MEDICINE | Facility: CLINIC | Age: 59
End: 2019-11-20

## 2019-11-20 VITALS
BODY MASS INDEX: 24.4 KG/M2 | WEIGHT: 132.6 LBS | RESPIRATION RATE: 18 BRPM | SYSTOLIC BLOOD PRESSURE: 163 MMHG | OXYGEN SATURATION: 100 % | HEIGHT: 62 IN | HEART RATE: 76 BPM | TEMPERATURE: 98.4 F | DIASTOLIC BLOOD PRESSURE: 109 MMHG

## 2019-11-20 DIAGNOSIS — M54.30 SCIATICA, UNSPECIFIED LATERALITY: ICD-10-CM

## 2019-11-20 DIAGNOSIS — M54.2 CERVICALGIA: ICD-10-CM

## 2019-11-20 DIAGNOSIS — L25.9 CONTACT DERMATITIS, UNSPECIFIED CONTACT DERMATITIS TYPE, UNSPECIFIED TRIGGER: Primary | ICD-10-CM

## 2019-11-20 DIAGNOSIS — M75.02 ADHESIVE CAPSULITIS OF BOTH SHOULDERS: ICD-10-CM

## 2019-11-20 DIAGNOSIS — S90.02XA CONTUSION OF LEFT ANKLE, INITIAL ENCOUNTER: ICD-10-CM

## 2019-11-20 DIAGNOSIS — K59.00 CONSTIPATION, UNSPECIFIED CONSTIPATION TYPE: ICD-10-CM

## 2019-11-20 DIAGNOSIS — M75.01 ADHESIVE CAPSULITIS OF BOTH SHOULDERS: ICD-10-CM

## 2019-11-20 RX ORDER — PREDNISONE 20 MG/1
TABLET ORAL
Qty: 21 TABLET | Refills: 0 | Status: SHIPPED | OUTPATIENT
Start: 2019-11-20 | End: 2020-02-25

## 2019-11-20 RX ORDER — GABAPENTIN 300 MG/1
600 CAPSULE ORAL 3 TIMES DAILY
Qty: 180 CAPSULE | Refills: 3 | Status: SHIPPED | OUTPATIENT
Start: 2019-11-20 | End: 2020-04-27

## 2019-11-20 RX ORDER — IBUPROFEN 400 MG/1
400 TABLET, FILM COATED ORAL EVERY 6 HOURS PRN
Qty: 90 TABLET | Refills: 11 | Status: SHIPPED | OUTPATIENT
Start: 2019-11-20 | End: 2019-12-12

## 2019-11-20 RX ORDER — SENNOSIDES 8.6 MG
1 TABLET ORAL 2 TIMES DAILY PRN
Qty: 60 TABLET | Refills: 3 | Status: SHIPPED | OUTPATIENT
Start: 2019-11-20 | End: 2021-06-11

## 2019-11-20 ASSESSMENT — MIFFLIN-ST. JEOR: SCORE: 1131.78

## 2019-11-20 NOTE — TELEPHONE ENCOUNTER
[facility] phone call message- medication clarification/question:    Full Medication Name: Dose:     Question: Pt called, said that she was seen today by Dr. Cook on 11/20/19 at 11:20am regarding her left sprained ankle. She stated when she got home she twisted her left ankle some more and is now requesting if Dr. Cook can prescribe medications for her. Pls call and advise.    Pharmacy confirmed as [id number] - [place], [place] - [address] AT [facility]: Yes    OK to leave a message on voice mail? Yes    Primary language: English      needed? No    Call taken on November 20, 2019 at 3:17 PM by Emily Henning

## 2019-11-20 NOTE — TELEPHONE ENCOUNTER
Per Lisa does not have Motrin 400mg on hand. Request rx for this medication be sent to [facility] on [facility]. Thank you. Rochelle LONGO

## 2019-11-21 ENCOUNTER — TELEPHONE (OUTPATIENT)
Dept: FAMILY MEDICINE | Facility: CLINIC | Age: 59
End: 2019-11-21

## 2019-11-21 NOTE — TELEPHONE ENCOUNTER
[facility] phone call message- general phone call:    Reason for call: Patient states she is having foot pain and is wondering if she can come in for a nurse visit only just to have an xray since she just seen her pcp yesterday. Notified patient she may need an appointment with a provider. Patient states she would like a call back to see if she can come in for a nurse visit for xray only, if not patient states she will go to [facility]. Please call and advise.     Return call needed: Yes    OK to leave a message on voice mail? Yes    Primary language: English      needed? No    Call taken on November 21, 2019 at 9:47 AM by Ciara Rodriguez

## 2019-11-21 NOTE — TELEPHONE ENCOUNTER
Called patient and advised okay to come in Monday for xray. Patient stated she went to ED for xray r/t the pain. Resulted as a sprain. Patient stated she already has a follow up with PCP. Will route to pcp for FYI. Sherif LONGO

## 2019-11-25 ENCOUNTER — TELEPHONE (OUTPATIENT)
Dept: FAMILY MEDICINE | Facility: CLINIC | Age: 59
End: 2019-11-25

## 2019-11-25 NOTE — TELEPHONE ENCOUNTER
C/o x 3 days has noticed her speech= more stuttering, shakiness and unsteady on her feet, has fallen backwards this morning- no reported injuries, landed on her buttocks. Did not hit head. Noticed decreased short term memory since onset of symptoms.  C/o weakness in bilateral legs, still able to ambulate without assist.  Has not been drinking alcohol, last drink was in January 2019. Recommend she be further evaluated in ED.  Lisasa states understanding. Rochelle LONGO

## 2019-11-30 DIAGNOSIS — J44.9 CHRONIC OBSTRUCTIVE PULMONARY DISEASE, UNSPECIFIED COPD TYPE (H): ICD-10-CM

## 2019-12-02 ENCOUNTER — TELEPHONE (OUTPATIENT)
Dept: FAMILY MEDICINE | Facility: CLINIC | Age: 59
End: 2019-12-02

## 2019-12-02 NOTE — TELEPHONE ENCOUNTER
Called to remind pt of appt w/ Dr. Cook on 12/4 @ 3:20 PM. SW said he would touch base w/ pt during this appt.

## 2019-12-03 ENCOUNTER — OFFICE VISIT (OUTPATIENT)
Dept: FAMILY MEDICINE | Facility: CLINIC | Age: 59
End: 2019-12-03
Payer: COMMERCIAL

## 2019-12-03 VITALS
HEIGHT: 62 IN | WEIGHT: 137.8 LBS | TEMPERATURE: 99 F | HEART RATE: 84 BPM | DIASTOLIC BLOOD PRESSURE: 118 MMHG | BODY MASS INDEX: 25.36 KG/M2 | RESPIRATION RATE: 18 BRPM | SYSTOLIC BLOOD PRESSURE: 183 MMHG | OXYGEN SATURATION: 94 %

## 2019-12-03 DIAGNOSIS — R30.0 BURNING WITH URINATION: Primary | ICD-10-CM

## 2019-12-03 DIAGNOSIS — I10 ESSENTIAL HYPERTENSION: ICD-10-CM

## 2019-12-03 DIAGNOSIS — N76.0 BACTERIAL VAGINOSIS: ICD-10-CM

## 2019-12-03 DIAGNOSIS — N94.10 PAIN IN FEMALE GENITALIA ON INTERCOURSE: ICD-10-CM

## 2019-12-03 DIAGNOSIS — B96.89 BACTERIAL VAGINOSIS: ICD-10-CM

## 2019-12-03 DIAGNOSIS — R11.2 NAUSEA AND VOMITING, INTRACTABILITY OF VOMITING NOT SPECIFIED, UNSPECIFIED VOMITING TYPE: ICD-10-CM

## 2019-12-03 LAB
AMPHETAMINES QUAL: NEGATIVE
BACTERIA: NORMAL
BACTERIA: NORMAL
BARBITURATES QUAL URINE: NEGATIVE
BENZODIAZEPINE QUAL URINE: NEGATIVE
BILIRUBIN UR: NEGATIVE MG/DL
BLOOD UR: ABNORMAL MG/DL
BUPRENORPHINE QUAL URINE: NEGATIVE
CANNABINOIDS UR QL SCN: POSITIVE
CASTS: NORMAL /LPF
CLARITY, URINE: CLEAR
CLUE CELLS: NORMAL
COCAINE QUAL URINE: ABNORMAL
COLOR UR: YELLOW
CRYSTAL URINE: NORMAL /LPF
EPITHELIAL CELLS UR: NORMAL /LPF (ref 0–2)
GLUCOSE URINE: NEGATIVE
KETONES UR QL: NEGATIVE MG/DL
LEUKOCYTE ESTERASE UR: ABNORMAL
METHAMPHETAMINE: NEGATIVE
METHODONE QUAL: NEGATIVE
MORPHINE QUAL: NEGATIVE
MOTILE TRICHOMONAS: NEGATIVE
MUCOUS URINE: NORMAL LPF
NITRITE UR QL STRIP: NEGATIVE MG/DL
ODOR: NORMAL
OXYCODONE QUAL: NEGATIVE
PH UR STRIP: 7 [PH] (ref 4.5–8)
PH WET PREP: NORMAL (ref 3.8–4.5)
PHENCYCLIDINE: NEGATIVE
PROPOXYPHENE: NEGATIVE
PROTEIN UR: NEGATIVE MG/DL
RBC URINE: NORMAL /HPF
SP GR UR STRIP: 1.01 (ref 1–1.03)
TEMPERATURE OF URINE WAS BETWEEN 90-100 DEGREES F: NO
TRICYCLIC ANTIDEPRESSANTS: NEGATIVE
UROBILINOGEN UR STRIP-ACNC: ABNORMAL E.U./DL
WBC URINE: NORMAL /HPF
WBC WET PREP: NORMAL (ref 2–5)
YEAST: NORMAL

## 2019-12-03 RX ORDER — NITROFURANTOIN MACROCRYSTAL 100 MG
100 CAPSULE ORAL 2 TIMES DAILY
Qty: 10 CAPSULE | Refills: 0 | Status: SHIPPED | OUTPATIENT
Start: 2019-12-03 | End: 2020-02-25

## 2019-12-03 RX ORDER — GLYCERIN/PROPYLENE GLYCOL/WATR
SOLUTION, NON-ORAL VAGINAL 2 TIMES DAILY
Qty: 66.5 G | Refills: 1 | Status: SHIPPED | OUTPATIENT
Start: 2019-12-03 | End: 2020-08-13

## 2019-12-03 RX ORDER — IPRATROPIUM BROMIDE AND ALBUTEROL SULFATE 2.5; .5 MG/3ML; MG/3ML
SOLUTION RESPIRATORY (INHALATION)
Qty: 90 ML | Refills: 0 | Status: SHIPPED | OUTPATIENT
Start: 2019-12-03 | End: 2020-02-03

## 2019-12-03 RX ORDER — LISINOPRIL 5 MG/1
5 TABLET ORAL DAILY
Qty: 30 TABLET | Refills: 3 | Status: SHIPPED | OUTPATIENT
Start: 2019-12-03 | End: 2019-12-11

## 2019-12-03 RX ORDER — ONDANSETRON 4 MG/1
4 TABLET, ORALLY DISINTEGRATING ORAL EVERY 8 HOURS PRN
Qty: 20 TABLET | Refills: 0 | Status: SHIPPED | OUTPATIENT
Start: 2019-12-03 | End: 2019-12-12

## 2019-12-03 ASSESSMENT — MIFFLIN-ST. JEOR: SCORE: 1155.37
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
## 2019-12-04 LAB
C TRACH RRNA CVX QL NAA+PROBE: NEGATIVE
FINAL DIAGNOSIS: NORMAL
HPV 16 DNA: NEGATIVE
HPV 18 DNA: NEGATIVE
HPV SOURCE: NORMAL
N GONORRHOEA RRNA SPEC QL NAA+PROBE: NEGATIVE
OTHER HR HPV: NEGATIVE
SPECIMEN DESCRIPTION: NORMAL

## 2019-12-04 RX ORDER — METRONIDAZOLE 7.5 MG/G
1 GEL VAGINAL DAILY
Qty: 70 G | Refills: 0 | Status: SHIPPED | OUTPATIENT
Start: 2019-12-04 | End: 2020-02-25

## 2019-12-05 ENCOUNTER — TELEPHONE (OUTPATIENT)
Dept: FAMILY MEDICINE | Facility: CLINIC | Age: 59
End: 2019-12-05

## 2019-12-06 LAB — BENZOYLECGONINE, URN, QUANT: 895 NG/ML

## 2019-12-10 ENCOUNTER — TELEPHONE (OUTPATIENT)
Dept: FAMILY MEDICINE | Facility: CLINIC | Age: 59
End: 2019-12-10

## 2019-12-10 NOTE — PROGRESS NOTES
Pt is a 59 year old female last seen on 12/3/19 by Dr Parekh here today for:     1) L ankle contusion - went to ED after our last visit on 11/20 and had neg xray; now  over bone    2) Seizure - thinks it is just stress; not drinking; still sober   Was seen in ED on 11/26 and started on depakote    3) Pulm - has noted breathing a lot better in her new home  PFTs - 11/15/19  Impression:  Full Pulmonary Function Test is abnormal.  PFTs are   consistent with mild obstructive disease.  Spirometry is not consistent with reversibility.  There is no hyperinflation.  There is air-trapping.  Diffusion capacity when corrected for hemoglobin is normal.    4) Abnl UDS - denies using but 2 positive urine tests for cocaine   Admits to smoking weed but denies using cocaine    5) elevated BP - did not take meds today  Denies CP/SOB/palp    Past Medical History:   Diagnosis Date     Alcohol withdrawal seizure (H) 02/25/2017     Chronic obstructive pulmonary disease, unspecified COPD type (H) 2/23/2017     COPD (chronic obstructive pulmonary disease) (H)      Depressive disorder       No past surgical history on file.   Current Outpatient Medications   Medication Sig Dispense Refill     divalproex sodium delayed-release (DEPAKOTE) 250 MG DR tablet Take 1 tablet (250 mg) by mouth 2 times daily 60 tablet 3     lisinopril (PRINIVIL/ZESTRIL) 10 MG tablet Take 1 tablet (10 mg) by mouth daily 30 tablet 3     acetaminophen (TYLENOL) 500 MG tablet Take 1-2 tablets (500-1,000 mg) by mouth every 8 hours as needed for mild pain 90 tablet 11     aclidinium (TUDORZA PRESSAIR) 400 MCG/ACT inhaler Inhale 1 puff into the lungs 2 times daily 1 Inhaler 11     albuterol (PROAIR HFA/PROVENTIL HFA/VENTOLIN HFA) 108 (90 Base) MCG/ACT inhaler Inhale 2 puffs into the lungs every 6 hours as needed for shortness of breath / dyspnea or wheezing 2 Inhaler 3     busPIRone (BUSPAR) 30 MG tablet Take 0.5 tablets (15 mg) by mouth 3 times daily        calcium carbonate (TUMS) 500 MG chewable tablet Take 2 tablets (1,000 mg) by mouth 4 times daily as needed for heartburn       fluticasone (FLONASE) 50 MCG/ACT nasal spray Spray 2 sprays in nostril daily 9.9 mL 11     fluticasone-salmeterol (ADVAIR-HFA) 230-21 MCG/ACT inhaler Inhale 2 puffs into the lungs 2 times daily 1 Inhaler 11     gabapentin (NEURONTIN) 300 MG capsule Take 2 capsules (600 mg) by mouth 3 times daily 180 capsule 3     hydrochlorothiazide (HYDRODIURIL) 12.5 MG tablet Take 1 tablet (12.5 mg) by mouth daily (Patient not taking: Reported on 12/3/2019) 30 tablet 11     hydrOXYzine (ATARAX) 25 MG tablet Take 1 tablet (25 mg) by mouth 4 times daily as needed for anxiety       ibuprofen (ADVIL/MOTRIN) 400 MG tablet Take 1 tablet (400 mg) by mouth every 6 hours as needed for moderate pain 90 tablet 11     ipratropium - albuterol 0.5 mg/2.5 mg/3 mL (DUONEB) 0.5-2.5 (3) MG/3ML neb solution INHALE 1 VIAL VIA NEBULIZATION FOUR TIMES DAILY 90 mL 0     loperamide (IMODIUM A-D) 2 MG tablet Take 1 tablet (2 mg) by mouth 4 times daily as needed for diarrhea       Lubricants (ASTROGLIDE) GEL Externally apply topically 2 times daily 66.5 g 1     Melatonin 10 MG TABS tablet Take 1 tablet (10 mg) by mouth At Bedtime , additional tablet as needed for late night awakenings 90 tablet 1     metroNIDAZOLE (METROGEL) 0.75 % vaginal gel Place 1 applicator (5 g) vaginally daily 70 g 0     omeprazole (PRILOSEC) 20 MG DR capsule Take 1 capsule (20 mg) by mouth 2 times daily 60 capsule 3     ondansetron (ZOFRAN-ODT) 4 MG ODT tab Take 1 tablet (4 mg) by mouth every 8 hours as needed for nausea 20 tablet 0     order for DME DME - pt needs nebulizer tubing 1 Device 0     polyethylene glycol (MIRALAX/GLYCOLAX) powder Take 17 g (1 capful) by mouth daily as needed for constipation       polyvinyl alcohol (LIQUIFILM TEARS) 1.4 % ophthalmic solution Place 1 drop into both eyes as needed for dry eyes       potassium chloride ER  "(K-DUR/KLOR-CON M) 20 MEQ CR tablet Take 1 tablet (20 mEq) by mouth 2 times daily 60 tablet 0     prazosin (MINIPRESS) 2 MG capsule Take 1 capsule (2 mg) by mouth At Bedtime 30 capsule 1     QUEtiapine (SEROQUEL) 100 MG tablet Take 1 tablet (100 mg) by mouth daily as needed (anxiety or panic attacks) 30 tablet 5     QUEtiapine (SEROQUEL) 200 MG tablet Take 1 tablet (200 mg) by mouth At Bedtime 30 tablet 5     ranitidine (ZANTAC) 150 MG tablet Take 1 tablet (150 mg) by mouth 2 times daily       sennosides (SENOKOT) 8.6 MG tablet Take 1 tablet by mouth 2 times daily as needed for constipation 60 tablet 3     tiZANidine (ZANAFLEX) 4 MG tablet Take 1 tablet (4 mg) by mouth 3 times daily as needed for muscle spasms 90 tablet 3     zolpidem (AMBIEN) 5 MG tablet Take 1 tablet (5 mg) by mouth nightly as needed for sleep 30 tablet 0      Allergies   Allergen Reactions     Nkda [No Known Drug Allergies]         ROS:   Gen- no weight change, no fevers/chills   Head/ Eyes- no blurred vision, no headaches   ENT- no cough, no congestion, no URI symptoms   Cardiac - no palpitations, no chest pain   Respiratory - see HPI   GI - no nausea, no vomiting, no diarrhea, no constipation   Urinary - no dysuria, no polyuria   Neuro - no numbness, no tingling   Remainder of ROS negative.     Exam:   BP (!) 155/107   Pulse 93   Temp 98  F (36.7  C) (Oral)   Resp 20   Ht 1.575 m (5' 2\")   Wt 58.8 kg (129 lb 9.6 oz)   SpO2 97%   BMI 23.70 kg/m     Alert and oriented x 3; No acute distress   Resp: clear to auscultation bilaterally, no wheezing/ronchi   CV: rate/rhythm regular, no murmurs/rubs/gallops   Extrem: no clubbing/cyanosis/edema   MSK: +TTP over L tibia   Neuro: no focal deficits   Derm: no rashes     (Z13.9) Screening for condition  (primary encounter diagnosis)  Comment: reviewed UDS results w/ pt; counseling given; pt denies use  Plan: Rapid Urine Drug Screen ([facility]), Cocaine         Metabolite, Urine, Quantitative " ([facility])            (G40.909) Seizure disorder (H)  Comment: depakote refilled; will check level  Plan: divalproex sodium delayed-release (DEPAKOTE)         250 MG DR tablet, Valproic Acid Free            (I10) Essential hypertension  Comment: will inc lisinopril to 10; recheck in 2-4 wks  Plan: lisinopril (PRINIVIL/ZESTRIL) 10 MG tablet            (J44.9) Chronic obstructive pulmonary disease, unspecified COPD type (H)  Comment:   Plan: PFTs done showing mild obstructive dz; cont inhalers    (S90.02XD) Contusion of left ankle, subsequent encounter  Comment:   Plan: slowly-resolving; anticipatory guidance given    Nikolay Cook MD  December 11, 2019  3:43 PM

## 2019-12-11 ENCOUNTER — CARE COORDINATION (OUTPATIENT)
Dept: FAMILY MEDICINE | Facility: CLINIC | Age: 59
End: 2019-12-11

## 2019-12-11 ENCOUNTER — OFFICE VISIT (OUTPATIENT)
Dept: FAMILY MEDICINE | Facility: CLINIC | Age: 59
End: 2019-12-11
Payer: COMMERCIAL

## 2019-12-11 ENCOUNTER — RECORDS - HEALTHEAST (OUTPATIENT)
Dept: ADMINISTRATIVE | Facility: OTHER | Age: 59
End: 2019-12-11

## 2019-12-11 VITALS
RESPIRATION RATE: 20 BRPM | SYSTOLIC BLOOD PRESSURE: 155 MMHG | HEIGHT: 62 IN | OXYGEN SATURATION: 97 % | DIASTOLIC BLOOD PRESSURE: 107 MMHG | WEIGHT: 129.6 LBS | TEMPERATURE: 98 F | HEART RATE: 93 BPM | BODY MASS INDEX: 23.85 KG/M2

## 2019-12-11 DIAGNOSIS — J44.9 CHRONIC OBSTRUCTIVE PULMONARY DISEASE, UNSPECIFIED COPD TYPE (H): ICD-10-CM

## 2019-12-11 DIAGNOSIS — G40.909 SEIZURE DISORDER (H): ICD-10-CM

## 2019-12-11 DIAGNOSIS — I10 ESSENTIAL HYPERTENSION: ICD-10-CM

## 2019-12-11 DIAGNOSIS — Z13.9 SCREENING FOR CONDITION: Primary | ICD-10-CM

## 2019-12-11 DIAGNOSIS — S90.02XD CONTUSION OF LEFT ANKLE, SUBSEQUENT ENCOUNTER: ICD-10-CM

## 2019-12-11 LAB
AMPHETAMINES QUAL: NEGATIVE
BARBITURATES QUAL URINE: NEGATIVE
BENZODIAZEPINE QUAL URINE: NEGATIVE
BUPRENORPHINE QUAL URINE: NEGATIVE
CANNABINOIDS UR QL SCN: POSITIVE
COCAINE QUAL URINE: ABNORMAL
CYTOLOGY CVX/VAG DOC THIN PREP: ABNORMAL
HIGH RISK?: NO
HPV REFLEX?: ABNORMAL
LAB AP ABNORMAL BLEEDING: NO
LAB AP BIRTH CONTROL/HORMONES: ABNORMAL
LAB AP CERVICAL APPEARANCE: ABNORMAL
LAB AP PATIENT STATUS: ABNORMAL
LAB AP PREVIOUS ABNL: ABNORMAL
LAB AP PREVIOUS NORMAL: ABNORMAL
LAST MENS PERIOD: ABNORMAL
METHAMPHETAMINE: NEGATIVE
METHODONE QUAL: NEGATIVE
MORPHINE QUAL: NEGATIVE
OXYCODONE QUAL: NEGATIVE
PATH REPORT.COMMENTS IMP SPEC: ABNORMAL
PATH REPORT.COMMENTS IMP SPEC: ABNORMAL
PHENCYCLIDINE: NEGATIVE
PROPOXYPHENE: NEGATIVE
SPECIMEN ADEQUACY:: ABNORMAL
TEMPERATURE OF URINE WAS BETWEEN 90-100 DEGREES F: NO
TRICYCLIC ANTIDEPRESSANTS: POSITIVE

## 2019-12-11 RX ORDER — LISINOPRIL 10 MG/1
10 TABLET ORAL DAILY
Qty: 30 TABLET | Refills: 3 | Status: SHIPPED | OUTPATIENT
Start: 2019-12-11 | End: 2020-03-24

## 2019-12-11 RX ORDER — DIVALPROEX SODIUM 250 MG/1
250 TABLET, DELAYED RELEASE ORAL 2 TIMES DAILY
Qty: 60 TABLET | Refills: 3 | Status: SHIPPED | OUTPATIENT
Start: 2019-12-11 | End: 2020-04-29

## 2019-12-11 ASSESSMENT — MIFFLIN-ST. JEOR: SCORE: 1126.11
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
## 2019-12-12 ENCOUNTER — TELEPHONE (OUTPATIENT)
Dept: ORTHOPEDICS | Facility: CLINIC | Age: 59
End: 2019-12-12

## 2019-12-12 ENCOUNTER — TELEPHONE (OUTPATIENT)
Dept: FAMILY MEDICINE | Facility: CLINIC | Age: 59
End: 2019-12-12

## 2019-12-12 DIAGNOSIS — M75.01 ADHESIVE CAPSULITIS OF BOTH SHOULDERS: ICD-10-CM

## 2019-12-12 DIAGNOSIS — M54.30 SCIATICA, UNSPECIFIED LATERALITY: ICD-10-CM

## 2019-12-12 DIAGNOSIS — R11.2 NAUSEA AND VOMITING, INTRACTABILITY OF VOMITING NOT SPECIFIED, UNSPECIFIED VOMITING TYPE: ICD-10-CM

## 2019-12-12 DIAGNOSIS — M75.02 ADHESIVE CAPSULITIS OF BOTH SHOULDERS: ICD-10-CM

## 2019-12-12 RX ORDER — ONDANSETRON 4 MG/1
4 TABLET, ORALLY DISINTEGRATING ORAL EVERY 8 HOURS PRN
Qty: 20 TABLET | Refills: 11 | Status: SHIPPED | OUTPATIENT
Start: 2019-12-12 | End: 2020-04-20

## 2019-12-12 RX ORDER — IBUPROFEN 400 MG/1
400 TABLET, FILM COATED ORAL EVERY 6 HOURS PRN
Qty: 90 TABLET | Refills: 3 | Status: SHIPPED | OUTPATIENT
Start: 2019-12-12 | End: 2020-02-25

## 2019-12-12 NOTE — TELEPHONE ENCOUNTER
Spoke to patient there should be 5 refills left at [facility] pharmacy,  Refilled it in November 13, 2019 #30 with 5 additional refills.  She will check with pharmacy and call me back directly if there is a problem. CXiong, KONG

## 2019-12-12 NOTE — TELEPHONE ENCOUNTER
[facility] phone call message- patient requesting a refill:    Full Medication Name: QUEtiapine (SEROQUEL)      Dose: 100 MG    Pharmacy confirmed as   [facility] #[id number] - [place], [place] - [address] AT [address] & [facility]  [address] [place] [address]  Phone: [phone] Fax: [phone]  : Yes    Additional Comments: Patient state she found her blood pressure medication but would like to request refill on this medication and requesting PCP to call back     OK to leave a message on voice mail? Yes    Primary language: English      needed? No    Call taken on December 12, 2019 at 12:42 PM by Jeronimo Gauthier

## 2019-12-12 NOTE — TELEPHONE ENCOUNTER
[facility] phone call message- patient requesting a refill:    Full Medication Name: ondansetron (ZOFRAN-ODT)     Dose: 4 MG ODT tab    Pharmacy confirmed as   [id number] - [place], [place] - [address] AT [facility] & [address] [place] [address]  Phone: [phone] Fax: [phone]  : Yes    Additional Comments: Pt called requesting refill for med listed above.     OK to leave a message on voice mail? Yes    Primary language: English      needed? No    Call taken on December 12, 2019 at 10:26 AM by Emily Henning

## 2019-12-12 NOTE — TELEPHONE ENCOUNTER
Message to physician: Ibuprofen    Date of last visit: 12/11/2019     Date of next visit if scheduled: Visit date not found       Last Comprehensive Metabolic Panel:  Sodium   Date Value Ref Range Status   10/23/2019 140.0 133.0 - 144.0 mmol/L Final     Potassium   Date Value Ref Range Status   10/23/2019 4.0 3.4 - 5.3 mmol/L Final     Chloride   Date Value Ref Range Status   10/23/2019 105.0 94.0 - 109.0 mmol/L Final     Carbon Dioxide   Date Value Ref Range Status   10/23/2019 24.0 20.0 - 32.0 mmol/L Final     Glucose   Date Value Ref Range Status   10/23/2019 119.0 (H) 60.0 - 109.0 mg/dL Final     Urea Nitrogen   Date Value Ref Range Status   10/23/2019 17.0 7.0 - 30.0 mg/dL Final     Creatinine   Date Value Ref Range Status   10/23/2019 0.8 0.6 - 1.3 mg/dL Final     GFR Estimate   Date Value Ref Range Status   01/12/2017 40 (L) >60 mL/min/1.73m2 Final     Calcium   Date Value Ref Range Status   10/23/2019 9.0 8.5 - 10.4 mg/dL Final       BP Readings from Last 3 Encounters:   12/11/19 (!) 155/107   12/03/19 (!) 183/118   11/20/19 (!) 163/109       Lab Results   Component Value Date    A1C 4.9 11/20/2014    A1C 5.7 03/16/2011                Please complete refill and CLOSE ENCOUNTER.  Closing the encounter signifies the refill is complete.

## 2019-12-14 LAB — BENZOYLECGONINE, URN, QUANT: >1000 NG/ML

## 2019-12-18 ENCOUNTER — COMMUNICATION - HEALTHEAST (OUTPATIENT)
Dept: RESPIRATORY THERAPY | Facility: CLINIC | Age: 59
End: 2019-12-18

## 2019-12-23 DIAGNOSIS — F41.9 ANXIETY: ICD-10-CM

## 2019-12-23 DIAGNOSIS — L29.9 ITCHING: Primary | ICD-10-CM

## 2019-12-23 RX ORDER — HYDROXYZINE HYDROCHLORIDE 25 MG/1
25 TABLET, FILM COATED ORAL EVERY 8 HOURS PRN
Qty: 30 TABLET | Refills: 3 | Status: SHIPPED | OUTPATIENT
Start: 2019-12-23 | End: 2020-03-02

## 2019-12-23 NOTE — TELEPHONE ENCOUNTER
[facility] phone call message- patient requesting a refill:    Full Medication Name: hydrOXYzine (ATARAX)     Dose: 25 MG tablet    Pharmacy confirmed as   [facility] #[id number] - [place], [place] - [address] AT [address] [place] [address]  Phone: [phone] Fax: [phone]  : Yes    Additional Comments: Patient is requesting  A refill for the medication listed above. Please call and advise.     OK to leave a message on voice mail? Yes    Primary language: English      needed? No    Call taken on December 23, 2019 at 2:47 PM by Ciara Rodriguez

## 2019-12-30 DIAGNOSIS — F51.01 PRIMARY INSOMNIA: ICD-10-CM

## 2019-12-30 NOTE — TELEPHONE ENCOUNTER
Message to physician: zolpidem    Date of last visit: 12/11/2019     Date of next visit if scheduled: Visit date not found       Last Comprehensive Metabolic Panel:  Sodium   Date Value Ref Range Status   10/23/2019 140.0 133.0 - 144.0 mmol/L Final     Potassium   Date Value Ref Range Status   10/23/2019 4.0 3.4 - 5.3 mmol/L Final     Chloride   Date Value Ref Range Status   10/23/2019 105.0 94.0 - 109.0 mmol/L Final     Carbon Dioxide   Date Value Ref Range Status   10/23/2019 24.0 20.0 - 32.0 mmol/L Final     Glucose   Date Value Ref Range Status   10/23/2019 119.0 (H) 60.0 - 109.0 mg/dL Final     Urea Nitrogen   Date Value Ref Range Status   10/23/2019 17.0 7.0 - 30.0 mg/dL Final     Creatinine   Date Value Ref Range Status   10/23/2019 0.8 0.6 - 1.3 mg/dL Final     GFR Estimate   Date Value Ref Range Status   01/12/2017 40 (L) >60 mL/min/1.73m2 Final     Calcium   Date Value Ref Range Status   10/23/2019 9.0 8.5 - 10.4 mg/dL Final       BP Readings from Last 3 Encounters:   12/11/19 (!) 155/107   12/03/19 (!) 183/118   11/20/19 (!) 163/109       Lab Results   Component Value Date    A1C 4.9 11/20/2014    A1C 5.7 03/16/2011                Please complete refill and CLOSE ENCOUNTER.  Closing the encounter signifies the refill is complete.

## 2019-12-31 RX ORDER — ZOLPIDEM TARTRATE 5 MG/1
5 TABLET ORAL
Qty: 30 TABLET | Refills: 0 | Status: SHIPPED | OUTPATIENT
Start: 2019-12-31 | End: 2020-03-05

## 2020-01-13 ENCOUNTER — TELEPHONE (OUTPATIENT)
Dept: FAMILY MEDICINE | Facility: CLINIC | Age: 60
End: 2020-01-13

## 2020-01-13 NOTE — TELEPHONE ENCOUNTER
Called to remind pt of appt w/ Dr. Cook on 1/15 @ 9:00 AM. Called 2 days ahead in case pt needed to still schedule transportation. No answer. LVM.

## 2020-01-20 ENCOUNTER — TELEPHONE (OUTPATIENT)
Dept: ORTHOPEDICS | Facility: CLINIC | Age: 60
End: 2020-01-20

## 2020-01-20 ENCOUNTER — COMMUNICATION - HEALTHEAST (OUTPATIENT)
Dept: RESPIRATORY THERAPY | Facility: CLINIC | Age: 60
End: 2020-01-20

## 2020-01-20 DIAGNOSIS — M54.30 SCIATICA, UNSPECIFIED LATERALITY: ICD-10-CM

## 2020-01-24 ENCOUNTER — TELEPHONE (OUTPATIENT)
Dept: FAMILY MEDICINE | Facility: CLINIC | Age: 60
End: 2020-01-24

## 2020-02-03 DIAGNOSIS — J44.9 CHRONIC OBSTRUCTIVE PULMONARY DISEASE, UNSPECIFIED COPD TYPE (H): ICD-10-CM

## 2020-02-03 RX ORDER — IPRATROPIUM BROMIDE AND ALBUTEROL SULFATE 2.5; .5 MG/3ML; MG/3ML
SOLUTION RESPIRATORY (INHALATION)
Qty: 90 ML | Refills: 0 | Status: SHIPPED | OUTPATIENT
Start: 2020-02-03 | End: 2020-02-14

## 2020-02-06 DIAGNOSIS — J42 CHRONIC BRONCHITIS, UNSPECIFIED CHRONIC BRONCHITIS TYPE (H): ICD-10-CM

## 2020-02-06 NOTE — TELEPHONE ENCOUNTER
[facility] Family Medicine phone call message- medication clarification/question:    Full Medication Name: Willy   Strength:     Have you contacted your pharmacy about this refill request? Yes    If  Yes,  which pharmacy? [email or web address]    When did you contact the pharmacy? Yesterday and they told her they sent it over, told her we have not received anything.    Additional comments/concerns from call to pharmacy:    Reason for call to clinic: Calling to request refill for medication above. Told her that it could take up to two business days for a response. Please call and advise.       Pharmacy confirmed as [id number] - [place], [place] - [address] AT [facility]: Yes    OK to leave a message on voice mail?     Primary language: English      needed? No    Call taken on February 6, 2020 at 10:03 AM by Dangelo Arias

## 2020-02-08 RX ORDER — ALBUTEROL SULFATE 90 UG/1
2 AEROSOL, METERED RESPIRATORY (INHALATION) EVERY 6 HOURS PRN
Qty: 2 INHALER | Refills: 3 | Status: SHIPPED | OUTPATIENT
Start: 2020-02-08 | End: 2020-05-20

## 2020-02-13 DIAGNOSIS — J44.9 CHRONIC OBSTRUCTIVE PULMONARY DISEASE, UNSPECIFIED COPD TYPE (H): ICD-10-CM

## 2020-02-17 RX ORDER — IPRATROPIUM BROMIDE AND ALBUTEROL SULFATE 2.5; .5 MG/3ML; MG/3ML
SOLUTION RESPIRATORY (INHALATION)
Qty: 90 ML | Refills: 3 | Status: SHIPPED | OUTPATIENT
Start: 2020-02-17 | End: 2020-07-02

## 2020-02-19 ENCOUNTER — COMMUNICATION - HEALTHEAST (OUTPATIENT)
Dept: RESPIRATORY THERAPY | Facility: CLINIC | Age: 60
End: 2020-02-19

## 2020-02-24 ENCOUNTER — TELEPHONE (OUTPATIENT)
Dept: FAMILY MEDICINE | Facility: CLINIC | Age: 60
End: 2020-02-24

## 2020-02-24 NOTE — TELEPHONE ENCOUNTER
Reminded t of her 2/25 appt w/ Dr. Atkins @ 11:00 AM. Pt said she would be there and that she scheduled appt this morning. For the most part, pt is doing okay, but having issues w/ seizures. Doctors @ hospital reportedly told pt she would need to discontinue some medications that increase her risk of seizures.     SW said he would f/u w/ her @ appt and reminded her to bring her medications.

## 2020-02-25 ENCOUNTER — OFFICE VISIT (OUTPATIENT)
Dept: FAMILY MEDICINE | Facility: CLINIC | Age: 60
End: 2020-02-25
Payer: COMMERCIAL

## 2020-02-25 ENCOUNTER — CARE COORDINATION (OUTPATIENT)
Dept: FAMILY MEDICINE | Facility: CLINIC | Age: 60
End: 2020-02-25

## 2020-02-25 VITALS
WEIGHT: 134.6 LBS | RESPIRATION RATE: 22 BRPM | SYSTOLIC BLOOD PRESSURE: 120 MMHG | TEMPERATURE: 98.3 F | OXYGEN SATURATION: 96 % | BODY MASS INDEX: 24.77 KG/M2 | HEART RATE: 99 BPM | HEIGHT: 62 IN | DIASTOLIC BLOOD PRESSURE: 84 MMHG

## 2020-02-25 DIAGNOSIS — E87.6 HYPOKALEMIA: Primary | ICD-10-CM

## 2020-02-25 DIAGNOSIS — M75.02 ADHESIVE CAPSULITIS OF BOTH SHOULDERS: ICD-10-CM

## 2020-02-25 DIAGNOSIS — M54.30 SCIATICA, UNSPECIFIED LATERALITY: ICD-10-CM

## 2020-02-25 DIAGNOSIS — M75.01 ADHESIVE CAPSULITIS OF BOTH SHOULDERS: ICD-10-CM

## 2020-02-25 LAB
ANION GAP SERPL CALCULATED.3IONS-SCNC: 13 MMOL/L (ref 5–18)
BUN SERPL-MCNC: 22 MG/DL (ref 8–22)
CALCIUM SERPL-MCNC: 9.5 MG/DL (ref 8.5–10.5)
CHLORIDE SERPL-SCNC: 102 MMOL/L (ref 98–107)
CO2 SERPL-SCNC: 22 MMOL/L (ref 22–31)
CREAT SERPL-MCNC: 1.1 MG/DL (ref 0.6–1.1)
GLUCOSE SERPL-MCNC: 68 MG/DL (ref 70–125)
POTASSIUM SERPL-SCNC: 4.9 MMOL/L (ref 3.5–5)
SODIUM SERPL-SCNC: 137 MMOL/L (ref 136–145)

## 2020-02-25 RX ORDER — LANOLIN ALCOHOL/MO/W.PET/CERES
200 CREAM (GRAM) TOPICAL 3 TIMES DAILY
COMMUNITY
Start: 2020-02-23 | End: 2023-04-18

## 2020-02-25 RX ORDER — ONDANSETRON 4 MG/1
4 TABLET, FILM COATED ORAL DAILY PRN
COMMUNITY
End: 2020-04-08

## 2020-02-25 RX ORDER — ACETAMINOPHEN 500 MG
500-1000 TABLET ORAL EVERY 8 HOURS PRN
Qty: 90 TABLET | Refills: 11 | Status: SHIPPED | OUTPATIENT
Start: 2020-02-25 | End: 2020-05-28

## 2020-02-25 RX ORDER — IBUPROFEN 400 MG/1
400 TABLET, FILM COATED ORAL EVERY 6 HOURS PRN
Qty: 90 TABLET | Refills: 3 | Status: SHIPPED | OUTPATIENT
Start: 2020-02-25 | End: 2020-08-17

## 2020-02-25 RX ORDER — LISINOPRIL 5 MG/1
5 TABLET ORAL DAILY
Refills: 3 | COMMUNITY
Start: 2019-11-30 | End: 2020-03-24

## 2020-02-25 ASSESSMENT — MIFFLIN-ST. JEOR: SCORE: 1148.79

## 2020-02-25 NOTE — PATIENT INSTRUCTIONS
You should go see Neurology in about 6 weeks to follow up on your seizure and the thiamine.
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
## 2020-02-25 NOTE — LETTER
February 27, 2020      Lisa Scott  [address]  [place] [address]        Dear Lisa,    Your potassium level is normal now which is great. It looks like you might have been a little dehydrated yesterday so continue to drink plenty of water and let's check your kidneys again next time you come to clinic.     Please see below for your test results.    Resulted Orders   Basic Metabolic Profile ([facility]) - Results > 1 hr   Result Value Ref Range    Sodium 137 136 - 145 mmol/L    Potassium 4.9 3.5 - 5.0 mmol/L    Chloride 102 98 - 107 mmol/L    CO2, Total 22 22 - 31 mmol/L    Anion Gap 13 5 - 18 mmol/L    Glucose 68 (L) 70 - 125 mg/dL    Calcium 9.5 8.5 - 10.5 mg/dL    Urea Nitrogen 22 8 - 22 mg/dL    Creatinine 1.10 0.60 - 1.10 mg/dL    GFR Estimate If Black >60 >60 mL/min/1.73m2    GFR Estimate 51 (L) >60 mL/min/1.73m2    Narrative    Test performed by:  [facility]  [address] [place] [address]  Fasting Glucose reference range is 70-99 mg/dL per  American Diabetes Association (ADA) guidelines.       If you have any questions, please call the clinic to make an appointment.    Sincerely,    Lora Atkins MD
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
## 2020-02-26 NOTE — RESULT ENCOUNTER NOTE
Could you call the patient with the following message? Thank you!    Dear Lisa,    Your potassium level is normal now which is great. It looks like you might have been a little dehydrated yesterday so continue to drink plenty of water and let's check your kidneys again next time you come to clinic.     Sincerely,  Dr. Lora Atkins

## 2020-02-27 DIAGNOSIS — K21.9 GASTROESOPHAGEAL REFLUX DISEASE WITHOUT ESOPHAGITIS: ICD-10-CM

## 2020-02-27 DIAGNOSIS — F41.9 ANXIETY: ICD-10-CM

## 2020-02-27 NOTE — TELEPHONE ENCOUNTER
Also, asking for cyclobenzaprine 10mg one three times daily for muscle spasms. I see that it was a discontinued med. Tasked rx request to MD. Oneal, KONG

## 2020-02-28 NOTE — RESULT ENCOUNTER NOTE
3rd attempt and no answer. Did not leave VM this time. Results and recommendation already sent to be mailed to pt. --HHer, CMA

## 2020-03-02 RX ORDER — HYDROXYZINE HYDROCHLORIDE 25 MG/1
25 TABLET, FILM COATED ORAL EVERY 8 HOURS PRN
Qty: 30 TABLET | Refills: 11 | Status: SHIPPED | OUTPATIENT
Start: 2020-03-02 | End: 2020-06-03

## 2020-03-05 DIAGNOSIS — F29 PSYCHOSIS, UNSPECIFIED PSYCHOSIS TYPE (H): ICD-10-CM

## 2020-03-05 DIAGNOSIS — F51.01 PRIMARY INSOMNIA: ICD-10-CM

## 2020-03-05 NOTE — TELEPHONE ENCOUNTER
[facility] Family Medicine phone call message- patient requesting a refill:    Full Medication Name: zolpidem (AMBIEN) 5 MG  , QUEtiapine (SEROQUEL) 100 MG       Dose:     Pharmacy confirmed as   [facility] #[id number] - [place], [place] - [address] AT [facility]  [address] [place] [address]  Phone: [phone] Fax: [phone]  : Yes    Additional Comments: Patient states these medication needs a refill. Patient has hard copy at [facility] but they need approval from Dr. Cook and state usually Dr. Cook would re-write these medication since the other doctor is not PCP.      OK to leave a message on voice mail? Yes    Primary language: English      needed? No    Call taken on March 5, 2020 at 9:45 AM by Jeronimo Gauthier

## 2020-03-06 RX ORDER — QUETIAPINE FUMARATE 100 MG/1
100 TABLET, FILM COATED ORAL DAILY PRN
Qty: 30 TABLET | Refills: 1 | Status: SHIPPED | OUTPATIENT
Start: 2020-03-06 | End: 2020-03-24

## 2020-03-06 RX ORDER — ZOLPIDEM TARTRATE 5 MG/1
5 TABLET ORAL
Qty: 30 TABLET | Refills: 1 | Status: SHIPPED | OUTPATIENT
Start: 2020-03-06 | End: 2020-03-26 | Stop reason: ALTCHOICE

## 2020-03-06 NOTE — TELEPHONE ENCOUNTER
Patient due for follow up with Joyce.    Nitish Simpson III, MD, FAAFP  [facility]  03/06/20 9:03 AM

## 2020-03-16 ENCOUNTER — TELEPHONE (OUTPATIENT)
Dept: FAMILY MEDICINE | Facility: CLINIC | Age: 60
End: 2020-03-16

## 2020-03-16 NOTE — TELEPHONE ENCOUNTER
[facility] Family Medicine phone call message- medication clarification/question:    Full Medication Name: omeprazole (PRILOSEC)      Dose: 20 MG    Question: Patient state pharmacy told her they gave her 60 pills in one bottle and she can not refill until the 21st in which patient is stating is a lie since barely 30 pills fit in the bottle. Patient is looking to get another Rx sent to pharmacy with different dosage in order to get another fill or another alternative acid reflux medication added to this medication until she can get her refill on this medication as patient state she is having really bad acid reflux.    Pharmacy confirmed as [email or web address] #[id number] - [place], [place] - [address] AT [facility]: Yes    OK to leave a message on voice mail? Yes    Primary language: English      needed? No    Call taken on March 16, 2020 at 2:42 PM by Jeronimo Gauthier

## 2020-03-16 NOTE — TELEPHONE ENCOUNTER
Called and informed patient she should have sufficient amounts of refills for the Ondansetron since Dr. Cook last refilled on 12/12/19 with 11 refills, so instructed patient to check with the pharmacy.      Patient also requested an alternative for her omeprazole as she was told by the pharmacist they were unable to fill until 3/21/20 (insurance policy)? But patient was c/o terrible acid reflux and wanted something else in the meantime until she can refill her omeprazole on 3/21/20.

## 2020-03-16 NOTE — TELEPHONE ENCOUNTER
Called patient to discuss, patient not able to buy over the counter. She stated she took some extra doses but not enough to be out of the medication. Patient asking if PCP can change dose or change medication to something else. Will route to PCP/covering provider for further recommendation. Advised patient will f/u tomorrow if I have not heard back, patient verbalized understanding. Sherif LONGO

## 2020-03-16 NOTE — TELEPHONE ENCOUNTER
[facility] Family Medicine phone call message- patient requesting a refill:    Full Medication Name: ondansetron (ZOFRAN-ODT)      Dose: 4MG    Pharmacy confirmed as   [email or web address] #[id number] - [place], [place] - [address] [place] [address]  Phone: [phone] Fax: [phone]  : Yes    Additional Comments: Patient wants refill on medication to help with acid reflux    OK to leave a message on voice mail? Yes    Primary language: English      needed? No    Call taken on March 16, 2020 at 2:46 PM by Jeronimo Gauthier

## 2020-03-18 ENCOUNTER — TELEPHONE (OUTPATIENT)
Dept: FAMILY MEDICINE | Facility: CLINIC | Age: 60
End: 2020-03-18

## 2020-03-18 ENCOUNTER — COMMUNICATION - HEALTHEAST (OUTPATIENT)
Dept: RESPIRATORY THERAPY | Facility: CLINIC | Age: 60
End: 2020-03-18
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
## 2020-03-18 NOTE — TELEPHONE ENCOUNTER
SW called confirming he received pt's VM. SW and pt agreed that pt would remain @ [facility] and both would contact [facility]. SW let pt know he would be working from home and directed pt to contact clinic to get hold of him. Pt understood.

## 2020-03-18 NOTE — TELEPHONE ENCOUNTER
VM left by pt who said she contacted [facility], who said their shelters are currently full. But pt should call each hours for updates. Pt said she would continue to call and remain @ [facility].

## 2020-03-19 ENCOUNTER — TELEPHONE (OUTPATIENT)
Dept: FAMILY MEDICINE | Facility: CLINIC | Age: 60
End: 2020-03-19

## 2020-03-19 NOTE — TELEPHONE ENCOUNTER
Called pt, who said [facility] contacted her and will let her stay. Pt will be going there between 6 an 7 this evening. Pt thanked SW for trying to help her find a place to stay.     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     No other needs @ this time.

## 2020-03-19 NOTE — TELEPHONE ENCOUNTER
[facility] Family Medicine phone call message- general phone call:    Reason for call: Patient calling to speak to Yamil she states it is important. Please call and advise.     Return call needed: Yes    OK to leave a message on voice mail?     Primary language: English      needed? No    Call taken on March 19, 2020 at 1:05 PM by Dangelo Arias

## 2020-03-19 NOTE — TELEPHONE ENCOUNTER
LVM w/ pt to update her on [facility], [facility], and [facility]. Told pt to call clinic and request my call-back if she needed to speak.

## 2020-03-19 NOTE — TELEPHONE ENCOUNTER
SW called [facility] ([phone]) who said they pending full, but told SW to check back in @ 12:15 because there may be openings.     Called [facility] ([phone]) and was told all women's shelters in the state were full, except in [place] @ [facility] [place] ([phone]). Pt would need to contact shelter to complete intake.

## 2020-03-19 NOTE — TELEPHONE ENCOUNTER
VM left by pt, who was told by [facility] that [facility] had openings, but when she tried to call them the schedule intake she was reportedly told she would never get in. Requested SW's help.

## 2020-03-20 ENCOUNTER — TELEPHONE (OUTPATIENT)
Dept: FAMILY MEDICINE | Facility: CLINIC | Age: 60
End: 2020-03-20

## 2020-03-20 ENCOUNTER — TELEPHONE (OUTPATIENT)
Dept: ORTHOPEDICS | Facility: CLINIC | Age: 60
End: 2020-03-20

## 2020-03-20 DIAGNOSIS — J42 CHRONIC BRONCHITIS, UNSPECIFIED CHRONIC BRONCHITIS TYPE (H): Primary | ICD-10-CM

## 2020-03-20 DIAGNOSIS — F51.01 PRIMARY INSOMNIA: ICD-10-CM

## 2020-03-20 DIAGNOSIS — M54.30 SCIATICA, UNSPECIFIED LATERALITY: ICD-10-CM

## 2020-03-20 RX ORDER — PREDNISONE 20 MG/1
40 TABLET ORAL DAILY
Qty: 8 TABLET | Refills: 0 | Status: SHIPPED | OUTPATIENT
Start: 2020-03-20 | End: 2020-03-24

## 2020-03-20 NOTE — TELEPHONE ENCOUNTER
Patient called to speak to Yamil. Told her will have to take a message. Then wanted to schedule an EDF appt, no openings today with a faculty so scheduled her for Tuesday 3/24 8 am telephone visit. She states she would like to speak to a Nurse or NP to see if she can get some pain medication for her feet that she went to the ER for. Told her that I would have to take a message for someone to call her. Appt is still scheduled for 3/27 at 8 am. Did notify her that pain medication would need an appt. Please call and advise.

## 2020-03-20 NOTE — TELEPHONE ENCOUNTER
[facility] Family Medicine phone call message- medication clarification/question:    Full Medication Name: Prednisone   Strength: 20 mg take 3 tab on day 1, 2 tab days 2-4, 1 tab day 5, 1 1/2 tab on day 6 and 7, then DC    Have you contacted your pharmacy about this refill request?     If  Yes,  which pharmacy?    When did you contact the pharmacy?    Additional comments/concerns from call to pharmacy:    Reason for call to clinic: Calling to get an approval for medication above as Patient was seen at [facility] but Patient couldn't get medication because she on restriction program with the clinic. Please call and advise.       Pharmacy confirmed as [id number] - [place], [place] - [address] AT [facility]: Yes    OK to leave a message on voice mail? Yes    Primary language: English      needed? No    Call taken on March 20, 2020 at 3:54 PM by Dangelo Arias

## 2020-03-20 NOTE — TELEPHONE ENCOUNTER
Returned call to Lisa    Currently staying at [facility]. Says was getting down from her bunk's ladder when slipped and hit her left heel. Was seen in emergency room and noted negative x-rays. I reviewed the note, unable to receive prescriptions (not sure which) due to restrictions    Currently taking ibuprofen and tylenol as well. Using alternatively. States would like something stronger. I would defer that to an in person visit given prior concerning history of polysubstance use, stay in shelter and will often lose medication and ask for early refills, and underlying pulmonary disease. Recommended otherwise relative rest, ice, and to return to ED if significant swelling, loss of feeling in foot, or other concerning signs.     Will have arrange in-person follow-up    Benjamin Rosenstein, MD, MA  [facility] [place]  P: [phone]

## 2020-03-20 NOTE — TELEPHONE ENCOUNTER
Per ED note, concern for mild COPD exacerbation. I will send for 40mg daily steroid burst x 4 days (received a dose in ED already). No need for complex taper.     Benjamin Rosenstein, MD, MA  [facility]  P: [phone]

## 2020-03-20 NOTE — TELEPHONE ENCOUNTER
[facility] Family Medicine phone call message- patient requesting a refill:    Full Medication Name: tiZANidine (ZANAFLEX)4 MG tablet  AND zolpidem (AMBIEN) 5 MG tablet    Dose:     Pharmacy confirmed as   [id number] - [place], [place] - [address] AT [facility]  [address] [place] [address]  Phone: [phone] Fax: [phone]  : Yes    Additional Comments: Patient states she needs a refill for the medications listed above. Notified it may take 2 business days for response. Please call and advise.      OK to leave a message on voice mail? Yes    Primary language: English      needed? No    Call taken on March 20, 2020 at 12:32 PM by Ciara Rodriguez

## 2020-03-23 DIAGNOSIS — K21.9 GASTROESOPHAGEAL REFLUX DISEASE WITHOUT ESOPHAGITIS: ICD-10-CM

## 2020-03-23 DIAGNOSIS — F51.01 PRIMARY INSOMNIA: ICD-10-CM

## 2020-03-23 DIAGNOSIS — F29 PSYCHOSIS, UNSPECIFIED PSYCHOSIS TYPE (H): ICD-10-CM

## 2020-03-23 DIAGNOSIS — M75.01 ADHESIVE CAPSULITIS OF BOTH SHOULDERS: ICD-10-CM

## 2020-03-23 DIAGNOSIS — M54.30 SCIATICA, UNSPECIFIED LATERALITY: ICD-10-CM

## 2020-03-23 DIAGNOSIS — M75.02 ADHESIVE CAPSULITIS OF BOTH SHOULDERS: ICD-10-CM

## 2020-03-23 RX ORDER — ZOLPIDEM TARTRATE 5 MG/1
5 TABLET ORAL
Qty: 30 TABLET | Refills: 1 | OUTPATIENT
Start: 2020-03-23

## 2020-03-23 NOTE — TELEPHONE ENCOUNTER
[facility] Family Medicine phone call message- medication clarification/question:    Full Medication Name: QUETIAPINE 50 MG, AND AMBIEN 10 MG   Strength:     Have you contacted your pharmacy about this refill request?     If  Yes,  which pharmacy?    When did you contact the pharmacy?    Additional comments/concerns from call to pharmacy:    Reason for call to clinic: Calling to get refill above since Patient is restricted has to go through her restricted doctor to get medication. Also needs a med list fax . Please call and advise.       Pharmacy confirmed as [facility] #[id number] - [place], [place] - [address] AT [facility]: Yes    OK to leave a message on voice mail?     Primary language: English      needed? No    Call taken on March 23, 2020 at 12:43 PM by Dangelo Arias

## 2020-03-23 NOTE — TELEPHONE ENCOUNTER
Spoke to patient and her nurse Juani to clarify the refill request dose for the meds Quetiapine and Ambien.  Meds pended for refill as requested by patient.  Juani also like a list of meds fax to her at # listed.  Informed Juani, I will fax it to her today.  Patient also like Ibuprofen 400mg increase to 600mg one pill every 4-6 PRN due to toe injury/fx.  Call nurse or patient if have any question.  Confirmed pt have a telephone visit tomorrow with Dr. Alvarez, will have [place] fax updated meds list then too if any changes.  Refills pended for MD to approve.  Meds list faxed [phone]. KONG Oneal

## 2020-03-24 ENCOUNTER — TELEPHONE (OUTPATIENT)
Dept: ORTHOPEDICS | Facility: CLINIC | Age: 60
End: 2020-03-24

## 2020-03-24 ENCOUNTER — TELEPHONE (OUTPATIENT)
Dept: FAMILY MEDICINE | Facility: CLINIC | Age: 60
End: 2020-03-24

## 2020-03-24 ENCOUNTER — VIRTUAL VISIT (OUTPATIENT)
Dept: FAMILY MEDICINE | Facility: CLINIC | Age: 60
End: 2020-03-24
Payer: COMMERCIAL

## 2020-03-24 DIAGNOSIS — I10 ESSENTIAL HYPERTENSION: Primary | ICD-10-CM

## 2020-03-24 DIAGNOSIS — S13.4XXA WHIPLASH INJURY TO NECK, INITIAL ENCOUNTER: ICD-10-CM

## 2020-03-24 DIAGNOSIS — M54.30 SCIATICA, UNSPECIFIED LATERALITY: ICD-10-CM

## 2020-03-24 DIAGNOSIS — S93.402D SPRAIN OF LEFT ANKLE, UNSPECIFIED LIGAMENT, SUBSEQUENT ENCOUNTER: ICD-10-CM

## 2020-03-24 DIAGNOSIS — F43.10 POSTTRAUMATIC STRESS DISORDER: ICD-10-CM

## 2020-03-24 DIAGNOSIS — F29 PSYCHOSIS, UNSPECIFIED PSYCHOSIS TYPE (H): ICD-10-CM

## 2020-03-24 RX ORDER — QUETIAPINE FUMARATE 200 MG/1
200 TABLET, FILM COATED ORAL AT BEDTIME
Qty: 30 TABLET | Refills: 5 | Status: SHIPPED | OUTPATIENT
Start: 2020-03-24 | End: 2020-04-08

## 2020-03-24 RX ORDER — QUETIAPINE FUMARATE 100 MG/1
100 TABLET, FILM COATED ORAL DAILY PRN
Qty: 30 TABLET | Refills: 1 | Status: SHIPPED | OUTPATIENT
Start: 2020-03-24 | End: 2020-03-24

## 2020-03-24 RX ORDER — OXCARBAZEPINE 300 MG/1
300 TABLET, FILM COATED ORAL AT BEDTIME
COMMUNITY
Start: 2020-03-13 | End: 2020-03-24

## 2020-03-24 RX ORDER — LOPERAMIDE HCL 2 MG
2 CAPSULE ORAL EVERY 6 HOURS PRN
COMMUNITY
Start: 2020-03-18 | End: 2021-02-02

## 2020-03-24 RX ORDER — LISINOPRIL 20 MG/1
20 TABLET ORAL DAILY
COMMUNITY
Start: 2020-03-21 | End: 2020-05-07

## 2020-03-24 RX ORDER — OXCARBAZEPINE 300 MG/1
300 TABLET, FILM COATED ORAL AT BEDTIME
Qty: 90 TABLET | Refills: 1 | Status: SHIPPED | OUTPATIENT
Start: 2020-03-24 | End: 2020-06-03

## 2020-03-24 RX ORDER — PRAZOSIN HYDROCHLORIDE 2 MG/1
4 CAPSULE ORAL AT BEDTIME
Qty: 60 CAPSULE | Refills: 3 | Status: SHIPPED | OUTPATIENT
Start: 2020-03-24 | End: 2021-05-19

## 2020-03-24 RX ORDER — QUETIAPINE FUMARATE 100 MG/1
100 TABLET, FILM COATED ORAL DAILY PRN
Qty: 90 TABLET | Refills: 3 | Status: SHIPPED | OUTPATIENT
Start: 2020-03-24 | End: 2020-03-26

## 2020-03-24 RX ORDER — BUPROPION HYDROCHLORIDE 300 MG/1
300 TABLET ORAL DAILY
COMMUNITY
Start: 2020-03-20 | End: 2021-02-02

## 2020-03-24 NOTE — TELEPHONE ENCOUNTER
Called [facility] ([phone]) and was told there are no opening except in [place] shelter [place] of [employer]. Was encouraged to call back later.

## 2020-03-24 NOTE — TELEPHONE ENCOUNTER
Patient states she needs to apply for emergency assistance. She states she would like a call back. Crisis place will only keep her for 10 days. Patient was upset and hung up. Patient states she would like to speak with Yamil. Please call and advise.

## 2020-03-24 NOTE — TELEPHONE ENCOUNTER
[facility] Family Medicine phone call message - order or referral request for patient:     Order or referral being requested: Medication Orders      Additional Comments: Patient states she is staying at a facility and nurses will not allow medication changes until their is an order faxed stating the medication changes. Order needs to be faxed to [phone], ATTN: Nurse. Please call and advise.     OK to leave a message on voice mail? Yes    Primary language: English      needed? No    Call taken on March 24, 2020 at 9:16 AM by Ciara Rodriguez

## 2020-03-24 NOTE — PATIENT INSTRUCTIONS
Please continue your current medications.  Call if problems   Heel and toe walk to help your heel and ankle injury.  Updated your refills.  Range of motion for your neck.

## 2020-03-24 NOTE — PROGRESS NOTES
"Family Medicine Telephone Visit Note               Telephone Visit Consent   Patient was verbally read the following and verbal consent was obtained.  \"I understand that I may revoke this request for a phone visit at any time.  This consent will automatically  3 months from the signed date and time.\"    Name person giving consent:  Patient   Date verbal consent given:  3/24/2020  Time verbal consent given:  8:13 AM      Chief Complaint   Patient presents with     Recheck Medication     medication adjustments, change hydroxyzine to 4x daily.     ER F/U     f/u on left foot, fell and injured foot, want pain meds     Medication Reconciliation     need attention-new dose on lisinopril     Current Outpatient Medications   Medication Sig Dispense Refill     acetaminophen (TYLENOL) 500 MG tablet Take 1-2 tablets (500-1,000 mg) by mouth every 8 hours as needed for mild pain 90 tablet 11     aclidinium (TUDORZA PRESSAIR) 400 MCG/ACT inhaler Inhale 1 puff into the lungs 2 times daily 1 Inhaler 11     albuterol (PROAIR HFA/PROVENTIL HFA/VENTOLIN HFA) 108 (90 Base) MCG/ACT inhaler Inhale 2 puffs into the lungs every 6 hours as needed for shortness of breath / dyspnea or wheezing 2 Inhaler 3     fluticasone-salmeterol (ADVAIR-HFA) 230-21 MCG/ACT inhaler Inhale 2 puffs into the lungs 2 times daily 1 Inhaler 11     gabapentin (NEURONTIN) 300 MG capsule Take 2 capsules (600 mg) by mouth 3 times daily 180 capsule 3     hydrOXYzine (ATARAX) 25 MG tablet Take 1 tablet (25 mg) by mouth every 8 hours as needed for anxiety 30 tablet 11     ibuprofen (ADVIL/MOTRIN) 400 MG tablet Take 1 tablet (400 mg) by mouth every 6 hours as needed for moderate pain 90 tablet 3     lisinopril (ZESTRIL) 20 MG tablet Take 20 mg by mouth daily       loperamide (IMODIUM) 2 MG capsule Take 2 mg by mouth every 6 hours as needed       OXcarbazepine (TRILEPTAL) 300 MG tablet Take 1 tablet (300 mg) by mouth At Bedtime 90 tablet 1     prazosin (MINIPRESS) 2 " MG capsule Take 2 capsules (4 mg) by mouth At Bedtime 60 capsule 3     QUEtiapine (SEROQUEL) 100 MG tablet Take 1 tablet (100 mg) by mouth daily as needed (anxiety or panic attacks) 90 tablet 3     QUEtiapine (SEROQUEL) 200 MG tablet Take 1 tablet (200 mg) by mouth At Bedtime 30 tablet 5     tiZANidine (ZANAFLEX) 4 MG tablet Take 2 tablets (8 mg) by mouth 3 times daily as needed for muscle spasms 240 tablet 3     buPROPion (WELLBUTRIN XL) 300 MG 24 hr tablet Take 300 mg by mouth daily       busPIRone (BUSPAR) 30 MG tablet Take 0.5 tablets (15 mg) by mouth 3 times daily       calcium carbonate (TUMS) 500 MG chewable tablet Take 2 tablets (1,000 mg) by mouth 4 times daily as needed for heartburn       divalproex sodium delayed-release (DEPAKOTE) 250 MG DR tablet Take 1 tablet (250 mg) by mouth 2 times daily 60 tablet 3     fluticasone (FLONASE) 50 MCG/ACT nasal spray Spray 2 sprays in nostril daily 9.9 mL 11     ipratropium - albuterol 0.5 mg/2.5 mg/3 mL (DUONEB) 0.5-2.5 (3) MG/3ML neb solution NEBULIZE 1 VIAL BY MOUTH FOUR TIMES DAILY 90 mL 3     Lubricants (ASTROGLIDE) GEL Externally apply topically 2 times daily 66.5 g 1     Melatonin 10 MG TABS tablet Take 1 tablet (10 mg) by mouth At Bedtime , additional tablet as needed for late night awakenings 90 tablet 1     omeprazole (PRILOSEC) 20 MG DR capsule Take 1 capsule (20 mg) by mouth 2 times daily 60 capsule 11     ondansetron (ZOFRAN) 4 MG tablet Take 4 mg by mouth daily as needed       ondansetron (ZOFRAN-ODT) 4 MG ODT tab Take 1 tablet (4 mg) by mouth every 8 hours as needed for nausea 20 tablet 11     order for DME DME - pt needs nebulizer tubing 1 Device 0     polyethylene glycol (MIRALAX/GLYCOLAX) powder Take 17 g (1 capful) by mouth daily as needed for constipation       polyvinyl alcohol (LIQUIFILM TEARS) 1.4 % ophthalmic solution Place 1 drop into both eyes as needed for dry eyes       ranitidine (ZANTAC) 150 MG tablet Take 1 tablet (150 mg) by mouth 2  times daily       sennosides (SENOKOT) 8.6 MG tablet Take 1 tablet by mouth 2 times daily as needed for constipation 60 tablet 3     vitamin B1 (THIAMINE) 100 MG tablet Take 200 mg by mouth 3 times daily       zolpidem (AMBIEN) 5 MG tablet Take 1 tablet (5 mg) by mouth nightly as needed for sleep 30 tablet 1     Allergies   Allergen Reactions     Nkda [No Known Drug Allergies]               HPI   Patients name: Lisa  Appointment start time:  8:30 AM    TELEPHONE CONVERSATION     (poor audio)    SUBJECTIVE:  A 59-year-old woman being evaluated and managed by phone during coronavirus pandemic.  She was evaluated 3 times in the ER in the past week Most recently for high blood pressure.  She also had a fall and injured left foot, she thinks she has a hairline fracture but the radiology report is negative.  She says her head has a small bump on the back of her head and is concern that she may have had a concussion. She compains of some neck pain and thinks she may have a whiplash injury. The story fits with whiplash.    She is getting her medications on a regular basis because she is living in a shelter.  She has been going on walks regularly while she is in the shelter which has increased her exercise minutes. Says her foot hurts when she walks and would like pain medications.  She needs some refills on medications, and I refilled her medications today.  Her blood pressure has been in a good range at her current home and is in the normal range today; although it was elevated in the ER.  She is taking the increased dose of lisinopril at 20 mg daily as prescribed in the ER.  She says the prazosin is not helping her at the current dose so we increased the dose to 4 mg daily at bedtime.  She has a new  prescription for Trileptal, but she did not have that medication with her when she entered the shelter so she in not currently taking the medication.  She is requesting increasing Zanaflex to 2 tablets 3 times daily for  muscle spasm to relieve her pain.     She continues to have discomfort in her ankle when she walks.  She is able to walk.    PLAN:  We will continue her current medications.  I have updated her medication list as best possible without the medications in front of me.    We talked about doing motion exercises for her neck and toe raises for ankle and foot strengthening.  She can warm pack her ankle for pain relief and also her neck.  We will reevaluate her in one month for her blood pressure.    The patient involved in medical decision making throughout the visit.    I did not give her narcotics as she was requesting.      Hypertension Follow-up  <140/90    Outpatient blood pressures are being checked at [facility].  Results are in normal range.    Chest Pain? :No     Low Salt Diet: not monitoring salt    Daily NSAID Use?No     Did patient take their HTN pills today/last night as usual?  Yes    Last Basic Metabolic Panel:  Lab Results   Component Value Date     02/25/2020      Lab Results   Component Value Date    POTASSIUM 4.9 02/25/2020     Lab Results   Component Value Date    CHLORIDE 102 02/25/2020     Lab Results   Component Value Date    JOVANY 9.5 02/25/2020     Lab Results   Component Value Date    CO2 24.0 10/23/2019     Lab Results   Component Value Date    BUN 22 02/25/2020     Lab Results   Component Value Date    CR 1.10 02/25/2020     Lab Results   Component Value Date    GLC 68 02/25/2020       Adherence and Exercise  Medication side effects: no  How often is a medication missed? Never  Exercise:walking  2-3 days/week for an average of 15-30 minutes      used via [employer] or similar service.  number: NA          Assessment and Plan       ICD-10-CM    1. Essential hypertension  I10    2. Sprain of left ankle, unspecified ligament, subsequent encounter  S93.402D    3. Sciatica, unspecified laterality  M54.30 tiZANidine (ZANAFLEX) 4 MG tablet   4. Posttraumatic stress disorder   F43.10 prazosin (MINIPRESS) 2 MG capsule   5. Psychosis, unspecified psychosis type (H)  F29 QUEtiapine (SEROQUEL) 200 MG tablet     OXcarbazepine (TRILEPTAL) 300 MG tablet     QUEtiapine (SEROQUEL) 100 MG tablet     DISCONTINUED: QUEtiapine (SEROQUEL) 100 MG tablet   6. Whiplash injury to neck, initial encounter  S13.4XXA        Refilled medications that would be required in the next 3 months.     After Visit Information:  Will print and mail AVS     Appointment end time: 8:57 AM  This is a telephone visit that took 27 minutes.      Clinician location: [place] ROBERTS, WILLIAM

## 2020-03-25 ENCOUNTER — TELEPHONE (OUTPATIENT)
Dept: FAMILY MEDICINE | Facility: CLINIC | Age: 60
End: 2020-03-25

## 2020-03-26 ENCOUNTER — VIRTUAL VISIT (OUTPATIENT)
Dept: FAMILY MEDICINE | Facility: CLINIC | Age: 60
End: 2020-03-26
Payer: COMMERCIAL

## 2020-03-26 DIAGNOSIS — F99 INSOMNIA DUE TO OTHER MENTAL DISORDER: Primary | ICD-10-CM

## 2020-03-26 DIAGNOSIS — M54.30 SCIATICA, UNSPECIFIED LATERALITY: ICD-10-CM

## 2020-03-26 DIAGNOSIS — F51.05 INSOMNIA DUE TO OTHER MENTAL DISORDER: Primary | ICD-10-CM

## 2020-03-26 DIAGNOSIS — F29 PSYCHOSIS, UNSPECIFIED PSYCHOSIS TYPE (H): ICD-10-CM

## 2020-03-26 RX ORDER — QUETIAPINE FUMARATE 100 MG/1
100 TABLET, FILM COATED ORAL 2 TIMES DAILY PRN
Qty: 90 TABLET | Refills: 3 | Status: SHIPPED | OUTPATIENT
Start: 2020-03-26 | End: 2020-04-08

## 2020-03-26 RX ORDER — TRAZODONE HYDROCHLORIDE 50 MG/1
50 TABLET, FILM COATED ORAL AT BEDTIME
Qty: 30 TABLET | Refills: 1 | Status: SHIPPED | OUTPATIENT
Start: 2020-03-26 | End: 2020-04-20

## 2020-03-26 RX ORDER — PHENOL 1.4 %
10 AEROSOL, SPRAY (ML) MUCOUS MEMBRANE
Qty: 90 TABLET | Refills: 1 | Status: SHIPPED | OUTPATIENT
Start: 2020-03-26 | End: 2021-08-31

## 2020-03-26 NOTE — TELEPHONE ENCOUNTER
Called pt back as she needed to talk. Pt wanted to know if he had any there resources for housing. SW said [facility] shelters were full currently. Pt said her last day @ [facility] was on 3/28, but she had found another room for rent in [place] via [employer]. Pt said she did not have enough money for the security deposit though. SW asked if pt had contacted [facility] regarding emergency assistance. Pt said she completed the application on 3/24 and requested $500. Was unsure of how long that would take.    SW called [facility]  ([phone]) and was told process may take 30 days.     LVM @ [facility] ([phone])  to see what their process was for security deposits.

## 2020-03-26 NOTE — TELEPHONE ENCOUNTER
Spoke w/ pt and let her know about potential 30 day wait period from [facility] regarding emergency assistance decision. Pt understood. SW received work from [facility] that they only disperse funds if it is to a certified landlord. Pt confirmed this person was not certified landlord, but someone renting a room. Pt also said she increased security deposit to $800. SW said he was worried pt was being taken advantage of and implored her to verify that amount. Pt said she would. Still currently @ crisis center. No other needs at this time.

## 2020-03-26 NOTE — PROGRESS NOTES
"Family Medicine Telephone Visit Note           Telephone Visit Consent   Patient was verbally read the following and verbal consent was obtained.  \"I understand that I may revoke this request for a phone visit at any time.  This consent will automatically  3 months from the signed date and time.\"    Name person giving consent:  Patient   Date verbal consent given:  3/26/2020  Time verbal consent given:  10:41 AM    Patient's Nurse Josefina is presented with pt during telephone visit.        No chief complaint on file.    Current Outpatient Medications   Medication Sig Dispense Refill     acetaminophen (TYLENOL) 500 MG tablet Take 1-2 tablets (500-1,000 mg) by mouth every 8 hours as needed for mild pain 90 tablet 11     aclidinium (TUDORZA PRESSAIR) 400 MCG/ACT inhaler Inhale 1 puff into the lungs 2 times daily 1 Inhaler 11     albuterol (PROAIR HFA/PROVENTIL HFA/VENTOLIN HFA) 108 (90 Base) MCG/ACT inhaler Inhale 2 puffs into the lungs every 6 hours as needed for shortness of breath / dyspnea or wheezing 2 Inhaler 3     buPROPion (WELLBUTRIN XL) 300 MG 24 hr tablet Take 300 mg by mouth daily       busPIRone (BUSPAR) 30 MG tablet Take 0.5 tablets (15 mg) by mouth 3 times daily       calcium carbonate (TUMS) 500 MG chewable tablet Take 2 tablets (1,000 mg) by mouth 4 times daily as needed for heartburn       divalproex sodium delayed-release (DEPAKOTE) 250 MG DR tablet Take 1 tablet (250 mg) by mouth 2 times daily 60 tablet 3     fluticasone (FLONASE) 50 MCG/ACT nasal spray Spray 2 sprays in nostril daily 9.9 mL 11     fluticasone-salmeterol (ADVAIR-HFA) 230-21 MCG/ACT inhaler Inhale 2 puffs into the lungs 2 times daily 1 Inhaler 11     gabapentin (NEURONTIN) 300 MG capsule Take 2 capsules (600 mg) by mouth 3 times daily 180 capsule 3     hydrOXYzine (ATARAX) 25 MG tablet Take 1 tablet (25 mg) by mouth every 8 hours as needed for anxiety 30 tablet 11     ibuprofen (ADVIL/MOTRIN) 400 MG tablet Take 1 tablet (400 mg) " by mouth every 6 hours as needed for moderate pain 90 tablet 3     ipratropium - albuterol 0.5 mg/2.5 mg/3 mL (DUONEB) 0.5-2.5 (3) MG/3ML neb solution NEBULIZE 1 VIAL BY MOUTH FOUR TIMES DAILY 90 mL 3     lisinopril (ZESTRIL) 20 MG tablet Take 20 mg by mouth daily       loperamide (IMODIUM) 2 MG capsule Take 2 mg by mouth every 6 hours as needed       Lubricants (ASTROGLIDE) GEL Externally apply topically 2 times daily 66.5 g 1     Melatonin 10 MG TABS tablet Take 1 tablet (10 mg) by mouth At Bedtime , additional tablet as needed for late night awakenings 90 tablet 1     omeprazole (PRILOSEC) 20 MG DR capsule Take 1 capsule (20 mg) by mouth 2 times daily 60 capsule 11     ondansetron (ZOFRAN) 4 MG tablet Take 4 mg by mouth daily as needed       ondansetron (ZOFRAN-ODT) 4 MG ODT tab Take 1 tablet (4 mg) by mouth every 8 hours as needed for nausea 20 tablet 11     order for DME DME - pt needs nebulizer tubing 1 Device 0     OXcarbazepine (TRILEPTAL) 300 MG tablet Take 1 tablet (300 mg) by mouth At Bedtime 90 tablet 1     polyethylene glycol (MIRALAX/GLYCOLAX) powder Take 17 g (1 capful) by mouth daily as needed for constipation       polyvinyl alcohol (LIQUIFILM TEARS) 1.4 % ophthalmic solution Place 1 drop into both eyes as needed for dry eyes       prazosin (MINIPRESS) 2 MG capsule Take 2 capsules (4 mg) by mouth At Bedtime 60 capsule 3     QUEtiapine (SEROQUEL) 100 MG tablet Take 1 tablet (100 mg) by mouth daily as needed (anxiety or panic attacks) 90 tablet 3     QUEtiapine (SEROQUEL) 200 MG tablet Take 1 tablet (200 mg) by mouth At Bedtime 30 tablet 5     ranitidine (ZANTAC) 150 MG tablet Take 1 tablet (150 mg) by mouth 2 times daily       sennosides (SENOKOT) 8.6 MG tablet Take 1 tablet by mouth 2 times daily as needed for constipation 60 tablet 3     tiZANidine (ZANAFLEX) 4 MG tablet Take 2 tablets (8 mg) by mouth 3 times daily as needed for muscle spasms 240 tablet 3     vitamin B1 (THIAMINE) 100 MG tablet  Take 200 mg by mouth 3 times daily       zolpidem (AMBIEN) 5 MG tablet Take 1 tablet (5 mg) by mouth nightly as needed for sleep 30 tablet 1     Allergies   Allergen Reactions     Nkda [No Known Drug Allergies]                    HPI   Patients name: Lisa  Appointment start time:  11:01 AM    Currently living in a crisis center.     On the phone with her nurse Olga.    Lisa had been using a lot of THC and now she quit. Since then having trouble sleeping and is up every 3 hours. She also feels her anxiety is not well controlled since stopping marijuana. She has several requests for changing her medications to help with these symptoms.     Lisa currently takes seroquel 200mg at night, this works well. She's wondering if she could have prn doses of seroquel 100 in am and at lunch as well. Looks like right now she's prescribed 1 dose of seroquel 100mg daily prn.     Takes hydroxyzine 25 1 tab every 8 hour, she is requesting to increase that to every 6 hours as needed.     Tizanadine- currently takes every 8 hours, is wondering if she could have it changed to 4 times per day. Takes this for sciatica and muscle spasms.     She is wondering if we can add trazodone at night to help with her sleep. Also wondering about melatonin. Melatonin is on her med list but she hasn't been getting it lately. She has ambien on her list too but her nurse reports she is out of that medicine so hasn't been getting that either.     Wakes during the night, has a hard time getting back to sleep. Doesn't take naps. Goes to bed at 9 pm most nights, wakes at 6 am but wakes multiple times in between.      Sees psychiatry once per month but prescriptions are restricted to our clinic.             Assessment and Plan     1. Insomnia due to other mental disorder  I recommended starting melatonin again- previously it had been prescribed to be taken at bedtime but I changed the order to recommend taking with dinner since this is when it is  designed to be taken. I don't think adding ambien back into the mix is a good idea. The patient takes many neuro-psych meds and so I ran all her meds through [facility] and added in trazodone and this should be safe to add at a low dose for sleep.   - Melatonin 10 MG TABS tablet; Take 1 tablet (10 mg) by mouth daily (with dinner) , additional tablet as needed for late night awakenings  Dispense: 90 tablet; Refill: 1  - traZODone (DESYREL) 50 MG tablet; Take 1 tablet (50 mg) by mouth At Bedtime  Dispense: 30 tablet; Refill: 1    2. Uncontrolled anxiety  I think it is reasonable to add in another prn dose of seroquel for the patient. I advised against changing her hydroxyzine dose both a. Because I don't want to change more medicines all at once and b. It would increase the risk of combined sedating effects of her meds.   - QUEtiapine (SEROQUEL) 100 MG tablet; Take 1 tablet (100 mg) by mouth 2 times daily as needed (anxiety or panic attacks)  Dispense: 90 tablet; Refill: 3    3. Sciatica, unspecified laterality  I informed Lisa it's not safe to go up on her dose of flexeril at this time. She tells me her psychiatrist did it for her in the past and I informed her that each physician has to make their own decisions about treatment safety and I will not increase her dose.     Follow up on telephone in 2 weeks to check in on how things are going with the med changes.     Refilled medications that would be required in the next 3 months.     After Visit Information:  Will print and mail AVS  (faxed to [facility] so that nurse has an up to date list)    Appointment end time: 11:25 AM  This is a telephone visit that took 24 minutes.      Clinician location:  [facility]    JAYESH UPTON

## 2020-03-27 RX ORDER — ZOLPIDEM TARTRATE 5 MG/1
5 TABLET ORAL
Qty: 30 TABLET | OUTPATIENT
Start: 2020-03-27

## 2020-03-27 RX ORDER — QUETIAPINE FUMARATE 100 MG/1
100 TABLET, FILM COATED ORAL DAILY PRN
Qty: 30 TABLET | OUTPATIENT
Start: 2020-03-27

## 2020-03-27 RX ORDER — QUETIAPINE FUMARATE 200 MG/1
200 TABLET, FILM COATED ORAL AT BEDTIME
Qty: 30 TABLET | OUTPATIENT
Start: 2020-03-27

## 2020-03-27 RX ORDER — IBUPROFEN 600 MG/1
600 TABLET, FILM COATED ORAL EVERY 4 HOURS PRN
OUTPATIENT
Start: 2020-03-27

## 2020-03-30 ENCOUNTER — TELEPHONE (OUTPATIENT)
Dept: FAMILY MEDICINE | Facility: CLINIC | Age: 60
End: 2020-03-30

## 2020-03-30 NOTE — TELEPHONE ENCOUNTER
[facility] Family Medicine phone call message- general phone call:    Reason for call: Per patient states that crisis place where she is stating is refusing to follow medication instructions. Patient would like a call back from Dr. Atkins explaining how medications should be taken. Please call and advise.     Return call needed: Yes    OK to leave a message on voice mail? Yes    Primary language: English      needed? No    Call taken on March 30, 2020 at 8:47 AM by Ciara Rodriguez

## 2020-03-30 NOTE — TELEPHONE ENCOUNTER
LARRY called pt back. Pt was concerned because she feels the medical staff @ crisis center are making up their own rules for medication management and ignoring her doctor's direction. All medications are reportedly to be taken every 8 hours regardless of what is on the directions. Pt is primarily concerned w/ her Ibuprofen and Tizanidine for her back spasms.     LARRY was given contact info for Elizabeth, the nurse on duty ([phone]).

## 2020-03-30 NOTE — TELEPHONE ENCOUNTER
Called received from Elizabeth @ [facility] ([phone]). Elizabeth said that clinic staff do not give out medication every 8 hours, but as direct by the instructions via med lists. Elizabeth is not pt's regular nurse, but believes there may be confusion on pt's part or staff. Elizabeth said she would speak w/ pt again about following med list guideline and re-enforce that with staff as well.

## 2020-04-07 NOTE — PROGRESS NOTES
"  Family Medicine Telephone Visit Note         Telephone Visit Consent   Patient was verbally read the following and verbal consent was obtained.  \"I understand that I may revoke this request for a phone visit at any time.  This consent will automatically  3 months from the signed date and time.\"    Name person giving consent:  Patient   Date verbal consent given:  2020  Time verbal consent given:  8:50 AM           HPI   Patients name: Lisa  Appointment start time:  8:59 AM    Follow-up from call w/ Dr Atkins on 3/26/20  1) Several medication changes made to address insomnia  - added Trazodone  - added Melatonin     2) Mood - also made Seroquel bid   Advised against additional doses of hydroxyzine  Pt tearful over the phone  \"Pure hell x 2 months now\"  Went through a break-up and can't get her things  Living at a crisis center called [facility] in [facility]  Has been there x 5 days - will be there another 5 days - unsure where she will live after due to covid     She states that she does not have a script for 200 mg seroquel at night    3) Sciatica - recommended against increasing Flexeril as well  Stable for now  No red flags  Clarified that she will use 400mg Ibuprofen q 6hrs, not q8hrs    Asthma - needs new nebulizer machine - has solution but is unable to get her machine     ED visit 3/17, 3/18, 3/21  Subsequent virtual visit with Dr Alvarez on 3/24  Tizanidine tid for muscle pains  Prazosin increased to 4mg at night     [phone] - number for crisis center   Fax [phone] - requesting to fax medication list      Current Outpatient Medications   Medication Sig Dispense Refill     acetaminophen (TYLENOL) 500 MG tablet Take 1-2 tablets (500-1,000 mg) by mouth every 8 hours as needed for mild pain 90 tablet 11     aclidinium (TUDORZA PRESSAIR) 400 MCG/ACT inhaler Inhale 1 puff into the lungs 2 times daily 1 Inhaler 11     albuterol (PROAIR HFA/PROVENTIL HFA/VENTOLIN HFA) 108 (90 " Base) MCG/ACT inhaler Inhale 2 puffs into the lungs every 6 hours as needed for shortness of breath / dyspnea or wheezing 2 Inhaler 3     buPROPion (WELLBUTRIN XL) 300 MG 24 hr tablet Take 300 mg by mouth daily       busPIRone (BUSPAR) 30 MG tablet Take 0.5 tablets (15 mg) by mouth 3 times daily       calcium carbonate (TUMS) 500 MG chewable tablet Take 2 tablets (1,000 mg) by mouth 4 times daily as needed for heartburn       divalproex sodium delayed-release (DEPAKOTE) 250 MG DR tablet Take 1 tablet (250 mg) by mouth 2 times daily 60 tablet 3     fluticasone (FLONASE) 50 MCG/ACT nasal spray Spray 2 sprays in nostril daily 9.9 mL 11     fluticasone-salmeterol (ADVAIR-HFA) 230-21 MCG/ACT inhaler Inhale 2 puffs into the lungs 2 times daily 1 Inhaler 11     gabapentin (NEURONTIN) 300 MG capsule Take 2 capsules (600 mg) by mouth 3 times daily 180 capsule 3     hydrOXYzine (ATARAX) 25 MG tablet Take 1 tablet (25 mg) by mouth every 8 hours as needed for anxiety 30 tablet 11     ibuprofen (ADVIL/MOTRIN) 400 MG tablet Take 1 tablet (400 mg) by mouth every 6 hours as needed for moderate pain 90 tablet 3     ipratropium - albuterol 0.5 mg/2.5 mg/3 mL (DUONEB) 0.5-2.5 (3) MG/3ML neb solution NEBULIZE 1 VIAL BY MOUTH FOUR TIMES DAILY 90 mL 3     lisinopril (ZESTRIL) 20 MG tablet Take 20 mg by mouth daily       loperamide (IMODIUM) 2 MG capsule Take 2 mg by mouth every 6 hours as needed       Lubricants (ASTROGLIDE) GEL Externally apply topically 2 times daily 66.5 g 1     Melatonin 10 MG TABS tablet Take 1 tablet (10 mg) by mouth daily (with dinner) , additional tablet as needed for late night awakenings 90 tablet 1     omeprazole (PRILOSEC) 20 MG DR capsule Take 1 capsule (20 mg) by mouth 2 times daily 60 capsule 11     ondansetron (ZOFRAN) 4 MG tablet Take 4 mg by mouth daily as needed       ondansetron (ZOFRAN-ODT) 4 MG ODT tab Take 1 tablet (4 mg) by mouth every 8 hours as needed for nausea 20 tablet 11     order for DME  DME - pt needs nebulizer tubing 1 Device 0     OXcarbazepine (TRILEPTAL) 300 MG tablet Take 1 tablet (300 mg) by mouth At Bedtime 90 tablet 1     polyethylene glycol (MIRALAX/GLYCOLAX) powder Take 17 g (1 capful) by mouth daily as needed for constipation       polyvinyl alcohol (LIQUIFILM TEARS) 1.4 % ophthalmic solution Place 1 drop into both eyes as needed for dry eyes       prazosin (MINIPRESS) 2 MG capsule Take 2 capsules (4 mg) by mouth At Bedtime 60 capsule 3     QUEtiapine (SEROQUEL) 100 MG tablet Take 1 tablet (100 mg) by mouth 2 times daily as needed (anxiety or panic attacks) 90 tablet 3     QUEtiapine (SEROQUEL) 200 MG tablet Take 1 tablet (200 mg) by mouth At Bedtime 30 tablet 5     ranitidine (ZANTAC) 150 MG tablet Take 1 tablet (150 mg) by mouth 2 times daily       sennosides (SENOKOT) 8.6 MG tablet Take 1 tablet by mouth 2 times daily as needed for constipation 60 tablet 3     tiZANidine (ZANAFLEX) 4 MG tablet Take 2 tablets (8 mg) by mouth 3 times daily as needed for muscle spasms 240 tablet 3     traZODone (DESYREL) 50 MG tablet Take 1 tablet (50 mg) by mouth At Bedtime 30 tablet 1     vitamin B1 (THIAMINE) 100 MG tablet Take 200 mg by mouth 3 times daily       Allergies   Allergen Reactions     Nkda [No Known Drug Allergies]               Review of Systems:     ROS COMP: CONSTITUTIONAL: NEGATIVE for fever, chills, change in weight  ENT/MOUTH: POSITIVE for cough  RESP: NEGATIVE for significant SOB  CV: NEGATIVE for chest pain, palpitations or peripheral edema         Physical Exam:     N/A        Assessment and Plan     (F51.05,  F99) Insomnia due to other mental disorder  (primary encounter diagnosis)  Comment:   Plan: explained that we would not add ambien as she is on multiple medications w/ sedative effects; she will benefit from having her nighttime seroquel; orders placed/ dosages clarified    (F29) Psychosis, unspecified psychosis type (H)  Comment: see above   Plan: QUEtiapine (SEROQUEL) 200  MG tablet, QUEtiapine (SEROQUEL) 100 MG tablet            (J44.9) Chronic obstructive pulmonary disease, unspecified COPD type (H)  Comment:   Plan: she will come to our office for a nebulizer machine       Refilled medications that would be required in the next 3 months.     After Visit Information:  Patient declined AVS     No follow-ups on file.    Appointment end time: 9:22 AM  This is a telephone visit that took 23 minutes.      Clinician location:  [place]

## 2020-04-08 ENCOUNTER — VIRTUAL VISIT (OUTPATIENT)
Dept: FAMILY MEDICINE | Facility: CLINIC | Age: 60
End: 2020-04-08
Payer: COMMERCIAL

## 2020-04-08 VITALS — WEIGHT: 140 LBS | BODY MASS INDEX: 25.61 KG/M2

## 2020-04-08 DIAGNOSIS — J44.9 CHRONIC OBSTRUCTIVE PULMONARY DISEASE, UNSPECIFIED COPD TYPE (H): ICD-10-CM

## 2020-04-08 DIAGNOSIS — F99 INSOMNIA DUE TO OTHER MENTAL DISORDER: Primary | ICD-10-CM

## 2020-04-08 DIAGNOSIS — F29 PSYCHOSIS, UNSPECIFIED PSYCHOSIS TYPE (H): ICD-10-CM

## 2020-04-08 DIAGNOSIS — F51.05 INSOMNIA DUE TO OTHER MENTAL DISORDER: Primary | ICD-10-CM

## 2020-04-08 RX ORDER — QUETIAPINE FUMARATE 100 MG/1
100 TABLET, FILM COATED ORAL 2 TIMES DAILY PRN
Qty: 60 TABLET | Refills: 3 | Status: SHIPPED | OUTPATIENT
Start: 2020-04-08 | End: 2020-06-03

## 2020-04-08 RX ORDER — QUETIAPINE FUMARATE 200 MG/1
200 TABLET, FILM COATED ORAL AT BEDTIME
Qty: 30 TABLET | Refills: 5 | Status: SHIPPED | OUTPATIENT
Start: 2020-04-08 | End: 2020-06-03

## 2020-04-08 ASSESSMENT — PATIENT HEALTH QUESTIONNAIRE - PHQ9: SUM OF ALL RESPONSES TO PHQ QUESTIONS 1-9: 14

## 2020-04-09 ENCOUNTER — TELEPHONE (OUTPATIENT)
Dept: FAMILY MEDICINE | Facility: CLINIC | Age: 60
End: 2020-04-09

## 2020-04-09 NOTE — TELEPHONE ENCOUNTER
Spoke w/ pt, who said she could not speak right now, as she was in a cab. Agreed to speak in 30 minutes.

## 2020-04-15 ENCOUNTER — TELEPHONE (OUTPATIENT)
Dept: FAMILY MEDICINE | Facility: CLINIC | Age: 60
End: 2020-04-15

## 2020-04-15 ENCOUNTER — TELEPHONE (OUTPATIENT)
Dept: ORTHOPEDICS | Facility: CLINIC | Age: 60
End: 2020-04-15

## 2020-04-15 NOTE — TELEPHONE ENCOUNTER
[facility] Family Medicine phone call message- general phone call:    Reason for call: Patient called stating she recently moved and is on the second floor and is unable to keep going up and down the stairs and is needing/ wondering if Dr. Cook is able to write an order to ok patient to move to the first floor. Please call and advise.    Return call needed: Yes    OK to leave a message on voice mail? Yes    Primary language: English      needed? No    Call taken on April 15, 2020 at 4:12 PM by Kaycee Arias

## 2020-04-15 NOTE — TELEPHONE ENCOUNTER
[facility] Family Medicine phone call message- general phone call:    Reason for call: Patient called just wanting to say thank you to Dr. Cook, she as able to get the nebulizer.    Return call needed: No    OK to leave a message on voice mail? Yes    Primary language: English      needed? No    Call taken on April 15, 2020 at 11:53 AM by Kaycee Arias

## 2020-04-16 NOTE — TELEPHONE ENCOUNTER
Called and informed patient we will contact her Monday 4/21/20 once letter is completed because that is when Dr. Cook would be in next.  Patient understood and agreed.

## 2020-04-20 ENCOUNTER — VIRTUAL VISIT (OUTPATIENT)
Dept: FAMILY MEDICINE | Facility: CLINIC | Age: 60
End: 2020-04-20
Payer: COMMERCIAL

## 2020-04-20 ENCOUNTER — TELEPHONE (OUTPATIENT)
Dept: FAMILY MEDICINE | Facility: CLINIC | Age: 60
End: 2020-04-20

## 2020-04-20 DIAGNOSIS — F51.05 INSOMNIA DUE TO OTHER MENTAL DISORDER: ICD-10-CM

## 2020-04-20 DIAGNOSIS — R26.2 AMBULATORY DYSFUNCTION: ICD-10-CM

## 2020-04-20 DIAGNOSIS — R11.2 NAUSEA AND VOMITING, INTRACTABILITY OF VOMITING NOT SPECIFIED, UNSPECIFIED VOMITING TYPE: ICD-10-CM

## 2020-04-20 DIAGNOSIS — J44.9 CHRONIC OBSTRUCTIVE PULMONARY DISEASE, UNSPECIFIED COPD TYPE (H): ICD-10-CM

## 2020-04-20 DIAGNOSIS — M54.30 SCIATICA, UNSPECIFIED LATERALITY: ICD-10-CM

## 2020-04-20 DIAGNOSIS — F33.1 MAJOR DEPRESSIVE DISORDER, RECURRENT EPISODE, MODERATE (H): Primary | ICD-10-CM

## 2020-04-20 DIAGNOSIS — F99 INSOMNIA DUE TO OTHER MENTAL DISORDER: ICD-10-CM

## 2020-04-20 RX ORDER — TRAZODONE HYDROCHLORIDE 50 MG/1
50 TABLET, FILM COATED ORAL AT BEDTIME
Qty: 30 TABLET | Refills: 11 | Status: SHIPPED | OUTPATIENT
Start: 2020-04-20 | End: 2020-05-06

## 2020-04-20 RX ORDER — ONDANSETRON 4 MG/1
4 TABLET, ORALLY DISINTEGRATING ORAL EVERY 8 HOURS PRN
Qty: 20 TABLET | Refills: 11 | Status: SHIPPED | OUTPATIENT
Start: 2020-04-20 | End: 2020-06-03

## 2020-04-20 ASSESSMENT — PATIENT HEALTH QUESTIONNAIRE - PHQ9: SUM OF ALL RESPONSES TO PHQ QUESTIONS 1-9: 14

## 2020-04-20 NOTE — PROGRESS NOTES
Pt is a 59 year old female being evaluated via phone encounter for follow-up:    1) Mood/ Insomnia -     2) Asthma/COPD -     3) Sciatica -

## 2020-04-20 NOTE — TELEPHONE ENCOUNTER
Attempted to contact pt x3 for telephone visit and unable to. Informed  and provider of no show.  NURY Avendaño

## 2020-04-20 NOTE — LETTER
To Whom It May Concern,    Lisa Scott is under my care. She has significant limitations to climbing up and down stairs due to hx of L hip fracture with hardware in place and post-traumatic arthritis, chronic lumbar and SI pain, and bilateral shoulder pain (limiting upper body support).     I am recommending she live in a unit without stairs. Please consider this accomodation as I do believe it is medically necessary.      Sincerely,        Nikolay Cook MD  April 20, 2020  1:15 PM

## 2020-04-20 NOTE — PROGRESS NOTES
"Family Medicine Telephone Visit Note           Telephone Visit Consent   Patient was verbally read the following and verbal consent was obtained.    \"This telephone visit will be conducted via a call between you and your physician/provider. We have found that certain health care needs can be provided without the need for a physical exam.  This service lets us provide the care you need with a short phone conversation.  If a prescription is necessary we can send it directly to your pharmacy.  If lab work is needed we can place an order for that and you can then stop by our lab to have the test done at a later time.    Telephone visits are billed at different rates depending on your insurance coverage. During this emergency period, for some insurers they may be billed the same as an in-person visit.  Please reach out to your insurance provider with any questions.    If during the course of the call the physician/provider feels a telephone visit is not appropriate, you will not be charged for this service.\"    Name person giving consent:  Patient   Date verbal consent given:  4/20/2020  Time verbal consent given:  10:49 AM     Chief Complaint: Follow-up         HPI   Patients name: Lisa  Appointment start time:  11:02 AM    Pt is a 59 year old female being evaluated via phone encounter for follow-up:    1) Mood/ Insomnia - restarted Seroquel night dose -> it's way better  Needs Trazodone refill  Needs Zofran refill - feels nausea due to meds?    2) Asthma/COPD - got nebulizer machine  \"everything is going smoothly\"    3) Sciatica - using cane more but also has shoulder pain; has a walker but not in her possession.   Shocking pains down leg have increased   Almost fell a couple times   Housing letter -> lives on 2nd floor unit; needs a letter stating that she needs to be on main floor unit due to ortho issues    SH: At [facility] now in [facility] - can stay here up to 3 months      Current Outpatient Medications "   Medication Sig Dispense Refill     acetaminophen (TYLENOL) 500 MG tablet Take 1-2 tablets (500-1,000 mg) by mouth every 8 hours as needed for mild pain 90 tablet 11     aclidinium (TUDORZA PRESSAIR) 400 MCG/ACT inhaler Inhale 1 puff into the lungs 2 times daily 1 Inhaler 11     albuterol (PROAIR HFA/PROVENTIL HFA/VENTOLIN HFA) 108 (90 Base) MCG/ACT inhaler Inhale 2 puffs into the lungs every 6 hours as needed for shortness of breath / dyspnea or wheezing 2 Inhaler 3     buPROPion (WELLBUTRIN XL) 300 MG 24 hr tablet Take 300 mg by mouth daily       busPIRone (BUSPAR) 30 MG tablet Take 0.5 tablets (15 mg) by mouth 3 times daily       calcium carbonate (TUMS) 500 MG chewable tablet Take 2 tablets (1,000 mg) by mouth 4 times daily as needed for heartburn       divalproex sodium delayed-release (DEPAKOTE) 250 MG DR tablet Take 1 tablet (250 mg) by mouth 2 times daily 60 tablet 3     fluticasone (FLONASE) 50 MCG/ACT nasal spray Spray 2 sprays in nostril daily 9.9 mL 11     fluticasone-salmeterol (ADVAIR-HFA) 230-21 MCG/ACT inhaler Inhale 2 puffs into the lungs 2 times daily 1 Inhaler 11     gabapentin (NEURONTIN) 300 MG capsule Take 2 capsules (600 mg) by mouth 3 times daily 180 capsule 3     hydrOXYzine (ATARAX) 25 MG tablet Take 1 tablet (25 mg) by mouth every 8 hours as needed for anxiety 30 tablet 11     ibuprofen (ADVIL/MOTRIN) 400 MG tablet Take 1 tablet (400 mg) by mouth every 6 hours as needed for moderate pain 90 tablet 3     ipratropium - albuterol 0.5 mg/2.5 mg/3 mL (DUONEB) 0.5-2.5 (3) MG/3ML neb solution NEBULIZE 1 VIAL BY MOUTH FOUR TIMES DAILY 90 mL 3     lisinopril (ZESTRIL) 20 MG tablet Take 20 mg by mouth daily       loperamide (IMODIUM) 2 MG capsule Take 2 mg by mouth every 6 hours as needed       Lubricants (ASTROGLIDE) GEL Externally apply topically 2 times daily 66.5 g 1     Melatonin 10 MG TABS tablet Take 1 tablet (10 mg) by mouth daily (with dinner) , additional tablet as needed for late night  awakenings 90 tablet 1     omeprazole (PRILOSEC) 20 MG DR capsule Take 1 capsule (20 mg) by mouth 2 times daily 60 capsule 11     ondansetron (ZOFRAN-ODT) 4 MG ODT tab Take 1 tablet (4 mg) by mouth every 8 hours as needed for nausea 20 tablet 11     order for DME DME - pt needs nebulizer tubing 1 Device 0     OXcarbazepine (TRILEPTAL) 300 MG tablet Take 1 tablet (300 mg) by mouth At Bedtime 90 tablet 1     polyethylene glycol (MIRALAX/GLYCOLAX) powder Take 17 g (1 capful) by mouth daily as needed for constipation       polyvinyl alcohol (LIQUIFILM TEARS) 1.4 % ophthalmic solution Place 1 drop into both eyes as needed for dry eyes       prazosin (MINIPRESS) 2 MG capsule Take 2 capsules (4 mg) by mouth At Bedtime 60 capsule 3     QUEtiapine (SEROQUEL) 100 MG tablet Take 1 tablet (100 mg) by mouth 2 times daily as needed (anxiety or panic attacks) 60 tablet 3     QUEtiapine (SEROQUEL) 200 MG tablet Take 1 tablet (200 mg) by mouth At Bedtime 30 tablet 5     ranitidine (ZANTAC) 150 MG tablet Take 1 tablet (150 mg) by mouth 2 times daily       sennosides (SENOKOT) 8.6 MG tablet Take 1 tablet by mouth 2 times daily as needed for constipation 60 tablet 3     tiZANidine (ZANAFLEX) 4 MG tablet Take 2 tablets (8 mg) by mouth 3 times daily as needed for muscle spasms 240 tablet 3     traZODone (DESYREL) 50 MG tablet Take 1 tablet (50 mg) by mouth At Bedtime 30 tablet 1     vitamin B1 (THIAMINE) 100 MG tablet Take 200 mg by mouth 3 times daily       Allergies   Allergen Reactions     Nkda [No Known Drug Allergies]               Review of Systems:     ROS COMP: CONSTITUTIONAL: NEGATIVE for fever, chills, change in weight  ENT/MOUTH: NEGATIVE for ear, mouth and throat problems  RESP: NEGATIVE for significant cough or SOB  CV: NEGATIVE for chest pain, palpitations or peripheral edema         Physical Exam:     There were no vitals taken for this visit.  Estimated body mass index is 25.61 kg/m  as calculated from the following:     "Height as of 2/25/20: 1.575 m (5' 2\").    Weight as of 4/8/20: 63.5 kg (140 lb).    Exam:  Constitutional: healthy, alert and no distress  Psychiatric: mentation appears normal and affect normal/bright - greatly improved from last week          Assessment and Plan   (F33.1) Major depressive disorder, recurrent episode, moderate (H)  (primary encounter diagnosis)  Comment:   Plan: stable on current meds    (F51.05,  F99) Insomnia due to other mental disorder  Comment: improved w/ seroquel adjustment; trazodone refilled  Plan: traZODone (DESYREL) 50 MG tablet            (J44.9) Chronic obstructive pulmonary disease, unspecified COPD type (H)  Comment:   Plan: stable; has nebulizer; meds/ action plan reviewed    (M54.30) Sciatica, unspecified laterality  Comment:   Plan: still poorly controlled; will write letter as requested to have her live on street level unit w/o stairs as climbing is exacerbating her pain    (R26.2) Ambulatory dysfunction  Comment:   Plan: will write letter as requested    (R11.2) Nausea and vomiting, intractability of vomiting not specified, unspecified vomiting type  Comment: refilled  Plan: ondansetron (ZOFRAN-ODT) 4 MG ODT tab            Refilled medications that would be required in the next 3 months.     After Visit Information:  Patient declined AVS     4 weeks    Appointment end time: 11:13 AM  This is a telephone visit that took 11 minutes.      Clinician location:  [place] Rahul Kapur, MD  April 20, 2020  11:13 AM    Addendum: letter written and faxed as requested.    Nikolay Cook MD  April 20, 2020  1:24 PM    "

## 2020-04-23 ENCOUNTER — TELEPHONE (OUTPATIENT)
Dept: FAMILY MEDICINE | Facility: CLINIC | Age: 60
End: 2020-04-23

## 2020-04-23 NOTE — TELEPHONE ENCOUNTER
[facility] Family Medicine phone call message- general phone call:    Reason for call: Patient is calling to see if Dr. Cook can write a statement about her cough. She states she has a cough but it is normal and she started smoking on and off. She states the [facility] is bringing her food to her room starting today. She states she is outside right now to cool off. She states they told her she can't talk to no one and that she is wearing a mask. She states they think her cough is covid-19. Would like the letter fax to . Did let her know that Dr. Cook will be in this afternoon when she asked if he was in clinic today. Told her it could take up to two business days for a response. Please call and advise.     Return call needed: Yes    OK to leave a message on voice mail?     Primary language: English      needed? No    Call taken on April 23, 2020 at 10:13 AM by Dangelo Arias

## 2020-04-23 NOTE — TELEPHONE ENCOUNTER
Caller was advised from patient that [facility] will be faxing over a letter in regards to her health. Caller advised patient is in a shelter home and had coughing with phlegm/vomit this morning. Patient informed caller she has COPD. Caller state their fax machine is out of toner and would like the letter fax to [phone].

## 2020-04-23 NOTE — LETTER
To Whom It May Concern,    Lisa Scott is under my care. Our office was informed that she has a cough. In the setting of the COVID pandemic, it is not possible for me to say with certainty that her cough is benign. She does have COPD and a chronic cough but she could still have coronavirus. We are also unable to provide testing to her in our clinic as testing is being reserved for hospitalized patients. I would recommend following your shelter's covid protocol and having her quarantine for at least 7 days and monitor for fever, shortness of breath. She should go to the Emergency department if her symptoms become concerning.         Sincerely,      Nikolay Cook MD  April 23, 2020  2:18 PM

## 2020-04-23 NOTE — TELEPHONE ENCOUNTER
Letter written stating that we are unable to say with certainty that her cough is not related to COVID. She should self-quarantine per her shelter's protocol.    Nikolay Cook MD  April 23, 2020  2:30 PM

## 2020-04-27 DIAGNOSIS — M54.2 CERVICALGIA: ICD-10-CM

## 2020-04-28 RX ORDER — BUSPIRONE HYDROCHLORIDE 30 MG/1
15 TABLET ORAL 3 TIMES DAILY
Qty: 45 TABLET | Refills: 11 | Status: SHIPPED | OUTPATIENT
Start: 2020-04-28 | End: 2021-02-02

## 2020-04-28 RX ORDER — GABAPENTIN 300 MG/1
600 CAPSULE ORAL 3 TIMES DAILY
Qty: 180 CAPSULE | Refills: 11 | Status: SHIPPED | OUTPATIENT
Start: 2020-04-28 | End: 2020-07-02

## 2020-04-29 ENCOUNTER — VIRTUAL VISIT (OUTPATIENT)
Dept: FAMILY MEDICINE | Facility: CLINIC | Age: 60
End: 2020-04-29
Payer: COMMERCIAL

## 2020-04-29 ENCOUNTER — TELEPHONE (OUTPATIENT)
Dept: FAMILY MEDICINE | Facility: CLINIC | Age: 60
End: 2020-04-29

## 2020-04-29 VITALS — WEIGHT: 130 LBS | HEIGHT: 62 IN | BODY MASS INDEX: 23.92 KG/M2

## 2020-04-29 DIAGNOSIS — G40.909 SEIZURE DISORDER (H): ICD-10-CM

## 2020-04-29 RX ORDER — DIVALPROEX SODIUM 250 MG/1
250 TABLET, DELAYED RELEASE ORAL 2 TIMES DAILY
Qty: 60 TABLET | Refills: 11 | Status: SHIPPED | OUTPATIENT
Start: 2020-04-29 | End: 2020-10-15

## 2020-04-29 ASSESSMENT — MIFFLIN-ST. JEOR: SCORE: 1127.93

## 2020-04-29 ASSESSMENT — PATIENT HEALTH QUESTIONNAIRE - PHQ9: SUM OF ALL RESPONSES TO PHQ QUESTIONS 1-9: 17

## 2020-04-29 NOTE — PROGRESS NOTES
"Family Medicine Telephone Visit Note           Telephone Visit Consent   Patient was verbally read the following and verbal consent was obtained.    \"This telephone visit will be conducted via a call between you and your physician/provider. We have found that certain health care needs can be provided without the need for a physical exam.  This service lets us provide the care you need with a short phone conversation.  If a prescription is necessary we can send it directly to your pharmacy.  If lab work is needed we can place an order for that and you can then stop by our lab to have the test done at a later time.    Telephone visits are billed at different rates depending on your insurance coverage. During this emergency period, for some insurers they may be billed the same as an in-person visit.  Please reach out to your insurance provider with any questions.    If during the course of the call the physician/provider feels a telephone visit is not appropriate, you will not be charged for this service.\"    Name person giving consent:  Patient   Date verbal consent given:  4/29/2020  Time verbal consent given:  11:25 AM            hospitals   Patients name: Lisa  Appointment start time:  12:04 AM    Thinks she needs refills:  1) gabapentin - refilled yesterday  2) seroquel - refilled 4/8/20  3) buspar - refilled yesterday    I reviewed her entire med list and sent in any necessary refills. She had no further questions/ concerns.       Current Outpatient Medications   Medication Sig Dispense Refill     acetaminophen (TYLENOL) 500 MG tablet Take 1-2 tablets (500-1,000 mg) by mouth every 8 hours as needed for mild pain 90 tablet 11     aclidinium (TUDORZA PRESSAIR) 400 MCG/ACT inhaler Inhale 1 puff into the lungs 2 times daily 1 Inhaler 11     albuterol (PROAIR HFA/PROVENTIL HFA/VENTOLIN HFA) 108 (90 Base) MCG/ACT inhaler Inhale 2 puffs into the lungs every 6 hours as needed for shortness of breath / dyspnea or wheezing 2 " Inhaler 3     buPROPion (WELLBUTRIN XL) 300 MG 24 hr tablet Take 300 mg by mouth daily       busPIRone HCl (BUSPAR) 30 MG tablet Take 0.5 tablets (15 mg) by mouth 3 times daily 45 tablet 11     calcium carbonate (TUMS) 500 MG chewable tablet Take 2 tablets (1,000 mg) by mouth 4 times daily as needed for heartburn       divalproex sodium delayed-release (DEPAKOTE) 250 MG DR tablet Take 1 tablet (250 mg) by mouth 2 times daily 60 tablet 3     fluticasone (FLONASE) 50 MCG/ACT nasal spray Spray 2 sprays in nostril daily 9.9 mL 11     fluticasone-salmeterol (ADVAIR-HFA) 230-21 MCG/ACT inhaler Inhale 2 puffs into the lungs 2 times daily 1 Inhaler 11     gabapentin (NEURONTIN) 300 MG capsule Take 2 capsules (600 mg) by mouth 3 times daily 180 capsule 11     hydrOXYzine (ATARAX) 25 MG tablet Take 1 tablet (25 mg) by mouth every 8 hours as needed for anxiety 30 tablet 11     ibuprofen (ADVIL/MOTRIN) 400 MG tablet Take 1 tablet (400 mg) by mouth every 6 hours as needed for moderate pain 90 tablet 3     ipratropium - albuterol 0.5 mg/2.5 mg/3 mL (DUONEB) 0.5-2.5 (3) MG/3ML neb solution NEBULIZE 1 VIAL BY MOUTH FOUR TIMES DAILY 90 mL 3     lisinopril (ZESTRIL) 20 MG tablet Take 20 mg by mouth daily       loperamide (IMODIUM) 2 MG capsule Take 2 mg by mouth every 6 hours as needed       Lubricants (ASTROGLIDE) GEL Externally apply topically 2 times daily (Patient not taking: Reported on 4/20/2020) 66.5 g 1     Melatonin 10 MG TABS tablet Take 1 tablet (10 mg) by mouth daily (with dinner) , additional tablet as needed for late night awakenings 90 tablet 1     omeprazole (PRILOSEC) 20 MG DR capsule Take 1 capsule (20 mg) by mouth 2 times daily 60 capsule 11     ondansetron (ZOFRAN-ODT) 4 MG ODT tab Take 1 tablet (4 mg) by mouth every 8 hours as needed for nausea 20 tablet 11     order for DME DME - pt needs nebulizer tubing 1 Device 0     OXcarbazepine (TRILEPTAL) 300 MG tablet Take 1 tablet (300 mg) by mouth At Bedtime 90 tablet  "1     polyethylene glycol (MIRALAX/GLYCOLAX) powder Take 17 g (1 capful) by mouth daily as needed for constipation       polyvinyl alcohol (LIQUIFILM TEARS) 1.4 % ophthalmic solution Place 1 drop into both eyes as needed for dry eyes       prazosin (MINIPRESS) 2 MG capsule Take 2 capsules (4 mg) by mouth At Bedtime 60 capsule 3     QUEtiapine (SEROQUEL) 100 MG tablet Take 1 tablet (100 mg) by mouth 2 times daily as needed (anxiety or panic attacks) 60 tablet 3     QUEtiapine (SEROQUEL) 200 MG tablet Take 1 tablet (200 mg) by mouth At Bedtime 30 tablet 5     ranitidine (ZANTAC) 150 MG tablet Take 1 tablet (150 mg) by mouth 2 times daily       sennosides (SENOKOT) 8.6 MG tablet Take 1 tablet by mouth 2 times daily as needed for constipation 60 tablet 3     tiZANidine (ZANAFLEX) 4 MG tablet Take 2 tablets (8 mg) by mouth 3 times daily as needed for muscle spasms 240 tablet 3     traZODone (DESYREL) 50 MG tablet Take 1 tablet (50 mg) by mouth At Bedtime 30 tablet 11     vitamin B1 (THIAMINE) 100 MG tablet Take 200 mg by mouth 3 times daily       Allergies   Allergen Reactions     Nkda [No Known Drug Allergies]               Review of Systems:     ROS COMP: CONSTITUTIONAL: NEGATIVE for fever, chills, change in weight  RESP: NEGATIVE for significant cough or SOB  CV: NEGATIVE for chest pain, palpitations or peripheral edema         Physical Exam:     There were no vitals taken for this visit.  Estimated body mass index is 25.61 kg/m  as calculated from the following:    Height as of 2/25/20: 1.575 m (5' 2\").    Weight as of 4/8/20: 63.5 kg (140 lb).    Exam:  Constitutional: healthy, alert and no distress  Psychiatric: mentation appears normal and affect normal/bright          Assessment and Plan     Med refill    (G40.909) Seizure disorder (H)  Comment:   Plan: divalproex sodium delayed-release (DEPAKOTE)         250 MG DR tablet                Refilled medications that would be required in the next 3 months.     After " Visit Information:  Patient declined AVS     No follow-ups on file.    Appointment end time: 12:07 PM  This is a telephone visit that took 3 minutes.      Clinician location:  Phalen Rahul Kapur, MD  April 29, 2020  1:03 PM

## 2020-05-05 DIAGNOSIS — F51.05 INSOMNIA DUE TO OTHER MENTAL DISORDER: ICD-10-CM

## 2020-05-05 DIAGNOSIS — F99 INSOMNIA DUE TO OTHER MENTAL DISORDER: ICD-10-CM

## 2020-05-06 RX ORDER — TRAZODONE HYDROCHLORIDE 50 MG/1
50 TABLET, FILM COATED ORAL AT BEDTIME
Qty: 30 TABLET | Refills: 11 | Status: SHIPPED | OUTPATIENT
Start: 2020-05-06 | End: 2020-06-03

## 2020-05-07 ENCOUNTER — TELEPHONE (OUTPATIENT)
Dept: FAMILY MEDICINE | Facility: CLINIC | Age: 60
End: 2020-05-07

## 2020-05-07 DIAGNOSIS — K21.9 GASTROESOPHAGEAL REFLUX DISEASE WITHOUT ESOPHAGITIS: ICD-10-CM

## 2020-05-07 DIAGNOSIS — I10 ESSENTIAL HYPERTENSION: Primary | ICD-10-CM

## 2020-05-07 RX ORDER — LISINOPRIL 20 MG/1
20 TABLET ORAL DAILY
Qty: 30 TABLET | Refills: 11 | Status: SHIPPED | OUTPATIENT
Start: 2020-05-07 | End: 2020-11-17

## 2020-05-07 NOTE — TELEPHONE ENCOUNTER
[facility] Family Medicine phone call message- patient requesting a refill:    Full Medication Name: lisinopril (ZESTRIL)& omeprazole (PRILOSEC)     Dose: 20 MG & 20 MG    Pharmacy confirmed as   [id number] - [place], [place] - [address] [place] [address] AT [facility] & [facility]  [address] [place] [address]  Phone: [phone] Fax: [phone]  : Yes    Additional Comments: Patient called stating for her omeprazole she picked them up on 4/24/2020 and takes 2 a day but currently only has 1 left is unsure how that happened. Patient states if Dr. Cook is ok or needs to subsituite until refill can be approved. Please call and advise if needed. Patient will be calling back later to check.     OK to leave a message on voice mail? Yes    Primary language: English      needed? No    Call taken on May 7, 2020 at 9:19 AM by Kaycee Arias

## 2020-05-07 NOTE — TELEPHONE ENCOUNTER
[facility] Follow-up    Call initiated by clinic.  Message recorded by SENAIT Booth  Calling for: [facility] Monthly  Patient: Lisa Scott  Phone conversation with: Patient  Patient's Phone number/s: Home number on file [phone] (home)  Available today in the PM on their Home number.  OK to leave message on voice mail? Yes      Major diagnoses and/or issues requiring coordination services  Generalized anxiety disorder   Chronic obstructive pulmonary disease    In the past month, how many times have you been to the Emergency Room? 1  In the past month, how many times have you been hospitalized? 0    Summary notes: Pt doing okay since she last spoke w/ SW. Pt currently residing in a [place] hotel for the homeless. Pt was transferred after being @ crisis center. Said she was working w/  for long term solutions. Feels safe. SW let pt know that 2 medications were approved and sent to her pharmacy. Pt thanked SW and said she would be by to pick it up. No other needs at this time.       Counseling and coordination activities with: ANILA Booth    Follow-up date: 6/7/2020

## 2020-05-12 ENCOUNTER — TELEPHONE (OUTPATIENT)
Dept: FAMILY MEDICINE | Facility: CLINIC | Age: 60
End: 2020-05-12
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
## 2020-05-15 ENCOUNTER — TELEPHONE (OUTPATIENT)
Dept: FAMILY MEDICINE | Facility: CLINIC | Age: 60
End: 2020-05-15

## 2020-05-15 DIAGNOSIS — J42 CHRONIC BRONCHITIS, UNSPECIFIED CHRONIC BRONCHITIS TYPE (H): Primary | ICD-10-CM

## 2020-05-15 RX ORDER — INHALER, ASSIST DEVICES
SPACER (EA) MISCELLANEOUS
Qty: 1 EACH | Refills: 0 | Status: SHIPPED | OUTPATIENT
Start: 2020-05-15 | End: 2021-01-25
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
## 2020-05-15 NOTE — TELEPHONE ENCOUNTER
Patient called again and would like for PCP to prescribe for another nebulizer today as she state she is having a really hard time breathing. No appointments wanted during this call but requesting for PCP to order one and she will  today. Patient also request for rob Lobo to call back.

## 2020-05-18 ENCOUNTER — TELEPHONE (OUTPATIENT)
Dept: FAMILY MEDICINE | Facility: CLINIC | Age: 60
End: 2020-05-18

## 2020-05-18 NOTE — PROGRESS NOTES
"Family Medicine Telephone Visit Note         Telephone Visit Consent   Patient was verbally read the following and verbal consent was obtained.    \"Telephone visits are billed at different rates depending on your insurance coverage. During this emergency period, for some insurers they may be billed the same as an in-person visit.  Please reach out to your insurance provider with any questions.  If during the course of the call the physician/provider feels a telephone visit is not appropriate, you will not be charged for this service.\"    Name person giving consent:  Patient   Date verbal consent given:  5/20/2020  Time verbal consent given:  1:03 PM     Chief complaint - follow- up          HPI   Patients name: Lisa  Appointment start time:  1:07 PM    Last virtual visit was 4/29/20 for refills of medications  Since then, ED visit on 5/4 for hyperemesis due to cannabis intoxication   Also had call last week claiming she lost her nebulizer machine again     1) Insomnia - New nightime regimen is working     2) Mood - stable     3) SH - [facility] [place] brought everybody w/ health issues in a hotel [place] ([facility])     4) Seasonal allergies - worse in the spring     5) Asthma - using advair and tudorza as directed; using albuterol w/ spaced  Using the albuterol a lot - 6x/day   Is getting wheezing more than usual  She feels like the allergies are making it worse     6) HTN - is taking her lisinoproil  Denies vision changes/ HA       Current Outpatient Medications   Medication Sig Dispense Refill     acetaminophen (TYLENOL) 500 MG tablet Take 1-2 tablets (500-1,000 mg) by mouth every 8 hours as needed for mild pain 90 tablet 11     aclidinium (TUDORZA PRESSAIR) 400 MCG/ACT inhaler Inhale 1 puff into the lungs 2 times daily 1 Inhaler 11     albuterol (PROAIR HFA/PROVENTIL HFA/VENTOLIN HFA) 108 (90 Base) MCG/ACT inhaler Inhale 2 puffs into the lungs every 6 hours as needed for shortness of breath / dyspnea or " wheezing 2 Inhaler 3     buPROPion (WELLBUTRIN XL) 300 MG 24 hr tablet Take 300 mg by mouth daily       busPIRone HCl (BUSPAR) 30 MG tablet Take 0.5 tablets (15 mg) by mouth 3 times daily 45 tablet 11     calcium carbonate (TUMS) 500 MG chewable tablet Take 2 tablets (1,000 mg) by mouth 4 times daily as needed for heartburn       divalproex sodium delayed-release (DEPAKOTE) 250 MG DR tablet Take 1 tablet (250 mg) by mouth 2 times daily 60 tablet 11     fluticasone (FLONASE) 50 MCG/ACT nasal spray Spray 2 sprays in nostril daily 9.9 mL 11     fluticasone-salmeterol (ADVAIR-HFA) 230-21 MCG/ACT inhaler Inhale 2 puffs into the lungs 2 times daily 1 Inhaler 11     gabapentin (NEURONTIN) 300 MG capsule Take 2 capsules (600 mg) by mouth 3 times daily 180 capsule 11     hydrOXYzine (ATARAX) 25 MG tablet Take 1 tablet (25 mg) by mouth every 8 hours as needed for anxiety 30 tablet 11     ibuprofen (ADVIL/MOTRIN) 400 MG tablet Take 1 tablet (400 mg) by mouth every 6 hours as needed for moderate pain 90 tablet 3     ipratropium - albuterol 0.5 mg/2.5 mg/3 mL (DUONEB) 0.5-2.5 (3) MG/3ML neb solution NEBULIZE 1 VIAL BY MOUTH FOUR TIMES DAILY 90 mL 3     lisinopril (ZESTRIL) 20 MG tablet Take 1 tablet (20 mg) by mouth daily 30 tablet 11     loperamide (IMODIUM) 2 MG capsule Take 2 mg by mouth every 6 hours as needed       Lubricants (ASTROGLIDE) GEL Externally apply topically 2 times daily 66.5 g 1     Melatonin 10 MG TABS tablet Take 1 tablet (10 mg) by mouth daily (with dinner) , additional tablet as needed for late night awakenings 90 tablet 1     omeprazole (PRILOSEC) 20 MG DR capsule Take 1 capsule (20 mg) by mouth 2 times daily 60 capsule 11     ondansetron (ZOFRAN-ODT) 4 MG ODT tab Take 1 tablet (4 mg) by mouth every 8 hours as needed for nausea 20 tablet 11     order for DME DME - pt needs nebulizer tubing 1 Device 0     OXcarbazepine (TRILEPTAL) 300 MG tablet Take 1 tablet (300 mg) by mouth At Bedtime 90 tablet 1      "polyethylene glycol (MIRALAX/GLYCOLAX) powder Take 17 g (1 capful) by mouth daily as needed for constipation       polyvinyl alcohol (LIQUIFILM TEARS) 1.4 % ophthalmic solution Place 1 drop into both eyes as needed for dry eyes       prazosin (MINIPRESS) 2 MG capsule Take 2 capsules (4 mg) by mouth At Bedtime 60 capsule 3     QUEtiapine (SEROQUEL) 100 MG tablet Take 1 tablet (100 mg) by mouth 2 times daily as needed (anxiety or panic attacks) 60 tablet 3     QUEtiapine (SEROQUEL) 200 MG tablet Take 1 tablet (200 mg) by mouth At Bedtime 30 tablet 5     ranitidine (ZANTAC) 150 MG tablet Take 1 tablet (150 mg) by mouth 2 times daily       sennosides (SENOKOT) 8.6 MG tablet Take 1 tablet by mouth 2 times daily as needed for constipation 60 tablet 3     spacer (OPTICHAMBER FAZAL) holding chamber For use with inhaler 1 each 0     tiZANidine (ZANAFLEX) 4 MG tablet Take 2 tablets (8 mg) by mouth 3 times daily as needed for muscle spasms 240 tablet 3     traZODone (DESYREL) 50 MG tablet Take 1 tablet (50 mg) by mouth At Bedtime 30 tablet 11     vitamin B1 (THIAMINE) 100 MG tablet Take 200 mg by mouth 3 times daily       Allergies   Allergen Reactions     Nkda [No Known Drug Allergies]               Review of Systems:     Constitutional, HEENT, cardiovascular, pulmonary, gi and gu systems are negative, except as otherwise noted.         Physical Exam:     There were no vitals taken for this visit.  Estimated body mass index is 23.78 kg/m  as calculated from the following:    Height as of 4/29/20: 1.575 m (5' 2\").    Weight as of 4/29/20: 59 kg (130 lb).    Exam:  Constitutional: healthy, alert and no distress  Psychiatric: mentation appears normal and affect normal/bright            Assessment and Plan     (J30.2) Seasonal allergic rhinitis, unspecified trigger  (primary encounter diagnosis)  Comment: refilled flonase and allegra; f/u in 2 wks   Plan: fluticasone (FLONASE) 50 MCG/ACT nasal spray,         fexofenadine " (ALLEGRA) 180 MG tablet            (J42) Chronic bronchitis, unspecified chronic bronchitis type (H)  Comment: concerning use of her rescue inhaler; will allow her trial of allergy meds but low threshold to tx w/ pred for asthma flare. Consider inc controller medication as well  Plan: albuterol (PROAIR HFA/PROVENTIL HFA/VENTOLIN         HFA) 108 (90 Base) MCG/ACT inhaler            (F33.1) Major depressive disorder, recurrent episode, moderate (H)  Comment:   Plan: stable    (F51.05,  F99) Insomnia due to other mental disorder  Comment:   Plan: stable    (I10) Essential hypertension  Comment:   Plan: cont lisinopril; encouraged her to get some readings so we can monitor      Refilled medications that would be required in the next 3 months.     After Visit Information:  Patient declined AVS     No follow-ups on file.    Appointment end time: 1:18 PM  This is a telephone visit that took 11 minutes.    Clinician location:  Phalen Rahul Kapur, MD  May 20, 2020  1:18 PM

## 2020-05-20 ENCOUNTER — VIRTUAL VISIT (OUTPATIENT)
Dept: FAMILY MEDICINE | Facility: CLINIC | Age: 60
End: 2020-05-20
Payer: COMMERCIAL

## 2020-05-20 DIAGNOSIS — F51.05 INSOMNIA DUE TO OTHER MENTAL DISORDER: ICD-10-CM

## 2020-05-20 DIAGNOSIS — F99 INSOMNIA DUE TO OTHER MENTAL DISORDER: ICD-10-CM

## 2020-05-20 DIAGNOSIS — F33.1 MAJOR DEPRESSIVE DISORDER, RECURRENT EPISODE, MODERATE (H): ICD-10-CM

## 2020-05-20 DIAGNOSIS — J30.2 SEASONAL ALLERGIC RHINITIS, UNSPECIFIED TRIGGER: Primary | ICD-10-CM

## 2020-05-20 DIAGNOSIS — J42 CHRONIC BRONCHITIS, UNSPECIFIED CHRONIC BRONCHITIS TYPE (H): ICD-10-CM

## 2020-05-20 DIAGNOSIS — I10 ESSENTIAL HYPERTENSION: ICD-10-CM

## 2020-05-20 RX ORDER — FLUTICASONE PROPIONATE 50 MCG
2 SPRAY, SUSPENSION (ML) NASAL DAILY
Qty: 9.9 ML | Refills: 11 | Status: SHIPPED | OUTPATIENT
Start: 2020-05-20 | End: 2020-12-09

## 2020-05-20 RX ORDER — FEXOFENADINE HCL 180 MG/1
180 TABLET ORAL DAILY
Qty: 30 TABLET | Refills: 11 | Status: SHIPPED | OUTPATIENT
Start: 2020-05-20 | End: 2021-02-02

## 2020-05-20 RX ORDER — ALBUTEROL SULFATE 90 UG/1
2 AEROSOL, METERED RESPIRATORY (INHALATION) EVERY 6 HOURS PRN
Qty: 2 INHALER | Refills: 11 | Status: SHIPPED | OUTPATIENT
Start: 2020-05-20 | End: 2020-06-03

## 2020-05-21 ENCOUNTER — TELEPHONE (OUTPATIENT)
Dept: FAMILY MEDICINE | Facility: CLINIC | Age: 60
End: 2020-05-21

## 2020-05-21 DIAGNOSIS — J44.9 CHRONIC OBSTRUCTIVE PULMONARY DISEASE, UNSPECIFIED COPD TYPE (H): Primary | ICD-10-CM

## 2020-05-21 NOTE — TELEPHONE ENCOUNTER
[facility] Family Medicine phone call message- medication clarification/question:    Full Medication Name:    Dose:     Question: Patient states she spoke with Dr. Cook yesterday regarding a steroid for the lung to help with the patients coughing. Patient states she didn't want the medication at first but she thought it over and would like try it. She states its a medication and not an inhaler. Please call and advise.     Pharmacy confirmed as [facility] #[id number] - [place], [place] - [address] AT [facility]: Yes    OK to leave a message on voice mail? Yes    Primary language: English      needed? No    Call taken on May 21, 2020 at 8:26 AM by Ciara Rodriguez

## 2020-05-22 ENCOUNTER — TELEPHONE (OUTPATIENT)
Dept: FAMILY MEDICINE | Facility: CLINIC | Age: 60
End: 2020-05-22

## 2020-05-22 RX ORDER — PREDNISONE 50 MG/1
50 TABLET ORAL DAILY
Qty: 5 TABLET | Refills: 0 | Status: SHIPPED | OUTPATIENT
Start: 2020-05-22 | End: 2020-07-02

## 2020-05-22 NOTE — TELEPHONE ENCOUNTER
Team - please inform patient that I have called in 5 days of prednisone for her. She needs to take the medication with food and not take with any anti-inflammatories like Ibuprofen or Aleve. Thanks!    Nikolay Cook MD  May 22, 2020  8:04 AM

## 2020-05-22 NOTE — TELEPHONE ENCOUNTER
Plan:   1) Routed to PCP as FYI  2) Routed to PCP Team to communicate the following to patient:    We received notification from your pharmacy that Fexofenadine is not be covered by your insurance    Our clinic policy is to not pursue insurance coverage for this medicine as these requests are usually not approved. We recommend you purchase out of pocket if medicine desired. If you need assistance selecting the correct product, please ask your pharmacist for help.     If this plan does not work, please let us know and we will send a message to your doctor to see if another medicine is available to suit your needs.     Nichole Douglas MA May 22, 2020 at 1:55 PM

## 2020-05-22 NOTE — TELEPHONE ENCOUNTER
Left detailed message on pt's personal VM per provider. Informed pt that if she has any questions, for her to call us back.  NURY Avendaño

## 2020-05-28 DIAGNOSIS — M75.02 ADHESIVE CAPSULITIS OF BOTH SHOULDERS: ICD-10-CM

## 2020-05-28 DIAGNOSIS — M75.01 ADHESIVE CAPSULITIS OF BOTH SHOULDERS: ICD-10-CM

## 2020-05-28 DIAGNOSIS — M54.30 SCIATICA, UNSPECIFIED LATERALITY: ICD-10-CM

## 2020-05-28 RX ORDER — ACETAMINOPHEN 500 MG
500-1000 TABLET ORAL EVERY 8 HOURS PRN
Qty: 90 TABLET | Refills: 11 | Status: SHIPPED | OUTPATIENT
Start: 2020-05-28 | End: 2020-06-03

## 2020-05-28 NOTE — TELEPHONE ENCOUNTER
Message to physician:     Date of last visit: 2/25/2020    Date of next visit if scheduled: Visit date not found       Last Comprehensive Metabolic Panel:  Sodium   Date Value Ref Range Status   02/25/2020 137 136 - 145 mmol/L Final     Potassium   Date Value Ref Range Status   02/25/2020 4.9 3.5 - 5.0 mmol/L Final     Chloride   Date Value Ref Range Status   02/25/2020 102 98 - 107 mmol/L Final     Carbon Dioxide   Date Value Ref Range Status   10/23/2019 24.0 20.0 - 32.0 mmol/L Final     Glucose   Date Value Ref Range Status   02/25/2020 68 (L) 70 - 125 mg/dL Final     Urea Nitrogen   Date Value Ref Range Status   02/25/2020 22 8 - 22 mg/dL Final     Creatinine   Date Value Ref Range Status   02/25/2020 1.10 0.60 - 1.10 mg/dL Final     GFR Estimate   Date Value Ref Range Status   02/25/2020 51 (L) >60 mL/min/1.73m2 Final     Calcium   Date Value Ref Range Status   02/25/2020 9.5 8.5 - 10.5 mg/dL Final       BP Readings from Last 3 Encounters:   02/25/20 120/84   12/11/19 (!) 155/107   12/03/19 (!) 183/118       Lab Results   Component Value Date    A1C 4.9 11/20/2014    A1C 5.7 03/16/2011                Please complete refill and CLOSE ENCOUNTER.  Closing the encounter signifies the refill is complete.

## 2020-05-29 ENCOUNTER — OFFICE VISIT (OUTPATIENT)
Dept: FAMILY MEDICINE | Facility: CLINIC | Age: 60
End: 2020-05-29
Payer: COMMERCIAL

## 2020-05-29 VITALS
HEIGHT: 62 IN | OXYGEN SATURATION: 98 % | BODY MASS INDEX: 25.4 KG/M2 | TEMPERATURE: 98.6 F | WEIGHT: 138 LBS | DIASTOLIC BLOOD PRESSURE: 85 MMHG | SYSTOLIC BLOOD PRESSURE: 151 MMHG | HEART RATE: 82 BPM | RESPIRATION RATE: 22 BRPM

## 2020-05-29 DIAGNOSIS — J44.9 CHRONIC OBSTRUCTIVE PULMONARY DISEASE, UNSPECIFIED COPD TYPE (H): Primary | ICD-10-CM

## 2020-05-29 RX ORDER — SOFT LENS DISINFECTANT
SOLUTION, NON-ORAL MISCELLANEOUS
Qty: 1 KIT | Refills: 0 | Status: SHIPPED | OUTPATIENT
Start: 2020-05-29 | End: 2021-02-02

## 2020-05-29 RX ORDER — MONTELUKAST SODIUM 10 MG/1
10 TABLET ORAL AT BEDTIME
Qty: 90 TABLET | Refills: 1 | Status: SHIPPED | OUTPATIENT
Start: 2020-05-29 | End: 2020-05-29

## 2020-05-29 RX ORDER — SOFT LENS DISINFECTANT
SOLUTION, NON-ORAL MISCELLANEOUS
Qty: 1 KIT | Refills: 0 | Status: SHIPPED | OUTPATIENT
Start: 2020-05-29 | End: 2020-05-29

## 2020-05-29 RX ORDER — MONTELUKAST SODIUM 10 MG/1
10 TABLET ORAL AT BEDTIME
Qty: 90 TABLET | Refills: 1 | Status: SHIPPED | OUTPATIENT
Start: 2020-05-29 | End: 2020-06-03

## 2020-05-29 ASSESSMENT — MIFFLIN-ST. JEOR: SCORE: 1159.21
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
## 2020-05-30 ASSESSMENT — ASTHMA QUESTIONNAIRES: ACT_TOTALSCORE: 8

## 2020-06-01 ENCOUNTER — TELEPHONE (OUTPATIENT)
Dept: FAMILY MEDICINE | Facility: CLINIC | Age: 60
End: 2020-06-01

## 2020-06-01 NOTE — TELEPHONE ENCOUNTER
Called patient to notify of no active insurance. Patient states she was unaware and would like to have jett call her. Please call and advise.

## 2020-06-02 NOTE — TELEPHONE ENCOUNTER
Called pt, who wanted to know if her pharmacy had been transferred. SW said he had left 2 messages for Zana NOLEN @ [facility]. Pt siad she would call back in 20 minutes to get the number for Zana, as she is outside.

## 2020-06-02 NOTE — PROGRESS NOTES
Pt last seen by Dr Parekh on 5/29/20 for follow-up of asthma/COPD    Telephone visit today to discuss:    1) Pulm - Dr Parekh prescribed Singulair    2) Mood -

## 2020-06-02 NOTE — TELEPHONE ENCOUNTER
Incoming call from Zana @ "[employer] Provided Zana w/ some options for [place] pharmacies. Zana said he would check to see which were in network and contact pt.

## 2020-06-02 NOTE — TELEPHONE ENCOUNTER
Patient states she was wondering what's going on with her insurance. I notified patient we were able to verify that her insurance is still active. Patient understood. Patient states she would like a call back from Yamil. Please call and advise.

## 2020-06-03 ENCOUNTER — TELEPHONE (OUTPATIENT)
Dept: FAMILY MEDICINE | Facility: CLINIC | Age: 60
End: 2020-06-03

## 2020-06-03 ENCOUNTER — VIRTUAL VISIT (OUTPATIENT)
Dept: FAMILY MEDICINE | Facility: CLINIC | Age: 60
End: 2020-06-03
Payer: COMMERCIAL

## 2020-06-03 DIAGNOSIS — M26.609 TMJ (TEMPOROMANDIBULAR JOINT SYNDROME): ICD-10-CM

## 2020-06-03 DIAGNOSIS — F43.10 POSTTRAUMATIC STRESS DISORDER: ICD-10-CM

## 2020-06-03 DIAGNOSIS — F51.05 INSOMNIA DUE TO OTHER MENTAL DISORDER: ICD-10-CM

## 2020-06-03 DIAGNOSIS — J44.9 CHRONIC OBSTRUCTIVE PULMONARY DISEASE, UNSPECIFIED COPD TYPE (H): Primary | ICD-10-CM

## 2020-06-03 DIAGNOSIS — F99 INSOMNIA DUE TO OTHER MENTAL DISORDER: ICD-10-CM

## 2020-06-03 DIAGNOSIS — J42 CHRONIC BRONCHITIS, UNSPECIFIED CHRONIC BRONCHITIS TYPE (H): ICD-10-CM

## 2020-06-03 DIAGNOSIS — F29 PSYCHOSIS, UNSPECIFIED PSYCHOSIS TYPE (H): ICD-10-CM

## 2020-06-03 DIAGNOSIS — I10 ESSENTIAL HYPERTENSION: ICD-10-CM

## 2020-06-03 DIAGNOSIS — F41.9 ANXIETY: ICD-10-CM

## 2020-06-03 DIAGNOSIS — M75.01 ADHESIVE CAPSULITIS OF BOTH SHOULDERS: ICD-10-CM

## 2020-06-03 DIAGNOSIS — M75.02 ADHESIVE CAPSULITIS OF BOTH SHOULDERS: ICD-10-CM

## 2020-06-03 DIAGNOSIS — M54.30 SCIATICA, UNSPECIFIED LATERALITY: ICD-10-CM

## 2020-06-03 DIAGNOSIS — R11.2 NAUSEA AND VOMITING, INTRACTABILITY OF VOMITING NOT SPECIFIED, UNSPECIFIED VOMITING TYPE: ICD-10-CM

## 2020-06-03 RX ORDER — QUETIAPINE FUMARATE 100 MG/1
100 TABLET, FILM COATED ORAL 2 TIMES DAILY PRN
Qty: 60 TABLET | Refills: 4 | Status: SHIPPED | OUTPATIENT
Start: 2020-06-03 | End: 2020-07-02

## 2020-06-03 RX ORDER — QUETIAPINE FUMARATE 200 MG/1
200 TABLET, FILM COATED ORAL AT BEDTIME
Qty: 30 TABLET | Refills: 4 | Status: SHIPPED | OUTPATIENT
Start: 2020-06-03 | End: 2020-07-22

## 2020-06-03 RX ORDER — HYDROXYZINE HYDROCHLORIDE 25 MG/1
25 TABLET, FILM COATED ORAL EVERY 8 HOURS PRN
Qty: 30 TABLET | Refills: 4 | Status: SHIPPED | OUTPATIENT
Start: 2020-06-03 | End: 2020-07-02

## 2020-06-03 RX ORDER — OXCARBAZEPINE 300 MG/1
300 TABLET, FILM COATED ORAL AT BEDTIME
Qty: 30 TABLET | Refills: 4 | Status: SHIPPED | OUTPATIENT
Start: 2020-06-03 | End: 2021-02-02

## 2020-06-03 RX ORDER — ALBUTEROL SULFATE 90 UG/1
2 AEROSOL, METERED RESPIRATORY (INHALATION) EVERY 6 HOURS PRN
Qty: 2 INHALER | Refills: 11 | Status: SHIPPED | OUTPATIENT
Start: 2020-06-03 | End: 2020-09-08

## 2020-06-03 RX ORDER — ONDANSETRON 4 MG/1
4 TABLET, ORALLY DISINTEGRATING ORAL EVERY 8 HOURS PRN
Qty: 20 TABLET | Refills: 4 | Status: SHIPPED | OUTPATIENT
Start: 2020-06-03 | End: 2020-11-17

## 2020-06-03 RX ORDER — TRAZODONE HYDROCHLORIDE 50 MG/1
50 TABLET, FILM COATED ORAL AT BEDTIME
Qty: 30 TABLET | Refills: 4 | Status: SHIPPED | OUTPATIENT
Start: 2020-06-03 | End: 2021-05-13

## 2020-06-03 RX ORDER — MONTELUKAST SODIUM 10 MG/1
10 TABLET ORAL AT BEDTIME
Qty: 30 TABLET | Refills: 4 | Status: SHIPPED | OUTPATIENT
Start: 2020-06-03 | End: 2021-08-31

## 2020-06-03 RX ORDER — ACETAMINOPHEN 500 MG
500-1000 TABLET ORAL EVERY 8 HOURS PRN
Qty: 90 TABLET | Refills: 4 | Status: SHIPPED | OUTPATIENT
Start: 2020-06-03 | End: 2020-08-13

## 2020-06-03 RX ORDER — CALCIUM CARBONATE 500 MG/1
2 TABLET, CHEWABLE ORAL 4 TIMES DAILY PRN
Qty: 240 TABLET | Refills: 4 | Status: SHIPPED | OUTPATIENT
Start: 2020-06-03 | End: 2021-05-06

## 2020-06-03 ASSESSMENT — ANXIETY QUESTIONNAIRES
3. WORRYING TOO MUCH ABOUT DIFFERENT THINGS: NOT AT ALL
2. NOT BEING ABLE TO STOP OR CONTROL WORRYING: NOT AT ALL
IF YOU CHECKED OFF ANY PROBLEMS ON THIS QUESTIONNAIRE, HOW DIFFICULT HAVE THESE PROBLEMS MADE IT FOR YOU TO DO YOUR WORK, TAKE CARE OF THINGS AT HOME, OR GET ALONG WITH OTHER PEOPLE: NOT DIFFICULT AT ALL
GAD7 TOTAL SCORE: 0
5. BEING SO RESTLESS THAT IT IS HARD TO SIT STILL: NOT AT ALL
1. FEELING NERVOUS, ANXIOUS, OR ON EDGE: NOT AT ALL
7. FEELING AFRAID AS IF SOMETHING AWFUL MIGHT HAPPEN: NOT AT ALL
6. BECOMING EASILY ANNOYED OR IRRITABLE: NOT AT ALL

## 2020-06-03 ASSESSMENT — PATIENT HEALTH QUESTIONNAIRE - PHQ9
5. POOR APPETITE OR OVEREATING: NOT AT ALL
SUM OF ALL RESPONSES TO PHQ QUESTIONS 1-9: 0

## 2020-06-03 NOTE — PROGRESS NOTES
"Family Medicine Telephone Visit Note           Telephone Visit Consent   Patient was verbally read the following and verbal consent was obtained.    \"Telephone visits are billed at different rates depending on your insurance coverage. During this emergency period, for some insurers they may be billed the same as an in-person visit.  Please reach out to your insurance provider with any questions.  If during the course of the call the physician/provider feels a telephone visit is not appropriate, you will not be charged for this service.\"    Name person giving consent:  Patient   Date verbal consent given:  6/3/2020  Time verbal consent given:  8:20 AM            HPI   Patients name: Lisa  Appointment start time:  8:44 AM    Pt last seen by Dr Parekh on 5/29/20 for follow-up of asthma/COPD    Telephone visit today to discuss:    1) Pulm - Dr Parekh prescribed Singulair -> unable to get this yet  Using inhaler w/ spacer but is still out of albuterol  Finished her steroid course and wheezing is better    2) Mood - good!    3) L ear pain - no drainage; no tinnitus; ? Hearing change and popping sensation  New teeth are bothering her   No fevers    4) HTN - BP check in her facility was normal      Current Outpatient Medications   Medication Sig Dispense Refill     acetaminophen (TYLENOL) 500 MG tablet Take 1-2 tablets (500-1,000 mg) by mouth every 8 hours as needed for mild pain 90 tablet 11     aclidinium (TUDORZA PRESSAIR) 400 MCG/ACT inhaler Inhale 1 puff into the lungs 2 times daily 1 Inhaler 11     albuterol (PROAIR HFA/PROVENTIL HFA/VENTOLIN HFA) 108 (90 Base) MCG/ACT inhaler Inhale 2 puffs into the lungs every 6 hours as needed for shortness of breath / dyspnea or wheezing 2 Inhaler 11     buPROPion (WELLBUTRIN XL) 300 MG 24 hr tablet Take 300 mg by mouth daily       busPIRone HCl (BUSPAR) 30 MG tablet Take 0.5 tablets (15 mg) by mouth 3 times daily 45 tablet 11     calcium carbonate (TUMS) 500 MG chewable tablet " Take 2 tablets (1,000 mg) by mouth 4 times daily as needed for heartburn       divalproex sodium delayed-release (DEPAKOTE) 250 MG DR tablet Take 1 tablet (250 mg) by mouth 2 times daily 60 tablet 11     fexofenadine (ALLEGRA) 180 MG tablet Take 1 tablet (180 mg) by mouth daily 30 tablet 11     fluticasone (FLONASE) 50 MCG/ACT nasal spray Spray 2 sprays in nostril daily 9.9 mL 11     fluticasone-salmeterol (ADVAIR-HFA) 230-21 MCG/ACT inhaler Inhale 2 puffs into the lungs 2 times daily 1 Inhaler 11     gabapentin (NEURONTIN) 300 MG capsule Take 2 capsules (600 mg) by mouth 3 times daily 180 capsule 11     hydrOXYzine (ATARAX) 25 MG tablet Take 1 tablet (25 mg) by mouth every 8 hours as needed for anxiety 30 tablet 11     ibuprofen (ADVIL/MOTRIN) 400 MG tablet Take 1 tablet (400 mg) by mouth every 6 hours as needed for moderate pain 90 tablet 3     ipratropium - albuterol 0.5 mg/2.5 mg/3 mL (DUONEB) 0.5-2.5 (3) MG/3ML neb solution NEBULIZE 1 VIAL BY MOUTH FOUR TIMES DAILY 90 mL 3     lisinopril (ZESTRIL) 20 MG tablet Take 1 tablet (20 mg) by mouth daily 30 tablet 11     loperamide (IMODIUM) 2 MG capsule Take 2 mg by mouth every 6 hours as needed       Lubricants (ASTROGLIDE) GEL Externally apply topically 2 times daily (Patient not taking: Reported on 5/29/2020) 66.5 g 1     Melatonin 10 MG TABS tablet Take 1 tablet (10 mg) by mouth daily (with dinner) , additional tablet as needed for late night awakenings 90 tablet 1     montelukast (SINGULAIR) 10 MG tablet Take 1 tablet (10 mg) by mouth At Bedtime 90 tablet 1     omeprazole (PRILOSEC) 20 MG DR capsule Take 1 capsule (20 mg) by mouth 2 times daily 60 capsule 11     ondansetron (ZOFRAN-ODT) 4 MG ODT tab Take 1 tablet (4 mg) by mouth every 8 hours as needed for nausea 20 tablet 11     order for DME DME - pt needs nebulizer tubing 1 Device 0     OXcarbazepine (TRILEPTAL) 300 MG tablet Take 1 tablet (300 mg) by mouth At Bedtime 90 tablet 1     polyethylene glycol  "(MIRALAX/GLYCOLAX) powder Take 17 g (1 capful) by mouth daily as needed for constipation       polyvinyl alcohol (LIQUIFILM TEARS) 1.4 % ophthalmic solution Place 1 drop into both eyes as needed for dry eyes       prazosin (MINIPRESS) 2 MG capsule Take 2 capsules (4 mg) by mouth At Bedtime 60 capsule 3     QUEtiapine (SEROQUEL) 100 MG tablet Take 1 tablet (100 mg) by mouth 2 times daily as needed (anxiety or panic attacks) 60 tablet 3     QUEtiapine (SEROQUEL) 200 MG tablet Take 1 tablet (200 mg) by mouth At Bedtime 30 tablet 5     ranitidine (ZANTAC) 150 MG tablet Take 1 tablet (150 mg) by mouth 2 times daily       Respiratory Therapy Supplies (NEBULIZER/PEDIATRIC MASK) KIT kit Nebulizer device, mask, and tubing to be used to deliver nebulized medications. 1 kit 0     sennosides (SENOKOT) 8.6 MG tablet Take 1 tablet by mouth 2 times daily as needed for constipation 60 tablet 3     spacer (OPTICHAMBER FAZAL) holding chamber For use with inhaler 1 each 0     tiZANidine (ZANAFLEX) 4 MG tablet Take 2 tablets (8 mg) by mouth 3 times daily as needed for muscle spasms 240 tablet 3     traZODone (DESYREL) 50 MG tablet Take 1 tablet (50 mg) by mouth At Bedtime 30 tablet 11     vitamin B1 (THIAMINE) 100 MG tablet Take 200 mg by mouth 3 times daily       Allergies   Allergen Reactions     Nkda [No Known Drug Allergies]               Review of Systems:     Constitutional, HEENT, cardiovascular, pulmonary, gi and gu systems are negative, except as otherwise noted.         Physical Exam:     There were no vitals taken for this visit.  Estimated body mass index is 25.24 kg/m  as calculated from the following:    Height as of 5/29/20: 1.575 m (5' 2\").    Weight as of 5/29/20: 62.6 kg (138 lb).    Exam:  Constitutional: healthy, alert and no distress  Psychiatric: mentation appears normal and affect normal/bright          Assessment and Plan     (J44.9) Chronic obstructive pulmonary disease, unspecified COPD type (H)  (primary " encounter diagnosis)  Comment:   Plan: will need her inhalers and singulair refilled; will coordinate w/ Yamil to get her pharmacy confirmed    (I10) Essential hypertension  Comment:   Plan: stable per BP checks    (F43.10) Posttraumatic stress disorder  Comment:   Plan: stable    (M26.609) TMJ (temporomandibular joint syndrome)  Comment:   Plan: L ear pain likely TMJ; precautions given that she would need office visit if symptoms worsen      After Visit Information:  Patient declined AVS         Appointment end time: 8:52 AM  This is a telephone visit that took 8 minutes.      Clinician location:  Phalen Rahul Kapur, MD  [place] 3, 2020  8:53 AM

## 2020-06-03 NOTE — TELEPHONE ENCOUNTER
Incoming call from Yakov @ [facility], confirming pt's pharmacy was changed to:     [facility]  Address: [address] [place] [address]  Phone: [phone]    Forwarded info to Dr. Cook.

## 2020-06-04 ASSESSMENT — ANXIETY QUESTIONNAIRES: GAD7 TOTAL SCORE: 0

## 2020-06-05 ENCOUNTER — CARE COORDINATION (OUTPATIENT)
Dept: FAMILY MEDICINE | Facility: CLINIC | Age: 60
End: 2020-06-05

## 2020-06-08 NOTE — PROGRESS NOTES
Met pt in [place] and had her sign last page of housing intake form. Faxed form to pt's , Angela Beasley ([phone]).

## 2020-06-15 ENCOUNTER — TELEPHONE (OUTPATIENT)
Dept: FAMILY MEDICINE | Facility: CLINIC | Age: 60
End: 2020-06-15

## 2020-06-19 ENCOUNTER — TELEPHONE (OUTPATIENT)
Dept: FAMILY MEDICINE | Facility: CLINIC | Age: 60
End: 2020-06-19

## 2020-06-19 NOTE — TELEPHONE ENCOUNTER
VM left by pt on 6/18, asking SW to call back. Pt reportedly list her phone and wanted SW to reach out @ [phone].     Called pt back, but she wanted to speak later, as she was at the phone state.

## 2020-06-19 NOTE — TELEPHONE ENCOUNTER
VM left by pt [facility] ([phone]), who was requesting new Lisinopril rx be sent over.     Called back to say pt had a Lisinopril rx w/ 11 refills ordered on 5/7 to [facility], pt's old restricted pharmacy. Provided Store and contact #. Was told they would call [facility] to retrieve prescription.

## 2020-06-25 ENCOUNTER — TELEPHONE (OUTPATIENT)
Dept: FAMILY MEDICINE | Facility: CLINIC | Age: 60
End: 2020-06-25

## 2020-06-25 NOTE — TELEPHONE ENCOUNTER
[facility] phone call message- medication clarification/question:    Full Medication Name: albuterol (PROAIR HFA/PROVENTIL HFA/VENTOLIN HFA) 108, QUEtiapine (SEROQUEL), omeprazole (PRILOSEC) 20 MG DR capsule         Dose:     Question: Patient would like for these medication to be called into pharmacy. Patient try picking up medication from [facility] who told patient they do not have refills and need clinic to contact. Patient also indicate the PROAIR working well so she tried to get more refills and pharmacy is questioning her so she would like to see why.     Pharmacy confirmed as    [id number] - [place], [place] - [address] [place] [address] E AT [id number] - [place], [place] - [address] [place] [address] AT [facility] & [address]  [facility]  [facility] - [place], [place] - [address]: Yes    OK to leave a message on voice mail? Yes    Primary language: English      needed? No    Call taken on June 25, 2020 at 8:38 AM by Jeronimo Gauthier

## 2020-06-29 ENCOUNTER — TRANSFERRED RECORDS (OUTPATIENT)
Dept: HEALTH INFORMATION MANAGEMENT | Facility: CLINIC | Age: 60
End: 2020-06-29

## 2020-06-30 ENCOUNTER — TELEPHONE (OUTPATIENT)
Dept: FAMILY MEDICINE | Facility: CLINIC | Age: 60
End: 2020-06-30

## 2020-06-30 NOTE — TELEPHONE ENCOUNTER
I called the pt back, and spoke to the pt. Pt stated that she is doing her laundry right now, and she will give me a call back later or tomorrow.

## 2020-06-30 NOTE — TELEPHONE ENCOUNTER
I got a message from Lizbeth about the pt, she wanted me to reach out to the pt about Public Housing. I called the pt, but she did not answer her phone. I left a vm for the pt to give me a call back.

## 2020-06-30 NOTE — TELEPHONE ENCOUNTER
Out going call made to follow up er visit. Patient reports she still is not feeling that great. Patient has been scheduled for a telephone visit for follow up.  Patient reports she may need assistance with applying again for public housing. Patient advised I will forward message to Pat for his assistance.    Lisa has been notified that due to previous history I will not be taking over her care coordination. Patient verbalized complete understanding and is agreeable with plan.

## 2020-07-02 ENCOUNTER — VIRTUAL VISIT (OUTPATIENT)
Dept: FAMILY MEDICINE | Facility: CLINIC | Age: 60
End: 2020-07-02
Payer: COMMERCIAL

## 2020-07-02 DIAGNOSIS — M54.2 CERVICALGIA: ICD-10-CM

## 2020-07-02 DIAGNOSIS — J44.9 CHRONIC OBSTRUCTIVE PULMONARY DISEASE, UNSPECIFIED COPD TYPE (H): ICD-10-CM

## 2020-07-02 DIAGNOSIS — F41.9 ANXIETY: ICD-10-CM

## 2020-07-02 DIAGNOSIS — F29 PSYCHOSIS, UNSPECIFIED PSYCHOSIS TYPE (H): ICD-10-CM

## 2020-07-02 RX ORDER — HYDROXYZINE HYDROCHLORIDE 25 MG/1
25 TABLET, FILM COATED ORAL EVERY 8 HOURS PRN
Qty: 30 TABLET | Refills: 4 | Status: SHIPPED | OUTPATIENT
Start: 2020-07-02 | End: 2021-02-02

## 2020-07-02 RX ORDER — GABAPENTIN 300 MG/1
600 CAPSULE ORAL 3 TIMES DAILY
Qty: 180 CAPSULE | Refills: 11 | Status: SHIPPED | OUTPATIENT
Start: 2020-07-02 | End: 2020-07-22

## 2020-07-02 RX ORDER — QUETIAPINE FUMARATE 25 MG/1
25 TABLET, FILM COATED ORAL DAILY PRN
COMMUNITY
Start: 2020-06-19 | End: 2021-02-02

## 2020-07-02 RX ORDER — QUETIAPINE FUMARATE 100 MG/1
100 TABLET, FILM COATED ORAL 2 TIMES DAILY PRN
Qty: 60 TABLET | Refills: 4 | Status: SHIPPED | OUTPATIENT
Start: 2020-07-02 | End: 2021-05-10

## 2020-07-02 RX ORDER — IPRATROPIUM BROMIDE AND ALBUTEROL SULFATE 2.5; .5 MG/3ML; MG/3ML
SOLUTION RESPIRATORY (INHALATION)
Qty: 90 ML | Refills: 3 | Status: SHIPPED | OUTPATIENT
Start: 2020-07-02 | End: 2021-05-13

## 2020-07-02 NOTE — PROGRESS NOTES
"Family Medicine Telephone Visit Note               Telephone Visit Consent   Patient was verbally read the following and verbal consent was obtained.    \"Telephone visits are billed at different rates depending on your insurance coverage. During this emergency period, for some insurers they may be billed the same as an in-person visit.  Please reach out to your insurance provider with any questions.  If during the course of the call the physician/provider feels a telephone visit is not appropriate, you will not be charged for this service.\"    Name person giving consent:  Patient   Date verbal consent given:  7/2/2020  Time verbal consent given:  1:38 PM           Chief Complaint   Patient presents with     ER F/U                     HPI   Patients name: Lisa  Appointment start time:  1357    TELEPHONE NOTE      SUBJECTIVE:  Lisa is a 60-year-old woman being managed by telephone due to COVID-19 pandemic.  She was recently in the ER because she was running out of medications.  She was referred back to us for medication refill.  She is generally restricted to Dr. Cook, who is not available today.      She is having no COVID-19 symptoms or signs.      We reviewed her medications, and I tried to update her medication list.  I refilled her medications as listed below.  She is having no other problems and review of symptoms is negative.      The patient was involved in medical decision-making throughout the visit.      The patient will follow-up in clinic as needed and see Dr. Cook prior to next set of refills.           Current Outpatient Medications   Medication Sig Dispense Refill     acetaminophen (TYLENOL) 500 MG tablet Take 1-2 tablets (500-1,000 mg) by mouth every 8 hours as needed for mild pain 90 tablet 4     aclidinium (TUDORZA PRESSAIR) 400 MCG/ACT inhaler Inhale 1 puff into the lungs 2 times daily 1 Inhaler 11     albuterol (PROAIR HFA/PROVENTIL HFA/VENTOLIN HFA) 108 (90 Base) MCG/ACT inhaler Inhale 2 " puffs into the lungs every 6 hours as needed for shortness of breath / dyspnea or wheezing 2 Inhaler 11     buPROPion (WELLBUTRIN XL) 300 MG 24 hr tablet Take 300 mg by mouth daily       busPIRone HCl (BUSPAR) 30 MG tablet Take 0.5 tablets (15 mg) by mouth 3 times daily 45 tablet 11     calcium carbonate (TUMS) 500 MG chewable tablet Take 2 tablets (1,000 mg) by mouth 4 times daily as needed for heartburn 240 tablet 4     divalproex sodium delayed-release (DEPAKOTE) 250 MG DR tablet Take 1 tablet (250 mg) by mouth 2 times daily 60 tablet 11     fexofenadine (ALLEGRA) 180 MG tablet Take 1 tablet (180 mg) by mouth daily 30 tablet 11     fluticasone (FLONASE) 50 MCG/ACT nasal spray Spray 2 sprays in nostril daily 9.9 mL 11     fluticasone-salmeterol (ADVAIR-HFA) 230-21 MCG/ACT inhaler Inhale 2 puffs into the lungs 2 times daily 1 Inhaler 11     gabapentin (NEURONTIN) 300 MG capsule Take 2 capsules (600 mg) by mouth 3 times daily 180 capsule 11     hydrOXYzine (ATARAX) 25 MG tablet Take 1 tablet (25 mg) by mouth every 8 hours as needed for anxiety 30 tablet 4     ibuprofen (ADVIL/MOTRIN) 400 MG tablet Take 1 tablet (400 mg) by mouth every 6 hours as needed for moderate pain 90 tablet 3     ipratropium - albuterol 0.5 mg/2.5 mg/3 mL (DUONEB) 0.5-2.5 (3) MG/3ML neb solution NEBULIZE 1 VIAL BY MOUTH FOUR TIMES DAILY 90 mL 3     lisinopril (ZESTRIL) 20 MG tablet Take 1 tablet (20 mg) by mouth daily 30 tablet 11     loperamide (IMODIUM) 2 MG capsule Take 2 mg by mouth every 6 hours as needed       Lubricants (ASTROGLIDE) GEL Externally apply topically 2 times daily (Patient not taking: Reported on 5/29/2020) 66.5 g 1     Melatonin 10 MG TABS tablet Take 1 tablet (10 mg) by mouth daily (with dinner) , additional tablet as needed for late night awakenings 90 tablet 1     montelukast (SINGULAIR) 10 MG tablet Take 1 tablet (10 mg) by mouth At Bedtime 30 tablet 4     omeprazole (PRILOSEC) 20 MG DR capsule Take 1 capsule (20 mg)  "by mouth 2 times daily 60 capsule 11     ondansetron (ZOFRAN-ODT) 4 MG ODT tab Take 1 tablet (4 mg) by mouth every 8 hours as needed for nausea 20 tablet 4     order for DME DME - pt needs nebulizer tubing 1 Device 0     OXcarbazepine (TRILEPTAL) 300 MG tablet Take 1 tablet (300 mg) by mouth At Bedtime 30 tablet 4     polyethylene glycol (MIRALAX/GLYCOLAX) powder Take 17 g (1 capful) by mouth daily as needed for constipation       polyvinyl alcohol (LIQUIFILM TEARS) 1.4 % ophthalmic solution Place 1 drop into both eyes as needed for dry eyes       prazosin (MINIPRESS) 2 MG capsule Take 2 capsules (4 mg) by mouth At Bedtime 60 capsule 3     QUEtiapine (SEROQUEL) 100 MG tablet Take 1 tablet (100 mg) by mouth 2 times daily as needed (anxiety or panic attacks) 60 tablet 4     QUEtiapine (SEROQUEL) 200 MG tablet Take 1 tablet (200 mg) by mouth At Bedtime 30 tablet 4     Respiratory Therapy Supplies (NEBULIZER/PEDIATRIC MASK) KIT kit Nebulizer device, mask, and tubing to be used to deliver nebulized medications. 1 kit 0     sennosides (SENOKOT) 8.6 MG tablet Take 1 tablet by mouth 2 times daily as needed for constipation 60 tablet 3     spacer (OPTICHAMBER FAZAL) holding chamber For use with inhaler 1 each 0     tiZANidine (ZANAFLEX) 4 MG tablet Take 2 tablets (8 mg) by mouth 3 times daily as needed for muscle spasms 240 tablet 4     traZODone (DESYREL) 50 MG tablet Take 1 tablet (50 mg) by mouth At Bedtime 30 tablet 4     vitamin B1 (THIAMINE) 100 MG tablet Take 200 mg by mouth 3 times daily       Allergies   Allergen Reactions     Nkda [No Known Drug Allergies]               Review of Systems:     Constitutional, HEENT, cardiovascular, pulmonary, gi and gu systems are negative, except as otherwise noted.         Physical Exam:     There were no vitals taken for this visit.  Estimated body mass index is 25.24 kg/m  as calculated from the following:    Height as of 5/29/20: 1.575 m (5' 2\").    Weight as of 5/29/20: 62.6 " kg (138 lb).    Exam:  Constitutional: healthy, alert and no distress  Psychiatric: mentation appears normal and affect normal/bright          Assessment and Plan       ICD-10-CM    1. Cervicalgia  M54.2 gabapentin (NEURONTIN) 300 MG capsule   2. Psychosis, unspecified psychosis type (H)  F29 QUEtiapine (SEROQUEL) 100 MG tablet   3. Anxiety  F41.9 hydrOXYzine (ATARAX) 25 MG tablet   4. Chronic obstructive pulmonary disease, unspecified COPD type (H)  J44.9 ipratropium - albuterol 0.5 mg/2.5 mg/3 mL (DUONEB) 0.5-2.5 (3) MG/3ML neb solution       Refilled medications that would be required in the next 3 months.     After Visit Information:  Will print and mail AVS     No follow-ups on file.    Appointment end time: 4935  This is a telephone visit that took 12 minutes.      Clinician location:  [facility]     Tremaine Alvarez MD

## 2020-07-02 NOTE — PATIENT INSTRUCTIONS
Refilled requested medications.  Sent to your new pharmacy in [facility].  Please see Dr Cook in clinic in the next month.

## 2020-07-08 ENCOUNTER — TELEPHONE (OUTPATIENT)
Dept: FAMILY MEDICINE | Facility: CLINIC | Age: 60
End: 2020-07-08

## 2020-07-08 NOTE — TELEPHONE ENCOUNTER
[facility] Family Medicine phone call message- medication clarification/question:    Full Medication Name: ondansetron (ZOFRAN-ODT) 4 MG ODT tab  And  omeprazole (PRILOSEC) 20 MG DR capsule       Question: Patient states she needs someone to call [facility] pharmacy on [address]. She states they are giving her a hard time and wont give her the medications listed. Phone number to [email or web address] is [phone]. Please call and advise.     Pharmacy confirmed as    [id number] - [place] [address] [address] [facility] [address]: Yes    OK to leave a message on voice mail? Yes    Primary language: English      needed? No    Call taken on July 8, 2020 at 4:18 PM by Ciara Rodriguez

## 2020-07-08 NOTE — TELEPHONE ENCOUNTER
Called patient's restricted  (Piotr [phone]) and left Vm to call me back to discuss if pharmacy change was ever placed per previous chart note from Taylor Gorman RN

## 2020-07-13 ENCOUNTER — TELEPHONE (OUTPATIENT)
Dept: FAMILY MEDICINE | Facility: CLINIC | Age: 60
End: 2020-07-13

## 2020-07-13 NOTE — TELEPHONE ENCOUNTER
I got a message from  about the pt. It seems the pt wanted to switch pharmacy, and we needed to call the pt  Piotr. I called him, but got his vm, and I left a vm for him to give me a call back.

## 2020-07-13 NOTE — TELEPHONE ENCOUNTER
I got a call back from Piotr the . He stated that he had switched it a couple of weeks back already. He stated that the pt should not have any issue with getting medication from [facility].

## 2020-07-16 ENCOUNTER — COMMUNICATION - HEALTHEAST (OUTPATIENT)
Dept: RESPIRATORY THERAPY | Facility: CLINIC | Age: 60
End: 2020-07-16

## 2020-07-17 ENCOUNTER — TELEPHONE (OUTPATIENT)
Dept: FAMILY MEDICINE | Facility: CLINIC | Age: 60
End: 2020-07-17

## 2020-07-17 DIAGNOSIS — K21.9 GASTROESOPHAGEAL REFLUX DISEASE WITHOUT ESOPHAGITIS: ICD-10-CM

## 2020-07-17 NOTE — TELEPHONE ENCOUNTER
[facility] Family Medicine phone call message- general phone call:    Reason for call: Patient state she has been buying Omeprazole OTC and has not been able to fill it. Patient wants to know why she got cut off this medication. Please call and advise.    Return call needed: Yes    OK to leave a message on voice mail? Yes    Primary language: English      needed? No    Call taken on July 17, 2020 at 11:50 AM by Jeronimo Gauthier

## 2020-07-21 NOTE — PROGRESS NOTES
"Pt is a 60 year old female last seen on 7/2/20 by Dr Alvarez via virtual visit here today for:     1) \"feels icky\"  Feeling more nauseated   Up until yesterday, had diarrhea real bad  Is worried about her electrolytes  No fevers/ chills   +fatigue  No blood in vomit/ stool  Able to keep water and food down now    2)  Wheezing, worse w/ humidity  Has a neb machine which helps  Still in a hotel as shelter   Gets her temp taken twice daily   Wearing a mask at all times     3) R hip is bothering her  \"that's my good hip\"  No falls  +hx of sciatica on L   Pain is lateral  Pain does radiate anteriorly    4) rash on R lower extremity  Terribly itchy   Looks like hives  Has not changed any soaps, etc      Past Medical History:   Diagnosis Date     Alcohol withdrawal seizure (H) 02/25/2017     Chronic obstructive pulmonary disease, unspecified COPD type (H) 2/23/2017     COPD (chronic obstructive pulmonary disease) (H)      Depressive disorder       History reviewed. No pertinent surgical history.   Current Outpatient Medications   Medication Sig Dispense Refill     famotidine (PEPCID) 20 MG tablet Take 1 tablet (20 mg) by mouth 2 times daily for 10 days 20 tablet 0     gabapentin (NEURONTIN) 300 MG capsule Take 2 capsules (600 mg) by mouth 3 times daily 180 capsule 11     predniSONE (DELTASONE) 50 MG tablet Take 1 tablet (50 mg) by mouth daily for 5 days 5 tablet 0     QUEtiapine (SEROQUEL) 200 MG tablet Take 1 tablet (200 mg) by mouth At Bedtime 30 tablet 11     acetaminophen (TYLENOL) 500 MG tablet Take 1-2 tablets (500-1,000 mg) by mouth every 8 hours as needed for mild pain 90 tablet 4     aclidinium (TUDORZA PRESSAIR) 400 MCG/ACT inhaler Inhale 1 puff into the lungs 2 times daily 1 Inhaler 11     albuterol (PROAIR HFA/PROVENTIL HFA/VENTOLIN HFA) 108 (90 Base) MCG/ACT inhaler Inhale 2 puffs into the lungs every 6 hours as needed for shortness of breath / dyspnea or wheezing 2 Inhaler 11     buPROPion (WELLBUTRIN XL) 300 " MG 24 hr tablet Take 300 mg by mouth daily       busPIRone HCl (BUSPAR) 30 MG tablet Take 0.5 tablets (15 mg) by mouth 3 times daily 45 tablet 11     calcium carbonate (TUMS) 500 MG chewable tablet Take 2 tablets (1,000 mg) by mouth 4 times daily as needed for heartburn 240 tablet 4     divalproex sodium delayed-release (DEPAKOTE) 250 MG DR tablet Take 1 tablet (250 mg) by mouth 2 times daily 60 tablet 11     fexofenadine (ALLEGRA) 180 MG tablet Take 1 tablet (180 mg) by mouth daily 30 tablet 11     fluticasone (FLONASE) 50 MCG/ACT nasal spray Spray 2 sprays in nostril daily 9.9 mL 11     fluticasone-salmeterol (ADVAIR-HFA) 230-21 MCG/ACT inhaler Inhale 2 puffs into the lungs 2 times daily 1 Inhaler 11     hydrOXYzine (ATARAX) 25 MG tablet Take 1 tablet (25 mg) by mouth every 8 hours as needed for anxiety 30 tablet 4     ibuprofen (ADVIL/MOTRIN) 400 MG tablet Take 1 tablet (400 mg) by mouth every 6 hours as needed for moderate pain 90 tablet 3     ipratropium - albuterol 0.5 mg/2.5 mg/3 mL (DUONEB) 0.5-2.5 (3) MG/3ML neb solution NEBULIZE 1 VIAL BY MOUTH FOUR TIMES DAILY 90 mL 3     lisinopril (ZESTRIL) 20 MG tablet Take 1 tablet (20 mg) by mouth daily 30 tablet 11     loperamide (IMODIUM) 2 MG capsule Take 2 mg by mouth every 6 hours as needed       Lubricants (ASTROGLIDE) GEL Externally apply topically 2 times daily (Patient not taking: Reported on 5/29/2020) 66.5 g 1     Melatonin 10 MG TABS tablet Take 1 tablet (10 mg) by mouth daily (with dinner) , additional tablet as needed for late night awakenings 90 tablet 1     montelukast (SINGULAIR) 10 MG tablet Take 1 tablet (10 mg) by mouth At Bedtime 30 tablet 4     omeprazole (PRILOSEC) 20 MG DR capsule Take 1 capsule (20 mg) by mouth 2 times daily 60 capsule 11     ondansetron (ZOFRAN-ODT) 4 MG ODT tab Take 1 tablet (4 mg) by mouth every 8 hours as needed for nausea 20 tablet 4     order for DME DME - pt needs nebulizer tubing 1 Device 0     OXcarbazepine  "(TRILEPTAL) 300 MG tablet Take 1 tablet (300 mg) by mouth At Bedtime 30 tablet 4     polyethylene glycol (MIRALAX/GLYCOLAX) powder Take 17 g (1 capful) by mouth daily as needed for constipation       polyvinyl alcohol (LIQUIFILM TEARS) 1.4 % ophthalmic solution Place 1 drop into both eyes as needed for dry eyes       prazosin (MINIPRESS) 2 MG capsule Take 2 capsules (4 mg) by mouth At Bedtime 60 capsule 3     QUEtiapine (SEROQUEL) 100 MG tablet Take 1 tablet (100 mg) by mouth 2 times daily as needed (anxiety or panic attacks) 60 tablet 4     QUEtiapine (SEROQUEL) 25 MG tablet Take 25 mg by mouth daily as needed       Respiratory Therapy Supplies (NEBULIZER/PEDIATRIC MASK) KIT kit Nebulizer device, mask, and tubing to be used to deliver nebulized medications. 1 kit 0     sennosides (SENOKOT) 8.6 MG tablet Take 1 tablet by mouth 2 times daily as needed for constipation 60 tablet 3     spacer (OPTICHAMBER FAZAL) holding chamber For use with inhaler 1 each 0     tiZANidine (ZANAFLEX) 4 MG tablet Take 2 tablets (8 mg) by mouth 3 times daily as needed for muscle spasms 240 tablet 4     traZODone (DESYREL) 50 MG tablet Take 1 tablet (50 mg) by mouth At Bedtime 30 tablet 4     vitamin B1 (THIAMINE) 100 MG tablet Take 200 mg by mouth 3 times daily        Allergies   Allergen Reactions     Nkda [No Known Drug Allergies]         ROS:   Gen- no weight change, no fevers/chills   Head/ Eyes- no blurred vision, no headaches   ENT- no cough, no congestion, no URI symptoms   Cardiac - no palpitations, no chest pain   Respiratory - no shortness of breath , + wheezing   GI - + nausea, + vomiting, + diarrhea, no constipation - see HPI  Urinary - no dysuria, no polyuria   Neuro - no numbness, no tingling   Remainder of ROS negative.     Exam:   BP (!) 140/105   Pulse 76   Temp 98.6  F (37  C) (Oral)   Resp 22   Ht 1.575 m (5' 2\")   Wt 59.5 kg (131 lb 3.2 oz)   SpO2 98%   BMI 24.00 kg/m     Alert and oriented x 3; No acute " distress   Neck: no masses, no lymphadenopathy   Resp:  + diffuse wheezing  CV: rate/rhythm regular, no murmurs/rubs/gallops   ABd: soft, + bowel sounds, nontender/nondistended, no rebound/guarding   Extrem: no clubbing/cyanosis/edema   MSK:   R hip:  Full ROM  +TTP at trochanter  +SARAH/FADIR  Neuro: no focal deficits   Derm: + erythematous patchy rash on R lower extremity w/ areas of confluence and clearing consistent w/ urticarial rash    [facility]  R hip - mild OA  L hip replacement intact      (A08.4) Viral gastroenteritis  (primary encounter diagnosis)  Comment: resolving; check lytes  Plan: Comprehensive Metabolic Panel ([facility]) -         results <1hr Protocol            (M25.551) Hip pain, right  Comment: exam/ imaging consistent w/ trochanteric bursitis; injection deferred; handout given  Plan: XR Pelvis 1/2 Views, CANCELED: XR Pelvis 1/2         Views            (L50.9) Urticaria  Comment: will take H1 (she already has hydroxyzine) and H2 blocker (pepcid called in); precautions given  Plan: famotidine (PEPCID) 20 MG tablet            (J44.9) Chronic obstructive pulmonary disease, unspecified COPD type (H)  Comment: pred burst for flare; precautions given  Plan: predniSONE (DELTASONE) 50 MG tablet            (M54.2) Cervicalgia  Comment:   Plan: gabapentin (NEURONTIN) 300 MG capsule            (F29) Psychosis, unspecified psychosis type (H)  Comment:   Plan: QUEtiapine (SEROQUEL) 200 MG tablet          > 40 min spent w/ pt, > 50% time spent in counseling and coordination of care    Nikolay Cook MD  July 22, 2020  11:29 AM

## 2020-07-22 ENCOUNTER — OFFICE VISIT (OUTPATIENT)
Dept: FAMILY MEDICINE | Facility: CLINIC | Age: 60
End: 2020-07-22
Payer: COMMERCIAL

## 2020-07-22 VITALS
RESPIRATION RATE: 22 BRPM | WEIGHT: 131.2 LBS | TEMPERATURE: 98.6 F | HEART RATE: 76 BPM | BODY MASS INDEX: 24.14 KG/M2 | OXYGEN SATURATION: 98 % | SYSTOLIC BLOOD PRESSURE: 140 MMHG | HEIGHT: 62 IN | DIASTOLIC BLOOD PRESSURE: 105 MMHG

## 2020-07-22 DIAGNOSIS — L50.9 URTICARIA: ICD-10-CM

## 2020-07-22 DIAGNOSIS — A08.4 VIRAL GASTROENTERITIS: Primary | ICD-10-CM

## 2020-07-22 DIAGNOSIS — F29 PSYCHOSIS, UNSPECIFIED PSYCHOSIS TYPE (H): ICD-10-CM

## 2020-07-22 DIAGNOSIS — M54.2 CERVICALGIA: ICD-10-CM

## 2020-07-22 DIAGNOSIS — J44.9 CHRONIC OBSTRUCTIVE PULMONARY DISEASE, UNSPECIFIED COPD TYPE (H): ICD-10-CM

## 2020-07-22 DIAGNOSIS — M25.551 HIP PAIN, RIGHT: ICD-10-CM

## 2020-07-22 LAB
ALBUMIN SERPL-MCNC: 4.2 G/DL (ref 3.2–4.5)
ALP SERPL-CCNC: 70 U/L (ref 40–150)
ALT SERPL-CCNC: 15 U/L (ref 0–45)
AST SERPL-CCNC: 25 U/L (ref 0–45)
BILIRUB SERPL-MCNC: 0.5 MG/DL (ref 0.2–1.3)
BUN SERPL-MCNC: 11 MG/DL (ref 7–30)
CALCIUM SERPL-MCNC: 9.9 MG/DL (ref 8.5–10.4)
CHLORIDE SERPLBLD-SCNC: 103 MMOL/L (ref 94–109)
CO2 SERPL-SCNC: 26 MMOL/L (ref 20–32)
CREAT SERPL-MCNC: 1 MG/DL (ref 0.6–1.3)
EGFR CALCULATED (BLACK REFERENCE): 73.2 ML/MIN
EGFR CALCULATED (NON BLACK REFERENCE): 60.5 ML/MIN
GLUCOSE SERPL-MCNC: 94 MG/DL (ref 60–109)
POTASSIUM SERPL-SCNC: 4.4 MMOL/L (ref 3.4–5.3)
PROT SERPL-MCNC: 7.1 G/DL (ref 6.6–8.8)
SODIUM SERPL-SCNC: 139 MMOL/L (ref 133–144)

## 2020-07-22 RX ORDER — QUETIAPINE FUMARATE 200 MG/1
200 TABLET, FILM COATED ORAL AT BEDTIME
Qty: 30 TABLET | Refills: 11 | Status: SHIPPED | OUTPATIENT
Start: 2020-07-22 | End: 2021-05-13

## 2020-07-22 RX ORDER — GABAPENTIN 300 MG/1
600 CAPSULE ORAL 3 TIMES DAILY
Qty: 180 CAPSULE | Refills: 11 | Status: SHIPPED | OUTPATIENT
Start: 2020-07-22 | End: 2021-07-16

## 2020-07-22 RX ORDER — FAMOTIDINE 20 MG/1
20 TABLET, FILM COATED ORAL 2 TIMES DAILY
Qty: 20 TABLET | Refills: 0 | Status: SHIPPED | OUTPATIENT
Start: 2020-07-22 | End: 2020-08-13

## 2020-07-22 RX ORDER — PREDNISONE 50 MG/1
50 TABLET ORAL DAILY
Qty: 5 TABLET | Refills: 0 | Status: SHIPPED | OUTPATIENT
Start: 2020-07-22 | End: 2020-08-13

## 2020-07-22 ASSESSMENT — MIFFLIN-ST. JEOR: SCORE: 1128.37

## 2020-07-22 NOTE — LETTER
July 22, 2020      Lisa Scott  [address] [place] [address]        Dear ,    We are writing to inform you of your test results.    Your labwork was normal.    Resulted Orders   Comprehensive Metabolic Panel ([facility]) - results <1hr Protocol   Result Value Ref Range    Glucose 94.0 60.0 - 109.0 mg/dL    Urea Nitrogen 11.0 7.0 - 30.0 mg/dL    Creatinine 1.0 0.6 - 1.3 mg/dL    Sodium 139.0 133.0 - 144.0 mmol/L    Potassium 4.4 3.4 - 5.3 mmol/L    Chloride 103.0 94.0 - 109.0 mmol/L    Carbon Dioxide 26.0 20.0 - 32.0 mmol/L    Calcium 9.9 8.5 - 10.4 mg/dL    Protein Total 7.1 6.6 - 8.8 g/dL    Albumin 4.2 3.2 - 4.5 g/dL    Alkaline Phosphatase 70.0 40.0 - 150.0 U/L    ALT 15.0 0.0 - 45.0 U/L    AST 25.0 0.0 - 45.0 U/L    Bilirubin Total 0.5 0.2 - 1.3 mg/dL    eGFR Calculated (Non Black Reference) 60.5 >60.0 mL/min    eGFR Calculated (Black Reference) 73.2 >60.0 mL/min       If you have any questions or concerns, please call the clinic at the number listed above.       Sincerely,        Nikolay Cook MD

## 2020-07-28 ENCOUNTER — TELEPHONE (OUTPATIENT)
Dept: FAMILY MEDICINE | Facility: CLINIC | Age: 60
End: 2020-07-28

## 2020-07-28 NOTE — TELEPHONE ENCOUNTER
[facility] Follow-up    Call initiated by clinic.  Message recorded by SENAIT Cruz  Calling for: monthly follow up   Patient: Lisa Scott  Phone conversation with: Patient  Patient's Phone number/s: Home number on file [phone] (home)  Available today in the AM on their Home number.  OK to leave message on voice mail? Yes      Major diagnoses and/or issues requiring coordination services  Hypertension     In the past month, how many times have you been to the Emergency Room? 0  In the past month, how many times have you been hospitalized? 0    Summary notes: I called to check up on the pt, pt stated that she is doing ok. Pt stated that she has been taking her medication as directed. I called to inform the pt that Yamil is no longer working here, and that I will be her care coordinator. Pt stated that it would be fine. Pt stated her BP has been fine the last time she checked it. Pt stated that if she needs anything she will give me a call. Pt does not have any other issue or concerns right now.     Self-management goals:  Monitor BP     Counseling and coordination activities with: SENAIT Loya    Follow-up date: monthly

## 2020-07-31 ENCOUNTER — TELEPHONE (OUTPATIENT)
Dept: FAMILY MEDICINE | Facility: CLINIC | Age: 60
End: 2020-07-31

## 2020-07-31 DIAGNOSIS — M16.10 ARTHRITIS OF HIP: Primary | ICD-10-CM

## 2020-07-31 NOTE — TELEPHONE ENCOUNTER
DME (Durable Medical Equipment) Orders and Documentation  Orders Placed This Encounter   Procedures     Rolling Knee Walker Order      The patient was assessed and it was determined the patient is in need of the following listed DME Supplies/Equipment. Please complete supporting documentation below to demonstrate medical necessity.      DME All Other Item(s) Documentation    List reason for need and supporting documentation for medical necessity below for each DME item.     1. Roller walker with seat -> for limited mobility secondary to bilateral hip arthritis. L hip is s/p total hip replacement; R hip with arthritis on imaging.      Nikolay Cook MD  July 31, 2020  9:30 AM

## 2020-07-31 NOTE — TELEPHONE ENCOUNTER
Faxed order, face to face, facesheet, insurance information to [facility]. Sherif LONGO

## 2020-07-31 NOTE — TELEPHONE ENCOUNTER
[facility] Family Medicine phone call message- general phone call:    Reason for call: Patient states her right hip is in pain. She is not able to get out of bed or walk without a walker or cane. Patient states if Dr. Cook can place an order for a walker with a chair. Patient states pcp knows her personal story and what's going on and her previous walker stayed at a place that she was unable to go back to due to police being called every time she tried to get her walker. Patient states she needs a call back because she is in pain and needs a walker. Please call and advise.     Return call needed: Yes    OK to leave a message on voice mail? Yes    Primary language: English      needed? No    Call taken on July 31, 2020 at 8:26 AM by Ciara Rodriguez

## 2020-08-04 ENCOUNTER — TELEPHONE (OUTPATIENT)
Dept: FAMILY MEDICINE | Facility: CLINIC | Age: 60
End: 2020-08-04

## 2020-08-04 NOTE — TELEPHONE ENCOUNTER
[facility] Follow-up    Call initiated by clinic.  Message recorded by SENAIT Cruz  Calling for: monthly follow up   Patient: Lisa Scott  Phone conversation with: Patient  Patient's Phone number/s: Home number on file [phone] (home)  Available today in the AM on their Home number.  OK to leave message on voice mail? Yes      Major diagnoses and/or issues requiring coordination services  Walker  Hip pain    In the past month, how many times have you been to the Emergency Room? 0  In the past month, how many times have you been hospitalized? 0    Summary notes: I called to check up on the pt, pt stated that she is in a lot of pain. Pt stated that she had called yesterday about her walker. She stated that she needs it to get around her home. Pt stated that she needs a seated walker, because sometimes she gets tired and need to sit and rest. I told the pt that her medical insurance might not cover the seated walker. Medical insurance usually covers only the basic walker only. I told her that she will need to call her insurance and ask them to see if they will cover the seated walker. I gave the pt their number to call. Pt also wanted something to help with the hip pain, I told her that a message was sent to  already, and she needs to wait to see what  decides. Pt stated that she wanted help with getting her new social security card. She wanted me to help go online and get her a new one. I told her that I will see what they need online, and will let her know. Pt did not have any other issue or concerns right now.     Self-management goals:  Get social security card    Counseling and coordination activities with: SENAIT Loya    Follow-up date: monthly

## 2020-08-04 NOTE — TELEPHONE ENCOUNTER
DME (Durable Medical Equipment) Orders and Documentation  Orders Placed This Encounter   Procedures     Rolling Knee Walker Order     Walker Order      The patient was assessed and it was determined the patient is in need of the following listed DME Supplies/Equipment. Please complete supporting documentation below to demonstrate medical necessity.      DME All Other Item(s) Documentation    List reason for need and supporting documentation for medical necessity below for each DME item.     1. Rolling walker with seat - for hip arthritis     Nikolay Cook MD  August 4, 2020  2:34 PM

## 2020-08-05 NOTE — PROGRESS NOTES
"Family Medicine Telephone Visit Note           Telephone Visit Consent   Patient was verbally read the following and verbal consent was obtained.    \"Telephone visits are billed at different rates depending on your insurance coverage. During this emergency period, for some insurers they may be billed the same as an in-person visit.  Please reach out to your insurance provider with any questions.  If during the course of the call the physician/provider feels a telephone visit is not appropriate, you will not be charged for this service.\"    Name person giving consent:  Patient   Date verbal consent given:  8/6/2020  Time verbal consent given:  1:39 PM           HPI   Patients name: Lisa  Appointment start time:  2:59 PM    R hip pain - received walker   Feels like pain is better w/ exercises   Taking tylenol 500, tizanidine, and gabapentin         Current Outpatient Medications   Medication Sig Dispense Refill     acetaminophen (TYLENOL) 500 MG tablet Take 1-2 tablets (500-1,000 mg) by mouth every 8 hours as needed for mild pain 90 tablet 4     aclidinium (TUDORZA PRESSAIR) 400 MCG/ACT inhaler Inhale 1 puff into the lungs 2 times daily 1 Inhaler 11     albuterol (PROAIR HFA/PROVENTIL HFA/VENTOLIN HFA) 108 (90 Base) MCG/ACT inhaler Inhale 2 puffs into the lungs every 6 hours as needed for shortness of breath / dyspnea or wheezing 2 Inhaler 11     buPROPion (WELLBUTRIN XL) 300 MG 24 hr tablet Take 300 mg by mouth daily       busPIRone HCl (BUSPAR) 30 MG tablet Take 0.5 tablets (15 mg) by mouth 3 times daily 45 tablet 11     calcium carbonate (TUMS) 500 MG chewable tablet Take 2 tablets (1,000 mg) by mouth 4 times daily as needed for heartburn 240 tablet 4     divalproex sodium delayed-release (DEPAKOTE) 250 MG DR tablet Take 1 tablet (250 mg) by mouth 2 times daily 60 tablet 11     fexofenadine (ALLEGRA) 180 MG tablet Take 1 tablet (180 mg) by mouth daily 30 tablet 11     fluticasone (FLONASE) 50 MCG/ACT nasal spray " Spray 2 sprays in nostril daily 9.9 mL 11     fluticasone-salmeterol (ADVAIR-HFA) 230-21 MCG/ACT inhaler Inhale 2 puffs into the lungs 2 times daily 1 Inhaler 11     gabapentin (NEURONTIN) 300 MG capsule Take 2 capsules (600 mg) by mouth 3 times daily 180 capsule 11     hydrOXYzine (ATARAX) 25 MG tablet Take 1 tablet (25 mg) by mouth every 8 hours as needed for anxiety 30 tablet 4     ibuprofen (ADVIL/MOTRIN) 400 MG tablet Take 1 tablet (400 mg) by mouth every 6 hours as needed for moderate pain 90 tablet 3     ipratropium - albuterol 0.5 mg/2.5 mg/3 mL (DUONEB) 0.5-2.5 (3) MG/3ML neb solution NEBULIZE 1 VIAL BY MOUTH FOUR TIMES DAILY 90 mL 3     lisinopril (ZESTRIL) 20 MG tablet Take 1 tablet (20 mg) by mouth daily 30 tablet 11     loperamide (IMODIUM) 2 MG capsule Take 2 mg by mouth every 6 hours as needed       Lubricants (ASTROGLIDE) GEL Externally apply topically 2 times daily (Patient not taking: Reported on 5/29/2020) 66.5 g 1     Melatonin 10 MG TABS tablet Take 1 tablet (10 mg) by mouth daily (with dinner) , additional tablet as needed for late night awakenings 90 tablet 1     montelukast (SINGULAIR) 10 MG tablet Take 1 tablet (10 mg) by mouth At Bedtime 30 tablet 4     omeprazole (PRILOSEC) 20 MG DR capsule Take 1 capsule (20 mg) by mouth 2 times daily 60 capsule 11     ondansetron (ZOFRAN-ODT) 4 MG ODT tab Take 1 tablet (4 mg) by mouth every 8 hours as needed for nausea 20 tablet 4     order for DME DME - pt needs nebulizer tubing 1 Device 0     OXcarbazepine (TRILEPTAL) 300 MG tablet Take 1 tablet (300 mg) by mouth At Bedtime 30 tablet 4     polyethylene glycol (MIRALAX/GLYCOLAX) powder Take 17 g (1 capful) by mouth daily as needed for constipation       polyvinyl alcohol (LIQUIFILM TEARS) 1.4 % ophthalmic solution Place 1 drop into both eyes as needed for dry eyes       prazosin (MINIPRESS) 2 MG capsule Take 2 capsules (4 mg) by mouth At Bedtime 60 capsule 3     QUEtiapine (SEROQUEL) 100 MG tablet Take  "1 tablet (100 mg) by mouth 2 times daily as needed (anxiety or panic attacks) 60 tablet 4     QUEtiapine (SEROQUEL) 200 MG tablet Take 1 tablet (200 mg) by mouth At Bedtime 30 tablet 11     QUEtiapine (SEROQUEL) 25 MG tablet Take 25 mg by mouth daily as needed       Respiratory Therapy Supplies (NEBULIZER/PEDIATRIC MASK) KIT kit Nebulizer device, mask, and tubing to be used to deliver nebulized medications. 1 kit 0     sennosides (SENOKOT) 8.6 MG tablet Take 1 tablet by mouth 2 times daily as needed for constipation 60 tablet 3     spacer (OPTICHAMBER FAZAL) holding chamber For use with inhaler 1 each 0     tiZANidine (ZANAFLEX) 4 MG tablet Take 2 tablets (8 mg) by mouth 3 times daily as needed for muscle spasms 240 tablet 4     traZODone (DESYREL) 50 MG tablet Take 1 tablet (50 mg) by mouth At Bedtime 30 tablet 4     vitamin B1 (THIAMINE) 100 MG tablet Take 200 mg by mouth 3 times daily       Allergies   Allergen Reactions     Nkda [No Known Drug Allergies]               Review of Systems:              Physical Exam:     There were no vitals taken for this visit.  Estimated body mass index is 24 kg/m  as calculated from the following:    Height as of 7/22/20: 1.575 m (5' 2\").    Weight as of 7/22/20: 59.5 kg (131 lb 3.2 oz).    Exam:  Constitutional: healthy, alert and no distress  Psychiatric: mentation appears normal and affect normal/bright            Assessment and Plan     (M16.10) Arthritis of hip  (primary encounter diagnosis)  Comment: pt notes improvement w/ exercises and her new walker; will change from ibuprofen to mobic; f/u in office in 1 week for consideration for further imaging of hip vs injection  Plan: meloxicam (MOBIC) 15 MG tablet            After Visit Information:  Patient declined AVS     No follow-ups on file.    Appointment end time: 3:09 PM  This is a telephone visit that took 10 minutes.      Clinician location:  [facility]     Nikolay Cook MD  August 6, 2020  3:09 PM    "

## 2020-08-06 ENCOUNTER — VIRTUAL VISIT (OUTPATIENT)
Dept: FAMILY MEDICINE | Facility: CLINIC | Age: 60
End: 2020-08-06
Payer: COMMERCIAL

## 2020-08-06 DIAGNOSIS — M16.10 ARTHRITIS OF HIP: Primary | ICD-10-CM

## 2020-08-06 RX ORDER — MELOXICAM 15 MG/1
15 TABLET ORAL DAILY
Qty: 30 TABLET | Refills: 1 | Status: SHIPPED | OUTPATIENT
Start: 2020-08-06 | End: 2020-10-07

## 2020-08-12 ENCOUNTER — TELEPHONE (OUTPATIENT)
Dept: FAMILY MEDICINE | Facility: CLINIC | Age: 60
End: 2020-08-12

## 2020-08-12 NOTE — TELEPHONE ENCOUNTER
I got a message from the  about the pt. Pt wanted me to give her a call back. I called the pt back, but she did not answer her phone, so I left a vm for the pt to give me a call back.

## 2020-08-13 ENCOUNTER — AMBULATORY - HEALTHEAST (OUTPATIENT)
Dept: ADMINISTRATIVE | Facility: REHABILITATION | Age: 60
End: 2020-08-13

## 2020-08-13 ENCOUNTER — OFFICE VISIT (OUTPATIENT)
Dept: FAMILY MEDICINE | Facility: CLINIC | Age: 60
End: 2020-08-13
Payer: COMMERCIAL

## 2020-08-13 ENCOUNTER — CARE COORDINATION (OUTPATIENT)
Dept: FAMILY MEDICINE | Facility: CLINIC | Age: 60
End: 2020-08-13

## 2020-08-13 VITALS
BODY MASS INDEX: 26.5 KG/M2 | SYSTOLIC BLOOD PRESSURE: 154 MMHG | TEMPERATURE: 98.4 F | RESPIRATION RATE: 16 BRPM | OXYGEN SATURATION: 96 % | DIASTOLIC BLOOD PRESSURE: 105 MMHG | HEART RATE: 68 BPM | HEIGHT: 60 IN | WEIGHT: 135 LBS

## 2020-08-13 DIAGNOSIS — M16.10 ARTHRITIS OF HIP: Primary | ICD-10-CM

## 2020-08-13 DIAGNOSIS — M54.30 SCIATICA, UNSPECIFIED LATERALITY: ICD-10-CM

## 2020-08-13 DIAGNOSIS — L50.9 URTICARIA: ICD-10-CM

## 2020-08-13 DIAGNOSIS — M16.10 ARTHRITIS OF HIP: ICD-10-CM

## 2020-08-13 DIAGNOSIS — Z20.822 EXPOSURE TO COVID-19 VIRUS: ICD-10-CM

## 2020-08-13 PROBLEM — Z71.89 CARE PLAN DISCUSSED WITH PATIENT: Status: ACTIVE | Noted: 2019-05-03

## 2020-08-13 LAB
COVID-19 VIRUS PCR TO U OF MN - SOURCE: NORMAL
SARS-COV-2 RNA SPEC QL NAA+PROBE: NOT DETECTED

## 2020-08-13 RX ORDER — ACETAMINOPHEN 500 MG
500-1000 TABLET ORAL EVERY 8 HOURS PRN
Qty: 100 TABLET | Refills: 4 | Status: SHIPPED | OUTPATIENT
Start: 2020-08-13 | End: 2020-09-01

## 2020-08-13 RX ORDER — FAMOTIDINE 20 MG/1
20 TABLET, FILM COATED ORAL 2 TIMES DAILY
Qty: 60 TABLET | Refills: 0 | Status: SHIPPED | OUTPATIENT
Start: 2020-08-13 | End: 2020-09-11

## 2020-08-13 ASSESSMENT — MIFFLIN-ST. JEOR: SCORE: 1120.73

## 2020-08-13 NOTE — PATIENT INSTRUCTIONS
Referral for :     Physical therapy    LOCATION/PLACE/Provider :    [facility]   DATE & TIME :    Referral faxed, location will call   PHONE :     [phone]  FAX :    [phone]  Appointment made by clinic staff/:    I[place]

## 2020-08-13 NOTE — PROGRESS NOTES
I have personally reviewed the history and examination as documented by Dr. Gates.  I was present during key portions of the visit and agree with the assessment and plan as documented for 60 yr old female with hip/back arthritis here for follow-up. Encouraged PT. Pt also w/ COVID exposure so will swab. Precautions given. Anticipatory guidance given.     Nikolay Cook MD  August 13, 2020  3:30 PM
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
## 2020-08-13 NOTE — PROGRESS NOTES
I met with the pt today, pt came to see me before her appointment to have me help fill out a social security card replacement form. I helped the pt fill out the form, and she will mail it to the social security.

## 2020-08-17 ENCOUNTER — TELEPHONE (OUTPATIENT)
Dept: FAMILY MEDICINE | Facility: CLINIC | Age: 60
End: 2020-08-17

## 2020-08-17 DIAGNOSIS — M54.30 SCIATICA, UNSPECIFIED LATERALITY: ICD-10-CM

## 2020-08-17 DIAGNOSIS — M75.01 ADHESIVE CAPSULITIS OF BOTH SHOULDERS: ICD-10-CM

## 2020-08-17 DIAGNOSIS — M75.02 ADHESIVE CAPSULITIS OF BOTH SHOULDERS: ICD-10-CM

## 2020-08-17 RX ORDER — IBUPROFEN 400 MG/1
400 TABLET, FILM COATED ORAL EVERY 6 HOURS PRN
Qty: 90 TABLET | Refills: 3 | Status: SHIPPED | OUTPATIENT
Start: 2020-08-17 | End: 2020-11-09

## 2020-08-17 NOTE — TELEPHONE ENCOUNTER
Normal results from 08/13/2020 given to patient. Patient returning call, gave her neg covid result.

## 2020-08-18 ENCOUNTER — TELEPHONE (OUTPATIENT)
Dept: FAMILY MEDICINE | Facility: CLINIC | Age: 60
End: 2020-08-18

## 2020-08-18 NOTE — TELEPHONE ENCOUNTER
The last time I met with the pt, pt had some paperwork that she needed to get notarized. I told her that one of our staff does notarize paperwork. She wanted to know what day the staff will be in so that she can come in to get her paperwork notarize.     I message Siomara to see when she will be in. She stated that she will be here this Thursday.     I called the pt to inform her that our staff that does notarizing will be in this Thursday. The pt stated that she is waiting on a friend of hers at the place she lives in. She stated that if that does not work out, she will let me know, and come in here.

## 2020-08-20 DIAGNOSIS — J42 CHRONIC BRONCHITIS, UNSPECIFIED CHRONIC BRONCHITIS TYPE (H): ICD-10-CM

## 2020-08-21 ENCOUNTER — COMMUNICATION - HEALTHEAST (OUTPATIENT)
Dept: RESPIRATORY THERAPY | Facility: CLINIC | Age: 60
End: 2020-08-21

## 2020-08-21 RX ORDER — FLUTICASONE PROPIONATE AND SALMETEROL XINAFOATE 230; 21 UG/1; UG/1
2 AEROSOL, METERED RESPIRATORY (INHALATION) 2 TIMES DAILY
Qty: 1 INHALER | Refills: 11 | Status: SHIPPED | OUTPATIENT
Start: 2020-08-21 | End: 2021-06-11

## 2020-09-01 ENCOUNTER — TELEPHONE (OUTPATIENT)
Dept: FAMILY MEDICINE | Facility: CLINIC | Age: 60
End: 2020-09-01

## 2020-09-01 DIAGNOSIS — M16.10 ARTHRITIS OF HIP: ICD-10-CM

## 2020-09-01 DIAGNOSIS — M54.30 SCIATICA, UNSPECIFIED LATERALITY: ICD-10-CM

## 2020-09-01 RX ORDER — ACETAMINOPHEN 500 MG
500-1000 TABLET ORAL EVERY 8 HOURS PRN
Qty: 100 TABLET | Refills: 11 | Status: SHIPPED | OUTPATIENT
Start: 2020-09-01 | End: 2021-02-26

## 2020-09-01 NOTE — TELEPHONE ENCOUNTER
Message to physician:     Date of last visit: 8/13/2020     Date of next visit if scheduled: Visit date not found       Last Comprehensive Metabolic Panel:  Sodium   Date Value Ref Range Status   07/22/2020 139.0 133.0 - 144.0 mmol/L Final     Potassium   Date Value Ref Range Status   07/22/2020 4.4 3.4 - 5.3 mmol/L Final     Chloride   Date Value Ref Range Status   07/22/2020 103.0 94.0 - 109.0 mmol/L Final     Carbon Dioxide   Date Value Ref Range Status   07/22/2020 26.0 20.0 - 32.0 mmol/L Final     Glucose   Date Value Ref Range Status   07/22/2020 94.0 60.0 - 109.0 mg/dL Final     Urea Nitrogen   Date Value Ref Range Status   07/22/2020 11.0 7.0 - 30.0 mg/dL Final     Creatinine   Date Value Ref Range Status   07/22/2020 1.0 0.6 - 1.3 mg/dL Final     GFR Estimate   Date Value Ref Range Status   02/25/2020 51 (L) >60 mL/min/1.73m2 Final     Calcium   Date Value Ref Range Status   07/22/2020 9.9 8.5 - 10.4 mg/dL Final       BP Readings from Last 3 Encounters:   08/13/20 (!) 154/105   07/22/20 (!) 140/105   05/29/20 (!) 151/85       Lab Results   Component Value Date    A1C 4.9 11/20/2014    A1C 5.7 03/16/2011                Please complete refill and CLOSE ENCOUNTER.  Closing the encounter signifies the refill is complete.

## 2020-09-01 NOTE — TELEPHONE ENCOUNTER
[facility] Follow-up    Call initiated by clinic.  Message recorded by SENAIT Cruz  Calling for: monthly follow up   Patient: Lisa Scott  Phone conversation with: Patient  Patient's Phone number/s: Home number on file [phone] (home)  Available today in the PM on their Home number.  OK to leave message on voice mail? Yes      Major diagnoses and/or issues requiring coordination services  Housing     In the past month, how many times have you been to the Emergency Room? 0  In the past month, how many times have you been hospitalized? 0    Summary notes: I called to check up on the pt, pt stated that she is doing fine. Pt stated that she is waiting for her Social Security card to come in the mail. She also stated that she got all her paperwork and medical records turned into the housing lady. Pt stated that with all her paperwork turned in, she should be getting a new place to stay. Pt is currently staying at the [facility] [place]. If she gets her new place, it will be [facility]. Pt is doing fine, she does not have any other issue or concerns right now.     Self-management goals:  Get a new place to live    Counseling and coordination activities with: SENAIT Loya     Follow-up date: monthly

## 2020-09-08 DIAGNOSIS — J42 CHRONIC BRONCHITIS, UNSPECIFIED CHRONIC BRONCHITIS TYPE (H): ICD-10-CM

## 2020-09-08 RX ORDER — ALBUTEROL SULFATE 90 UG/1
2 AEROSOL, METERED RESPIRATORY (INHALATION) EVERY 6 HOURS PRN
Qty: 2 INHALER | Refills: 4 | Status: SHIPPED | OUTPATIENT
Start: 2020-09-08 | End: 2020-11-19

## 2020-09-08 NOTE — TELEPHONE ENCOUNTER
Message to physician:     Date of last visit: 8/13/2020     Date of next visit if scheduled: none    Last Comprehensive Metabolic Panel:  Sodium   Date Value Ref Range Status   07/22/2020 139.0 133.0 - 144.0 mmol/L Final     Potassium   Date Value Ref Range Status   07/22/2020 4.4 3.4 - 5.3 mmol/L Final     Chloride   Date Value Ref Range Status   07/22/2020 103.0 94.0 - 109.0 mmol/L Final     Carbon Dioxide   Date Value Ref Range Status   07/22/2020 26.0 20.0 - 32.0 mmol/L Final     Glucose   Date Value Ref Range Status   07/22/2020 94.0 60.0 - 109.0 mg/dL Final     Urea Nitrogen   Date Value Ref Range Status   07/22/2020 11.0 7.0 - 30.0 mg/dL Final     Creatinine   Date Value Ref Range Status   07/22/2020 1.0 0.6 - 1.3 mg/dL Final     GFR Estimate   Date Value Ref Range Status   02/25/2020 51 (L) >60 mL/min/1.73m2 Final     Calcium   Date Value Ref Range Status   07/22/2020 9.9 8.5 - 10.4 mg/dL Final       BP Readings from Last 3 Encounters:   08/13/20 (!) 154/105   07/22/20 (!) 140/105   05/29/20 (!) 151/85       Lab Results   Component Value Date    A1C 4.9 11/20/2014    A1C 5.7 03/16/2011                Please complete refill and CLOSE ENCOUNTER.  Closing the encounter signifies the refill is complete.

## 2020-09-10 ENCOUNTER — COMMUNICATION - HEALTHEAST (OUTPATIENT)
Dept: SCHEDULING | Facility: CLINIC | Age: 60
End: 2020-09-10

## 2020-09-10 ENCOUNTER — NURSE TRIAGE (OUTPATIENT)
Dept: NURSING | Facility: CLINIC | Age: 60
End: 2020-09-10

## 2020-09-10 NOTE — TELEPHONE ENCOUNTER
Asking for covid results.  See [facility] chart as she was seen there.    Bekah Briseno RN  [facility] Nurse Advisor

## 2020-09-11 DIAGNOSIS — L50.9 URTICARIA: ICD-10-CM

## 2020-09-11 RX ORDER — FAMOTIDINE 20 MG/1
20 TABLET, FILM COATED ORAL 2 TIMES DAILY
Qty: 60 TABLET | Refills: 11 | Status: SHIPPED | OUTPATIENT
Start: 2020-09-11 | End: 2021-08-31

## 2020-09-16 ENCOUNTER — COMMUNICATION - HEALTHEAST (OUTPATIENT)
Dept: RESPIRATORY THERAPY | Facility: CLINIC | Age: 60
End: 2020-09-16

## 2020-09-28 DIAGNOSIS — M54.30 SCIATICA, UNSPECIFIED LATERALITY: ICD-10-CM

## 2020-10-07 ENCOUNTER — TELEPHONE (OUTPATIENT)
Dept: FAMILY MEDICINE | Facility: CLINIC | Age: 60
End: 2020-10-07

## 2020-10-07 DIAGNOSIS — M16.10 ARTHRITIS OF HIP: ICD-10-CM

## 2020-10-07 RX ORDER — MELOXICAM 15 MG/1
15 TABLET ORAL DAILY
Qty: 30 TABLET | Refills: 3 | Status: SHIPPED | OUTPATIENT
Start: 2020-10-07 | End: 2020-12-18

## 2020-10-07 NOTE — TELEPHONE ENCOUNTER
[facility] Follow-up    Call initiated by clinic.  Message recorded by SENAIT Cruz  Calling for: monthly follow up   Patient: Lisa Scott  Phone conversation with: Patient  Patient's Phone number/s: Home number on file [phone] (home)  Available today in the AM on their Home number.  OK to leave message on voice mail? Yes      Major diagnoses and/or issues requiring coordination services  Medication     In the past month, how many times have you been to the Emergency Room? 0  In the past month, how many times have you been hospitalized? 0    Summary notes: I called to check up on the pt, pt stated that she is doing ok. Pt stated that she has been taking her medication as directed. Pt stated that she wants to come to see  about medical marijuana. Pt stated that when she uses street marijuana, it helps her, and she does not need to take about 5 of her medication. I told her that I can help her schedule it, I was able to schedule her to come in on 10/21/2020 at 8:40am with . Pt stated that she found a new place to live. Pt stated that she will move in November 1st. Pt stated that she will no longer be living in a shelter anymore. Pt is doing fine, she does not have any other issue or concerns right now.     Self-management goals:  Move in to new place    Counseling and coordination activities with: SENAIT Loya    Follow-up date: monthly
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
## 2020-10-14 ENCOUNTER — TELEPHONE (OUTPATIENT)
Dept: FAMILY MEDICINE | Facility: CLINIC | Age: 60
End: 2020-10-14

## 2020-10-14 NOTE — TELEPHONE ENCOUNTER
I got a call from the pt, pt called to say that she wants to come in to be seen. Pt stated that she might have thrush. Pt wanted someone to look at it ASAP. I was able to schedule the pt pt to come in tomorrow at 1:40pm to see .  will be precept tomorrow afternoon.

## 2020-10-15 ENCOUNTER — OFFICE VISIT (OUTPATIENT)
Dept: FAMILY MEDICINE | Facility: CLINIC | Age: 60
End: 2020-10-15
Payer: COMMERCIAL

## 2020-10-15 VITALS
HEART RATE: 71 BPM | RESPIRATION RATE: 16 BRPM | SYSTOLIC BLOOD PRESSURE: 91 MMHG | WEIGHT: 126 LBS | HEIGHT: 61 IN | DIASTOLIC BLOOD PRESSURE: 63 MMHG | OXYGEN SATURATION: 100 % | BODY MASS INDEX: 23.79 KG/M2 | TEMPERATURE: 97.4 F

## 2020-10-15 DIAGNOSIS — G40.909 SEIZURE DISORDER (H): ICD-10-CM

## 2020-10-15 DIAGNOSIS — R55 VASOVAGAL SYNCOPE: ICD-10-CM

## 2020-10-15 DIAGNOSIS — J44.9 CHRONIC OBSTRUCTIVE PULMONARY DISEASE, UNSPECIFIED COPD TYPE (H): ICD-10-CM

## 2020-10-15 DIAGNOSIS — K13.0 ANGULAR CHEILITIS: Primary | ICD-10-CM

## 2020-10-15 PROCEDURE — 99214 OFFICE O/P EST MOD 30 MIN: CPT | Mod: GC | Performed by: STUDENT IN AN ORGANIZED HEALTH CARE EDUCATION/TRAINING PROGRAM

## 2020-10-15 RX ORDER — LISINOPRIL 10 MG/1
10 TABLET ORAL DAILY
Qty: 30 TABLET | Refills: 0 | Status: SHIPPED | OUTPATIENT
Start: 2020-10-15 | End: 2020-11-16

## 2020-10-15 RX ORDER — DIVALPROEX SODIUM 250 MG/1
250 TABLET, DELAYED RELEASE ORAL 2 TIMES DAILY
Qty: 60 TABLET | Refills: 11 | Status: SHIPPED | OUTPATIENT
Start: 2020-10-15 | End: 2020-10-15

## 2020-10-15 RX ORDER — BACITRACIN ZINC 500 [USP'U]/G
OINTMENT TOPICAL 2 TIMES DAILY
Qty: 14 G | Refills: 0 | Status: SHIPPED | OUTPATIENT
Start: 2020-10-15 | End: 2020-10-15

## 2020-10-15 RX ORDER — LISINOPRIL 10 MG/1
10 TABLET ORAL DAILY
Qty: 30 TABLET | Refills: 0 | Status: SHIPPED | OUTPATIENT
Start: 2020-10-15 | End: 2020-10-15

## 2020-10-15 RX ORDER — INHALER, ASSIST DEVICES
SPACER (EA) MISCELLANEOUS
Qty: 2 EACH | Refills: 3 | Status: SHIPPED | OUTPATIENT
Start: 2020-10-15 | End: 2021-01-25

## 2020-10-15 RX ORDER — BACITRACIN ZINC 500 [USP'U]/G
OINTMENT TOPICAL 2 TIMES DAILY
Qty: 14 G | Refills: 0 | Status: SHIPPED | OUTPATIENT
Start: 2020-10-15 | End: 2021-02-02

## 2020-10-15 RX ORDER — DIVALPROEX SODIUM 250 MG/1
250 TABLET, DELAYED RELEASE ORAL 2 TIMES DAILY
Qty: 60 TABLET | Refills: 11 | Status: SHIPPED | OUTPATIENT
Start: 2020-10-15 | End: 2021-10-05

## 2020-10-15 ASSESSMENT — MIFFLIN-ST. JEOR: SCORE: 1092.4

## 2020-10-15 NOTE — PROGRESS NOTES
I have personally reviewed the history and examination as documented by Dr. Whitley.  I was present during key portions of the visit and agree with the assessment and plan as documented for 60 yr old female here for rash on lips and syncopal episode. Rash consistent w/ chelitis. Will dec BP meds. Pt recommended to get head CT given ?neurologic symptoms s/p fall w/ head trauma but she declined. Precautions given. Anticipatory guidance given.     Nikolay Cook MD  October 15, 2020  2:55 PM
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
## 2020-10-20 ENCOUNTER — COMMUNICATION - HEALTHEAST (OUTPATIENT)
Dept: RESPIRATORY THERAPY | Facility: CLINIC | Age: 60
End: 2020-10-20

## 2020-10-21 ENCOUNTER — OFFICE VISIT (OUTPATIENT)
Dept: FAMILY MEDICINE | Facility: CLINIC | Age: 60
End: 2020-10-21
Payer: COMMERCIAL

## 2020-10-21 VITALS
WEIGHT: 130.8 LBS | RESPIRATION RATE: 20 BRPM | TEMPERATURE: 98.1 F | HEART RATE: 81 BPM | HEIGHT: 62 IN | BODY MASS INDEX: 24.07 KG/M2 | DIASTOLIC BLOOD PRESSURE: 88 MMHG | SYSTOLIC BLOOD PRESSURE: 130 MMHG | OXYGEN SATURATION: 99 %

## 2020-10-21 DIAGNOSIS — J44.9 CHRONIC OBSTRUCTIVE PULMONARY DISEASE, UNSPECIFIED COPD TYPE (H): ICD-10-CM

## 2020-10-21 DIAGNOSIS — M12.9 ARTHRITIS INVOLVING MULTIPLE SITES: Primary | ICD-10-CM

## 2020-10-21 DIAGNOSIS — I10 ESSENTIAL HYPERTENSION: ICD-10-CM

## 2020-10-21 PROCEDURE — 99214 OFFICE O/P EST MOD 30 MIN: CPT | Performed by: FAMILY MEDICINE

## 2020-10-21 ASSESSMENT — MIFFLIN-ST. JEOR: SCORE: 1126.55

## 2020-10-21 NOTE — PROGRESS NOTES
Pt is a 60 year old female last seen on 10/15/20 by Dr Whitley here today for:     Per ED visit on 10/15/20: Left AMA  60 y.o. female presents to the Emergency Department for evaluation of multiple complaints. Patient is here for evaluation of both syncopal events as well as some wheezing. Patient does have COPD and has chronic wheezing, her oxygen level is 97 to 99% on room air. We will give her some albuterol as well as steroids. Patient does endorse having syncopal events, this does appear to be orthostatic, the patient feels lightheaded upon standing or when she has vasovagal episodes while on the toilet. She denies any chest pain, exertional syncope, syncope that is unprovoked. She does endorse that she has not been eating or drinking well over the last few days. Will do a cardiac work-up given that she did hit her head I do have low suspicion for intracranial injury however given her complaint of persistent headaches will obtain a CT head. No neuro deficits on exam.  - Feels that stuttering resolved after using marijuana   - No new neurologic complaints  - No more syncopal episodes since ED visit     2) COPD -  Has been using inhalers. Feels that her breathing is well controlled at this time. Needs to  Prednisone that was prescribed in ED.    3) Chronic pain   Requests initiating process for medical marijuana  Has chronic pain due to osteoarthritis and sciatica   Worst pain in low back   Has felt that THC has helped in the past     Past Medical History:   Diagnosis Date     Alcohol withdrawal seizure (H) 02/25/2017     Chronic obstructive pulmonary disease, unspecified COPD type (H) 2/23/2017     COPD (chronic obstructive pulmonary disease) (H)      Depressive disorder       History reviewed. No pertinent surgical history.   Current Outpatient Medications   Medication Sig Dispense Refill     acetaminophen (TYLENOL) 500 MG tablet Take 1-2 tablets (500-1,000 mg) by mouth every 8 hours as needed for mild  pain 100 tablet 11     aclidinium (TUDORZA PRESSAIR) 400 MCG/ACT inhaler Inhale 1 puff into the lungs 2 times daily 1 Inhaler 11     albuterol (PROAIR HFA/PROVENTIL HFA/VENTOLIN HFA) 108 (90 Base) MCG/ACT inhaler Inhale 2 puffs into the lungs every 6 hours as needed for shortness of breath / dyspnea or wheezing 2 Inhaler 4     bacitracin 500 UNIT/GM external ointment Apply topically 2 times daily 14 g 0     buPROPion (WELLBUTRIN XL) 300 MG 24 hr tablet Take 300 mg by mouth daily       busPIRone HCl (BUSPAR) 30 MG tablet Take 0.5 tablets (15 mg) by mouth 3 times daily 45 tablet 11     calcium carbonate (TUMS) 500 MG chewable tablet Take 2 tablets (1,000 mg) by mouth 4 times daily as needed for heartburn 240 tablet 4     divalproex sodium delayed-release (DEPAKOTE) 250 MG DR tablet Take 1 tablet (250 mg) by mouth 2 times daily 60 tablet 11     famotidine (PEPCID) 20 MG tablet Take 1 tablet (20 mg) by mouth 2 times daily 60 tablet 11     fexofenadine (ALLEGRA) 180 MG tablet Take 1 tablet (180 mg) by mouth daily 30 tablet 11     fluticasone (FLONASE) 50 MCG/ACT nasal spray Spray 2 sprays in nostril daily 9.9 mL 11     fluticasone-salmeterol (ADVAIR-HFA) 230-21 MCG/ACT inhaler Inhale 2 puffs into the lungs 2 times daily 1 Inhaler 11     gabapentin (NEURONTIN) 300 MG capsule Take 2 capsules (600 mg) by mouth 3 times daily 180 capsule 11     hydrOXYzine (ATARAX) 25 MG tablet Take 1 tablet (25 mg) by mouth every 8 hours as needed for anxiety 30 tablet 4     ibuprofen (ADVIL/MOTRIN) 400 MG tablet Take 1 tablet (400 mg) by mouth every 6 hours as needed for moderate pain 90 tablet 3     ipratropium - albuterol 0.5 mg/2.5 mg/3 mL (DUONEB) 0.5-2.5 (3) MG/3ML neb solution NEBULIZE 1 VIAL BY MOUTH FOUR TIMES DAILY 90 mL 3     lisinopril (ZESTRIL) 10 MG tablet Take 1 tablet (10 mg) by mouth daily 30 tablet 0     lisinopril (ZESTRIL) 20 MG tablet Take 1 tablet (20 mg) by mouth daily 30 tablet 11     loperamide (IMODIUM) 2 MG capsule  Take 2 mg by mouth every 6 hours as needed       Melatonin 10 MG TABS tablet Take 1 tablet (10 mg) by mouth daily (with dinner) , additional tablet as needed for late night awakenings 90 tablet 1     meloxicam (MOBIC) 15 MG tablet Take 1 tablet (15 mg) by mouth daily 30 tablet 3     montelukast (SINGULAIR) 10 MG tablet Take 1 tablet (10 mg) by mouth At Bedtime 30 tablet 4     omeprazole (PRILOSEC) 20 MG DR capsule Take 1 capsule (20 mg) by mouth 2 times daily 60 capsule 11     ondansetron (ZOFRAN-ODT) 4 MG ODT tab Take 1 tablet (4 mg) by mouth every 8 hours as needed for nausea 20 tablet 4     order for DME DME - pt needs nebulizer tubing 1 Device 0     OXcarbazepine (TRILEPTAL) 300 MG tablet Take 1 tablet (300 mg) by mouth At Bedtime 30 tablet 4     polyethylene glycol (MIRALAX/GLYCOLAX) powder Take 17 g (1 capful) by mouth daily as needed for constipation       polyvinyl alcohol (LIQUIFILM TEARS) 1.4 % ophthalmic solution Place 1 drop into both eyes as needed for dry eyes       prazosin (MINIPRESS) 2 MG capsule Take 2 capsules (4 mg) by mouth At Bedtime 60 capsule 3     QUEtiapine (SEROQUEL) 100 MG tablet Take 1 tablet (100 mg) by mouth 2 times daily as needed (anxiety or panic attacks) 60 tablet 4     QUEtiapine (SEROQUEL) 200 MG tablet Take 1 tablet (200 mg) by mouth At Bedtime 30 tablet 11     QUEtiapine (SEROQUEL) 25 MG tablet Take 25 mg by mouth daily as needed       Respiratory Therapy Supplies (NEBULIZER/PEDIATRIC MASK) KIT kit Nebulizer device, mask, and tubing to be used to deliver nebulized medications. 1 kit 0     sennosides (SENOKOT) 8.6 MG tablet Take 1 tablet by mouth 2 times daily as needed for constipation 60 tablet 3     spacer (OPTICHAMBER FAZAL) holding chamber Use with inhaler 2 each 3     spacer (OPTICHAMBER FAZAL) holding chamber For use with inhaler 1 each 0     tiZANidine (ZANAFLEX) 4 MG tablet Take 2 tablets (8 mg) by mouth 3 times daily as needed for muscle spasms 240 tablet 11      "traZODone (DESYREL) 50 MG tablet Take 1 tablet (50 mg) by mouth At Bedtime 30 tablet 4     vitamin B1 (THIAMINE) 100 MG tablet Take 200 mg by mouth 3 times daily        Allergies   Allergen Reactions     Nkda [No Known Drug Allergies]         ROS:   Gen- no weight change, no fevers/chills   Head/ Eyes- no blurred vision, no headaches   ENT- no cough, no congestion, no URI symptoms   Cardiac - no palpitations, no chest pain   Respiratory - no shortness of breath , no wheezing   GI - no nausea, no vomiting, no diarrhea, no constipation   Urinary - no dysuria, no polyuria   Neuro - no numbness, no tingling   Remainder of ROS negative.     Exam:   /88 (BP Location: Left arm, Patient Position: Sitting, Cuff Size: Adult Regular)   Pulse 81   Temp 98.1  F (36.7  C) (Oral)   Resp 20   Ht 1.575 m (5' 2\")   Wt 59.3 kg (130 lb 12.8 oz)   SpO2 99%   BMI 23.92 kg/m     Alert and oriented x 3; No acute distress   HEENT: Head normocephalic. Eyes anicteric without conjunctival injection EOM intact  Neck: no masses, no lymphadenopathy   Resp: Coarse breath sounds throughout. Wheezing in right upper lobe.   CV: Rate/rhythm regular, no murmurs/rubs/gallops   ABd: soft, + bowel sounds, nontender/nondistended, no rebound/guarding   Extrem: no clubbing/cyanosis/edema   MSK:deferred  Neuro: no focal deficits   Derm: no rashes           (M12.9) Arthritis involving multiple sites  (primary encounter diagnosis)  Comment: Meets criteria for medical marijuana due to diagnosis of chronic pain  Plan:  Initiated process for medical marijuana. Provided patient with letter of medical necessity. Working with [facility] for further registration.       (J44.9) Chronic obstructive pulmonary disease, unspecified COPD type (H)  Comment:   Plan: stable; has not required pred burst prescribed in ED; cont current regimen    (I10) Essential hypertension  Comment:   Plan: stable; cont current regimen    ED follow-up: Patient feeling improved after " visit on 10/15. No further syncopal episodes and resolution of neurologic symptoms.     Follow-up in 3 months for recheck.    Pt seen w/ Racheal Cook MD  October 21, 2020  10:09 AM

## 2020-11-06 ENCOUNTER — TELEPHONE (OUTPATIENT)
Dept: FAMILY MEDICINE | Facility: CLINIC | Age: 60
End: 2020-11-06

## 2020-11-06 NOTE — TELEPHONE ENCOUNTER
[facility] Follow-up    Call initiated by clinic.  Message recorded by SENAIT Cruz  Calling for: monthly follow up   Patient: Lisa Scott  Phone conversation with: Patient  Patient's Phone number/s: Home number on file [phone] (home)  Available today in the AM on their Home number.  OK to leave message on voice mail? Yes      Major diagnoses and/or issues requiring coordination services  Hypertension     In the past month, how many times have you been to the Emergency Room? 0  In the past month, how many times have you been hospitalized? 0    Summary notes: I called to check up on the pt, pt stated that she is doing ok. Pt stated that she is moved in to her new place. She stated that her new place in near the  and [place]. Pt stated that she is still waiting for other stuff to furnish her place. Pt has been taking her medication as directed. Pt stated that her BP has been fine. Pt stated that it has been in the 130's. Pt is doing fine, she is just happy that she got her won place now. Pt does not have any other issue or concerns right now.     Self-management goals:  Get a microwave    Counseling and coordination activities with: SENAIT Loya    Follow-up date: monthly

## 2020-11-09 DIAGNOSIS — M75.01 ADHESIVE CAPSULITIS OF BOTH SHOULDERS: ICD-10-CM

## 2020-11-09 DIAGNOSIS — M54.30 SCIATICA, UNSPECIFIED LATERALITY: ICD-10-CM

## 2020-11-09 DIAGNOSIS — M75.02 ADHESIVE CAPSULITIS OF BOTH SHOULDERS: ICD-10-CM

## 2020-11-09 RX ORDER — IBUPROFEN 400 MG/1
400 TABLET, FILM COATED ORAL EVERY 6 HOURS PRN
Qty: 90 TABLET | Refills: 3 | Status: SHIPPED | OUTPATIENT
Start: 2020-11-09 | End: 2020-12-30 | Stop reason: ALTCHOICE

## 2020-11-16 DIAGNOSIS — R55 VASOVAGAL SYNCOPE: ICD-10-CM

## 2020-11-16 DIAGNOSIS — I10 ESSENTIAL HYPERTENSION: Primary | ICD-10-CM

## 2020-11-17 DIAGNOSIS — R11.2 NAUSEA AND VOMITING, INTRACTABILITY OF VOMITING NOT SPECIFIED, UNSPECIFIED VOMITING TYPE: ICD-10-CM

## 2020-11-17 RX ORDER — LISINOPRIL 10 MG/1
10 TABLET ORAL DAILY
Qty: 30 TABLET | Refills: 11 | Status: SHIPPED | OUTPATIENT
Start: 2020-11-17 | End: 2021-02-02

## 2020-11-17 RX ORDER — ONDANSETRON 4 MG/1
4 TABLET, ORALLY DISINTEGRATING ORAL EVERY 8 HOURS PRN
Qty: 20 TABLET | Refills: 11 | Status: SHIPPED | OUTPATIENT
Start: 2020-11-17 | End: 2021-05-17

## 2020-11-19 DIAGNOSIS — J42 CHRONIC BRONCHITIS, UNSPECIFIED CHRONIC BRONCHITIS TYPE (H): ICD-10-CM

## 2020-11-19 RX ORDER — ALBUTEROL SULFATE 90 UG/1
2 AEROSOL, METERED RESPIRATORY (INHALATION) EVERY 6 HOURS PRN
Qty: 2 INHALER | Refills: 11 | Status: SHIPPED | OUTPATIENT
Start: 2020-11-19 | End: 2021-05-17

## 2020-11-23 ENCOUNTER — TELEPHONE (OUTPATIENT)
Dept: FAMILY MEDICINE | Facility: CLINIC | Age: 60
End: 2020-11-23

## 2020-11-23 NOTE — TELEPHONE ENCOUNTER
I got a call from the pt about her medical marijuana. It seems like her worker tried to help her but it did not go work. I asked the pt to see if she has schedule a visit with the distribution center. Pt stated that she has not yet. I told her that she needs to go register with the distribution center. She needs to have a visit with them, they will give her, her registration information, then she can go inline to register. The pt stated that she will give them a call and see. I told her to give me a call back if she has any other questions.

## 2020-12-02 ENCOUNTER — TELEPHONE (OUTPATIENT)
Dept: FAMILY MEDICINE | Facility: CLINIC | Age: 60
End: 2020-12-02

## 2020-12-02 NOTE — TELEPHONE ENCOUNTER
[facility] Follow-up    Call initiated by clinic.  Message recorded by SENAIT Cruz  Calling for: monthly follow up   Patient: Lisa Scott  Phone conversation with: Patient  Patient's Phone number/s: Home number on file [phone] (home)  Available today in the PM on their Home number.  OK to leave message on voice mail? Yes      Major diagnoses and/or issues requiring coordination services  Ear     In the past month, how many times have you been to the Emergency Room? 0  In the past month, how many times have you been hospitalized? 0    Summary notes: I called to check up on the pt, pt stated that she is doing ok. Pt stated that she is on her way to the store. Pt stated that she has been taking her medication as directed. Pt stated that she still looking for someone to help with her medical marijuana. I told her that she needs to complete her initial visit with the distribution center first then she can go register. Pt stated that she will try to make an appointment. Pt stated that her ear has not been unplugged for a couple of days now. Pt wanted to get it looked at. She has an appointment on 12/17/2020. I told her that if she feels that she needs to come in earlier, to give us a call. Pt does not have any other issue or concerns right now.     Self-management goals:  Complete registration for marijuana medical    Counseling and coordination activities with: SENAIT Loya    Follow-up date: monthly

## 2020-12-09 ENCOUNTER — TELEPHONE (OUTPATIENT)
Dept: FAMILY MEDICINE | Facility: CLINIC | Age: 60
End: 2020-12-09

## 2020-12-09 ENCOUNTER — OFFICE VISIT (OUTPATIENT)
Dept: FAMILY MEDICINE | Facility: CLINIC | Age: 60
End: 2020-12-09
Payer: COMMERCIAL

## 2020-12-09 VITALS
RESPIRATION RATE: 20 BRPM | TEMPERATURE: 97.8 F | BODY MASS INDEX: 24.58 KG/M2 | WEIGHT: 130.2 LBS | DIASTOLIC BLOOD PRESSURE: 82 MMHG | SYSTOLIC BLOOD PRESSURE: 116 MMHG | OXYGEN SATURATION: 100 % | HEART RATE: 92 BPM | HEIGHT: 61 IN

## 2020-12-09 DIAGNOSIS — J30.2 SEASONAL ALLERGIC RHINITIS, UNSPECIFIED TRIGGER: ICD-10-CM

## 2020-12-09 DIAGNOSIS — F31.60 BIPOLAR DISORDER, MIXED (H): ICD-10-CM

## 2020-12-09 DIAGNOSIS — H65.00 NON-RECURRENT ACUTE SEROUS OTITIS MEDIA, UNSPECIFIED LATERALITY: Primary | ICD-10-CM

## 2020-12-09 PROCEDURE — 99214 OFFICE O/P EST MOD 30 MIN: CPT | Performed by: FAMILY MEDICINE

## 2020-12-09 RX ORDER — FLUTICASONE PROPIONATE 50 MCG
2 SPRAY, SUSPENSION (ML) NASAL DAILY
Qty: 9.9 ML | Refills: 11 | Status: SHIPPED | OUTPATIENT
Start: 2020-12-09 | End: 2021-07-27

## 2020-12-09 ASSESSMENT — MIFFLIN-ST. JEOR: SCORE: 1102.08

## 2020-12-09 ASSESSMENT — PATIENT HEALTH QUESTIONNAIRE - PHQ9: SUM OF ALL RESPONSES TO PHQ QUESTIONS 1-9: 18

## 2020-12-09 NOTE — TELEPHONE ENCOUNTER
I talked to Lizbeth about the pt, I wanted to know if there are any places that their psychiatrist if accepting new pt. She stated that there are not many places that will accept new pt. She stated that we can schedule the pt to see  for the time being to prescribe for her till we find a psychiatrist for her.  has this Monday the 14th, there is a 8:30am and 10:00am time slot open. I told her that I will call the pt to see what works best for her, and let her know.     I called the pt, pt stated that the 10:00am slot will work for her. I told her to put it on her calendar. Pt stated that she wrote it down.

## 2020-12-09 NOTE — TELEPHONE ENCOUNTER
I got a call back from  office. They informed me that the pt does have an appointment with  scheduled for 01/28/2021. The pt last appointment was suppose to be on 11/12/2020. The pt had no showed for that appointment. The pt now is not able to be seen anymore. The pt will have to write a letter to the [facility] clinic manager to explain to them why she has so many no showed. They will then determine if they will see the pt again. They stated that for the mean time, the primary physician will have to prescribed for now.

## 2020-12-09 NOTE — Clinical Note
Pat - I saw Lisa earlier today and I am concerned about her bipolar disorder; She said she was going to try to get in to see her psychiatrist soon. Can you reach out to her and make sure she gets evaluated?  Thank you!    Nikolay

## 2020-12-09 NOTE — TELEPHONE ENCOUNTER
I got a message from  about the pt. He wanted me to follow up with the pt psychiatrist to see if he has been seeing the pt. I called the pt, I talked to the pt about her psychiatrist. She informed me that he is only there Thursday and Fridays only. She does have an appointment with  on January 28th.  She stated that she had called  office, and wanted a emergency appointment. She stated that they are suppose to video visit with her tomorrow or Friday. She stated that she is waiting for a call. I told her to give them a call if she does not hear from them. Pt stated that she will call them if she does not hear from them.     I called  office to find out the status of the pt visits. I got their vm, so I left a vm for them to give me a call back.
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
## 2020-12-14 ENCOUNTER — VIRTUAL VISIT (OUTPATIENT)
Dept: PSYCHOLOGY | Facility: CLINIC | Age: 60
End: 2020-12-14
Payer: COMMERCIAL

## 2020-12-14 DIAGNOSIS — F31.32 BIPOLAR AFFECTIVE DISORDER, CURRENTLY DEPRESSED, MODERATE (H): Primary | ICD-10-CM

## 2020-12-14 PROCEDURE — 99214 OFFICE O/P EST MOD 30 MIN: CPT | Mod: GC | Performed by: STUDENT IN AN ORGANIZED HEALTH CARE EDUCATION/TRAINING PROGRAM
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
## 2020-12-17 ENCOUNTER — OFFICE VISIT (OUTPATIENT)
Dept: FAMILY MEDICINE | Facility: CLINIC | Age: 60
End: 2020-12-17
Payer: COMMERCIAL

## 2020-12-17 VITALS
RESPIRATION RATE: 16 BRPM | DIASTOLIC BLOOD PRESSURE: 77 MMHG | HEART RATE: 80 BPM | SYSTOLIC BLOOD PRESSURE: 127 MMHG | WEIGHT: 123.8 LBS | OXYGEN SATURATION: 98 % | BODY MASS INDEX: 23.68 KG/M2

## 2020-12-17 DIAGNOSIS — R87.620 ATYPICAL SQUAMOUS CELL CHANGES OF UNDETERMINED SIGNIFICANCE (ASCUS) ON VAGINAL CYTOLOGY: ICD-10-CM

## 2020-12-17 DIAGNOSIS — K40.90 NON-RECURRENT UNILATERAL INGUINAL HERNIA WITHOUT OBSTRUCTION OR GANGRENE: Primary | ICD-10-CM

## 2020-12-17 PROCEDURE — 99214 OFFICE O/P EST MOD 30 MIN: CPT | Performed by: FAMILY MEDICINE
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
## 2020-12-17 NOTE — PATIENT INSTRUCTIONS
No heavy lifting until evaluated by the surgeon. Recommend tylenol for pain and support the area with cough or sneezing.  Patient Education     Hernia (Adult)    A hernia can happen when there is a weakness or defect in the wall of the abdomen or groin. Intestines or nearby tissues may move from their usual location and push through the weakness in the wall. This can cause a hernia (bulge) you may see or feel.  Causes and risk factors   A hernia may be present at birth. Or it may be caused by the wear and tear of daily living. Certain factors can make a hernia more likely. These can include:    Heavy lifting    Straining, whether from lifting, movement, or constipation    Chronic cough    Injury to the abdominal wall    Excess weight    Pregnancy    Prior surgery    Older age    Family history of hernia  Symptoms  Symptoms of a hernia may come on suddenly. Or they may appear slowly over time. Some common symptoms include:    Bulge in the groin area, around the navel, or in the scrotum (the bulge may get bigger when you stand and go away when you lie down)    Pain or pressure around the bulge    Pain during activities such as lifting, coughing, or sneezing    A feeling of weakness or pressure in the groin    Pain or swelling in the scrotum  Types of hernias  There are different types of hernia. The type you have depends on its location:    Inguinal. This type is in the groin or scrotum. It is more common in men. But, women can get this hernia, too.    Femoral. This type is in the groin, upper thigh (where the leg bends), or labia. It is more common in women.    Ventral. This type is in the abdominal wall.    Umbilical. This type occurs around the navel (belly button).    Incisional. This type occurs at the site of a previous surgery.  The condition of the hernia can help determine how urgently it needs to be treated.    Reducible. It goes back in by itself, or it can be pushed back in.    Irreducible. It can t be  pushed back in.    Incarcerated/strangulated. The intestine is trapped (incarcerated). If this happens, you won t be able to push the bulge back in. If the incarcerated hernia isn t treated, it may become strangulated. This means the area loses blood supply and the tissue may die. This requires emergency surgery. You need treatment right away.  In most cases, a hernia will not heal on its own.You may need surgery to repair the defect in the abdominal wall or groin. You ll be told more about surgery, if needed.  If your symptoms are not severe, treatment may sometimes be delayed. In such cases, you will need regular follow-up visits with the provider. You ll be asked to keep track of your symptoms and to watch for signs of more serious problems. You may also be given guidelines similar to the home care instructions below.  Home care  To help keep a hernia from getting worse, you may be advised to:    Avoid heavy lifting and straining as directed.    Take steps to prevent constipation, such as eating more fiber and drinking more water. This may help reduce straining that can occur when having a bowel movement. Reducing straining may help keep your symptoms from getting worse.    Maintain a healthy weight or lose excess weight. This can help reduce strain on abdominal muscles and tissues.    Stop smoking. This can help prevent coughing that may also strain abdominal muscles and tissues.  Follow-up care  Follow up with your healthcare provider, or as directed. If imaging tests were done, they will be reviewed a doctor. You will be told the results and any new findings that may affect your care.  When to seek medical advice  Call your healthcare provider right away if any of these occur:    Hernia hardens, swells, or grows larger    Hernia can no longer be pushed back in    Pain moves to the lower right abdomen (just below the waistline), or spreads to the back  Call 911  Call 911 if any of these occur:    Severe pain,  redness, or tenderness in the area near the hernia    Pain worsens quickly and doesn t get better    Inability to have a bowel movement or pass gas    Fever of 100.4 F (38 C) or higher, or as directed by your healthcare provider  Jessica last reviewed this educational content on 3/1/2018    [id number] [employer]. [address] [place] [address]. All rights reserved. This information is not intended as a substitute for professional medical care. Always follow your healthcare professional's instructions.         Referral for :     General Surgery    LOCATION/PLACE/Provider :    [facility] [place]   DATE & TIME :    Dec. 21st at 11:40 am   PHONE :     [phone]  FAX :    [phone]  Appointment made by clinic staff/:    [place]

## 2020-12-18 ENCOUNTER — AMBULATORY - HEALTHEAST (OUTPATIENT)
Dept: ADMINISTRATIVE | Facility: CLINIC | Age: 60
End: 2020-12-18

## 2020-12-18 DIAGNOSIS — K40.90 UNILATERAL INGUINAL HERNIA WITHOUT OBSTRUCTION OR GANGRENE, RECURRENCE NOT SPECIFIED: ICD-10-CM

## 2020-12-18 LAB
FINAL DIAGNOSIS: NORMAL
HPV HR 12 DNA CVX QL NAA+PROBE: NEGATIVE
HPV16 DNA SPEC QL NAA+PROBE: NEGATIVE
HPV18 DNA SPEC QL NAA+PROBE: NEGATIVE
SPECIMEN DESCRIPTION: NORMAL
SPECIMEN SOURCE CVX/VAG CYTO: NORMAL

## 2020-12-21 ENCOUNTER — VIRTUAL VISIT (OUTPATIENT)
Dept: PSYCHOLOGY | Facility: CLINIC | Age: 60
End: 2020-12-21
Payer: COMMERCIAL

## 2020-12-21 ENCOUNTER — TRANSFERRED RECORDS (OUTPATIENT)
Dept: HEALTH INFORMATION MANAGEMENT | Facility: CLINIC | Age: 60
End: 2020-12-21

## 2020-12-21 ENCOUNTER — OFFICE VISIT - HEALTHEAST (OUTPATIENT)
Dept: SURGERY | Facility: CLINIC | Age: 60
End: 2020-12-21

## 2020-12-21 DIAGNOSIS — F31.32 BIPOLAR AFFECTIVE DISORDER, CURRENTLY DEPRESSED, MODERATE (H): Primary | ICD-10-CM

## 2020-12-21 DIAGNOSIS — K40.90 RIGHT INGUINAL HERNIA: ICD-10-CM

## 2020-12-21 PROCEDURE — 99214 OFFICE O/P EST MOD 30 MIN: CPT | Mod: GC | Performed by: STUDENT IN AN ORGANIZED HEALTH CARE EDUCATION/TRAINING PROGRAM
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
## 2020-12-22 NOTE — PROGRESS NOTES
Preceptor Attestation:    I talked to the patient on the phone and discussed the patient with the resident. I have verified the content of the note, which accurately reflects my assessment of the patient and the plan of care.   Supervising Physician:  Angela Ontiveros MD.

## 2020-12-29 NOTE — PROGRESS NOTES
"Family Medicine Telephone Visit Note           Telephone Visit Consent   Patient was verbally read the following and verbal consent was obtained.    \"Telephone visits are billed at different rates depending on your insurance coverage. During this emergency period, for some insurers they may be billed the same as an in-person visit.  Please reach out to your insurance provider with any questions.  If during the course of the call the physician/provider feels a telephone visit is not appropriate, you will not be charged for this service.\"    Name person giving consent:  Patient   Date verbal consent given:  12/30/2020  Time verbal consent given:  10:17 AM      Chief Complaint   Patient presents with     RECHECK     F/U: Abdomen and Back -still in pain.      Medication Reconciliation     Needs attention             HPI   Patients name: Lisa  Appointment start time:  10:30 AM    Pt last seen on 12/9/20 evaluated today via telephone encounter for:    1) abd pain - diagnosed w/ inguinal hernia by Dr Phillips  Has not set surgery date   Has noted constipation     2) low back pain - sharp pain and weakness  Bilateral hip pain   +radicular pain across both hips and down legs  Feels unsteady when she gets pain ->using a walker  Taking 600mg bid of gabapentin, tizanidine,     Per telephone message on 12/18/20:  Reason for call: Patient states she has a hernia in her stomach. She states tylenol is not helping and wants to speak with Dr. Cook to see if he wants to prescribe something since no medication was prescribed at her recent visit. Patient states she also has back pain and cannot handle the pain and has been crying due to the pain. Please call and advise.     Per consult w/ Dr Phillips of general surgery on 12/21/20:  Assessment/Plan: Pt with a right inguinal hernia. I discussed this at length with She. I went over conservative management as well as surgical treatment of this.. I would plan on doing this via " anlaparoscopic approach with possible use of mesh. I went over the small risks of surgery including but not limited to bleeding and infection, anesthesia, recurrence rates and nerve injury. I discussed the expected recovery time as well. Will get this scheduled. She will contact us to have this scheduled.        Current Outpatient Medications   Medication Sig Dispense Refill     acetaminophen (TYLENOL) 500 MG tablet Take 1-2 tablets (500-1,000 mg) by mouth every 8 hours as needed for mild pain 100 tablet 11     aclidinium (TUDORZA PRESSAIR) 400 MCG/ACT inhaler Inhale 1 puff into the lungs 2 times daily 1 Inhaler 11     albuterol (PROAIR HFA/PROVENTIL HFA/VENTOLIN HFA) 108 (90 Base) MCG/ACT inhaler Inhale 2 puffs into the lungs every 6 hours as needed for shortness of breath / dyspnea or wheezing 2 Inhaler 11     bacitracin 500 UNIT/GM external ointment Apply topically 2 times daily 14 g 0     buPROPion (WELLBUTRIN XL) 300 MG 24 hr tablet Take 300 mg by mouth daily       busPIRone HCl (BUSPAR) 30 MG tablet Take 0.5 tablets (15 mg) by mouth 3 times daily 45 tablet 11     calcium carbonate (TUMS) 500 MG chewable tablet Take 2 tablets (1,000 mg) by mouth 4 times daily as needed for heartburn 240 tablet 4     divalproex sodium delayed-release (DEPAKOTE) 250 MG DR tablet Take 1 tablet (250 mg) by mouth 2 times daily 60 tablet 11     famotidine (PEPCID) 20 MG tablet Take 1 tablet (20 mg) by mouth 2 times daily 60 tablet 11     fexofenadine (ALLEGRA) 180 MG tablet Take 1 tablet (180 mg) by mouth daily 30 tablet 11     fluticasone (FLONASE) 50 MCG/ACT nasal spray Spray 2 sprays in nostril daily 9.9 mL 11     fluticasone-salmeterol (ADVAIR-HFA) 230-21 MCG/ACT inhaler Inhale 2 puffs into the lungs 2 times daily 1 Inhaler 11     gabapentin (NEURONTIN) 300 MG capsule Take 2 capsules (600 mg) by mouth 3 times daily 180 capsule 11     hydrOXYzine (ATARAX) 25 MG tablet Take 1 tablet (25 mg) by mouth every 8 hours as needed for  anxiety 30 tablet 4     ibuprofen (ADVIL/MOTRIN) 400 MG tablet Take 1 tablet (400 mg) by mouth every 6 hours as needed for moderate pain 90 tablet 3     ipratropium - albuterol 0.5 mg/2.5 mg/3 mL (DUONEB) 0.5-2.5 (3) MG/3ML neb solution NEBULIZE 1 VIAL BY MOUTH FOUR TIMES DAILY 90 mL 3     lisinopril (ZESTRIL) 10 MG tablet Take 1 tablet (10 mg) by mouth daily 30 tablet 11     loperamide (IMODIUM) 2 MG capsule Take 2 mg by mouth every 6 hours as needed       Melatonin 10 MG TABS tablet Take 1 tablet (10 mg) by mouth daily (with dinner) , additional tablet as needed for late night awakenings 90 tablet 1     meloxicam (MOBIC) 15 MG tablet Take 1 tablet (15 mg) by mouth daily 14 tablet 1     montelukast (SINGULAIR) 10 MG tablet Take 1 tablet (10 mg) by mouth At Bedtime 30 tablet 4     omeprazole (PRILOSEC) 20 MG DR capsule Take 1 capsule (20 mg) by mouth 2 times daily 60 capsule 11     ondansetron (ZOFRAN-ODT) 4 MG ODT tab Take 1 tablet (4 mg) by mouth every 8 hours as needed for nausea 20 tablet 11     order for DME DME - pt needs nebulizer tubing 1 Device 0     OXcarbazepine (TRILEPTAL) 300 MG tablet Take 1 tablet (300 mg) by mouth At Bedtime 30 tablet 4     polyethylene glycol (MIRALAX/GLYCOLAX) powder Take 17 g (1 capful) by mouth daily as needed for constipation       polyvinyl alcohol (LIQUIFILM TEARS) 1.4 % ophthalmic solution Place 1 drop into both eyes as needed for dry eyes       prazosin (MINIPRESS) 2 MG capsule Take 2 capsules (4 mg) by mouth At Bedtime 60 capsule 3     QUEtiapine (SEROQUEL) 100 MG tablet Take 1 tablet (100 mg) by mouth 2 times daily as needed (anxiety or panic attacks) 60 tablet 4     QUEtiapine (SEROQUEL) 200 MG tablet Take 1 tablet (200 mg) by mouth At Bedtime 30 tablet 11     QUEtiapine (SEROQUEL) 25 MG tablet Take 25 mg by mouth daily as needed       Respiratory Therapy Supplies (NEBULIZER/PEDIATRIC MASK) KIT kit Nebulizer device, mask, and tubing to be used to deliver nebulized  "medications. 1 kit 0     sennosides (SENOKOT) 8.6 MG tablet Take 1 tablet by mouth 2 times daily as needed for constipation 60 tablet 3     spacer (OPTICHAMBER FAZAL) holding chamber Use with inhaler 2 each 3     spacer (OPTICHAMBER FAZAL) holding chamber For use with inhaler 1 each 0     tiZANidine (ZANAFLEX) 4 MG tablet Take 2 tablets (8 mg) by mouth 3 times daily as needed for muscle spasms 240 tablet 11     traZODone (DESYREL) 50 MG tablet Take 1 tablet (50 mg) by mouth At Bedtime 30 tablet 4     vitamin B1 (THIAMINE) 100 MG tablet Take 200 mg by mouth 3 times daily       Allergies   Allergen Reactions     Nkda [No Known Drug Allergies]      Wellbutrin [Bupropion]      Stopped due to history of seizures              Review of Systems:     CONSTITUTIONAL: NEGATIVE for fever, chills, change in weight  ENT/MOUTH: NEGATIVE for ear, mouth and throat problems  RESP: NEGATIVE for significant cough or SOB  CV: NEGATIVE for chest pain, palpitations or peripheral edema         Physical Exam:     There were no vitals taken for this visit.  Estimated body mass index is 23.68 kg/m  as calculated from the following:    Height as of 12/9/20: 1.54 m (5' 0.63\").    Weight as of 12/17/20: 56.2 kg (123 lb 12.8 oz).    Exam:  Constitutional: healthy, alert and no distress  Psychiatric: mentation appears normal and affect normal/bright          Assessment and Plan     (K59.09) Other constipation  (primary encounter diagnosis)  Comment: trial of miralax  Plan: polyethylene glycol (MIRALAX) 17 GM/Dose powder            (K40.90) Non-recurrent unilateral inguinal hernia without obstruction or gangrene  Comment:   Plan: recommended she proceed with surgery; she will call to get scheduled    (M54.16) Lumbar radiculopathy  Comment: meds adjusted; will see her in clinic after she has recovered from hernia surgery; precautions given; she understands she is not to take any other nsaids while on diclofenac  Plan: diclofenac (VOLTAREN) 1 % " topical gel, diclofenac (VOLTAREN) 50 MG EC tablet            Refilled medications that would be required in the next 3 months.     After Visit Information:  Patient declined AVS     No follow-ups on file.    Appointment end time: 10:52 AM  This is a telephone visit that took 22 minutes.      Clinician location:  [facility]     Nikolay Cook MD  December 30, 2020  10:52 AM

## 2020-12-30 ENCOUNTER — VIRTUAL VISIT (OUTPATIENT)
Dept: FAMILY MEDICINE | Facility: CLINIC | Age: 60
End: 2020-12-30
Payer: COMMERCIAL

## 2020-12-30 ENCOUNTER — RECORDS - HEALTHEAST (OUTPATIENT)
Dept: ADMINISTRATIVE | Facility: OTHER | Age: 60
End: 2020-12-30

## 2020-12-30 DIAGNOSIS — M54.16 LUMBAR RADICULOPATHY: ICD-10-CM

## 2020-12-30 DIAGNOSIS — K40.90 NON-RECURRENT UNILATERAL INGUINAL HERNIA WITHOUT OBSTRUCTION OR GANGRENE: ICD-10-CM

## 2020-12-30 DIAGNOSIS — K59.09 OTHER CONSTIPATION: Primary | ICD-10-CM

## 2020-12-30 LAB
CYTOLOGY CVX/VAG DOC THIN PREP: ABNORMAL
HIGH RISK?: NO
HPV REFLEX?: ABNORMAL
LAB AP ABNORMAL BLEEDING: NO
LAB AP BIRTH CONTROL/HORMONES: ABNORMAL
LAB AP CERVICAL APPEARANCE: ABNORMAL
LAB AP PATIENT STATUS: ABNORMAL
LAB AP PREVIOUS ABNL: ABNORMAL
LAB AP PREVIOUS NORMAL: ABNORMAL
LAST MENS PERIOD: ABNORMAL
PATH REPORT.COMMENTS IMP SPEC: ABNORMAL
PATH REPORT.COMMENTS IMP SPEC: ABNORMAL
SPECIMEN ADEQUACY:: ABNORMAL

## 2020-12-30 PROCEDURE — 99213 OFFICE O/P EST LOW 20 MIN: CPT | Mod: TEL | Performed by: FAMILY MEDICINE

## 2020-12-30 RX ORDER — POLYETHYLENE GLYCOL 3350 17 G/17G
1 POWDER, FOR SOLUTION ORAL DAILY PRN
Qty: 507 G | Refills: 1 | Status: SHIPPED | OUTPATIENT
Start: 2020-12-30 | End: 2021-10-05

## 2021-01-07 ENCOUNTER — TELEPHONE (OUTPATIENT)
Dept: FAMILY MEDICINE | Facility: CLINIC | Age: 61
End: 2021-01-07

## 2021-01-07 NOTE — TELEPHONE ENCOUNTER
I called to check up on the pt for his month follow up. The pt did not answer her phone, so I left a vm. I called the pt on 01/05/2021, 01/06/2021, and today.

## 2021-01-18 ENCOUNTER — COMMUNICATION - HEALTHEAST (OUTPATIENT)
Dept: SURGERY | Facility: CLINIC | Age: 61
End: 2021-01-18

## 2021-01-19 ENCOUNTER — AMBULATORY - HEALTHEAST (OUTPATIENT)
Dept: SURGERY | Facility: AMBULATORY SURGERY CENTER | Age: 61
End: 2021-01-19

## 2021-01-19 DIAGNOSIS — Z11.59 ENCOUNTER FOR SCREENING FOR OTHER VIRAL DISEASES: ICD-10-CM

## 2021-01-25 ENCOUNTER — OFFICE VISIT (OUTPATIENT)
Dept: FAMILY MEDICINE | Facility: CLINIC | Age: 61
End: 2021-01-25
Payer: COMMERCIAL

## 2021-01-25 VITALS
TEMPERATURE: 98.2 F | OXYGEN SATURATION: 97 % | SYSTOLIC BLOOD PRESSURE: 126 MMHG | RESPIRATION RATE: 16 BRPM | WEIGHT: 126.6 LBS | DIASTOLIC BLOOD PRESSURE: 82 MMHG | HEART RATE: 67 BPM | BODY MASS INDEX: 23.3 KG/M2 | HEIGHT: 62 IN

## 2021-01-25 DIAGNOSIS — Z01.818 PREOP GENERAL PHYSICAL EXAM: Primary | ICD-10-CM

## 2021-01-25 DIAGNOSIS — J44.1 COPD EXACERBATION (H): ICD-10-CM

## 2021-01-25 DIAGNOSIS — F31.60 BIPOLAR DISORDER, MIXED (H): ICD-10-CM

## 2021-01-25 DIAGNOSIS — I10 ESSENTIAL HYPERTENSION: ICD-10-CM

## 2021-01-25 DIAGNOSIS — D64.9 NORMOCYTIC ANEMIA: ICD-10-CM

## 2021-01-25 DIAGNOSIS — J42 CHRONIC BRONCHITIS, UNSPECIFIED CHRONIC BRONCHITIS TYPE (H): ICD-10-CM

## 2021-01-25 LAB
BUN SERPL-MCNC: 15 MG/DL (ref 7–30)
CALCIUM SERPL-MCNC: 9 MG/DL (ref 8.5–10.4)
CHLORIDE SERPLBLD-SCNC: 110 MMOL/L (ref 94–109)
CO2 SERPL-SCNC: 25 MMOL/L (ref 20–32)
CREAT SERPL-MCNC: 1.1 MG/DL (ref 0.6–1.3)
EGFR CALCULATED (BLACK REFERENCE): 65.6 ML/MIN
EGFR CALCULATED (NON BLACK REFERENCE): 54.2 ML/MIN
GLUCOSE SERPL-MCNC: 94 MG/DL (ref 60–109)
HCT VFR BLD AUTO: 35.9 % (ref 35–47)
HEMOGLOBIN: 10.5 G/DL (ref 11.7–15.7)
MCH RBC QN AUTO: 28.1 PG (ref 26.5–35)
MCHC RBC AUTO-ENTMCNC: 29.2 G/DL (ref 32–36)
MCV RBC AUTO: 96.1 FL (ref 78–100)
PLATELET # BLD AUTO: 225 K/UL (ref 150–450)
POTASSIUM SERPL-SCNC: 3.8 MMOL/L (ref 3.4–5.3)
RBC # BLD AUTO: 3.74 M/UL (ref 3.8–5.2)
SODIUM SERPL-SCNC: 138 MMOL/L (ref 133–144)
WBC # BLD AUTO: 5.5 K/UL (ref 4–11)

## 2021-01-25 PROCEDURE — 85027 COMPLETE CBC AUTOMATED: CPT | Performed by: FAMILY MEDICINE

## 2021-01-25 PROCEDURE — 36415 COLL VENOUS BLD VENIPUNCTURE: CPT | Performed by: FAMILY MEDICINE

## 2021-01-25 PROCEDURE — 80048 BASIC METABOLIC PNL TOTAL CA: CPT | Performed by: FAMILY MEDICINE

## 2021-01-25 PROCEDURE — 99214 OFFICE O/P EST MOD 30 MIN: CPT | Performed by: FAMILY MEDICINE

## 2021-01-25 PROCEDURE — 93000 ELECTROCARDIOGRAM COMPLETE: CPT | Performed by: FAMILY MEDICINE

## 2021-01-25 RX ORDER — PREDNISONE 20 MG/1
40 TABLET ORAL DAILY
Qty: 14 TABLET | Refills: 0 | Status: SHIPPED | OUTPATIENT
Start: 2021-01-25 | End: 2021-02-02

## 2021-01-25 RX ORDER — INHALER, ASSIST DEVICES
SPACER (EA) MISCELLANEOUS
Qty: 1 EACH | Refills: 0 | Status: SHIPPED | OUTPATIENT
Start: 2021-01-25 | End: 2021-08-31

## 2021-01-25 ASSESSMENT — MIFFLIN-ST. JEOR: SCORE: 1101.37

## 2021-01-25 NOTE — PATIENT INSTRUCTIONS
- Start taking the Augmentin today and take twice daily for 3 days.   - Start taking the prednisone 2 tablets each morning for 7 days.   -  the new nebulizer tubing and your spacer to use with your inhalers.   - Follow-up later this week if worsening or not improving as expected, including if worsening mood or gladis symptoms.   - Follow-up in one week to reassess your breathing and your ability to safely have your surgery.
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
## 2021-01-30 NOTE — TELEPHONE ENCOUNTER
Bed: P16  Expected date:   Expected time:   Means of arrival:   Comments:  RP-flu like symptoms x 3 days 63F   Spoke to Lisa. She states that she received a supply of 25mg tabs which she is taking (4-6 tabs per day) which should last her til she is able to get refills of 100mg or 200mg tabs later this week. She will call again in 2 days if issues persist.    Nikolay Cook MD  November 18, 2019  12:43 PM

## 2021-02-01 ASSESSMENT — MIFFLIN-ST. JEOR
SCORE: 1086.38
SCORE: 1086.38
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
## 2021-02-02 ENCOUNTER — OFFICE VISIT (OUTPATIENT)
Dept: FAMILY MEDICINE | Facility: CLINIC | Age: 61
End: 2021-02-02
Payer: COMMERCIAL

## 2021-02-02 ENCOUNTER — AMBULATORY - HEALTHEAST (OUTPATIENT)
Dept: FAMILY MEDICINE | Facility: CLINIC | Age: 61
End: 2021-02-02

## 2021-02-02 ENCOUNTER — TELEPHONE (OUTPATIENT)
Dept: FAMILY MEDICINE | Facility: CLINIC | Age: 61
End: 2021-02-02

## 2021-02-02 VITALS
HEIGHT: 61 IN | HEART RATE: 98 BPM | DIASTOLIC BLOOD PRESSURE: 52 MMHG | WEIGHT: 127.4 LBS | RESPIRATION RATE: 18 BRPM | SYSTOLIC BLOOD PRESSURE: 92 MMHG | OXYGEN SATURATION: 9 % | BODY MASS INDEX: 24.05 KG/M2

## 2021-02-02 DIAGNOSIS — F41.9 ANXIETY: ICD-10-CM

## 2021-02-02 DIAGNOSIS — Z11.59 ENCOUNTER FOR SCREENING FOR OTHER VIRAL DISEASES: ICD-10-CM

## 2021-02-02 DIAGNOSIS — J42 CHRONIC BRONCHITIS, UNSPECIFIED CHRONIC BRONCHITIS TYPE (H): ICD-10-CM

## 2021-02-02 DIAGNOSIS — J44.9 CHRONIC OBSTRUCTIVE PULMONARY DISEASE, UNSPECIFIED COPD TYPE (H): ICD-10-CM

## 2021-02-02 DIAGNOSIS — I10 ESSENTIAL HYPERTENSION: ICD-10-CM

## 2021-02-02 PROBLEM — K40.90 RIGHT INGUINAL HERNIA: Status: ACTIVE | Noted: 2021-01-19

## 2021-02-02 PROCEDURE — 99215 OFFICE O/P EST HI 40 MIN: CPT | Performed by: FAMILY MEDICINE

## 2021-02-02 RX ORDER — LISINOPRIL 5 MG/1
5 TABLET ORAL DAILY
Qty: 90 TABLET | Refills: 3 | Status: SHIPPED | OUTPATIENT
Start: 2021-02-02 | End: 2021-04-22

## 2021-02-02 RX ORDER — SOFT LENS DISINFECTANT
SOLUTION, NON-ORAL MISCELLANEOUS
Qty: 1 KIT | Refills: 0 | Status: SHIPPED | OUTPATIENT
Start: 2021-02-02 | End: 2022-05-17

## 2021-02-02 RX ORDER — HYDROXYZINE HYDROCHLORIDE 25 MG/1
25 TABLET, FILM COATED ORAL EVERY 8 HOURS PRN
Qty: 30 TABLET | Refills: 4 | Status: SHIPPED | OUTPATIENT
Start: 2021-02-02 | End: 2021-03-15

## 2021-02-02 ASSESSMENT — MIFFLIN-ST. JEOR
SCORE: 1083.91
SCORE: 1083.91
SCORE: 1095.26

## 2021-02-02 NOTE — TELEPHONE ENCOUNTER
[facility] Follow-up    Call initiated by clinic.  Message recorded by SENAIT Cruz  Calling for: monthly follow up   Patient: Lisa Scott  Phone conversation with: Patient  Patient's Phone number/s: Home number on file [phone] (home)  Available today in the AM on their Home number.  OK to leave message on voice mail? Yes      Major diagnoses and/or issues requiring coordination services  Hernia surgery   Medical marijuana     In the past month, how many times have you been to the Emergency Room? 0  In the past month, how many times have you been hospitalized? 0    Summary notes: I called to check up on the pt, pt stated that she is doing fine. Pt stated that she moved into her new place in November. Pt stated that its costing her $150 more then her last place. Pt stated that she is having her hernia surgery this Thursday. She stated that after this surgery, she is going to have shoulder surgery. Pt stated that she will have to take one thing at a time right now. Pt stated that she still has not enrolled into the medical marijuana yet. Pt stated that she will have to wait till her surgery is over with. Pt does have an appointment to follow up today in clinic about her lung at 2:00pm. Pt stated that she will see me then. Pt does not have any other issue or concerns right now.     Self-management goals:  Sign up for medical marijuana    Counseling and coordination activities with: SENAIT Loya     Follow-up date: monthly

## 2021-02-03 ENCOUNTER — ANESTHESIA - HEALTHEAST (OUTPATIENT)
Dept: SURGERY | Facility: AMBULATORY SURGERY CENTER | Age: 61
End: 2021-02-03

## 2021-02-03 LAB
SARS-COV-2 PCR COMMENT: NORMAL
SARS-COV-2 RNA SPEC QL NAA+PROBE: NEGATIVE
SARS-COV-2 VIRUS SPECIMEN SOURCE: NORMAL

## 2021-02-04 ENCOUNTER — HOSPITAL ENCOUNTER (OUTPATIENT)
Dept: SURGERY | Facility: AMBULATORY SURGERY CENTER | Age: 61
Discharge: HOME | End: 2021-02-04
Attending: SPECIALIST | Admitting: SPECIALIST
Payer: COMMERCIAL

## 2021-02-04 ENCOUNTER — SURGERY - HEALTHEAST (OUTPATIENT)
Dept: SURGERY | Facility: AMBULATORY SURGERY CENTER | Age: 61
End: 2021-02-04

## 2021-02-04 ENCOUNTER — COMMUNICATION - HEALTHEAST (OUTPATIENT)
Dept: SCHEDULING | Facility: CLINIC | Age: 61
End: 2021-02-04

## 2021-02-04 DIAGNOSIS — K40.90 RIGHT INGUINAL HERNIA: ICD-10-CM

## 2021-02-18 ENCOUNTER — OFFICE VISIT - HEALTHEAST (OUTPATIENT)
Dept: SURGERY | Facility: CLINIC | Age: 61
End: 2021-02-18

## 2021-02-18 ENCOUNTER — COMMUNICATION - HEALTHEAST (OUTPATIENT)
Dept: SURGERY | Facility: CLINIC | Age: 61
End: 2021-02-18

## 2021-02-18 DIAGNOSIS — K40.90 RIGHT INGUINAL HERNIA: ICD-10-CM

## 2021-02-24 ENCOUNTER — OFFICE VISIT (OUTPATIENT)
Dept: FAMILY MEDICINE | Facility: CLINIC | Age: 61
End: 2021-02-24
Payer: COMMERCIAL

## 2021-02-24 VITALS
OXYGEN SATURATION: 94 % | WEIGHT: 126.7 LBS | DIASTOLIC BLOOD PRESSURE: 82 MMHG | RESPIRATION RATE: 18 BRPM | HEART RATE: 79 BPM | HEIGHT: 61 IN | BODY MASS INDEX: 23.92 KG/M2 | TEMPERATURE: 98.3 F | SYSTOLIC BLOOD PRESSURE: 130 MMHG

## 2021-02-24 DIAGNOSIS — K13.0 PERLECHE: Primary | ICD-10-CM

## 2021-02-24 DIAGNOSIS — L29.89 WINTER ITCH: ICD-10-CM

## 2021-02-24 PROCEDURE — 99214 OFFICE O/P EST MOD 30 MIN: CPT | Performed by: FAMILY MEDICINE

## 2021-02-24 RX ORDER — CLOTRIMAZOLE AND BETAMETHASONE DIPROPIONATE 10; .5 MG/ML; MG/ML
LOTION TOPICAL 2 TIMES DAILY
Qty: 30 ML | Refills: 1 | Status: SHIPPED | OUTPATIENT
Start: 2021-02-24 | End: 2021-03-15

## 2021-02-24 RX ORDER — TRIAMCINOLONE ACETONIDE 1 MG/G
CREAM TOPICAL 2 TIMES DAILY
Qty: 30 G | Refills: 0 | Status: SHIPPED | OUTPATIENT
Start: 2021-02-24 | End: 2021-06-11

## 2021-02-24 ASSESSMENT — MIFFLIN-ST. JEOR: SCORE: 1092.09

## 2021-02-24 ASSESSMENT — PATIENT HEALTH QUESTIONNAIRE - PHQ9: SUM OF ALL RESPONSES TO PHQ QUESTIONS 1-9: 13

## 2021-02-24 ASSESSMENT — ENCOUNTER SYMPTOMS: CONSTITUTIONAL NEGATIVE: 1

## 2021-02-24 NOTE — PATIENT INSTRUCTIONS
Perleche (angulated chelitis) is a mild amount of inflammation and yeast infection that causes cracking and licking (which makes it worse). You will apply your prescription Lotrisone (clotrimazole/betamethasone) twice a day. While you are using this medicine, do not apply lip balms or chap sticks. Continue the Lotrisone for 3 days after your lips have healed up. Then, you may switch back to your regular lip balm. This was not a reaction or allergy and does not require different soaps or Chap sticks.    The triamcinolone can be applied to your hands.
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
## 2021-02-26 DIAGNOSIS — M54.30 SCIATICA, UNSPECIFIED LATERALITY: ICD-10-CM

## 2021-02-26 DIAGNOSIS — M16.10 ARTHRITIS OF HIP: ICD-10-CM

## 2021-02-26 DIAGNOSIS — G89.18 ACUTE POST-OPERATIVE PAIN: ICD-10-CM

## 2021-02-26 RX ORDER — HYDROMORPHONE HYDROCHLORIDE 2 MG/1
TABLET ORAL
Qty: 20 TABLET | OUTPATIENT
Start: 2021-02-26

## 2021-02-26 RX ORDER — ACETAMINOPHEN 500 MG
500-1000 TABLET ORAL EVERY 8 HOURS PRN
Qty: 100 TABLET | Refills: 3 | Status: SHIPPED | OUTPATIENT
Start: 2021-02-26 | End: 2021-07-19

## 2021-02-26 NOTE — TELEPHONE ENCOUNTER
Message to physician:     Date of last visit: 2/24/2021     Date of next visit if scheduled: none    Last Comprehensive Metabolic Panel:  Sodium   Date Value Ref Range Status   01/25/2021 138.0 133.0 - 144.0 mmol/L Final     Potassium   Date Value Ref Range Status   01/25/2021 3.8 3.4 - 5.3 mmol/L Final     Chloride   Date Value Ref Range Status   01/25/2021 110.0 (H) 94.0 - 109.0 mmol/L Final     Carbon Dioxide   Date Value Ref Range Status   01/25/2021 25.0 20.0 - 32.0 mmol/L Final     Glucose   Date Value Ref Range Status   01/25/2021 94.0 60.0 - 109.0 mg/dL Final     Urea Nitrogen   Date Value Ref Range Status   01/25/2021 15.0 7.0 - 30.0 mg/dL Final     Creatinine   Date Value Ref Range Status   01/25/2021 1.1 0.6 - 1.3 mg/dL Final     GFR Estimate   Date Value Ref Range Status   02/25/2020 51 (L) >60 mL/min/1.73m2 Final     Calcium   Date Value Ref Range Status   01/25/2021 9.0 8.5 - 10.4 mg/dL Final       BP Readings from Last 3 Encounters:   02/24/21 130/82   02/02/21 92/52   01/25/21 126/82       Lab Results   Component Value Date    A1C 4.9 11/20/2014    A1C 5.7 03/16/2011                Please complete refill and CLOSE ENCOUNTER.  Closing the encounter signifies the refill is complete.

## 2021-02-26 NOTE — TELEPHONE ENCOUNTER
[facility] phone call message- medication clarification/question:    Full Medication Name: acetaminophen (TYLENOL) 500 MG tablet and  HYDROmorphone (DILAUDID) 2 MG tablet Strength:     Have you contacted your pharmacy about this refill request?     If  Yes,  which pharmacy?    When did you contact the pharmacy?    Additional comments/concerns from call to pharmacy:    Reason for call to clinic: Patient is calling to get refill on above. She states she is in pain and her left foot is swollen so she is needing tylenol before next week as she is taking more than usual, she is taking 2 pills. Told her it could take up to two business days for a response. Please call and advise.       Pharmacy confirmed as [id number] - [place] [address] [facility] [address]: Yes    OK to leave a message on voice mail?     Primary language: English      needed? No    Call taken on February 26, 2021 at 12:55 PM by Dangelo Arias

## 2021-03-02 DIAGNOSIS — M54.16 LUMBAR RADICULOPATHY: ICD-10-CM

## 2021-03-04 ENCOUNTER — TELEPHONE (OUTPATIENT)
Dept: FAMILY MEDICINE | Facility: CLINIC | Age: 61
End: 2021-03-04

## 2021-03-04 NOTE — TELEPHONE ENCOUNTER
I called to check up on the pt, but the pt did not answer her phone. I left a vm for the pt to give me a call back. I tried calling the pt on 03/02/2021, 03/03/2021, and today. I have not gotten a hold of the pt or heard from the pt.

## 2021-03-15 ENCOUNTER — OFFICE VISIT (OUTPATIENT)
Dept: FAMILY MEDICINE | Facility: CLINIC | Age: 61
End: 2021-03-15
Payer: COMMERCIAL

## 2021-03-15 VITALS
WEIGHT: 119 LBS | TEMPERATURE: 97.3 F | HEIGHT: 61 IN | SYSTOLIC BLOOD PRESSURE: 123 MMHG | OXYGEN SATURATION: 99 % | BODY MASS INDEX: 22.47 KG/M2 | RESPIRATION RATE: 22 BRPM | HEART RATE: 89 BPM | DIASTOLIC BLOOD PRESSURE: 85 MMHG

## 2021-03-15 DIAGNOSIS — R94.4 DECREASED GFR: ICD-10-CM

## 2021-03-15 DIAGNOSIS — F41.9 ANXIETY: ICD-10-CM

## 2021-03-15 DIAGNOSIS — J30.89 NON-SEASONAL ALLERGIC RHINITIS, UNSPECIFIED TRIGGER: ICD-10-CM

## 2021-03-15 DIAGNOSIS — F17.200 TOBACCO DEPENDENCE SYNDROME: ICD-10-CM

## 2021-03-15 DIAGNOSIS — D64.9 NORMOCYTIC ANEMIA: ICD-10-CM

## 2021-03-15 DIAGNOSIS — K13.0 PERLECHE: Primary | ICD-10-CM

## 2021-03-15 DIAGNOSIS — J44.1 COPD EXACERBATION (H): ICD-10-CM

## 2021-03-15 DIAGNOSIS — I10 ESSENTIAL HYPERTENSION: ICD-10-CM

## 2021-03-15 LAB
% IMMATURE GRANULOCYTES: 0 %
ABS IMMATURE GRANULOCYTES: 0 THOU/UL
BASOPHILS # BLD AUTO: 0.1 THOU/UL (ref 0–0.2)
BASOPHILS NFR BLD AUTO: 1 % (ref 0–2)
BUN SERPL-MCNC: 16 MG/DL (ref 7–30)
CALCIUM SERPL-MCNC: 9.3 MG/DL (ref 8.5–10.4)
CHLORIDE SERPLBLD-SCNC: 104 MMOL/L (ref 94–109)
CHOLEST SERPL-MCNC: 167 MG/DL
CHOLEST/HDLC SERPL: 2.6 RATIO
CO2 SERPL-SCNC: 28 MMOL/L (ref 20–32)
CREAT SERPL-MCNC: 1.2 MG/DL (ref 0.6–1.3)
EGFR CALCULATED (BLACK REFERENCE): 59.3 ML/MIN
EGFR CALCULATED (NON BLACK REFERENCE): 49 ML/MIN
EOSINOPHIL # BLD AUTO: 0.6 THOU/UL (ref 0–0.4)
EOSINOPHIL NFR BLD AUTO: 9 % (ref 0–6)
ERYTHROCYTE [DISTWIDTH] IN BLOOD BY AUTOMATED COUNT: 15.4 % (ref 11–14.5)
FERRITIN SERPL-MCNC: 22 NG/ML (ref 10–130)
GLUCOSE SERPL-MCNC: 93 MG/DL (ref 60–109)
HCT VFR BLD AUTO: 35.8 % (ref 35–47)
HDLC SERPL-MCNC: 65 MG/DL
HGB BLD-MCNC: 11.1 G/DL (ref 12–16)
IRON SATN MFR SERPL: 11 % (ref 20–50)
IRON SERPL-MCNC: 40 UG/DL (ref 42–175)
LDLC SERPL CALC-MCNC: 67 MG/DL (ref 0–99)
LYMPHOCYTES # BLD AUTO: 3.2 THOU/UL (ref 0.8–4.4)
LYMPHOCYTES NFR BLD AUTO: 47 % (ref 20–40)
MCH RBC QN AUTO: 28.6 PG (ref 27–34)
MCHC RBC AUTO-ENTMCNC: 31 G/DL (ref 32–36)
MCV RBC AUTO: 92 FL (ref 80–100)
MONOCYTES # BLD AUTO: 0.6 THOU/UL (ref 0–0.9)
MONOCYTES NFR BLD AUTO: 9 % (ref 2–10)
NEUTROPHILS # BLD AUTO: 2.2 THOU/UL (ref 2–7.7)
NEUTROPHILS NFR BLD AUTO: 34 % (ref 50–70)
PLATELET # BLD AUTO: 311 THOU/UL (ref 140–440)
PMV BLD AUTO: 10.7 FL (ref 8.5–12.5)
POTASSIUM SERPL-SCNC: 3.8 MMOL/L (ref 3.4–5.3)
RBC # BLD AUTO: 3.88 MILL/UL (ref 3.8–5.4)
RETICS # AUTO: 0.04 MILL/UL (ref 0.01–0.11)
RETICS/RBC NFR AUTO: 1.15 % (ref 0.8–2.7)
SODIUM SERPL-SCNC: 143 MMOL/L (ref 133–144)
TIBC SERPL-MCNC: 364 UG/DL (ref 313–563)
TRANSFERRIN SERPL-MCNC: 291 MG/DL (ref 212–360)
TRIGL SERPL-MCNC: 175 MG/DL
VLDL-CHOLESTEROL: 35 MG/DL (ref 7–32)
WBC # BLD AUTO: 6.7 THOU/UL (ref 4–11)

## 2021-03-15 PROCEDURE — 36415 COLL VENOUS BLD VENIPUNCTURE: CPT | Performed by: FAMILY MEDICINE

## 2021-03-15 PROCEDURE — 80061 LIPID PANEL: CPT | Performed by: FAMILY MEDICINE

## 2021-03-15 PROCEDURE — 99214 OFFICE O/P EST MOD 30 MIN: CPT | Performed by: FAMILY MEDICINE

## 2021-03-15 PROCEDURE — 80048 BASIC METABOLIC PNL TOTAL CA: CPT | Performed by: FAMILY MEDICINE

## 2021-03-15 RX ORDER — CLOTRIMAZOLE AND BETAMETHASONE DIPROPIONATE 10; .5 MG/ML; MG/ML
LOTION TOPICAL 2 TIMES DAILY
Qty: 30 ML | Refills: 1 | Status: SHIPPED | OUTPATIENT
Start: 2021-03-15 | End: 2022-05-17

## 2021-03-15 RX ORDER — CETIRIZINE HYDROCHLORIDE 10 MG/1
10 TABLET ORAL DAILY PRN
Qty: 30 TABLET | Refills: 0 | Status: SHIPPED | OUTPATIENT
Start: 2021-03-15 | End: 2021-05-19

## 2021-03-15 RX ORDER — HYDROXYZINE HYDROCHLORIDE 25 MG/1
25 TABLET, FILM COATED ORAL EVERY 8 HOURS PRN
Qty: 30 TABLET | Refills: 4 | Status: SHIPPED | OUTPATIENT
Start: 2021-03-15 | End: 2021-04-15

## 2021-03-15 RX ORDER — PREDNISONE 20 MG/1
40 TABLET ORAL DAILY
Qty: 14 TABLET | Refills: 0 | Status: SHIPPED | OUTPATIENT
Start: 2021-03-15 | End: 2021-03-22

## 2021-03-15 ASSESSMENT — MIFFLIN-ST. JEOR: SCORE: 1057.16

## 2021-03-15 NOTE — PROGRESS NOTES
Assessment & Plan     Dameon  Improving rash on exam today but some concerns about incorrect medication use. Advised switching to Lotrisone and not applying higher potency steroids directly to her lips. Prescription resent. Discontinue application of Neosporin as this might worsen symptoms.  - clotrimazole-betamethasone (LOTRISONE) 1-0.05 % external lotion  Dispense: 30 mL; Refill: 1    COPD exacerbation (H)  Acute exacerbation of COPD with increased wheezing and dyspnea on exertion but no changes in sputum production. Given no changes in sputum and other concerning symptoms and recent lapse in regular inhaler use, will treat with a short course of prednisone while restarting her controller medications. Last prednisone burst and antibiotics given in late Jan. 2021.  - predniSONE (DELTASONE) 20 MG tablet  Dispense: 14 tablet; Refill: 0  - Restart Advair and continue aclidinium inhalers. Patient reports that she does not need a refill.   - Albuterol as needed.   - Close follow-up to reassess symptoms in one week.     Tobacco dependence syndrome  Occasional tobacco use. Not ready to fully quit.   - Continue to support and explore full tobacco cessation.    Non-seasonal allergic rhinitis, unspecified trigger  On Flonase but having some increasing rhinorrhea and sneezing.   - hydrOXYzine (ATARAX) 25 MG tablet  Dispense: 30 tablet; Refill: 4  - cetirizine (ZYRTEC) 10 MG tablet  Dispense: 30 tablet; Refill: 0    Normocytic anemia  Hgb of 10.5, normocytic, in Jan. 2021 of unclear etiology. No history of colorectal cancer screening.  Would prefer non-invasive testing if possible.   - CBC w/ Diff and Plt ([facility])  - Iron Transferrin Saturat ([facility])  - Ferritin ([facility])  - Reticulocyte Count  Abs ([facility])  - [facility]  - Fecal colorectal cancer screen FIT ordered today.    Decreased GFR  Blood pressure well-controlled. GFR down on labs with preop eval in late Jan., 54.   -  Repeat Basic Metabolic Panel ([facility]) - Results < 1 hr today.    Hypertension  Blood pressure well-controlled on lisinopril. No known CAD.  - Due for Lipid Panel ([facility]) - Results < 1 hr    38 minutes spent on the date of the encounter doing chart review, patient visit and documentation        Tobacco Cessation:   reports that she has been smoking. She has never used smokeless tobacco.  Tobacco Cessation Action Plan: Information offered: Patient not interested at this time    Follow-up in one week to reassess breathing. Can discuss vaginal concerns further at that visit.    Denisse Morales MD  [facility] is a 60 year old who presents for the following health issues:    HPI     She is here today to follow up the rash around her mouth. She was evaluated for this on 2/24/21 with Dr. Simpson. He diagnosed her with perleche and prescribed Lotrisone lotion. She says that her rash is 85% better with the topical medication. However, she states that she is using hydrocortisone cream or the same cream that she was using for the dry skin on her hands (she was prescribed triamcinolone for this at the same visit). She's been using this on both her hands and her face with improvement. She also has been using a homemade mixture of Chapstick and Neosporin throughout the day for this issue.     She was diagnosed with a mild normocytic anemia with a Hgb of 10.5 at her preoperative assessment in late Jan. 2021 and hasn't had a follow-up visit for this. No known bleeding issues. No visible blood in her stools or melena and no vaginal bleeding. She has no previous history of colon cancer screening.    She's also worried about her breathing today. She has increased wheezing and shortness of breath.  She was out of her inhalers (not sure which ones, but definitely albuterol) for a few days in the past week because she had some issues with her insurance and couldn't fill them early. She  "is regularly taking her aclidinium. She is not using her Advair regularly because she was told that she wasn't supposed to use this as its not as good for COPD. On brief chart review, I cannot see that this was discontinued. She's been using albuterol 3 times per day as an inhaler, 1-2 times per day albuterol nebuzilers. This is increased from her baseline of 2-3 times per day albuterol when she is at her baseline. She also has an intermittent cough with no changes in her mucous production and no increased purulence. She has chronic postnasal drip and is using Flonase for this. She has some itchy watery eyes from dust and early pollen and wants some other allergy medications for this.    She also wants to speak about some difficulty with penetration with sex and a new medication to help her sleep that has been successful for one of her friends.     Review of Systems   Constitutional, HEENT, cardiovascular, pulmonary, gi and gu systems are negative, except as otherwise noted.      Objective    /85   Pulse 89   Temp 97.3  F (36.3  C) (Oral)   Resp 22   Ht 1.549 m (5' 1\")   Wt 54 kg (119 lb)   SpO2 99%   BMI 22.48 kg/m    Body mass index is 22.48 kg/m .  Physical Exam   GENERAL: healthy, alert and no distress  EYES: Eyes grossly normal to inspection, PERRL and conjunctivae and sclerae normal  RESP: Normal rate and effort. Speaking without effort. Diffuse end expiratory wheezing in posterior lung fields. No crackles. Decreased breath sounds at both bases.  CV: regular rate and rhythm, normal S1 S2, no S3 or S4, no murmur, click or rub, no peripheral edema and peripheral pulses strong  SKIN: erythema and flaking skin - in the perioral region and on lips and lichenification on the dorsal hands with one healing crack on the dorsal aspect of the right hand.            "

## 2021-03-15 NOTE — PATIENT INSTRUCTIONS
-  the clotrimazole/betamethasone (Lotrisone) lotion and use this medication alone on your lips twice daily.   - Okay to use a moisturizer like Vaseline after and in-between applications. Don't apply the Neosporin on your lips. Don't use the triamcinolone cream on your lips!  - Okay to use the triamcinolone on your hands twice daily as needed. Increase the use of your Vaseline on your hands.   - Collect a stool sample to screen for blood in your stool as a way of testing for colorectal cancer.   - For your breathin. Restart your Advair and use this twice daily.    2. Continue the aclidinium    3. Start the oral prednisone and take 40 mg daily for 7 days.     Follow-up in one week.

## 2021-03-16 ENCOUNTER — RECORDS - HEALTHEAST (OUTPATIENT)
Dept: ADMINISTRATIVE | Facility: OTHER | Age: 61
End: 2021-03-16

## 2021-03-16 DIAGNOSIS — D64.9 NORMOCYTIC ANEMIA: Primary | ICD-10-CM

## 2021-03-16 LAB
% IMMATURE GRANULOCYTES: 0 %
ABS IMMATURE GRANULOCYTES: 0 THOU/UL
BASOPHILS # BLD AUTO: 0.1 THOU/UL (ref 0–0.2)
BASOPHILS NFR BLD AUTO: 1 % (ref 0–2)
EOSINOPHIL # BLD AUTO: 0.6 THOU/UL (ref 0–0.4)
EOSINOPHIL NFR BLD AUTO: 9 % (ref 0–6)
ERYTHROCYTE [DISTWIDTH] IN BLOOD BY AUTOMATED COUNT: 15.4 % (ref 11–14.5)
HCT VFR BLD AUTO: 35.8 % (ref 35–47)
HGB BLD-MCNC: 11.1 G/DL (ref 12–16)
LAB AP DIAGNOSIS COMMENT: NORMAL
LAP AP PERIPHERAL SMEAR: NORMAL
LYMPHOCYTES # BLD AUTO: 3.2 THOU/UL (ref 0.8–4.4)
LYMPHOCYTES NFR BLD AUTO: 47 % (ref 20–40)
MCH RBC QN AUTO: 28.6 PG (ref 27–34)
MCHC RBC AUTO-ENTMCNC: 31 G/DL (ref 32–36)
MCV RBC AUTO: 92 FL (ref 80–100)
MONOCYTES # BLD AUTO: 0.6 THOU/UL (ref 0–0.9)
MONOCYTES NFR BLD AUTO: 9 % (ref 2–10)
MORPHOLOGY, PERIPHERAL BLOOD: ABNORMAL
NEUTROPHILS # BLD AUTO: 2.2 THOU/UL (ref 2–7.7)
NEUTROPHILS NFR BLD AUTO: 34 % (ref 50–70)
PATH REPORT.COMMENTS IMP SPEC: NORMAL
PATH REPORT.COMMENTS IMP SPEC: NORMAL
PATH REPORT.FINAL DX SPEC: NORMAL
PATH REPORT.RELEVANT HX SPEC: NORMAL
PLATELET # BLD AUTO: 311 THOU/UL (ref 140–440)
PMV BLD AUTO: 10.7 FL (ref 8.5–12.5)
RBC # BLD AUTO: 3.88 MILL/UL (ref 3.8–5.4)
WBC # BLD AUTO: 6.7 THOU/UL (ref 4–11)

## 2021-03-16 RX ORDER — FERROUS GLUCONATE 324(38)MG
324 TABLET ORAL
Qty: 30 TABLET | Refills: 1 | Status: SHIPPED | OUTPATIENT
Start: 2021-03-16 | End: 2021-06-11

## 2021-03-29 ENCOUNTER — TELEPHONE (OUTPATIENT)
Dept: FAMILY MEDICINE | Facility: CLINIC | Age: 61
End: 2021-03-29

## 2021-03-29 NOTE — TELEPHONE ENCOUNTER
[facility] phone call message- medication clarification/question:    Full Medication Name: all her Seroquel medications and inhaler  Strength:     Have you contacted your pharmacy about this refill request?     If  Yes,  which pharmacy?    When did you contact the pharmacy?    Additional comments/concerns from call to pharmacy:    Reason for call to clinic: Calling to get refills on all her Seroquel medication told her that she still has refill on the 200 mg and she states she contacted the pharmacy but they told her to call the clinic as well. She hung up call and called again to request refill for her inhaler she said she needs this tomorrow. She states she called the Pharmacy and they are giving her a hard time and not listening to what she has to say. Told her that refills could take up to two business days for a response.       Pharmacy confirmed as [id number] - [place], [place] - [address] AT [facility] [address]: Yes    OK to leave a message on voice mail?     Primary language: English      needed? No    Call taken on March 29, 2021 at 4:11 PM by Dangelo Arias

## 2021-03-30 NOTE — TELEPHONE ENCOUNTER
I attempted to call pt back to inform her the Seroquel and Advair are ready, also that the albuterol will be ready tomorrow. The albuterol refill isnt due until 3/31. Unable to leave a message as mall box is full.

## 2021-04-05 ENCOUNTER — TELEPHONE (OUTPATIENT)
Dept: FAMILY MEDICINE | Facility: CLINIC | Age: 61
End: 2021-04-05

## 2021-04-05 NOTE — TELEPHONE ENCOUNTER
[facility] Follow-up    Call initiated by clinic.  Message recorded by SENAIT Cruz  Calling for: monthly follow up   Patient: Lisa Scott  Phone conversation with: Patient  Patient's Phone number/s: Home number on file [phone] (home)  Available today in the PM on their Home number.  OK to leave message on voice mail? Yes      Major diagnoses and/or issues requiring coordination services  Hypertension     In the past month, how many times have you been to the Emergency Room? 0  In the past month, how many times have you been hospitalized? 0    Summary notes: I called to check up on the pt, pt stated that she is doing fine. Pt stated that she got her hernia surgery back in February. Pt stated that she is able to get around. Pt stated that it is healing up fine. Pt stated that she has not been checking her BP. Pt stated that she has been having some headache, and is not sure if its due to the BP. I told the pt that she needs to come in for her BP, and when she is here, we can have her provider prescribed her a BP cuff. I was able to schedule the pt to come in this Wednesday at 3:20pm with . Pt stated that she will come in. Pt does not have any other issue or concerns right now.     Self-management goals:  Follow up     Counseling and coordination activities with: SENAIT Loya     Follow-up date: monthly

## 2021-04-08 ENCOUNTER — OFFICE VISIT (OUTPATIENT)
Dept: FAMILY MEDICINE | Facility: CLINIC | Age: 61
End: 2021-04-08
Payer: COMMERCIAL

## 2021-04-08 VITALS
RESPIRATION RATE: 16 BRPM | DIASTOLIC BLOOD PRESSURE: 59 MMHG | SYSTOLIC BLOOD PRESSURE: 85 MMHG | BODY MASS INDEX: 21.34 KG/M2 | HEIGHT: 61 IN | WEIGHT: 113 LBS | OXYGEN SATURATION: 96 % | TEMPERATURE: 97.5 F | HEART RATE: 85 BPM

## 2021-04-08 DIAGNOSIS — K13.0 PERLECHE: Primary | ICD-10-CM

## 2021-04-08 DIAGNOSIS — I10 ESSENTIAL HYPERTENSION: ICD-10-CM

## 2021-04-08 PROCEDURE — 99213 OFFICE O/P EST LOW 20 MIN: CPT | Mod: GC | Performed by: STUDENT IN AN ORGANIZED HEALTH CARE EDUCATION/TRAINING PROGRAM

## 2021-04-08 ASSESSMENT — MIFFLIN-ST. JEOR: SCORE: 1029.94
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
## 2021-04-09 ENCOUNTER — DOCUMENTATION ONLY (OUTPATIENT)
Dept: PSYCHOLOGY | Facility: CLINIC | Age: 61
End: 2021-04-09

## 2021-04-09 DIAGNOSIS — F31.60 BIPOLAR DISORDER, MIXED (H): Primary | ICD-10-CM

## 2021-04-09 NOTE — PROGRESS NOTES
Addendum:    Referral for psychiatry in patient with long and complex psychiatric history. Needs to re-establish with psychiatrist. Can try the following:    [facility]  [facility]  [address]  [place], [place] [address]  [phone]    [facility]  [address] [facility]  [address]  [place] [address]  Phone:  [phone]  Fax: [phone]  Hours:  Monday - Friday 8:30 - 5:00pm    [facility]  [address]  [place] [address]  [phone]      [facility]  [address],  [place] [address] [phone]  Fax  [phone]

## 2021-04-12 ENCOUNTER — TELEPHONE (OUTPATIENT)
Dept: FAMILY MEDICINE | Facility: CLINIC | Age: 61
End: 2021-04-12

## 2021-04-12 NOTE — TELEPHONE ENCOUNTER
Out going call made to follow up on mental health referral, Letter will be mailed to home address listed in chart, LMTCB

## 2021-04-13 ENCOUNTER — VIRTUAL VISIT (OUTPATIENT)
Dept: FAMILY MEDICINE | Facility: CLINIC | Age: 61
End: 2021-04-13
Payer: COMMERCIAL

## 2021-04-13 ENCOUNTER — TELEPHONE (OUTPATIENT)
Dept: FAMILY MEDICINE | Facility: CLINIC | Age: 61
End: 2021-04-13

## 2021-04-13 DIAGNOSIS — K13.0 PERLECHE: Primary | ICD-10-CM

## 2021-04-13 PROCEDURE — 99212 OFFICE O/P EST SF 10 MIN: CPT | Mod: TEL | Performed by: STUDENT IN AN ORGANIZED HEALTH CARE EDUCATION/TRAINING PROGRAM

## 2021-04-13 NOTE — PROGRESS NOTES
Preceptor Attestation:    I discussed the patient with the resident. I have verified the content of the note, which accurately reflects my assessment of the patient and the plan of care.   Supervising Physician:  Angela Ontiveros MD.

## 2021-04-13 NOTE — PROGRESS NOTES
Family Medicine Telephone Visit Note                   Chief Complaint   Patient presents with     RECHECK     follow up rash, rash not getting     Prenatal Care              Due to patient being non-English speaking/uses sign language, an  was used for this visit. Only for face-to-face interpretation by an external agency, date and length of interpretation can be found on the scanned worksheet.           HPI   Patients name: Lisa  Appointment start time:  4:13 PM    Lip rash   - has been treated for 4 months   - self esteem worse with lips like this  - she thinks it looks like herpes but she said she was told it is not  - seen in clinic 4/8/2021 for this and is about the same e  - using Vaseline only  - itches and peels, she does scratch at it and pulls out dead skin   - no chills or fevers  - no pus today   - able to stop picking if needed, doesn't feel like she has to pick at it   - she wants to be seen in person for this    Mental   - said she wants to talk with her PCP Dr. Cook to discuss starting medications.   - I did have a list of places for her to establish for mental health therapy but she prefers to get it in the mail not over the phone.           Current Outpatient Medications   Medication Sig Dispense Refill     acetaminophen (TYLENOL) 500 MG tablet Take 1-2 tablets (500-1,000 mg) by mouth every 8 hours as needed for mild pain 100 tablet 3     aclidinium (TUDORZA PRESSAIR) 400 MCG/ACT inhaler Inhale 1 puff into the lungs 2 times daily 1 Inhaler 11     albuterol (PROAIR HFA/PROVENTIL HFA/VENTOLIN HFA) 108 (90 Base) MCG/ACT inhaler Inhale 2 puffs into the lungs every 6 hours as needed for shortness of breath / dyspnea or wheezing 2 Inhaler 11     calcium carbonate (TUMS) 500 MG chewable tablet Take 2 tablets (1,000 mg) by mouth 4 times daily as needed for heartburn (Patient not taking: Reported on 2/24/2021) 240 tablet 4     cetirizine (ZYRTEC) 10 MG tablet Take 1 tablet (10 mg) by mouth  daily as needed for allergies 30 tablet 0     clotrimazole-betamethasone (LOTRISONE) 1-0.05 % external lotion Apply topically 2 times daily Apply to lips, continue until healed for 3 days 30 mL 1     diclofenac (VOLTAREN) 1 % topical gel Apply 2 g topically 4 times daily To low back 100 g 3     diclofenac (VOLTAREN) 50 MG EC tablet Take 1 tablet (50 mg) by mouth 2 times daily as needed for moderate pain 60 tablet 3     divalproex sodium delayed-release (DEPAKOTE) 250 MG DR tablet Take 1 tablet (250 mg) by mouth 2 times daily 60 tablet 11     famotidine (PEPCID) 20 MG tablet Take 1 tablet (20 mg) by mouth 2 times daily 60 tablet 11     ferrous gluconate (FERGON) 324 (38 Fe) MG tablet Take 1 tablet (324 mg) by mouth daily (with breakfast) 30 tablet 1     fluticasone (FLONASE) 50 MCG/ACT nasal spray Spray 2 sprays in nostril daily 9.9 mL 11     fluticasone-salmeterol (ADVAIR-HFA) 230-21 MCG/ACT inhaler Inhale 2 puffs into the lungs 2 times daily 1 Inhaler 11     gabapentin (NEURONTIN) 300 MG capsule Take 2 capsules (600 mg) by mouth 3 times daily 180 capsule 11     HYDROmorphone (DILAUDID) 2 MG tablet 1-2 tablets every 4-6 hours as needed for severe pain (Patient not taking: Reported on 2/24/2021) 20 tablet 0     hydrOXYzine (ATARAX) 25 MG tablet Take 1 tablet (25 mg) by mouth every 8 hours as needed for anxiety 30 tablet 4     ipratropium - albuterol 0.5 mg/2.5 mg/3 mL (DUONEB) 0.5-2.5 (3) MG/3ML neb solution NEBULIZE 1 VIAL BY MOUTH FOUR TIMES DAILY 90 mL 3     lisinopril (ZESTRIL) 5 MG tablet Take 1 tablet (5 mg) by mouth daily 90 tablet 3     Melatonin 10 MG TABS tablet Take 1 tablet (10 mg) by mouth daily (with dinner) , additional tablet as needed for late night awakenings 90 tablet 1     montelukast (SINGULAIR) 10 MG tablet Take 1 tablet (10 mg) by mouth At Bedtime 30 tablet 4     omeprazole (PRILOSEC) 20 MG DR capsule Take 1 capsule (20 mg) by mouth 2 times daily 60 capsule 11     ondansetron (ZOFRAN-ODT) 4 MG  "ODT tab Take 1 tablet (4 mg) by mouth every 8 hours as needed for nausea 20 tablet 11     order for DME DME - pt needs nebulizer tubing 1 Device 0     polyethylene glycol (MIRALAX) 17 GM/Dose powder Take 17 g (1 capful) by mouth daily as needed for constipation 507 g 1     polyvinyl alcohol (LIQUIFILM TEARS) 1.4 % ophthalmic solution Place 1 drop into both eyes as needed for dry eyes       prazosin (MINIPRESS) 2 MG capsule Take 2 capsules (4 mg) by mouth At Bedtime 60 capsule 3     QUEtiapine (SEROQUEL) 100 MG tablet Take 1 tablet (100 mg) by mouth 2 times daily as needed (anxiety or panic attacks) 60 tablet 4     QUEtiapine (SEROQUEL) 200 MG tablet Take 1 tablet (200 mg) by mouth At Bedtime 30 tablet 11     Respiratory Therapy Supplies (NEBULIZER/PEDIATRIC MASK) KIT kit Nebulizer device, mask, and tubing to be used to deliver nebulized medications. 1 kit 0     sennosides (SENOKOT) 8.6 MG tablet Take 1 tablet by mouth 2 times daily as needed for constipation 60 tablet 3     spacer (OPTICHAMBER FAZAL) holding chamber Use with HFA inhalers 1 each 0     tiZANidine (ZANAFLEX) 4 MG tablet Take 2 tablets (8 mg) by mouth 3 times daily as needed for muscle spasms 240 tablet 11     traZODone (DESYREL) 50 MG tablet Take 1 tablet (50 mg) by mouth At Bedtime 30 tablet 4     triamcinolone (KENALOG) 0.1 % external cream Apply topically 2 times daily Apply to hands/thighs 30 g 0     vitamin B1 (THIAMINE) 100 MG tablet Take 200 mg by mouth 3 times daily       Allergies   Allergen Reactions     Nkda [No Known Drug Allergies]      Wellbutrin [Bupropion]      Stopped due to history of seizures              Review of Systems:     Noted in HPI         Physical Exam:     There were no vitals taken for this visit.  Estimated body mass index is 21.35 kg/m  as calculated from the following:    Height as of 4/8/21: 1.549 m (5' 1\").    Weight as of 4/8/21: 51.3 kg (113 lb).    Exam:  Constitutional: distressed  Psychiatric: worried, anxious " and tearful           Assessment and Plan   1. Dameon  Discussed that it is hard to assess her cheilitis over the phone. She wants to be seen in person. Advised to not pick and continue Vaseline. Will have her seen tomorrow. Could consider OTC NAC for this picking.    Advised to keep her appt with Dr. Cook to discuss mental health       Refilled medications that would be required in the next 3 months.     After Visit Information:  Patient declined AVS     No follow-ups on file.    Appointment end time: 4:22 PM  This is a telephone visit that took        minutes.      Clinician location:  [facility]     Elmer Doan MD  I precepted today with Dr. Ontiveros.

## 2021-04-13 NOTE — TELEPHONE ENCOUNTER
[facility] phone call message-patient reporting a symptom:     Symptom:rash on lip and nerves, depression    Same Day Visit Offered: Yes, declined    Additional comments: Patient wanted to leave a message for Dr. Cook to call her and talk to her about symptoms above. Told her that she would need an appt, she did schedule an appt for next week with Dr. Cook on 4/22 and states to leave a message as well since she can't wait that long. Offered her to schedule an appt with another Doctor but she refused, wanted to leave a message for Dr. Cook to see if he can prescribe her medication and that her depression is getting so bad she can't even get out of the house. Please call and advise.     OK to leave message on voice mail?     Primary language: English      needed? No    Call taken on April 13, 2021 at 1:41 PM by Dangelo Arias

## 2021-04-15 ENCOUNTER — OFFICE VISIT (OUTPATIENT)
Dept: FAMILY MEDICINE | Facility: CLINIC | Age: 61
End: 2021-04-15
Payer: COMMERCIAL

## 2021-04-15 VITALS
WEIGHT: 112 LBS | BODY MASS INDEX: 21.16 KG/M2 | DIASTOLIC BLOOD PRESSURE: 89 MMHG | RESPIRATION RATE: 18 BRPM | SYSTOLIC BLOOD PRESSURE: 128 MMHG

## 2021-04-15 DIAGNOSIS — F42.4 SKIN PICKING HABIT: ICD-10-CM

## 2021-04-15 DIAGNOSIS — L98.9 SKIN LESION: Primary | ICD-10-CM

## 2021-04-15 DIAGNOSIS — F41.9 ANXIETY: ICD-10-CM

## 2021-04-15 PROCEDURE — 99214 OFFICE O/P EST MOD 30 MIN: CPT | Mod: GC | Performed by: STUDENT IN AN ORGANIZED HEALTH CARE EDUCATION/TRAINING PROGRAM

## 2021-04-15 RX ORDER — BACITRACIN ZINC 500 [USP'U]/G
OINTMENT TOPICAL 2 TIMES DAILY
Qty: 113 G | Refills: 1 | Status: SHIPPED | OUTPATIENT
Start: 2021-04-15 | End: 2021-11-03

## 2021-04-15 RX ORDER — HYDROXYZINE HYDROCHLORIDE 25 MG/1
25 TABLET, FILM COATED ORAL EVERY 8 HOURS PRN
Qty: 30 TABLET | Refills: 4 | Status: SHIPPED | OUTPATIENT
Start: 2021-04-15 | End: 2021-08-31

## 2021-04-15 ASSESSMENT — PATIENT HEALTH QUESTIONNAIRE - PHQ9
5. POOR APPETITE OR OVEREATING: NEARLY EVERY DAY
SUM OF ALL RESPONSES TO PHQ QUESTIONS 1-9: 22

## 2021-04-15 ASSESSMENT — ANXIETY QUESTIONNAIRES
IF YOU CHECKED OFF ANY PROBLEMS ON THIS QUESTIONNAIRE, HOW DIFFICULT HAVE THESE PROBLEMS MADE IT FOR YOU TO DO YOUR WORK, TAKE CARE OF THINGS AT HOME, OR GET ALONG WITH OTHER PEOPLE: SOMEWHAT DIFFICULT
GAD7 TOTAL SCORE: 21
3. WORRYING TOO MUCH ABOUT DIFFERENT THINGS: NEARLY EVERY DAY
6. BECOMING EASILY ANNOYED OR IRRITABLE: NEARLY EVERY DAY
5. BEING SO RESTLESS THAT IT IS HARD TO SIT STILL: NEARLY EVERY DAY
2. NOT BEING ABLE TO STOP OR CONTROL WORRYING: NEARLY EVERY DAY
7. FEELING AFRAID AS IF SOMETHING AWFUL MIGHT HAPPEN: NEARLY EVERY DAY
1. FEELING NERVOUS, ANXIOUS, OR ON EDGE: NEARLY EVERY DAY

## 2021-04-15 NOTE — PROGRESS NOTES
Preceptor Attestation:   Patient seen, evaluated and discussed with the resident. I have verified the content of the note, which accurately reflects my assessment of the patient and the plan of care.  Supervising Physician:Tremaine Alvarez MD  [facility]
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
## 2021-04-16 ENCOUNTER — NURSE TRIAGE (OUTPATIENT)
Dept: FAMILY MEDICINE | Facility: CLINIC | Age: 61
End: 2021-04-16

## 2021-04-16 ASSESSMENT — ANXIETY QUESTIONNAIRES: GAD7 TOTAL SCORE: 21

## 2021-04-16 NOTE — TELEPHONE ENCOUNTER
Agree with recommendation to be seen today for worsening breathing symptoms in a patient with recent recurrent COPD exacerbations. If any worsening symptoms, patient should present urgently for assessment over the weekend.     Denisse Morales MD

## 2021-04-16 NOTE — TELEPHONE ENCOUNTER
Called Lisa back, left detailed message on voice mail reiterating the need and recommendation for her to be seen today. If clinic hours do not work for her, she should still be seen today via urgent care. Request once she has received my message to call me back. Rochelle LONGO

## 2021-04-16 NOTE — TELEPHONE ENCOUNTER
Nurse Triage SBAR    Situation: patient declined protocol recommendation    Background  : patient reports having chronic cough, intermittent. Cough has been more frequent with changes of when able to bring up thick phlegm it is greenish. Hx COPD and worsening symptoms of increase coughing, slight wheezing associated with mild shortness of breath with having to use more often her Duoneb and Albuterol MDI. Reported feeling warm, no measured temperature. No known positive COVID19 contacts    Assessment : patient needs to be seen for reported symptoms above.      Recommendation   : Routing to provider for recommendation on : patient deferred coming in today to be seen as per protocol recommendation.  Requests to be seen on Monday, 4/19/2021. Will route to precepting physician to review and further advise. Thank you.    Protocol Recommended Disposition: Urgent office follow-up appointment     Rochelle LONGO

## 2021-04-16 NOTE — TELEPHONE ENCOUNTER
Additional Information    Negative: Bluish (or gray) lips or face    Negative: Severe difficulty breathing (e.g., struggling for each breath, speaks in single words)    Negative: Rapid onset of cough and has hives    Negative: Coughing started suddenly after medicine, an allergic food or bee sting    Negative: Difficulty breathing after exposure to flames, smoke, or fumes    Negative: Sounds like a life-threatening emergency to the triager    Negative: Previous asthma attacks and this feels like asthma attack    Negative: Chest pain present when not coughing    Negative: Difficulty breathing    Negative: Passed out (i.e., fainted, collapsed and was not responding)    Negative: Patient sounds very sick or weak to the triager    Negative: Coughed up > 1 tablespoon (15 ml) blood (Exception: blood-tinged sputum)    Negative: Fever > 103 F (39.4 C)    Negative: Fever > 101 F (38.3 C) and over 60 years of age    Negative: Fever > 100.0 F (37.8 C) and has diabetes mellitus or a weak immune system (e.g., HIV positive, cancer chemotherapy, organ transplant, splenectomy, chronic steroids)    Negative: Fever > 100.0 F (37.8 C) and bedridden (e.g., nursing home patient, stroke, chronic illness, recovering from surgery)    Negative: Increasing ankle swelling    Negative: Wheezing is present    Known COPD or other severe lung disease (i.e., bronchiectasis, cystic fibrosis, lung surgery) and worsening symptoms (i.e., increased sputum purulence or amount, increased breathing difficulty)    Negative: SEVERE coughing spells (e.g., whooping sound after coughing, vomiting after coughing)    Negative: Coughing up abel-colored (reddish-brown) or blood-tinged sputum    Negative: Fever present > 3 days (72 hours)    Negative: Fever returns after gone for over 24 hours and symptoms worse or not improved    Negative: Using nasal washes and pain medicine > 24 hours and sinus pain persists    Answer Assessment - Initial Assessment  Questions  1. ONSET: reports having chronic, intermittent cough. Hx- COPD. Cough feels worse past 3-4 days with somewhat able to bring up thick, greenish phelgm.     2. SEVERITY: Has become more frequent than usual    3. RESPIRATORY DISTRESS: stable with slight wheezing and mild shortness of breath.    4. FEVER: reports feeling warm, no measured temperature.    5. HEMOPTYSIS: none    6. TREATMENT: no OTC cough medication used yet. Has been using Albuterol inhaler and Duoneb solution every 4 hours interchangeably.     7. CARDIAC HISTORY: no    8. LUNG HISTORY: COPD    9. PE RISK FACTORS: no    10. OTHER SYMPTOMS: as noted above    11. PREGNANCY:n/a    12. TRAVEL: no    Protocols used: COUGH-A-OH

## 2021-04-16 NOTE — TELEPHONE ENCOUNTER
[facility] phone call message- general phone call:    Reason for call: Patient state she was in Yesterday, patient saw Dr. Ryan Doan. Patient indicate she had forgot to mention that she feels there are things filled in her lungs. Patient would like to see if provider can call back to discuss further. Patient was informed an appointment may be needed and understood.    Return call needed: Yes    OK to leave a message on voice mail? Yes    Primary language: English      needed? No    Call taken on April 16, 2021 at 8:42 AM by Jeronimo Gauthier

## 2021-04-22 ENCOUNTER — OFFICE VISIT (OUTPATIENT)
Dept: FAMILY MEDICINE | Facility: CLINIC | Age: 61
End: 2021-04-22
Payer: COMMERCIAL

## 2021-04-22 VITALS
RESPIRATION RATE: 20 BRPM | HEIGHT: 61 IN | HEART RATE: 80 BPM | TEMPERATURE: 98.3 F | WEIGHT: 115.4 LBS | BODY MASS INDEX: 21.79 KG/M2 | DIASTOLIC BLOOD PRESSURE: 82 MMHG | OXYGEN SATURATION: 97 % | SYSTOLIC BLOOD PRESSURE: 118 MMHG

## 2021-04-22 DIAGNOSIS — J20.9 ACUTE BRONCHITIS, UNSPECIFIED ORGANISM: ICD-10-CM

## 2021-04-22 DIAGNOSIS — D64.9 NORMOCYTIC ANEMIA: Primary | ICD-10-CM

## 2021-04-22 DIAGNOSIS — L30.1 DYSHIDROTIC ECZEMA: ICD-10-CM

## 2021-04-22 DIAGNOSIS — N28.9 RENAL INSUFFICIENCY: ICD-10-CM

## 2021-04-22 DIAGNOSIS — R10.32 LEFT INGUINAL PAIN: ICD-10-CM

## 2021-04-22 PROCEDURE — 99214 OFFICE O/P EST MOD 30 MIN: CPT | Performed by: FAMILY MEDICINE

## 2021-04-22 RX ORDER — TRIAMCINOLONE ACETONIDE 1 MG/G
CREAM TOPICAL 2 TIMES DAILY
Qty: 80 G | Refills: 3 | Status: SHIPPED | OUTPATIENT
Start: 2021-04-22 | End: 2021-10-05

## 2021-04-22 RX ORDER — PREDNISONE 20 MG/1
40 TABLET ORAL DAILY
Qty: 10 TABLET | Refills: 0 | Status: SHIPPED | OUTPATIENT
Start: 2021-04-22 | End: 2021-04-27

## 2021-04-22 ASSESSMENT — MIFFLIN-ST. JEOR: SCORE: 1047.44

## 2021-04-22 NOTE — PROGRESS NOTES
ASSESSMENT/PLAN:    (D64.9) Normocytic anemia  (primary encounter diagnosis)  Comment: unable to draw labs today so she will return next week for labs   Plan: CBC with Plt (P )          (N28.9) Renal insufficiency  Comment: will return for repeat labs/ urine studies and renal U/S  Plan: Basic Metabolic Panel ([facility]) - Results < 1         hr, Urinalysis ([facility]), Prot/Creat Random Urine ([facility]), US Renal Complete          (R10.32) Left inguinal pain  Comment: exam fairly benign; will check U/S  Plan: US Hernia Evaluation          (L30.1) Dyshidrotic eczema  Comment: counseled on moisturizing and steroid crm  Plan: triamcinolone (KENALOG) 0.1 % external cream          (J20.9) Acute bronchitis, unspecified organism  Comment: precautions given  Plan: predniSONE (DELTASONE) 20 MG tablet            Nikolay Cook MD  April 22, 2021  9:52 AM        Pt is a 60 year old female last seen on 4/15/2021 here today for:     1) rash on lips - significantly improved w/ vaseline ointment   Also very dry hands w/ cracking     2) Back pain - sciatica shoots down both hips  Using a walker which has helped   Did PT for hips in past; started doing some recently   7/22/2020 - xray pelvis w/ mild arthritis in hips bilateral  Old L femur fx hardware    3) Lab follow-up - states that she is taking iron tabs  Renal insufficiency - Stopped taking ibuprofen and lisinopril; is taking diclofenac bid      Message from Dr Morales on 3/15/21:  Denisse Morales MD  P  Blue Team   Cc: Nikolay Cook MD   Can we please call [facility] to share the following results and recommendations:     1. Your anemia evaluation shows that you have a mild anemia that is likely from iron deficiency. You should begin to take an iron supplement once daily and we will reassess this in 4-6 weeks. I sent the prescription for the iron supplement called ferrous gluconate to your pharmacy.   2. You should also return the fit test for colon cancer screening at  your visit next week.   3. You kidney function remains below normal as it was when we checked in January. We should explore potential causes of this at your next follow-up visit.     She is scheduled to follow-up with Dr. Cook next week for a reassessment of her rash and breathing. Will copy him on this note so he is aware of the need to also explore her kidney dysfunction further.     Thanks! MD eDnisse Sanabria MD      4) HTN - off meds!   BP Readings from Last 6 Encounters:   04/22/21 118/82   04/15/21 128/89   04/08/21 (!) 85/59   03/15/21 123/85   02/24/21 130/82   02/02/21 92/52     5) concerned about hernia pain  Pain is similar to old hernia     6) ?bronchitis - greenish sputum x 1 wk  +feverish/chills  Not smoking cigarettes and is away from all the smokers   Using inhaler prn       Past Medical History:   Diagnosis Date     Alcohol withdrawal seizure (H) 02/25/2017     Chronic obstructive pulmonary disease, unspecified COPD type (H) 2/23/2017     COPD (chronic obstructive pulmonary disease) (H)      Depressive disorder       Past Surgical History:   Procedure Laterality Date     ARTHROPLASTY SHOULDER Bilateral      C/SECTION, LOW TRANSVERSE       HYSTERECTOMY  1993     JOINT REPLACEMENT, HIP RT/LT Left 2017      Current Outpatient Medications   Medication Sig Dispense Refill     acetaminophen (TYLENOL) 500 MG tablet Take 1-2 tablets (500-1,000 mg) by mouth every 8 hours as needed for mild pain 100 tablet 3     aclidinium (TUDORZA PRESSAIR) 400 MCG/ACT inhaler Inhale 1 puff into the lungs 2 times daily 1 Inhaler 11     albuterol (PROAIR HFA/PROVENTIL HFA/VENTOLIN HFA) 108 (90 Base) MCG/ACT inhaler Inhale 2 puffs into the lungs every 6 hours as needed for shortness of breath / dyspnea or wheezing 2 Inhaler 11     bacitracin 500 UNIT/GM external ointment Apply topically 2 times daily 113 g 1     calcium carbonate (TUMS) 500 MG chewable tablet Take 2 tablets (1,000 mg) by mouth 4 times  daily as needed for heartburn (Patient not taking: Reported on 2/24/2021) 240 tablet 4     cetirizine (ZYRTEC) 10 MG tablet Take 1 tablet (10 mg) by mouth daily as needed for allergies 30 tablet 0     clotrimazole-betamethasone (LOTRISONE) 1-0.05 % external lotion Apply topically 2 times daily Apply to lips, continue until healed for 3 days 30 mL 1     diclofenac (VOLTAREN) 1 % topical gel Apply 2 g topically 4 times daily To low back 100 g 3     diclofenac (VOLTAREN) 50 MG EC tablet Take 1 tablet (50 mg) by mouth 2 times daily as needed for moderate pain 60 tablet 3     divalproex sodium delayed-release (DEPAKOTE) 250 MG DR tablet Take 1 tablet (250 mg) by mouth 2 times daily 60 tablet 11     famotidine (PEPCID) 20 MG tablet Take 1 tablet (20 mg) by mouth 2 times daily 60 tablet 11     ferrous gluconate (FERGON) 324 (38 Fe) MG tablet Take 1 tablet (324 mg) by mouth daily (with breakfast) 30 tablet 1     fluticasone (FLONASE) 50 MCG/ACT nasal spray Spray 2 sprays in nostril daily 9.9 mL 11     fluticasone-salmeterol (ADVAIR-HFA) 230-21 MCG/ACT inhaler Inhale 2 puffs into the lungs 2 times daily 1 Inhaler 11     gabapentin (NEURONTIN) 300 MG capsule Take 2 capsules (600 mg) by mouth 3 times daily 180 capsule 11     HYDROmorphone (DILAUDID) 2 MG tablet 1-2 tablets every 4-6 hours as needed for severe pain (Patient not taking: Reported on 2/24/2021) 20 tablet 0     hydrOXYzine (ATARAX) 25 MG tablet Take 1 tablet (25 mg) by mouth every 8 hours as needed for anxiety 30 tablet 4     ipratropium - albuterol 0.5 mg/2.5 mg/3 mL (DUONEB) 0.5-2.5 (3) MG/3ML neb solution NEBULIZE 1 VIAL BY MOUTH FOUR TIMES DAILY 90 mL 3     Melatonin 10 MG TABS tablet Take 1 tablet (10 mg) by mouth daily (with dinner) , additional tablet as needed for late night awakenings 90 tablet 1     montelukast (SINGULAIR) 10 MG tablet Take 1 tablet (10 mg) by mouth At Bedtime 30 tablet 4     omeprazole (PRILOSEC) 20 MG DR capsule Take 1 capsule (20 mg)  by mouth 2 times daily 60 capsule 11     ondansetron (ZOFRAN-ODT) 4 MG ODT tab Take 1 tablet (4 mg) by mouth every 8 hours as needed for nausea 20 tablet 11     order for DME DME - pt needs nebulizer tubing 1 Device 0     polyethylene glycol (MIRALAX) 17 GM/Dose powder Take 17 g (1 capful) by mouth daily as needed for constipation 507 g 1     polyvinyl alcohol (LIQUIFILM TEARS) 1.4 % ophthalmic solution Place 1 drop into both eyes as needed for dry eyes       prazosin (MINIPRESS) 2 MG capsule Take 2 capsules (4 mg) by mouth At Bedtime 60 capsule 3     QUEtiapine (SEROQUEL) 100 MG tablet Take 1 tablet (100 mg) by mouth 2 times daily as needed (anxiety or panic attacks) 60 tablet 4     QUEtiapine (SEROQUEL) 200 MG tablet Take 1 tablet (200 mg) by mouth At Bedtime 30 tablet 11     Respiratory Therapy Supplies (NEBULIZER/PEDIATRIC MASK) KIT kit Nebulizer device, mask, and tubing to be used to deliver nebulized medications. 1 kit 0     sennosides (SENOKOT) 8.6 MG tablet Take 1 tablet by mouth 2 times daily as needed for constipation 60 tablet 3     spacer (OPTICHAMBER FAZAL) holding chamber Use with HFA inhalers 1 each 0     tiZANidine (ZANAFLEX) 4 MG tablet Take 2 tablets (8 mg) by mouth 3 times daily as needed for muscle spasms 240 tablet 11     traZODone (DESYREL) 50 MG tablet Take 1 tablet (50 mg) by mouth At Bedtime 30 tablet 4     triamcinolone (KENALOG) 0.1 % external cream Apply topically 2 times daily Apply to hands/thighs 30 g 0     vitamin B1 (THIAMINE) 100 MG tablet Take 200 mg by mouth 3 times daily        Allergies   Allergen Reactions     Nkda [No Known Drug Allergies]      Wellbutrin [Bupropion]      Stopped due to history of seizures        ROS:   Gen- no weight change, no fevers/+chills   Head/ Eyes- no blurred vision, no headaches   ENT- see HPI   Cardiac - no palpitations, no chest pain   Respiratory - no shortness of breath , + wheezing   GI - no nausea, no vomiting, no diarrhea, no constipation  "  Urinary - no dysuria, no polyuria   Neuro - no numbness, no tingling   Remainder of ROS negative.     Exam:   /82 (BP Location: Right arm, Patient Position: Sitting, Cuff Size: Adult Regular)   Pulse 80   Temp 98.3  F (36.8  C) (Oral)   Resp 20   Ht 1.56 m (5' 1.42\")   Wt 52.3 kg (115 lb 6.4 oz)   SpO2 97%   BMI 21.51 kg/m     Alert and oriented x 3; No acute distress   HEENT:  oropharynx clear   Neck: no masses, no lymphadenopathy   Resp: clear to auscultation bilaterally, no wheezing/ronchi   CV: rate/rhythm regular, no murmurs/rubs/gallops   ABd: soft, + bowel sounds, nontender/nondistended, no rebound/guarding   Extrem: no clubbing/cyanosis/edema   Neuro: no focal deficits   Derm: + dried cracked skin on palms and dorsum of hand      "

## 2021-04-29 ENCOUNTER — ANCILLARY PROCEDURE (OUTPATIENT)
Dept: ULTRASOUND IMAGING | Facility: CLINIC | Age: 61
End: 2021-04-29
Attending: FAMILY MEDICINE
Payer: COMMERCIAL

## 2021-04-29 ENCOUNTER — TELEPHONE (OUTPATIENT)
Dept: FAMILY MEDICINE | Facility: CLINIC | Age: 61
End: 2021-04-29

## 2021-04-29 DIAGNOSIS — N28.9 RENAL INSUFFICIENCY: ICD-10-CM

## 2021-04-29 DIAGNOSIS — R10.32 LEFT INGUINAL PAIN: ICD-10-CM

## 2021-04-29 DIAGNOSIS — K40.90 NON-RECURRENT UNILATERAL INGUINAL HERNIA WITHOUT OBSTRUCTION OR GANGRENE: ICD-10-CM

## 2021-04-29 DIAGNOSIS — K59.04 CHRONIC IDIOPATHIC CONSTIPATION: Primary | ICD-10-CM

## 2021-04-29 PROCEDURE — 76770 US EXAM ABDO BACK WALL COMP: CPT | Mod: 59 | Performed by: RADIOLOGY

## 2021-04-29 NOTE — CONFIDENTIAL NOTE
Order for box of small gloves placed.  Team - please help facilitate Thresa getting this from [facility]. Thanks!    Also, referral placed for general surgery for hernia. Referrals, please help coordinate. Thanks!    Nikolay Cook MD  April 29, 2021  9:57 AM

## 2021-04-29 NOTE — TELEPHONE ENCOUNTER
Referral for :      General Surgery   LOCATION/PLACE/Provider :   [facility]    DATE & TIME :    referral faxed, location will call   PHONE :     [phone]  FAX :    [phone]  Appointment made by clinic staff/:    [place]

## 2021-04-29 NOTE — TELEPHONE ENCOUNTER
DME (Durable Medical Equipment) Orders and Documentation  Orders Placed This Encounter   Procedures     Miscellaneous DME Order      The patient was assessed and it was determined the patient is in need of the following listed DME Supplies/Equipment. Please complete supporting documentation below to demonstrate medical necessity.      DME All Other Item(s) Documentation    List reason for need and supporting documentation for medical necessity below for each DME item.     1. Needs gloves for manual disimpaction of chronic constipation.      Nikolay Cook MD  April 29, 2021  10:05 AM

## 2021-04-30 ENCOUNTER — RECORDS - HEALTHEAST (OUTPATIENT)
Dept: ADMINISTRATIVE | Facility: OTHER | Age: 61
End: 2021-04-30

## 2021-05-05 ENCOUNTER — TELEPHONE (OUTPATIENT)
Dept: FAMILY MEDICINE | Facility: CLINIC | Age: 61
End: 2021-05-05

## 2021-05-05 NOTE — TELEPHONE ENCOUNTER
[facility] Follow-up    Call initiated by clinic.  Message recorded by SENAIT Cruz  Calling for: monthly follow up   Patient: Lisa Scott  Phone conversation with: Patient  Patient's Phone number/s: Home number on file [phone] (home)  Available today in the PM on their Home number.  OK to leave message on voice mail? Yes      Major diagnoses and/or issues requiring coordination services  Hernia      In the past month, how many times have you been to the Emergency Room? 0  In the past month, how many times have you been hospitalized? 0    Summary notes: I called to check up on the pt, pt stated that she is doing ok. Pt stated that she has been wanting to know the results of her ultrasound. I told her that I am not sure of her results, but if  looked at it, he will have someone to give her a call about the results. Pt stated that she has been having issue with her back. Pt stated that she has been having this issue for a while. Pt also stated that she has been having some mental breakdown. I asked her to give her therapist a call. Pt stated that she did not have a therapist. I told her to [facility] the [facility] a call if she feels worst. I told the pt that I will route this message to , and see. Pt did not have any other issue or concerns right now.     Self-management goals:  Feel better    Counseling and coordination activities with: SENAIT Loya     Follow-up date: monthly

## 2021-05-06 ENCOUNTER — TELEPHONE (OUTPATIENT)
Dept: FAMILY MEDICINE | Facility: CLINIC | Age: 61
End: 2021-05-06

## 2021-05-06 ENCOUNTER — OFFICE VISIT (OUTPATIENT)
Dept: FAMILY MEDICINE | Facility: CLINIC | Age: 61
End: 2021-05-06
Payer: COMMERCIAL

## 2021-05-06 VITALS
DIASTOLIC BLOOD PRESSURE: 96 MMHG | SYSTOLIC BLOOD PRESSURE: 155 MMHG | OXYGEN SATURATION: 94 % | RESPIRATION RATE: 16 BRPM | HEART RATE: 80 BPM | TEMPERATURE: 98 F

## 2021-05-06 DIAGNOSIS — F10.21 ALCOHOL DEPENDENCE IN REMISSION (H): ICD-10-CM

## 2021-05-06 DIAGNOSIS — L30.9 HAND DERMATITIS: ICD-10-CM

## 2021-05-06 DIAGNOSIS — M54.9 UPPER BACK PAIN ON LEFT SIDE: Primary | ICD-10-CM

## 2021-05-06 PROBLEM — K59.09 CHRONIC CONSTIPATION: Status: ACTIVE | Noted: 2020-08-25

## 2021-05-06 PROBLEM — G31.84 MILD COGNITIVE IMPAIRMENT: Status: ACTIVE | Noted: 2017-06-06

## 2021-05-06 PROBLEM — S72.142A INTERTROCHANTERIC FRACTURE OF LEFT FEMUR, CLOSED, INITIAL ENCOUNTER (H): Status: ACTIVE | Noted: 2018-01-06

## 2021-05-06 PROBLEM — Z01.89 ENCOUNTER FOR PAIN MANAGEMENT PLANNING: Status: ACTIVE | Noted: 2019-05-03

## 2021-05-06 PROCEDURE — 99214 OFFICE O/P EST MOD 30 MIN: CPT | Mod: GC | Performed by: STUDENT IN AN ORGANIZED HEALTH CARE EDUCATION/TRAINING PROGRAM

## 2021-05-06 RX ORDER — PEAK FLOW METER
EACH MISCELLANEOUS
COMMUNITY
Start: 2021-02-02 | End: 2022-05-17

## 2021-05-06 RX ORDER — CALCIUM CARBONATE 500(1250)
1000 TABLET,CHEWABLE ORAL
COMMUNITY
Start: 2020-06-03 | End: 2021-08-31

## 2021-05-06 RX ORDER — LISINOPRIL 10 MG/1
TABLET ORAL
COMMUNITY
Start: 2021-04-27 | End: 2021-05-19

## 2021-05-06 RX ORDER — ANTACID TABLETS 500 MG/1
TABLET, CHEWABLE ORAL
COMMUNITY
Start: 2020-06-03 | End: 2021-08-31

## 2021-05-06 NOTE — TELEPHONE ENCOUNTER
The pt came in today to be seen for joint pain. The pt is restricted to  but he was not here or precepting. I filled out a referral form for [facility] for . Now  should be able to prescribe for the pt, or refer the pt. Pt is seeing , and  should be able to precept with .
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
## 2021-05-06 NOTE — TELEPHONE ENCOUNTER
I got a message from  about the pt ultrasound results. He had called the pt on 05/03/2021 and left a vm for the pt about the results of the ultrasound, and that he had referred her to  about her hernia. Pt stated that she was able to get the vm. Pt stated that her back gave out and is in a lot of pain. Pt stated that she had fell three times already because of it. Pt stated that she would like something for the pain. Pt would really like to talk to  about it. She wanted me to send her a message to  and have him give her a call. Pt also wanted  phone number, and I gave it to her.

## 2021-05-10 DIAGNOSIS — F29 PSYCHOSIS, UNSPECIFIED PSYCHOSIS TYPE (H): ICD-10-CM

## 2021-05-10 NOTE — TELEPHONE ENCOUNTER
Controlled Substance Printed Prescription Disposal   Date: 07/18/19  Time: 1354  Epic prescription number: [id number]  Medication name: Zolpidem   Strength: 5mg  Prescription quantity: 30  Reason for disposal: Faxed into pharmacy, waited to dispose until confirmed from pharmacy they received the prescription  Prescription shredded: Yes Witnessed by: QING Byrd RN      Is This A New Presentation, Or A Follow-Up?: Skin Lesions How Severe Is Your Skin Lesion?: mild Have Your Skin Lesions Been Treated?: not been treated

## 2021-05-11 RX ORDER — QUETIAPINE FUMARATE 100 MG/1
100 TABLET, FILM COATED ORAL 2 TIMES DAILY PRN
Qty: 60 TABLET | Refills: 11 | Status: SHIPPED | OUTPATIENT
Start: 2021-05-11 | End: 2021-10-27

## 2021-05-12 ENCOUNTER — OFFICE VISIT (OUTPATIENT)
Dept: FAMILY MEDICINE | Facility: CLINIC | Age: 61
End: 2021-05-12
Payer: COMMERCIAL

## 2021-05-12 VITALS
SYSTOLIC BLOOD PRESSURE: 172 MMHG | WEIGHT: 108.12 LBS | OXYGEN SATURATION: 96 % | BODY MASS INDEX: 20.41 KG/M2 | TEMPERATURE: 98.2 F | RESPIRATION RATE: 22 BRPM | DIASTOLIC BLOOD PRESSURE: 116 MMHG | HEART RATE: 65 BPM | HEIGHT: 61 IN

## 2021-05-12 DIAGNOSIS — S29.012A MUSCLE STRAIN OF RIGHT UPPER BACK, INITIAL ENCOUNTER: ICD-10-CM

## 2021-05-12 DIAGNOSIS — R06.02 SHORTNESS OF BREATH: Primary | ICD-10-CM

## 2021-05-12 DIAGNOSIS — I10 BENIGN ESSENTIAL HYPERTENSION: ICD-10-CM

## 2021-05-12 DIAGNOSIS — J44.1 CHRONIC OBSTRUCTIVE PULMONARY DISEASE WITH ACUTE EXACERBATION (H): ICD-10-CM

## 2021-05-12 LAB
SARS-COV-2 RNA RESP QL NAA+PROBE: NORMAL
SPECIMEN SOURCE: NORMAL

## 2021-05-12 PROCEDURE — 99214 OFFICE O/P EST MOD 30 MIN: CPT | Mod: GC | Performed by: STUDENT IN AN ORGANIZED HEALTH CARE EDUCATION/TRAINING PROGRAM

## 2021-05-12 RX ORDER — PREDNISONE 20 MG/1
40 TABLET ORAL DAILY
Qty: 14 TABLET | Refills: 0 | Status: SHIPPED | OUTPATIENT
Start: 2021-05-12 | End: 2021-06-24

## 2021-05-12 RX ORDER — LISINOPRIL 10 MG/1
10 TABLET ORAL DAILY
Qty: 30 TABLET | Refills: 1 | Status: SHIPPED | OUTPATIENT
Start: 2021-05-12 | End: 2021-07-21

## 2021-05-12 ASSESSMENT — MIFFLIN-ST. JEOR: SCORE: 1002.81

## 2021-05-12 ASSESSMENT — PATIENT HEALTH QUESTIONNAIRE - PHQ9: SUM OF ALL RESPONSES TO PHQ QUESTIONS 1-9: 23

## 2021-05-13 DIAGNOSIS — F29 PSYCHOSIS, UNSPECIFIED PSYCHOSIS TYPE (H): ICD-10-CM

## 2021-05-13 DIAGNOSIS — J44.9 CHRONIC OBSTRUCTIVE PULMONARY DISEASE, UNSPECIFIED COPD TYPE (H): ICD-10-CM

## 2021-05-13 DIAGNOSIS — F51.05 INSOMNIA DUE TO OTHER MENTAL DISORDER: ICD-10-CM

## 2021-05-13 DIAGNOSIS — F99 INSOMNIA DUE TO OTHER MENTAL DISORDER: ICD-10-CM

## 2021-05-13 LAB
LABORATORY COMMENT REPORT: NORMAL
SARS-COV-2 RNA RESP QL NAA+PROBE: NEGATIVE
SPECIMEN SOURCE: NORMAL

## 2021-05-14 RX ORDER — QUETIAPINE FUMARATE 200 MG/1
200 TABLET, FILM COATED ORAL AT BEDTIME
Qty: 30 TABLET | Refills: 11 | Status: SHIPPED | OUTPATIENT
Start: 2021-05-14 | End: 2021-10-27

## 2021-05-14 RX ORDER — IPRATROPIUM BROMIDE AND ALBUTEROL SULFATE 2.5; .5 MG/3ML; MG/3ML
SOLUTION RESPIRATORY (INHALATION)
Qty: 90 ML | Refills: 11 | Status: SHIPPED | OUTPATIENT
Start: 2021-05-14 | End: 2021-10-15

## 2021-05-14 RX ORDER — TRAZODONE HYDROCHLORIDE 50 MG/1
50 TABLET, FILM COATED ORAL AT BEDTIME
Qty: 30 TABLET | Refills: 11 | Status: SHIPPED | OUTPATIENT
Start: 2021-05-14 | End: 2022-09-08

## 2021-05-14 NOTE — TELEPHONE ENCOUNTER
Patient is calling to see if another doctor can refill her medications for as she needing them. Told her that it would have to be her PCP and that they get two business days for a response. She states it's almost the weekend and she needs her meds. She states it is urgent. Please call and advise.

## 2021-05-14 NOTE — TELEPHONE ENCOUNTER
Patient is calling to get all her sleeping medication refills, she states she came in the other day and mentioned it but never got them refilled. She states she had requested them early on the week. Also she mention that her BP was wacked out but don't understand why she isn't put on any BP medications. Told her it could take up to two business days for a response. Please call and advise.

## 2021-05-14 NOTE — TELEPHONE ENCOUNTER
Spoke to pt. Informed her I sent in the seroquel, trazodone, and duonebs. I informed her that I could not refill the diclofenac as we are monitoring her kidney function. She is due for labwork when she sees me on 5/19/2021.     I also clarified that she can take the prednisone prescribed by Dr Mars and she was prescribed Lisinopril 10mg at that visit. She should pick that up as well.    Nikolay Cook MD  May 14, 2021  1:27 PM

## 2021-05-17 DIAGNOSIS — J42 CHRONIC BRONCHITIS, UNSPECIFIED CHRONIC BRONCHITIS TYPE (H): ICD-10-CM

## 2021-05-17 DIAGNOSIS — R11.2 NAUSEA AND VOMITING, INTRACTABILITY OF VOMITING NOT SPECIFIED, UNSPECIFIED VOMITING TYPE: ICD-10-CM

## 2021-05-17 NOTE — TELEPHONE ENCOUNTER
[facility] phone call message- medication clarification/question:    Full Medication Name: Ondansetron 4 mg and inhaler  Strength:     Have you contacted your pharmacy about this refill request?     If  Yes,  which pharmacy?    When did you contact the pharmacy?    Additional comments/concerns from call to pharmacy:    Reason for call to clinic: Calling to get refill for medication above, she states she spoke to Dr. Cook last week and was told that he would refill her medications but the pharmacy said Dr. Cook denied it. She states she needs it asap as she was nausea over the weekend. She needs these two medications today as urgent. Did let her know that Dr. Cook is not in clinic today that it could take up to two business days for a response. Please call and advise.       Pharmacy confirmed as    [id number] - [place], [place] - [address] AT [facility]: Yes    OK to leave a message on voice mail?     Primary language: English      needed? No    Call taken on May 17, 2021 at 1:44 PM by Dangelo Arias

## 2021-05-18 RX ORDER — ONDANSETRON 4 MG/1
4 TABLET, ORALLY DISINTEGRATING ORAL EVERY 8 HOURS PRN
Qty: 20 TABLET | Refills: 11 | Status: SHIPPED | OUTPATIENT
Start: 2021-05-18 | End: 2021-11-24

## 2021-05-18 RX ORDER — ALBUTEROL SULFATE 90 UG/1
2 AEROSOL, METERED RESPIRATORY (INHALATION) EVERY 6 HOURS PRN
Qty: 8.5 G | Refills: 11 | Status: SHIPPED | OUTPATIENT
Start: 2021-05-18 | End: 2021-06-11

## 2021-05-19 ENCOUNTER — VIRTUAL VISIT (OUTPATIENT)
Dept: FAMILY MEDICINE | Facility: CLINIC | Age: 61
End: 2021-05-19
Payer: COMMERCIAL

## 2021-05-19 DIAGNOSIS — F51.05 INSOMNIA DUE TO OTHER MENTAL DISORDER: ICD-10-CM

## 2021-05-19 DIAGNOSIS — R30.0 DYSURIA: ICD-10-CM

## 2021-05-19 DIAGNOSIS — J30.89 NON-SEASONAL ALLERGIC RHINITIS, UNSPECIFIED TRIGGER: ICD-10-CM

## 2021-05-19 DIAGNOSIS — I10 BENIGN ESSENTIAL HYPERTENSION: Primary | ICD-10-CM

## 2021-05-19 DIAGNOSIS — F99 INSOMNIA DUE TO OTHER MENTAL DISORDER: ICD-10-CM

## 2021-05-19 PROCEDURE — 99213 OFFICE O/P EST LOW 20 MIN: CPT | Mod: TEL | Performed by: FAMILY MEDICINE

## 2021-05-19 RX ORDER — CETIRIZINE HYDROCHLORIDE 10 MG/1
10 TABLET ORAL DAILY PRN
Qty: 30 TABLET | Refills: 3 | Status: SHIPPED | OUTPATIENT
Start: 2021-05-19 | End: 2022-05-17

## 2021-05-19 NOTE — PROGRESS NOTES
Family Medicine Telephone Visit Note    Chief Complaint   Patient presents with     RECHECK     F/U: Blood Pressure, medicine, SOB, fatigue - pt. reported she is feeling better     Medication Reconciliation     Needs attention     Constipation            HPI   Patients name: Lisa  Appointment start time:  3:55 PM    Pt is a 61 year old female last seen on 5/12/2021 evaluated today via telephone encounter for:     1) HTN - home BP today was 112/77   Is taking her lisinopril 10 daily  No missed doses    BP Readings from Last 6 Encounters:   05/12/21 (!) 172/116   05/06/21 (!) 155/96   04/22/21 118/82   04/15/21 128/89   04/08/21 (!) 85/59   03/15/21 123/85       2) Shortness of breath - lungs are clearing up better w/ steroids  +phlegm and blackish/greenish sputum  Worried about a sinus infection  Might be allergy related   No fevers      3) Pressure on bladder and constipation   No dysuria  Is wondering if it is the hernia    4) Mood - is on seroquel, hydroxyzine, depakote   Not on prazosin anymore  Also taking trazodone at night   Finally getting good sleep        Current Outpatient Medications   Medication Sig Dispense Refill     cetirizine (ZYRTEC) 10 MG tablet Take 1 tablet (10 mg) by mouth daily as needed for allergies 30 tablet 3     acetaminophen (TYLENOL) 500 MG tablet Take 1-2 tablets (500-1,000 mg) by mouth every 8 hours as needed for mild pain 100 tablet 3     aclidinium (TUDORZA PRESSAIR) 400 MCG/ACT inhaler Inhale 1 puff into the lungs 2 times daily 1 Inhaler 11     albuterol (PROAIR HFA/PROVENTIL HFA/VENTOLIN HFA) 108 (90 Base) MCG/ACT inhaler Inhale 2 puffs into the lungs every 6 hours as needed for shortness of breath / dyspnea or wheezing 8.5 g 11     bacitracin 500 UNIT/GM external ointment Apply topically 2 times daily 113 g 1     JOVANY-GEST ANTACID 500 MG chewable tablet        calcium carbonate 1250 (500 Ca) MG CHEW Take 1,000 mg by mouth       clotrimazole-betamethasone (LOTRISONE) 1-0.05 %  external lotion Apply topically 2 times daily Apply to lips, continue until healed for 3 days 30 mL 1     diclofenac (VOLTAREN) 1 % topical gel Apply 2 g topically 4 times daily To low back 100 g 3     diclofenac (VOLTAREN) 50 MG EC tablet        divalproex sodium delayed-release (DEPAKOTE) 250 MG DR tablet Take 1 tablet (250 mg) by mouth 2 times daily 60 tablet 11     famotidine (PEPCID) 20 MG tablet Take 1 tablet (20 mg) by mouth 2 times daily 60 tablet 11     ferrous gluconate (FERGON) 324 (38 Fe) MG tablet Take 1 tablet (324 mg) by mouth daily (with breakfast) 30 tablet 1     fluticasone (FLONASE) 50 MCG/ACT nasal spray Spray 2 sprays in nostril daily 9.9 mL 11     fluticasone-salmeterol (ADVAIR-HFA) 230-21 MCG/ACT inhaler Inhale 2 puffs into the lungs 2 times daily 1 Inhaler 11     gabapentin (NEURONTIN) 300 MG capsule Take 2 capsules (600 mg) by mouth 3 times daily 180 capsule 11     hydrOXYzine (ATARAX) 25 MG tablet Take 1 tablet (25 mg) by mouth every 8 hours as needed for anxiety 30 tablet 4     ipratropium - albuterol 0.5 mg/2.5 mg/3 mL (DUONEB) 0.5-2.5 (3) MG/3ML neb solution NEBULIZE 1 VIAL BY MOUTH FOUR TIMES DAILY 90 mL 11     lisinopril (ZESTRIL) 10 MG tablet Take 1 tablet (10 mg) by mouth daily 30 tablet 1     Melatonin 10 MG TABS tablet Take 1 tablet (10 mg) by mouth daily (with dinner) , additional tablet as needed for late night awakenings 90 tablet 1     montelukast (SINGULAIR) 10 MG tablet Take 1 tablet (10 mg) by mouth At Bedtime 30 tablet 4     Nebulizers ([employer] AEROSOL DELIVERY SYSTEM) MISC USE WITH NEBULIZER MEDS       omeprazole (PRILOSEC) 20 MG DR capsule Take 1 capsule (20 mg) by mouth 2 times daily 60 capsule 11     ondansetron (ZOFRAN-ODT) 4 MG ODT tab Take 1 tablet (4 mg) by mouth every 8 hours as needed for nausea 20 tablet 11     order for DME DME - pt needs nebulizer tubing 1 Device 0     polyethylene glycol (MIRALAX) 17 GM/Dose powder Take 17 g (1 capful) by mouth daily as needed  "for constipation 507 g 1     polyvinyl alcohol (LIQUIFILM TEARS) 1.4 % ophthalmic solution Place 1 drop into both eyes as needed for dry eyes       predniSONE (DELTASONE) 20 MG tablet Take 2 tablets (40 mg) by mouth daily 14 tablet 0     QUEtiapine (SEROQUEL) 100 MG tablet Take 1 tablet (100 mg) by mouth 2 times daily as needed (anxiety or panic attacks) 60 tablet 11     QUEtiapine (SEROQUEL) 200 MG tablet Take 1 tablet (200 mg) by mouth At Bedtime 30 tablet 11     Respiratory Therapy Supplies (NEBULIZER/PEDIATRIC MASK) KIT kit Nebulizer device, mask, and tubing to be used to deliver nebulized medications. 1 kit 0     sennosides (SENOKOT) 8.6 MG tablet Take 1 tablet by mouth 2 times daily as needed for constipation 60 tablet 3     spacer (OPTICHAMBER FAZAL) holding chamber Use with HFA inhalers 1 each 0     tiZANidine (ZANAFLEX) 4 MG tablet Take 2 tablets (8 mg) by mouth 3 times daily as needed for muscle spasms 240 tablet 11     traZODone (DESYREL) 50 MG tablet Take 1 tablet (50 mg) by mouth At Bedtime 30 tablet 11     triamcinolone (KENALOG) 0.1 % external cream Apply topically 2 times daily To hands 80 g 3     triamcinolone (KENALOG) 0.1 % external cream Apply topically 2 times daily Apply to hands/thighs 30 g 0     vitamin B1 (THIAMINE) 100 MG tablet Take 200 mg by mouth 3 times daily       Allergies   Allergen Reactions     Nkda [No Known Drug Allergies]      Wellbutrin [Bupropion]      Stopped due to history of seizures              Review of Systems:     CONSTITUTIONAL: NEGATIVE for fever, chills, change in weight  ENT/MOUTH: NEGATIVE for ear, mouth and throat problems  RESP: NEGATIVE for significant cough or SOB  CV: NEGATIVE for chest pain, palpitations or peripheral edema         Physical Exam:     There were no vitals taken for this visit.  Estimated body mass index is 20.43 kg/m  as calculated from the following:    Height as of 5/12/21: 1.549 m (5' 1\").    Weight as of 5/12/21: 49 kg (108 lb 1.9 " oz).    Exam:  Constitutional: healthy, alert and no distress  Psychiatric: mentation appears normal and affect normal/bright          Assessment and Plan   (I10) Benign essential hypertension  (primary encounter diagnosis)  Comment:   Plan: at goal; will cont lisinopril 10mg; will have her come in for lab visit this week    (J30.89) Non-seasonal allergic rhinitis, unspecified trigger  Comment: refilled; precautions given that if her sinus congestion persists >7-10 days, would need in-office visit to eval for bacterial sinusitis  Plan: cetirizine (ZYRTEC) 10 MG tablet          (F51.05,  F99) Insomnia due to other mental disorder  Comment:   Plan: is on multiple meds for mood and sleep; currently sleeping well; mood is up and down; will cont current regimen    (R30.0) Dysuria  Comment: will come in this week for labs  Plan: Urine Culture ([facility])            Refilled medications that would be required in the next 3 months.     After Visit Information:  Patient declined AVS     No follow-ups on file.    Appointment end time: 4:13 PM  This is a telephone visit that took 18 minutes.      Clinician location:  [facility]     Nikolay Cook MD  May 20, 2021  9:33 AM

## 2021-05-19 NOTE — PROGRESS NOTES
Pt is a 61 year old female last seen on 5/12/2021 evaluated today via telephone encounter for:     1) HTN -  BP Readings from Last 6 Encounters:   05/12/21 (!) 172/116   05/06/21 (!) 155/96   04/22/21 118/82   04/15/21 128/89   04/08/21 (!) 85/59   03/15/21 123/85     2) Shortness of breath -

## 2021-05-20 NOTE — PROGRESS NOTES
Preceptor Attestation:  Patient's case reviewed and discussed with Quinton Mars DO resident and I evaluated the patient. I agree with written assessment and plan of care.  Supervising Physician:  Zana Power MD, MD [facility]

## 2021-05-25 DIAGNOSIS — N28.9 RENAL INSUFFICIENCY: ICD-10-CM

## 2021-05-25 DIAGNOSIS — D64.9 NORMOCYTIC ANEMIA: Primary | ICD-10-CM

## 2021-05-25 LAB
BUN SERPL-MCNC: 15 MG/DL (ref 7–30)
CALCIUM SERPL-MCNC: 9.1 MG/DL (ref 8.5–10.4)
CHLORIDE SERPLBLD-SCNC: 107 MMOL/L (ref 94–109)
CO2 SERPL-SCNC: 29 MMOL/L (ref 20–32)
CREAT SERPL-MCNC: 1.2 MG/DL (ref 0.6–1.3)
EGFR CALCULATED (NON BLACK REFERENCE): 48.9 ML/MIN
ERYTHROCYTE [DISTWIDTH] IN BLOOD BY AUTOMATED COUNT: 17 % (ref 11–14.5)
GLUCOSE SERPL-MCNC: 110 MG/DL (ref 60–109)
HCT VFR BLD AUTO: 40.9 % (ref 35–47)
HGB BLD-MCNC: 12.4 G/DL (ref 12–16)
MCH RBC QN AUTO: 27.7 PG (ref 27–34)
MCHC RBC AUTO-ENTMCNC: 30.3 G/DL (ref 32–36)
MCV RBC AUTO: 92 FL (ref 80–100)
PLATELET # BLD AUTO: 414 THOU/UL (ref 140–440)
PMV BLD AUTO: 9.7 FL (ref 8.5–12.5)
POTASSIUM SERPL-SCNC: 4.4 MMOL/L (ref 3.4–5.3)
RBC # BLD AUTO: 4.47 MILL/UL (ref 3.8–5.4)
SODIUM SERPL-SCNC: 137 MMOL/L (ref 133–144)
WBC # BLD AUTO: 6.5 THOU/UL (ref 4–11)

## 2021-05-25 PROCEDURE — 36415 COLL VENOUS BLD VENIPUNCTURE: CPT

## 2021-05-25 PROCEDURE — 80048 BASIC METABOLIC PNL TOTAL CA: CPT

## 2021-06-01 ENCOUNTER — RECORDS - HEALTHEAST (OUTPATIENT)
Dept: ADMINISTRATIVE | Facility: CLINIC | Age: 61
End: 2021-06-01

## 2021-06-03 ENCOUNTER — TELEPHONE (OUTPATIENT)
Dept: FAMILY MEDICINE | Facility: CLINIC | Age: 61
End: 2021-06-03

## 2021-06-03 NOTE — TELEPHONE ENCOUNTER
I called to check up on the pt, but the pt did not answer her phone. I left a vm for the pt to give me a call back. I called the pt on 06/01/2021, 06/02/2021, and today. I have not gotten a hold of the pt or heard from the pt.

## 2021-06-04 ENCOUNTER — RECORDS - HEALTHEAST (OUTPATIENT)
Dept: ADMINISTRATIVE | Facility: CLINIC | Age: 61
End: 2021-06-04

## 2021-06-07 ENCOUNTER — TELEPHONE (OUTPATIENT)
Dept: FAMILY MEDICINE | Facility: CLINIC | Age: 61
End: 2021-06-07

## 2021-06-07 NOTE — TELEPHONE ENCOUNTER
Second attempt to contact patient, no answer. No VM left due to previous. Will attempt again tomorrow. Sherif LONGO

## 2021-06-07 NOTE — TELEPHONE ENCOUNTER
[facility] phone call message- general phone call:    Reason for call: Patient is calling asking to see if Dr. Cook is in clinic today, told her that he is not. She states she made an appt but forgot if she called to cancel it or not didn't really hear clear if she had no ride or not, told her that the appt was a no show. She states she would like to make an appt with anyone this week. Told her that we would need to make an appt soon since it is harder to schedule her as she continues to talk about mental health referral and speaking to Souadilene.  She states what does it mean she is a hard pt to schedule she's been seeing doctors here, told her because she is a restricted Patient she can only see certain Doctors so it makes it hard to schedule her, she states she understood. Offered her our only appt on Friday 6/11 with Dr. Morales, Patient okay with appt but states it is too long she is in a lot of pain. She states she was suppose to get appt schedule for mental health. She started to cry stating she is in pain and to leave a message for Dr. Cook to let him know and that her friends don't want to be her friends anymore as she is always saying she is in pain. She states she doesn't want to go to the ER and have to wait and then get sent home. Told her she could go to urgent care, she states she doesn't know any, told her about [facility] in [place] she was grabbing a pen to write down the information, came back on the line and states she wanted to make an appt with Pat, told her that we don't schedule appt with Souk she would have to talk to him but he is out today so told her I would transfer her to his line and she would have to leave a voice mail and he will call her back tomorrow when he comes in. Please call and advise.     Return call needed: Yes    OK to leave a message on voice mail?     Primary language: English      needed? No    Call taken on June 7, 2021 at  12:37 PM by Dangelo Arias

## 2021-06-07 NOTE — TELEPHONE ENCOUNTER
Called patient, no answer. Left VM on identified VM to call clinic back. Will attempt again at a later time Sherif LONGO

## 2021-06-07 NOTE — TELEPHONE ENCOUNTER
Called patient to discuss mental health locations as it appears previous care coordinator could not get a hold of patient. Patient answered phone, advised routing pain concern to PCP and wanting to discuss possible locations for the mental health referral. Patient stated she was getting out of the shower and requested a call-back later. Stated I will attempt later today around 2pm, patient asked writer to keep calling due to phone issues. Writer will attempt x3 per clinic policy. Sherif LONGO

## 2021-06-08 ENCOUNTER — TELEPHONE (OUTPATIENT)
Dept: FAMILY MEDICINE | Facility: CLINIC | Age: 61
End: 2021-06-08

## 2021-06-08 NOTE — TELEPHONE ENCOUNTER
[facility] Follow-up    Call initiated by clinic.  Message recorded by SENAIT Cruz  Calling for: monthly follow up  Patient: Lisa Scott  Phone conversation with: Patient  Patient's Phone number/s: Home number on file [phone] (home)  Available today in the AM on their Home number.  OK to leave message on voice mail? Yes      Major diagnoses and/or issues requiring coordination services  Shoulder pain    In the past month, how many times have you been to the Emergency Room? 0  In the past month, how many times have you been hospitalized? 0    Summary notes: I got a return call from the pt, pt had return my call. I called the pt back, she stated that she needs a phone number for the walk in care clinic in [place]. I checked and gave her the [facility] walk in clinic phone number. Pt stated that her shoulder is in a lot of pain, and she needs to go in and get it checked. Pt stated that it has been hurting her for a couple of weeks now. Pt stated that she will call and go there.     Self-management goals:  Get shoulder looked at    Counseling and coordination activities with: SENAIT Loya  Follow-up date: monthly

## 2021-06-08 NOTE — TELEPHONE ENCOUNTER
"Called patient to discuss MH referral, patient answered but stated she was getting out of the shower and requested call back in an hour  \"or whenever you can\". Will attempt to contact again at a later time. Sherif LONGO  "

## 2021-06-11 ENCOUNTER — OFFICE VISIT (OUTPATIENT)
Dept: FAMILY MEDICINE | Facility: CLINIC | Age: 61
End: 2021-06-11
Payer: COMMERCIAL

## 2021-06-11 ENCOUNTER — TELEPHONE (OUTPATIENT)
Dept: FAMILY MEDICINE | Facility: CLINIC | Age: 61
End: 2021-06-11

## 2021-06-11 VITALS
OXYGEN SATURATION: 98 % | RESPIRATION RATE: 18 BRPM | HEART RATE: 92 BPM | WEIGHT: 97 LBS | BODY MASS INDEX: 18.33 KG/M2 | SYSTOLIC BLOOD PRESSURE: 161 MMHG | DIASTOLIC BLOOD PRESSURE: 65 MMHG

## 2021-06-11 DIAGNOSIS — J42 CHRONIC BRONCHITIS, UNSPECIFIED CHRONIC BRONCHITIS TYPE (H): ICD-10-CM

## 2021-06-11 DIAGNOSIS — R21 RASH: ICD-10-CM

## 2021-06-11 DIAGNOSIS — K59.09 OTHER CONSTIPATION: ICD-10-CM

## 2021-06-11 DIAGNOSIS — M25.512 CHRONIC LEFT SHOULDER PAIN: ICD-10-CM

## 2021-06-11 DIAGNOSIS — G89.29 CHRONIC LEFT SHOULDER PAIN: ICD-10-CM

## 2021-06-11 DIAGNOSIS — R25.2 MUSCLE CRAMPING: ICD-10-CM

## 2021-06-11 DIAGNOSIS — I10 ESSENTIAL HYPERTENSION: ICD-10-CM

## 2021-06-11 DIAGNOSIS — J44.1 COPD EXACERBATION (H): Primary | ICD-10-CM

## 2021-06-11 LAB
BUN SERPL-MCNC: 14 MG/DL (ref 7–30)
CALCIUM SERPL-MCNC: 9 MG/DL (ref 8.5–10.4)
CHLORIDE SERPLBLD-SCNC: 107 MMOL/L (ref 94–109)
CO2 SERPL-SCNC: 17 MMOL/L (ref 20–32)
CREAT SERPL-MCNC: 0.8 MG/DL (ref 0.6–1.3)
EGFR CALCULATED (NON BLACK REFERENCE): 78 ML/MIN
GLUCOSE SERPL-MCNC: 101 MG/DL (ref 60–109)
MAGNESIUM SERPL-MCNC: 1.7 MG/DL (ref 1.8–2.6)
POTASSIUM SERPL-SCNC: 3.6 MMOL/L (ref 3.4–5.3)
SODIUM SERPL-SCNC: 132 MMOL/L (ref 133–144)

## 2021-06-11 PROCEDURE — 99215 OFFICE O/P EST HI 40 MIN: CPT | Performed by: FAMILY MEDICINE

## 2021-06-11 PROCEDURE — 36415 COLL VENOUS BLD VENIPUNCTURE: CPT | Performed by: FAMILY MEDICINE

## 2021-06-11 PROCEDURE — 80048 BASIC METABOLIC PNL TOTAL CA: CPT | Performed by: FAMILY MEDICINE

## 2021-06-11 RX ORDER — PREDNISONE 20 MG/1
40 TABLET ORAL DAILY
Qty: 10 TABLET | Refills: 0 | Status: SHIPPED | OUTPATIENT
Start: 2021-06-11 | End: 2021-06-16

## 2021-06-11 RX ORDER — ALBUTEROL SULFATE 90 UG/1
2 AEROSOL, METERED RESPIRATORY (INHALATION) EVERY 6 HOURS PRN
Qty: 8.5 G | Refills: 1 | Status: SHIPPED | OUTPATIENT
Start: 2021-06-11 | End: 2021-07-26

## 2021-06-11 RX ORDER — POLYETHYLENE GLYCOL 3350 17 G/17G
1 POWDER, FOR SOLUTION ORAL DAILY
Qty: 255 G | Refills: 1 | Status: SHIPPED | OUTPATIENT
Start: 2021-06-11 | End: 2021-10-05

## 2021-06-11 RX ORDER — FLUTICASONE PROPIONATE AND SALMETEROL XINAFOATE 230; 21 UG/1; UG/1
2 AEROSOL, METERED RESPIRATORY (INHALATION) 2 TIMES DAILY
Qty: 12 G | Refills: 3 | Status: SHIPPED | OUTPATIENT
Start: 2021-06-11 | End: 2021-12-07

## 2021-06-11 NOTE — LETTER
June 11, 2021      Lisa Scott  [address]  [address]  [place] [address]        To Whom It May Concern:    Lisa Scott has chronic lung disease related to COPD and would greatly benefit from air conditioning installation in her apartment. Please let us know what paperwork needs to be completed in support of this for her health.         Sincerely,        Denisse Morales MD

## 2021-06-11 NOTE — PATIENT INSTRUCTIONS
- Start taking prednisone and take 40 mg once daily. This should help you breathe better. Avoid taking any ibuprofen while taking this medication.   - Restart your daily inhalers: Advair (2 puffs twice daily) and aclidinium (1 puff twice daily) and bring these to your next visit along with your spacer so we can discuss how you are using them. Bringing in all of your medications will help us know what you are currently taking.    - Letter provided in support of getting air conditioning.  -  a refill of triamcinolone and use twice daily on the rash on your leg in a thin layer for up to 2 weeks or until the rash is gone.   - Stop the iron supplements and start taking Miralax once daily to help with constipation.   - Okay to take Tylenol for your shoulder pain as needed.    Follow-up in the next week to recheck your breathing and blood pressure.

## 2021-06-11 NOTE — PROGRESS NOTES
Assessment & Plan     COPD exacerbation (H)  Chronic bronchitis, unspecified chronic bronchitis type (H)  Symptoms concerning for a COPD exacerbation. Not taking her previously prescribed controller medications (Advair, Tudorza).She has been treated 3 times in the first 6 months of 2021 at this clinic with prednisone bursts. Unclear diagnostic picture, possibly a component of asthma also per chart review given previous Singulair prescription but PFTs in 2019 with an obstructive picture and not reversible with bronchodilator.  - Begin predniSONE (DELTASONE) 20 MG tablet; Take 2 tablets (40 mg) by mouth daily for 5 days  - Restart fluticasone-salmeterol (ADVAIR-HFA) 230-21 MCG/ACT inhaler; Inhale 2 puffs into the lungs 2 times daily  - Restart aclidinium (TUDORZA PRESSAIR) 400 MCG/ACT inhaler; Inhale 1 puff into the lungs 2 times daily  - Refilled albuterol (PROAIR HFA/PROVENTIL HFA/VENTOLIN HFA) 108 (90 Base) MCG/ACT inhaler; Inhale 2 puffs into the lungs every 6 hours as needed for shortness of breath / dyspnea or wheezing  - Encouraged close follow-up with inhalers and spacer to review how to use them and techniques at next visit.  - Letter provided in support of air conditioning benefit.    Essential hypertension  Above goal. Last checked at home during a telehealth visit in May and was controlled then on current dose of lisinopril. Recent use of NSAIDs for shoulder pain, vomiting, and COPD exacerbation likely contributing to elevated BP.  - Advised measuring blood pressures at home with close follow-up in the next week.   - Basic Metabolic Panel ([facility]) - Results < 1 hr    Muscle cramping  In the context of recent nausea/vomiting, which patient reports is a chronic issue for her. Did not have time to address in detail. Patient appeared well-hydrated. Will obtain electrolytes. Encouraged regular fluid intake with electrolytes.  - Basic Metabolic Panel ([facility]) - Results < 1 hr  - Magnesium  ([facility])    Chronic left shoulder pain  History of a shoulder replacement. Chronic shoulder pain with recent worsening. Mild discomfort on exam today and range of motion fairly well-preserved. No traumatic history to indicate a radiograph. Did not have adequate time to fully address this today.   - Tylenol instead of NSAIDs for pain relief. Anticipate some improvement with prednisone burst for COPD exacerbation.   - Follow-up with PCP to discuss further work-up such as imaging.    Rash  Several day history of itchy rash. No known exposures. History of eczema.   - Begin triamcinolone 0.1% BID for up to two weeks or until improvement.   - Emollient use.    Other constipation  Recently worsening in the context of iron supplementation. Anemia had resolved with labs 2 weeks ago.  - Discontinue iron supplement  - Begin polyethylene glycol (MIRALAX) 17 GM/Dose powder; Take 17 g (1 capful) by mouth daily    Review of external notes as documented elsewhere in note  48 minutes spent on the date of the encounter doing chart review, interpretation of tests, patient visit and documentation       Return in about 1 week (around 6/18/2021) for Follow up to recheck blood pressure and breathing.    Denisse Morales MD  [facility]    Suzy Gonzalez is a 61 year old who presents for the following health issues    HPI   1. Left shoulder pain: Has been having trouble with her left shoulder. It has been acting up intermittently for the past few years. She reports worsening of her pain about 1.5 weeks ago. It was an intense, constant sharp pain in the back of her left shoulder. She was wondering if she needs to have an xray. She has been taking Tylenol for pain relief and sometimes ibuprofen when it gets bad. Pain has improved over the past 2 days now. Limited range of motion. Has had a previous joint replacement in bilateral shoulders. No inciting injury but very poor range of motion. She was  receiving some treatment from a chiropractor and he told her that her rotator cuff was torn. She believes that Dr. Cook wanted an xray of this in the past but she never got this done.     2. COPD: Request a letter stating that she needs air conditioning because of her COPD.  Needing to use her nebulizer machine more than normal with hot weather due to shortness of breath and wheezing, limiting herself to 4x/day. Also using her albuterol with a spacer an additional 2-3 times per day. She is coughing up more phlegm that is more green and larger volume. No known fevers but she doesn't check her temperature. No chills. Thought that she was taken off the Tudorza and Advair and hasn't been taking this for at least several months. She is not taking Singulair any longer either.    3. Cramping all over: Had some issues with cramping all over her body over the weekend after she was nauseous with vomiting over the weekend. She has been taking more Zofran for this from her PCP. Taking plenty of fluids by mouth. Urinating normally. No abdominal pain. Longstanding constipation that's not getting better with fiber supplement use. Has been worse since she's been on iron supplements.    4. Hypertension - taking lisinopril daily. Threw up after taking her meds this morning. Not measuring her BP consistently at home. No headaches or chest pain. No lightheadedness or visual changes.    5. Rash on her right lower leg x 3 days. Very itchy. She's had something like this before and wasn't sure what it was related to. Last time it went away with moisturizers. She has a history of dyshidrotic eczema on her hands but no other known rash history.        Objective    BP (!) 161/65   Pulse 92   Resp 18   Wt 44 kg (97 lb)   SpO2 98%   BMI 18.33 kg/m    Body mass index is 18.33 kg/m .  Physical Exam   GENERAL: healthy, alert and no distress  RESP: Normal effort and rate but decreased breath sounds in bilateral bases and diffuse end  expiratory wheezes throughout.  CV: Regular rate and rhythm, normal S1 S2, no S3 or S4, no murmur, click or rub, no peripheral edema and peripheral pulses strong.  ABDOMEN: soft, nontender, no hepatosplenomegaly, no masses and bowel sounds normal  MS: Tenderness over posterior left shoulder landmarks. Relatively well-preserved ROM of left shoulder.  PSYCH: Disjointed history with rapid bouncing between topics. Difficult to keep on one topic even with redirection.   SKIN: Erythematous plaque, approximately 5 cm x 12 cm surface area, with some overlying scale and excoriations on the right lateral lower extremity just proximal to the ankle.

## 2021-06-11 NOTE — TELEPHONE ENCOUNTER
Can we please call Lisa to inform her that her magnesium and sodium levels are slightly low, likely from her recent vomiting and attempts to rehydrate with water. I would recommend continuing to drink electrolyte drinks like Gatorade. We can follow-up these labs at her next visit. She should come back sooner if her symptoms worsen.     Denisse Morales MD

## 2021-06-23 NOTE — PROGRESS NOTES
"ASSESSMENT/PLAN:    (S46.002D) Injury of left rotator cuff, subsequent encounter  (primary encounter diagnosis)  Comment: pt states inc in weakness and pain after recent injury; will check MRI to r/o acute full-thickness RTC tear; pt wanted to defer any injection or PT til imaging is complete; f/u after scan; recommended she stay off nsaids; T#3 sent   Plan: MR Shoulder Left w/o Contrast, lidocaine         (LIDODERM) 5 % patch, acetaminophen-codeine         (TYLENOL #3) 300-30 MG tablet, DISCONTINUED:         diclofenac (VOLTAREN) 50 MG EC tablet          (N28.9) Renal insufficiency  Comment:   Plan: last Cr ok; will repeat BMP at next visit          (I10) Essential hypertension  Comment:   Plan: improved today    Nikolay Cook MD  June 24, 2021  11:33 AM      Pt is a 61 year old female last seen on 6/11/2021 by Dr Morales here for follow up of:     1) L shoulder pain - seen in ED on 6/21/2021  Has been wearing sling x 3 days  Location:  Diffuse but worse in inferior shoulder in axilla and radiates to scapular region   Duration: since injury on 6/20/2021   Injury? Inciting activity? Yes - see below  Radiation: into neck and down arm and \"all over\"   Numbness/tingling? Down L arm to hand   Weakness? YES - significant   Instability? No   Clicking/ catching? No   Imaging? Xray in ED   Treatment? Sling, toradol injection      Per ED note:  The patient reports she was closing a window shut on 6/19 (2 days ago) when she hear a \"snap\" and developed onset left shoulder pain. She reports a history of left rotator cuff tear, although her current pain is different. She also notes the pain radiates across her chest. She called EMS after this incident, then another resident of the building offered her a ride but never ended up bringing her in. She has been applying ice packs at home which have provided some relief. She has also been taking tylenol and muscle relaxers without any relief. She has been taking Seroquel to help her " "sleep through some of the pain as well. She has not been using a sling at home. She had an appointment with ortho this morning which she missed.    Additionally, the patient also reports having two syncope episodes with head trauma after her shoulder injury. She suspects she maybe fainted secondary to pain. She denies having any chest pain, but states \"it feels like an elephant is sitting on my chest\". She states she was recently diagnosed with bronchitis and is currently on prednisone.     PE:  Cervical back: Normal range of motion and neck supple.   Comments: Left shoulder diffuse tenderness worse over posterior aspect without bony step off, focal tenderness, erythema, edema, or ecchymosis. There is no tenderness over the forearm, hand, or wrist. 5/5 strength of bilateral upper extremities and  strength bilaterally. Range of motion intact at the left shoulder.     Imaging (reviewed and interpreted):  XR Shoulder 3 views left:  Plate and screw fixation of an old healed proximal left humerus fracture. Hardware is intact. Calcified aortic atherosclerosis. Mild degenerative change thoracic spine. Calcified mitral annulus.   Please see the radiology dictation for the complete report    EKG:  Performed at 16:29:32  HR: 66 bpm  Normal sinus rhythm. Normal ECG.  I reviewed the patient's EKG, with comments made as listed above.  Please see scanned ECG for full report.      Impression and Plan:  Lisa Scott is a 61-year-old female past medical history including COPD, hypertension, alcohol abuse, bipolar disorder, presents to the emergency department for evaluation of left shoulder pain after hearing a popping sensation on Saturday while closing a window. She endorses subjective paresthesias of the left upper extremity, and pain radiating across her chest. She has taken Tylenol and muscle relaxers with minimal relief. She did have an appointment with sports medicine physician today regarding previous rotator cuff " injury, but missed this appointment presents to the emergency department for pain management.     On my initial evaluation, vital signs within normal limits. Physical exam reveals nontoxic well-appearing female resting comfortably in chair. Reproducible tenderness along the posterior glenohumeral joint without focal tenderness, bony step-off over the humeral head, olecranon, forearm, wrist, hand. There is no erythema, edema, ecchymosis, warmth. 2+ radial pulses and full range of motion intact. Cardiac regular rate and rhythm, lungs clear bilaterally, no neurologic deficit appreciated.     Differential diagnosis includes but is not limited to fracture, dislocation, musculoskeletal strain, ligamentous injury, contusion, ACS, dysrhythmia, others     EKG, troponin, left shoulder x-ray obtained. EKG normal sinus rhythm rate 66 bpm, nonischemic. Troponin negative. Shoulder x-ray with plate and screw fixation of old healed proximal left humerus fracture intact, noted aortic atherosclerosis and degenerative change of thoracic spine, otherwise unremarkable.    ED evaluation working diagnosis most consistent with acute left shoulder pain, likely in the setting of chronic left rotator cuff injury. Patient will need follow-up with sports medicine orthopedics for further evaluation and likely MR imaging and management. She is applied in a sling and recommend NSAIDs for pain. Referral placed. Discharged in stable condition with instruction as below. ED return precautions advised. Patient expressed understanding, feels comfortable, and is in agreement with this plan. All questions addressed prior to discharge.      2) HTN - greatly improved today    BP Readings from Last 6 Encounters:   06/24/21 125/86   06/11/21 (!) 161/65   05/12/21 (!) 172/116   05/06/21 (!) 155/96   04/22/21 118/82   04/15/21 128/89     3) Renal insufficiency - recheck back in normal range   Creatinine   Date Value Ref Range Status   06/11/2021 0.8 0.6 - 1.3  "mg/dL Final     4) COPD - improved w/ pred burst  States that she has her inhalers and she \"tries to use them as directed\"  Feels overwhelmed with all the inhalers and nebulizers   Feels like she is home-bound because of the need for all the breathing treatments       Per Dr Morales note:     COPD exacerbation (H)  Chronic bronchitis, unspecified chronic bronchitis type (H)  Symptoms concerning for a COPD exacerbation. Not taking her previously prescribed controller medications (Advair, Tudorza).She has been treated 3 times in the first 6 months of 2021 at this clinic with prednisone bursts. Unclear diagnostic picture, possibly a component of asthma also per chart review given previous Singulair prescription but PFTs in 2019 with an obstructive picture and not reversible with bronchodilator.  - Begin predniSONE (DELTASONE) 20 MG tablet; Take 2 tablets (40 mg) by mouth daily for 5 days  - Restart fluticasone-salmeterol (ADVAIR-HFA) 230-21 MCG/ACT inhaler; Inhale 2 puffs into the lungs 2 times daily  - Restart aclidinium (TUDORZA PRESSAIR) 400 MCG/ACT inhaler; Inhale 1 puff into the lungs 2 times daily  - Refilled albuterol (PROAIR HFA/PROVENTIL HFA/VENTOLIN HFA) 108 (90 Base) MCG/ACT inhaler; Inhale 2 puffs into the lungs every 6 hours as needed for shortness of breath / dyspnea or wheezing  - Encouraged close follow-up with inhalers and spacer to review how to use them and techniques at next visit.  - Letter provided in support of air conditioning benefit.     Essential hypertension  Above goal. Last checked at home during a telehealth visit in May and was controlled then on current dose of lisinopril. Recent use of NSAIDs for shoulder pain, vomiting, and COPD exacerbation likely contributing to elevated BP.  - Advised measuring blood pressures at home with close follow-up in the next week.   - Basic Metabolic Panel ([facility]) - Results < 1 hr     Muscle cramping  In the context of recent nausea/vomiting, which " patient reports is a chronic issue for her. Did not have time to address in detail. Patient appeared well-hydrated. Will obtain electrolytes. Encouraged regular fluid intake with electrolytes.  - Basic Metabolic Panel ([facility]) - Results < 1 hr  - Magnesium ([facility])     Chronic left shoulder pain  History of a shoulder replacement. Chronic shoulder pain with recent worsening. Mild discomfort on exam today and range of motion fairly well-preserved. No traumatic history to indicate a radiograph. Did not have adequate time to fully address this today.   - Tylenol instead of NSAIDs for pain relief. Anticipate some improvement with prednisone burst for COPD exacerbation.   - Follow-up with PCP to discuss further work-up such as imaging.     Rash  Several day history of itchy rash. No known exposures. History of eczema.   - Begin triamcinolone 0.1% BID for up to two weeks or until improvement.   - Emollient use.     Other constipation  Recently worsening in the context of iron supplementation. Anemia had resolved with labs 2 weeks ago.  - Discontinue iron supplement  - Begin polyethylene glycol (MIRALAX) 17 GM/Dose powder; Take 17 g (1 capful) by mouth daily     Review of external notes as documented elsewhere in note  48 minutes spent on the date of the encounter doing chart review, interpretation of tests, patient visit and documentation       Return in about 1 week (around 6/18/2021) for Follow up to recheck blood pressure and breathing.    Past Medical History:   Diagnosis Date     Alcohol withdrawal seizure (H) 02/25/2017     Chronic obstructive pulmonary disease, unspecified COPD type (H) 2/23/2017     COPD (chronic obstructive pulmonary disease) (H)      Depressive disorder       Current Outpatient Medications   Medication Sig Dispense Refill     acetaminophen (TYLENOL) 500 MG tablet Take 1-2 tablets (500-1,000 mg) by mouth every 8 hours as needed for mild pain 100 tablet 3     aclidinium (TUDORZA PRESSAIR) 400  MCG/ACT inhaler Inhale 1 puff into the lungs 2 times daily 1 each 3     albuterol (PROAIR HFA/PROVENTIL HFA/VENTOLIN HFA) 108 (90 Base) MCG/ACT inhaler Inhale 2 puffs into the lungs every 6 hours as needed for shortness of breath / dyspnea or wheezing 8.5 g 1     bacitracin 500 UNIT/GM external ointment Apply topically 2 times daily 113 g 1     JOVANY-GEST ANTACID 500 MG chewable tablet        calcium carbonate 1250 (500 Ca) MG CHEW Take 1,000 mg by mouth       cetirizine (ZYRTEC) 10 MG tablet Take 1 tablet (10 mg) by mouth daily as needed for allergies 30 tablet 3     clotrimazole-betamethasone (LOTRISONE) 1-0.05 % external lotion Apply topically 2 times daily Apply to lips, continue until healed for 3 days 30 mL 1     diclofenac (VOLTAREN) 1 % topical gel Apply 2 g topically 4 times daily To low back 100 g 3     diclofenac (VOLTAREN) 50 MG EC tablet        divalproex sodium delayed-release (DEPAKOTE) 250 MG DR tablet Take 1 tablet (250 mg) by mouth 2 times daily 60 tablet 11     famotidine (PEPCID) 20 MG tablet Take 1 tablet (20 mg) by mouth 2 times daily 60 tablet 11     fluticasone (FLONASE) 50 MCG/ACT nasal spray Spray 2 sprays in nostril daily 9.9 mL 11     fluticasone-salmeterol (ADVAIR-HFA) 230-21 MCG/ACT inhaler Inhale 2 puffs into the lungs 2 times daily 12 g 3     gabapentin (NEURONTIN) 300 MG capsule Take 2 capsules (600 mg) by mouth 3 times daily 180 capsule 11     hydrOXYzine (ATARAX) 25 MG tablet Take 1 tablet (25 mg) by mouth every 8 hours as needed for anxiety 30 tablet 4     ipratropium - albuterol 0.5 mg/2.5 mg/3 mL (DUONEB) 0.5-2.5 (3) MG/3ML neb solution NEBULIZE 1 VIAL BY MOUTH FOUR TIMES DAILY 90 mL 11     lisinopril (ZESTRIL) 10 MG tablet Take 1 tablet (10 mg) by mouth daily 30 tablet 1     Melatonin 10 MG TABS tablet Take 1 tablet (10 mg) by mouth daily (with dinner) , additional tablet as needed for late night awakenings 90 tablet 1     montelukast (SINGULAIR) 10 MG tablet Take 1 tablet (10  mg) by mouth At Bedtime 30 tablet 4     Nebulizers (VIOS AEROSOL DELIVERY SYSTEM) MISC USE WITH NEBULIZER MEDS       omeprazole (PRILOSEC) 20 MG DR capsule Take 1 capsule (20 mg) by mouth 2 times daily 60 capsule 11     ondansetron (ZOFRAN-ODT) 4 MG ODT tab Take 1 tablet (4 mg) by mouth every 8 hours as needed for nausea 20 tablet 11     order for DME DME - pt needs nebulizer tubing 1 Device 0     polyethylene glycol (MIRALAX) 17 GM/Dose powder Take 17 g (1 capful) by mouth daily 255 g 1     polyethylene glycol (MIRALAX) 17 GM/Dose powder Take 17 g (1 capful) by mouth daily as needed for constipation 507 g 1     polyvinyl alcohol (LIQUIFILM TEARS) 1.4 % ophthalmic solution Place 1 drop into both eyes as needed for dry eyes       predniSONE (DELTASONE) 20 MG tablet Take 2 tablets (40 mg) by mouth daily 14 tablet 0     QUEtiapine (SEROQUEL) 100 MG tablet Take 1 tablet (100 mg) by mouth 2 times daily as needed (anxiety or panic attacks) 60 tablet 11     QUEtiapine (SEROQUEL) 200 MG tablet Take 1 tablet (200 mg) by mouth At Bedtime 30 tablet 11     Respiratory Therapy Supplies (NEBULIZER/PEDIATRIC MASK) KIT kit Nebulizer device, mask, and tubing to be used to deliver nebulized medications. 1 kit 0     spacer (OPTICHAMBER FAZAL) holding chamber Use with HFA inhalers 1 each 0     tiZANidine (ZANAFLEX) 4 MG tablet Take 2 tablets (8 mg) by mouth 3 times daily as needed for muscle spasms 240 tablet 11     traZODone (DESYREL) 50 MG tablet Take 1 tablet (50 mg) by mouth At Bedtime 30 tablet 11     triamcinolone (KENALOG) 0.1 % external cream Apply topically 2 times daily To hands 80 g 3     vitamin B1 (THIAMINE) 100 MG tablet Take 200 mg by mouth 3 times daily        Allergies   Allergen Reactions     Wellbutrin [Bupropion]      Stopped due to history of seizures      ROS:   Gen- no fevers/chills   Derm - no rash/ redness   Neuro - see HPI  Remainder of ROS negative.     Exam:   /86 (BP Location: Right arm, Patient  "Position: Sitting, Cuff Size: Adult Regular)   Pulse 65   Temp 97.4  F (36.3  C) (Oral)   Resp 18   Ht 1.575 m (5' 2\")   Wt 50.5 kg (111 lb 6.4 oz)   SpO2 97%   BMI 20.38 kg/m        L Shoulder:   Inspection: asymmetry? YES; dyskinesis? YES   ROM:   Active - forward flexion - 100/ abduction - 90/ int rot - symmetric/ ext rot - symmetric   Passive- forward flexion - 120/ abduction - 100  Strength: deltoid/supraspinatous - 4/5; infraspinatous/ teres minor - 5/5; subscapularis - 5/5   Maneuvers:   RTC - empty can - pos; painful arc - pos; lift off - neg   Impingement - Hung -pos; Neer- pos  AC - cross arm - pos   Biceps - Speed's - pos; Yergason's - pos     Palpation: AC - neg; Acromion/ supraspinatus - pos; scapula - neg; bicipital groove - pos    "

## 2021-06-24 ENCOUNTER — OFFICE VISIT (OUTPATIENT)
Dept: FAMILY MEDICINE | Facility: CLINIC | Age: 61
End: 2021-06-24
Payer: COMMERCIAL

## 2021-06-24 ENCOUNTER — RECORDS - HEALTHEAST (OUTPATIENT)
Dept: ADMINISTRATIVE | Facility: OTHER | Age: 61
End: 2021-06-24

## 2021-06-24 ENCOUNTER — TELEPHONE (OUTPATIENT)
Dept: FAMILY MEDICINE | Facility: CLINIC | Age: 61
End: 2021-06-24

## 2021-06-24 VITALS
HEART RATE: 65 BPM | WEIGHT: 111.4 LBS | DIASTOLIC BLOOD PRESSURE: 86 MMHG | HEIGHT: 62 IN | OXYGEN SATURATION: 97 % | TEMPERATURE: 97.4 F | BODY MASS INDEX: 20.5 KG/M2 | RESPIRATION RATE: 18 BRPM | SYSTOLIC BLOOD PRESSURE: 125 MMHG

## 2021-06-24 DIAGNOSIS — S46.002D INJURY OF LEFT ROTATOR CUFF, SUBSEQUENT ENCOUNTER: Primary | ICD-10-CM

## 2021-06-24 DIAGNOSIS — I10 ESSENTIAL HYPERTENSION: ICD-10-CM

## 2021-06-24 DIAGNOSIS — N28.9 RENAL INSUFFICIENCY: ICD-10-CM

## 2021-06-24 PROCEDURE — 99214 OFFICE O/P EST MOD 30 MIN: CPT | Performed by: FAMILY MEDICINE

## 2021-06-24 RX ORDER — LIDOCAINE 50 MG/G
1 PATCH TOPICAL EVERY 24 HOURS
Qty: 14 PATCH | Refills: 1 | Status: SHIPPED | OUTPATIENT
Start: 2021-06-24 | End: 2021-08-13

## 2021-06-24 ASSESSMENT — MIFFLIN-ST. JEOR: SCORE: 1033.56

## 2021-06-24 NOTE — TELEPHONE ENCOUNTER
.Plan:   1) Routed to PCP as FYI  2) Routed to PCP Team to communicate the following to patient:    We received notification from your pharmacy that Lidocain 5% patch is not covered by your insurance.    Our clinic policy is to not pursue insurance coverage for this medicine as these requests are usually not approved. We recommend you purchase out of pocket if medicine desired. If you need assistance selecting the correct product, please ask your pharmacist for help. Another option to reduce cost of medicine is through coupons that may be available on [employer].    If this plan does not work, please let us know and we will send a message to your doctor to see if another medicine is available to suit your needs.     DINAH LAL, NURY June 24, 2021 at 1:15 PM

## 2021-06-24 NOTE — TELEPHONE ENCOUNTER
Attempted to call patient, unable to reach. Left detail message informing patient of non-coverage for lidocaine patches and can purchased otc.

## 2021-06-24 NOTE — PATIENT INSTRUCTIONS
Referral for :     MRI left shoulder    LOCATION/PLACE/Provider :    [facility]   DATE & TIME :    July 2nd at 5:45 pm  PHONE :    [phone]  FAX :    [phone]  Appointment made by clinic staff/:    [place]

## 2021-07-02 ENCOUNTER — TELEPHONE (OUTPATIENT)
Dept: FAMILY MEDICINE | Facility: CLINIC | Age: 61
End: 2021-07-02
Payer: COMMERCIAL

## 2021-07-02 NOTE — TELEPHONE ENCOUNTER
[facility] Follow-up    Call initiated by clinic.  Message recorded by SENAIT Cruz  Calling for: monthly follow up   Patient: Lisa Scott  Phone conversation with: Patient  Patient's Phone number/s: Home number on file [phone] (home)  Available today in the PM on their Home number.  OK to leave message on voice mail? Yes      Major diagnoses and/or issues requiring coordination services  Left shoulder pain    In the past month, how many times have you been to the Emergency Room? 0  In the past month, how many times have you been hospitalized? 0    Summary notes: I called to check up on the pt, pt stated that she is doing ok. Pt stated that lately she has been having some shoulder pain. Pt stated that she wanted to get a refill on her tylenol 3, to help with the pain. Pt stated that it was prescribed 9 tablets for her the last time she was seen by . I told the pt that I will send this request to him and see. I checked the pt medication list, I only see the 500mg tylenol only. The pt stated that its not that medication but another. I told the pt that I will send this to  and see. Pt has been taking her medication as directed. Pt is doing fine, she does not have any other issue or concerns right now. I reminded the pt of her up coming appointment on 07/06/2021 at 1:20pm with .    Self-management goals:  cope    Counseling and coordination activities with: SENAIT Loya    Follow-up date: monthly

## 2021-07-06 ENCOUNTER — TELEPHONE (OUTPATIENT)
Dept: FAMILY MEDICINE | Facility: CLINIC | Age: 61
End: 2021-07-06

## 2021-07-06 NOTE — TELEPHONE ENCOUNTER
Left message to have Thresa reschedule to 2:40 today or another day as Dr Morales's schedule unfortunately did not get blocked and she is presenting at a meeting.

## 2021-07-16 DIAGNOSIS — M54.2 CERVICALGIA: ICD-10-CM

## 2021-07-16 RX ORDER — GABAPENTIN 300 MG/1
600 CAPSULE ORAL 3 TIMES DAILY
Qty: 180 CAPSULE | Refills: 3 | Status: SHIPPED | OUTPATIENT
Start: 2021-07-16 | End: 2021-08-16

## 2021-07-19 DIAGNOSIS — M54.30 SCIATICA, UNSPECIFIED LATERALITY: ICD-10-CM

## 2021-07-19 DIAGNOSIS — M16.10 ARTHRITIS OF HIP: ICD-10-CM

## 2021-07-19 RX ORDER — ACETAMINOPHEN 500 MG
500-1000 TABLET ORAL EVERY 8 HOURS PRN
Qty: 100 TABLET | Refills: 4 | Status: SHIPPED | OUTPATIENT
Start: 2021-07-19 | End: 2021-08-18

## 2021-07-20 VITALS
HEIGHT: 61 IN | BODY MASS INDEX: 23.79 KG/M2 | BODY MASS INDEX: 23.79 KG/M2 | WEIGHT: 126 LBS | WEIGHT: 126 LBS | HEIGHT: 61 IN

## 2021-07-20 VITALS — DIASTOLIC BLOOD PRESSURE: 70 MMHG | WEIGHT: 125 LBS | BODY MASS INDEX: 23.91 KG/M2 | SYSTOLIC BLOOD PRESSURE: 126 MMHG

## 2021-07-20 NOTE — ANESTHESIA POSTPROCEDURE EVALUATION
Patient: Lisa Scott  Procedure(s) with comments:  HERNIORRHAPHY, INGUINAL, OPEN (Right) - Converted from laparoscopic to open  Anesthesia type: general    Patient location: PACU  Last vitals:   Vitals Value Taken Time   /66 02/04/21 1304   Temp 36.4  C (97.5  F) 02/04/21 1304   Pulse 75 02/04/21 1319   Resp 16 02/04/21 1315   SpO2 97 % 02/04/21 1319   Vitals shown include unvalidated device data.  Post vital signs: stable  Level of consciousness: awake and responds to simple questions  Post-anesthesia pain: pain controlled  Post-anesthesia nausea and vomiting: no  Pulmonary: unassisted, return to baseline  Cardiovascular: stable and blood pressure at baseline  Hydration: adequate  Anesthetic events: no    QCDR Measures:  ASA# 11 - [place]-op Cardiac Arrest: ASA11B - Patient did NOT experience unanticipated cardiac arrest  ASA# 12 - [place]-op Mortality Rate: ASA12B - Patient did NOT die  ASA# 13 - PACU Re-Intubation Rate: ASA13B - Patient did NOT require a new airway mgmt  ASA# 10 - Composite Anes Safety: ASA10A - No serious adverse event    Additional Notes:

## 2021-07-20 NOTE — PROGRESS NOTES
Patient was scheduled today (5/25/17) for psychological assessment with this provider. When referral placed, patient's estimated discharge date was 5/23/17. Since patient is currently still an inpatient, she is unable to attend this previously scheduled intake. A new intake will need to be scheduled with this or any other mental health provider when patient's actual discharge date is known.

## 2021-07-20 NOTE — ANESTHESIA CARE TRANSFER NOTE
Last vitals:   Vitals:    02/04/21 1225   BP: 137/82   Pulse: 74   Resp: 16   Temp: 36.3  C (97.3  F)   SpO2: 99%     Patient spontaneous RR, TV 300s, suctioned, following commands, extubated to facemask 10LPM, O2 sats 100%. VSS. Report to RN.    Patient's level of consciousness is drowsy  Spontaneous respirations: yes  Maintains airway independently: yes  Dentition unchanged: yes  Oropharynx: oropharynx clear of all foreign objects    QCDR Measures:  ASA# 20 - Surgical Safety Checklist: WHO surgical safety checklist completed prior to induction    PQRS# 430 - Adult PONV Prevention: 4558F - Pt received => 2 anti-emetic agents (different classes) preop & intraop  ASA# 8 - Peds PONV Prevention: NA - Not pediatric patient, not GA or 2 or more risk factors NOT present  PQRS# 424 - [place]-op Temp Management: 4559F - At least one body temp DOCUMENTED => 35.5C or 95.9F within required timeframe  PQRS# 426 - PACU Transfer Protocol: - Transfer of care checklist used  ASA# 14 - Acute Post-op Pain: ASA14B - Patient did NOT experience pain >= 7 out of 10

## 2021-07-20 NOTE — TELEPHONE ENCOUNTER
Spoke with Lisa she was at a clinic, will call later this week again.  Joselin Son, JEANNETTET, COPD Educator

## 2021-07-20 NOTE — TELEPHONE ENCOUNTER
Spoke with Lisa, doing well, no questions for me to day. no issues getting and/or taking their medications, no longer has binder, does not remember as that was when she was actively drinking, she has been sober for a year now, will resend binder.  Discussed action plan, use, and purpose. Reminded when we will call again and to call before if there is a question.  Joselin Son, LRT, COPD Educator
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
## 2021-07-20 NOTE — TELEPHONE ENCOUNTER
Spoke with Lisa and scheduled surgery. Will send letter with details per pt request.    See letters*    Yessy FOX  Surgery Scheduler  [facility]  Surgery [facility]  Direct line: [phone]   Main Line: [phone]  Direct Fax: [phone]
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
## 2021-07-20 NOTE — TELEPHONE ENCOUNTER
Spoke with Lisa, everything went well getting home from the hospital, feeling better, no questions or issues, let her know when we will call again.  Joselin Son, LRT, COPD Educator

## 2021-07-20 NOTE — TELEPHONE ENCOUNTER
Spoke with Lisa . She is feeling well today and in the green zone. She is trying to stay in the AC and out of the humidity. She is able to get and take her medications and is compliant in taking them.  Reviewed COPD Action Plan.  She had no problems or questions for me today.

## 2021-07-20 NOTE — PROGRESS NOTES
Progress Notes by Kj Phillips MD at 12/21/2020 11:40 AM     Author: Kj Phillips MD Service: -- Author Type: Physician    Filed: 12/21/2020  3:33 PM Encounter Date: 12/21/2020 Status: Signed    : Kj Phillips MD (Physician)             HPI:  This is a 60 y.o. female here today with concerns of pain and bulging in her right groin area. She has noted this for the past few month(s). The symptoms have progressed and increased over this time. She comes in for evaluation secondary to the hernia causing enough issues to bother them with daily activities or chores.    Allergies:Bupropion    Past Medical History:   Diagnosis Date   ? Anxiety    ? Arthritis    ? Asthma    ? Benzodiazepine dependence (H)    ? CHF (congestive heart failure) (H)    ? COPD (chronic obstructive pulmonary disease) (H)    ? Epileptic seizure (H)    ? Hip fracture (H) 01/06/2018    left   ? Hypertension    ? Severe alcohol dependence (H)    ? Stroke (H)        Past Surgical History:   Procedure Laterality Date   ? FRACTURE SURGERY     ? HYSTERECTOMY     ? TOTAL HIP ARTHROPLASTY Left        CURRENT MEDS:      Family History   Problem Relation Age of Onset   ? Emphysema Father         reports that she quit smoking about 3 years ago. She has a 1.00 pack-year smoking history. She has never used smokeless tobacco. She reports previous alcohol use. She reports current drug use. Frequency: 3.00 times per week. Drug: Marijuana.    Review of Systems - Negative except pain and bulge in right groin. Causing more troubles. Otherwise twelve system of review is negative.        Vitals:    12/21/20 1139   BP: 126/70   Patient Site: Right Arm   Patient Position: Sitting   Cuff Size: Adult Regular   Weight: 125 lb (56.7 kg)       Body mass index is 23.62 kg/m .    EXAM:  General: NAD   HEENT: normocephalic, PERRLA and EOMS intact  Mounth: Mucus membranes moist  Neck: Supple  Chest: Clear to auscultation bilaterally  CV: RRR  ABD: Soft  nontender and nondistended, right inguinal hernia, reducible  EXT: Warm, pulses intact,   Neuro: Alert and oriented x3  Back: no CVA tenderness    Assessment/Plan: Pt with a right inguinal hernia. I discussed this at length with She.  I went over conservative management as well as surgical treatment of this.. I would plan on doing this via anlaparoscopic  approach with possible use of mesh. I went over the small risks of surgery including but not limited to bleeding and infection, anesthesia, recurrence rates and nerve injury. I discussed the expected recovery time as well. Will get this scheduled. She will contact us to have this scheduled.      MD Kj Law MD, MD  General Surgery [phone]  Vascular Surgery [phone]

## 2021-07-20 NOTE — PROGRESS NOTES
"Lisa Scott is a 60 y.o. female who is being evaluated via a billable telephone visit.      The patient has been notified of following:     \"This telephone visit will be conducted via a call between you and your physician/provider. We have found that certain health care needs can be provided without the need for a physical exam.  This service lets us provide the care you need with a short phone conversation.  If a prescription is necessary we can send it directly to your pharmacy.  If lab work is needed we can place an order for that and you can then stop by our lab to have the test done at a later time.    Telephone visits are billed at different rates depending on your insurance coverage. During this emergency period, for some insurers they may be billed the same as an in-person visit.  Please reach out to your insurance provider with any questions.    If during the course of the call the physician/provider feels a telephone visit is not appropriate, you will not be charged for this service.\"    Patient has given verbal consent to a Telephone visit? Yes    HPI: Pt is s/p HERNIORRHAPHY, INGUINAL, OPEN (Right) started laparoscopic with Dr. Phillips on 2/4/21.   she is doing well.  Still has a fair amount of pain, slowly improving. Would like something stronger for another couple of days.. No difficulties with the surgical wound/wounds, no erythema or drainage. She does have some firmness in the area and resolving ecchymosis.  she is eating well and denies fever and chills. Bowel function has returned to normal.    Discussed that the firmness in the area is normal and is likely swelling and scar tissue that should resolve with time. She has no complaints or descriptions of what may be any infective type of process. It sounds like she is just struggling with post op pain, she is already taking tylenol and avoids NSAIDS due to kidney issues.       Assessment/Plan: Overall she is doing fine, without any concerning " signs.  Will send her a small prescription of oxycodone to see if this would help, but would like to avoid any further refills.  Eitan Tinsley PA-C  [phone]  General Surgery      Phone call duration: 6 minutes    Eitan Tinsley PA-C
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
## 2021-07-20 NOTE — LETTER
Letter by Yessy Loving CMA at      Author: Yessy Loving CMA Service: -- Author Type: --    Filed:  Encounter Date: 1/18/2021 Status: (Other)       Warren Gonzalez,    We've received instruction to get you scheduled for Laparoscopic right inguinal hernia repair with Dr Kj Phillips. We have that arranged as follows:     Surgery Date: Thursday February 4th 2021    Location: [facility] [address] (3rd Floor)                    [address] [place] [id number]     Arrival Time: Approximately 8:30 AM (unless instructed otherwise by the pre-op nurse)    Prep Instructions:   1. Please schedule a pre-op physical with your primary care doctor. This may be virtual or face-to-face depending on your doctors preference. Call them right away to schedule this.    2. COVID19 testing is required within 4 days of surgery. We have this scheduled for you at [facility], [address] [place] [address], 1st Floor on Monday Feb. 1st at 10:30 AM. If your test is positive, your surgery will be canceled.     3. Nothing to eat or drink for 8 hours before surgery unless instructed differently by the pre-op nurse.    4. No blood thinners including aspirin for one week prior to surgery. Verify this is safe for you with your primary care doctor before stopping.     5. You need an adult to drive you home and stay with you 24 hours after surgery.     6. No visitors are allowed at the facility or in the building. Family/Friends who accompany the patient will need to wait in their vehicle or return home to be called when patient is ready for .    7. When you arrive to the surgery center, you will be screened for COVID19 symptoms. If you screen positive, your surgery will need to be postponed for your safety.    8. If the community sees a new surge in COVID19 hospital admissions, your procedure may need to be postponed. We will contact you if this happens.    9. We always encourage you to notify your  insurance any time you have something scheduled including surgery. The number is usually right on the back of your insurance card. Please call [facility] Cost of Care at [phone] for the surgeon fees, and [facility] Cost of Care [phone] for facility fees if you need pricing information.     Call our office if you have any questions! Thank you!       Yessy FOX  Surgery Scheduler [phone]

## 2021-07-20 NOTE — TELEPHONE ENCOUNTER
Spoke with Lisa . She is feeling well today and in the green zone. She is able to get and take her medications and is compliant in taking them.  Reviewed COPD Action Plan.  Lisa had no problems or questions for me today.

## 2021-07-20 NOTE — TELEPHONE ENCOUNTER
Spoke with the pharmacist, Michelle. The pt has a restriction due to her insurance and the Rx needs to go to either a specific pharmacy or be prescribed by her PCP. Left message for Lisa to call me back to discuss.

## 2021-07-20 NOTE — TELEPHONE ENCOUNTER
Coronavirus (COVID-19) Notification    Lab Result   Lab test 2019-nCoV rRt-PCR OR SARS-COV-2 PCR    Nasopharyngeal AND/OR Oropharyngeal swab is NEGATIVE for 2019-nCoV RNA [OR] SARS-COV-2 RNA (COVID-19) RNA    Your result was negative. This means that we didn't find the virus that causes COVID-19 in your sample. A test may show negative when you do actually have the virus. This can happen when the virus is in the early stages of infection, before you feel illness symptoms.    If you have symptoms   Stay home and away from others (self-isolate) until you meet ALL of the guidelines below:    You've had no fever--and no medicine that reduces fever--for 3 full days (72 hours). And      Your other symptoms have gotten better. For example, your cough or breathing has improved. And     At least 10 days have passed since your symptoms started.    During this time:    Stay home. Don't go to work, school or anywhere else.     Stay in your own room, including for meals. Use your own bathroom if you can.    Stay away from others in your home. No hugging, kissing or shaking hands. No visitors.    Clean  high touch  surfaces often (doorknobs, counters, handles, etc.). Use a household cleaning spray or wipes. You can find a full list on the EPA website at www.epa.gov/pesticide-registration/list-n-disinfectants-use-against-sars-cov-2.    Cover your mouth and nose with a mask, tissue or washcloth to avoid spreading germs.    Wash your hands and face often with soap and water.    Going back to work  Check with your employer for any guidelines to follow for going back to work.  You are sent a letter for your Employer which will serve as formal document notice that you, the employee, tested negative for COVID-19, as of the testing date shown above.    If your symptoms worsen or other concerning symptoms, contact PCP, oncare or consider returning to Emergency Dept.    Where can I get more information?    [facility]:  [email or web address]    Coronavirus Basics: [email or web address]/[email or web address]/basics.html    [facility] Hotline ([phone])    {Name]  Bekah Briseno RN [facility] Triage Nurse Advisor
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
## 2021-07-20 NOTE — ANESTHESIA PREPROCEDURE EVALUATION
Anesthesia Evaluation        Airway   Mallampati: II  Neck ROM: full   Pulmonary - normal exam   (+) pneumonia, COPD, asthma  shortness of breath,                          Cardiovascular - normal exam  (+) hypertension, CHF, ,      Neuro/Psych    (+) CVA ,     Endo/Other       GI/Hepatic/Renal    (+)   chronic renal disease,           Dental - normal exam                        Anesthesia Plan  Planned anesthetic: general endotracheal    ASA 3   Induction: intravenous   Anesthetic plan and risks discussed with: patient    Post-op plan: routine recovery          
on unit

## 2021-07-20 NOTE — H&P
H&P by Kj Phillips MD at 2/4/2021  9:56 AM     Author: Kj Phillips MD Service: -- Author Type: Physician    Filed: 2/4/2021 11:06 AM Date of Service: 2/4/2021  9:56 AM Status: Signed    : Kj Phillips MD (Physician)       I have performed an assessment and examined the patient, as necessary, to update the patient's current status that may have changed since the prior History and Physical.  The History & Physical has been reviewed and no updates are needed.            Kj Phillips MD    [email or web address]  General Surgery [phone]  Vascular Surgery [phone]

## 2021-07-20 NOTE — TELEPHONE ENCOUNTER
RX sent to [facility] for this patient.  They can't fill the RX as this patient is restricted.    Any questions please call Michelle at the pharmacy. Thanks!

## 2021-07-21 DIAGNOSIS — K21.9 GASTROESOPHAGEAL REFLUX DISEASE WITHOUT ESOPHAGITIS: ICD-10-CM

## 2021-07-21 DIAGNOSIS — I10 BENIGN ESSENTIAL HYPERTENSION: ICD-10-CM

## 2021-07-21 RX ORDER — LISINOPRIL 10 MG/1
10 TABLET ORAL DAILY
Qty: 30 TABLET | Refills: 0 | Status: SHIPPED | OUTPATIENT
Start: 2021-07-21 | End: 2021-10-05

## 2021-07-26 DIAGNOSIS — J42 CHRONIC BRONCHITIS, UNSPECIFIED CHRONIC BRONCHITIS TYPE (H): ICD-10-CM

## 2021-07-26 RX ORDER — ALBUTEROL SULFATE 90 UG/1
2 AEROSOL, METERED RESPIRATORY (INHALATION) EVERY 6 HOURS PRN
Qty: 8.5 G | Refills: 11 | Status: SHIPPED | OUTPATIENT
Start: 2021-07-26 | End: 2021-11-24

## 2021-07-27 DIAGNOSIS — J30.2 SEASONAL ALLERGIC RHINITIS, UNSPECIFIED TRIGGER: ICD-10-CM

## 2021-07-27 RX ORDER — FLUTICASONE PROPIONATE 50 MCG
2 SPRAY, SUSPENSION (ML) NASAL DAILY
Qty: 9.9 ML | Refills: 11 | Status: SHIPPED | OUTPATIENT
Start: 2021-07-27 | End: 2021-11-24

## 2021-07-27 NOTE — TELEPHONE ENCOUNTER
Message to physician:     Date of last visit: 6/24/2021     Date of next visit if scheduled: none    Last Comprehensive Metabolic Panel:  Sodium   Date Value Ref Range Status   06/11/2021 132.0 (L) 133.0 - 144.0 mmol/L Final     Potassium   Date Value Ref Range Status   06/11/2021 3.6 3.4 - 5.3 mmol/L Final     Chloride   Date Value Ref Range Status   06/11/2021 107.0 94.0 - 109.0 mmol/L Final     Carbon Dioxide   Date Value Ref Range Status   06/11/2021 17.0 (L) 20.0 - 32.0 mmol/L Final     Anion Gap   Date Value Ref Range Status   09/08/2020 9 5 - 18 mmol/L Final     Glucose   Date Value Ref Range Status   06/11/2021 101.0 60.0 - 109.0 mg/dL Final     Urea Nitrogen   Date Value Ref Range Status   06/11/2021 14.0 7.0 - 30.0 mg/dL Final     Creatinine   Date Value Ref Range Status   06/11/2021 0.8 0.6 - 1.3 mg/dL Final     GFR Estimate   Date Value Ref Range Status   09/08/2020 57 (L) >60 mL/min/1.73m2 Final   02/25/2020 51 (L) >60 mL/min/1.73m2 Final     Calcium   Date Value Ref Range Status   06/11/2021 9.0 8.5 - 10.4 mg/dL Final       BP Readings from Last 3 Encounters:   06/24/21 125/86   06/11/21 (!) 161/65   05/12/21 (!) 172/116       Lab Results   Component Value Date    A1C 5.7 10/22/2018    A1C 4.9 11/20/2014    A1C 5.7 03/16/2011                Please complete refill and CLOSE ENCOUNTER.  Closing the encounter signifies the refill is complete.

## 2021-08-03 PROBLEM — F10.20 SEVERE ALCOHOL USE DISORDER (H): Status: RESOLVED | Noted: 2017-05-01 | Resolved: 2018-12-03

## 2021-08-03 PROBLEM — A41.9 SEPSIS (H): Status: RESOLVED | Noted: 2018-01-31 | Resolved: 2018-02-01

## 2021-08-06 ENCOUNTER — TELEPHONE (OUTPATIENT)
Dept: FAMILY MEDICINE | Facility: CLINIC | Age: 61
End: 2021-08-06

## 2021-08-09 NOTE — TELEPHONE ENCOUNTER
I called the pt to check up on her, but the pt did not answer her phone. I left a  for the pt to give me a call back. I called the pt on 08/04/2021, 08/05/2021, and 08/06/2021. I have not gotten a hold of the pt or heard from the pt.

## 2021-08-12 ENCOUNTER — TELEPHONE (OUTPATIENT)
Dept: FAMILY MEDICINE | Facility: CLINIC | Age: 61
End: 2021-08-12

## 2021-08-12 NOTE — TELEPHONE ENCOUNTER
[facility] phone call message- general phone call:    Reason for call: Pt would like some medication, due to her injury on her arm/elbow when she went in to the ER. She advised don't even have tylenol on hand. I advise her I'll send a message to her provider and she advised she wanted the message to go straight to [facility]. Patient have an appointment 08/13/2021 at 11:40 with Dr. Morales. Please call and advised.     Return call needed: Yes    OK to leave a message on voice mail? Yes    Primary language: English      needed? No    Call taken on August 12, 2021 at 11:42 AM by Bekah Loving

## 2021-08-13 ENCOUNTER — OFFICE VISIT (OUTPATIENT)
Dept: FAMILY MEDICINE | Facility: CLINIC | Age: 61
End: 2021-08-13
Payer: COMMERCIAL

## 2021-08-13 VITALS
RESPIRATION RATE: 18 BRPM | SYSTOLIC BLOOD PRESSURE: 122 MMHG | DIASTOLIC BLOOD PRESSURE: 83 MMHG | OXYGEN SATURATION: 97 % | HEART RATE: 88 BPM

## 2021-08-13 DIAGNOSIS — S46.002D INJURY OF LEFT ROTATOR CUFF, SUBSEQUENT ENCOUNTER: Primary | ICD-10-CM

## 2021-08-13 DIAGNOSIS — S59.902D ELBOW INJURY, LEFT, SUBSEQUENT ENCOUNTER: ICD-10-CM

## 2021-08-13 DIAGNOSIS — H61.22 IMPACTED CERUMEN OF LEFT EAR: ICD-10-CM

## 2021-08-13 DIAGNOSIS — R21 RASH: ICD-10-CM

## 2021-08-13 DIAGNOSIS — N18.31 STAGE 3A CHRONIC KIDNEY DISEASE (H): ICD-10-CM

## 2021-08-13 DIAGNOSIS — J42 CHRONIC BRONCHITIS, UNSPECIFIED CHRONIC BRONCHITIS TYPE (H): ICD-10-CM

## 2021-08-13 PROBLEM — N18.30 CHRONIC KIDNEY DISEASE, STAGE 3 (H): Status: ACTIVE | Noted: 2021-08-13

## 2021-08-13 PROCEDURE — 99215 OFFICE O/P EST HI 40 MIN: CPT | Performed by: FAMILY MEDICINE

## 2021-08-13 RX ORDER — ACETAMINOPHEN 500 MG
1000 TABLET ORAL 3 TIMES DAILY PRN
Qty: 100 TABLET | Refills: 1 | Status: SHIPPED | OUTPATIENT
Start: 2021-08-13 | End: 2021-09-28

## 2021-08-13 RX ORDER — HYDROCORTISONE 25 MG/G
OINTMENT TOPICAL 2 TIMES DAILY
Qty: 20 G | Refills: 0 | Status: SHIPPED | OUTPATIENT
Start: 2021-08-13 | End: 2021-08-31

## 2021-08-13 NOTE — PROGRESS NOTES
Assessment & Plan     Injury of left rotator cuff, subsequent encounter  Following with PCP for this but did not obtain scheduled MRI in July as planned. Struggling with pain control. Prefers to defer any injections until she has completed the imaging work-up. Cannot use NSAIDs safely due to CKD.  - Reschedule MR Shoulder Left w/o Contrast; Future  - acetaminophen-codeine (TYLENOL #3) 300-30 MG tablet; Take 1 tablet by mouth every 6 hours as needed for severe pain (#20 tablets provided)  - acetaminophen (TYLENOL) 500 MG tablet; Take 2 tablets (1,000 mg) by mouth 3 times daily as needed for mild pain    Elbow injury, left, subsequent encounter  Injury earlier this week. Neg xray in the ED. No effusion and full ROM today. Suspect healing contusion.   - Continue RICE. Gentle ROM.   - acetaminophen-codeine (TYLENOL #3) 300-30 MG tablet; Take 1 tablet by mouth every 6 hours as needed for severe pain  - acetaminophen (TYLENOL) 500 MG tablet; Take 2 tablets (1,000 mg) by mouth 3 times daily as needed for mild pain    Stage 3a chronic kidney disease  Consistently low GFR and meets criteria for CKD3a.  - NSAIDs contraindicated.    Rash  Excoriated pruritic rash on lateral right ankle. Not discussed at length. Patient requested medication for symptomatic relief.  - hydrocortisone 2.5 % ointment; Apply topically 2 times daily    Chronic bronchitis, unspecified chronic bronchitis type (H)  Patient reports adherence on her COPD regimen of Tudorza and Advair. Symptoms inadequately controlled but not in an acute flare today. Did not have time to adequately address today.   - Continue consistent use of Tudorza and Advair. Bring inhalers to follow-up visit with Dr. Cook to review technique.   - Close follow-up.    Impacted cerumen on left:  Resolved pain that may have represented a serous otitis. Cannot visualize the ear drum today due to impacted cerumen, which likely explains the patient's buzzing sensation.   - Discussed  briefly strategies for ear wax removal including Debrox. Patient to follow-up on this at the next visit if not resolved.      44 minutes spent on the date of the encounter doing chart review, review of outside records, patient visit and documentation        Follow-up in 2 weeks with PCP. COVID vaccine at that time.     Denisse Morales MD  [facility] [place] Subjective Thresa is a 61 year old who presents for the following health issues     HPI   1. Left elbow injury: Here for follow-up after a recent fall in which she injured her left elbow. She's been falling a lot more recently - no lightheadedness or vertigo, she's been slipping on things on the floor since she has had cold like symptoms. She was evaluated at [facility] on 8/9/21. Xray without any acute injuries, though identified some arthritic changes. She was advised to try RICE to alleviate her symptoms. She wonders what else she can do. The pain is a little better since the initial injury.     2. Left shoulder pain: Is still having significant left shoulder pain. Had a MRI scheduled at [facility] on 7/2/21 but did not show up to the appointment. Had been using NSAIDs previously but given her CKD these were discontinued and replaced by Lidoderm patches (not covered by insurance) and Tylenol #3, which she ran out of earlier this week.     3. Left ear pain and buzzing: Lots of congestion and left ear pain last week. She still has some buzzing and congestion in this ear. No drainage or persistent pain. No fevers. Was having worsening breathing issues last week but they have since stabilized. She is taking her Advair and Tudorza daily and also using her albuterol as needed.     Healthcare maintenance:   - Due for COVID-19 vaccine and agreeable to move forward with this at her next visit  - Also due for mammogram & colorectal cancer screening, will discuss at next visit        Objective    /83   Pulse 88   Resp 18   SpO2 97%    There is no height or weight on file to calculate BMI.  Physical Exam   GENERAL: healthy, alert and no distress  HEENT: Conjunctiva clear. Nares patent. Oropharynx clear. Poor dentition. Right TM pearly gray with intact light reflexes. Left TM obstructed by impacted cerumen. No pain with palpation of external ear.  NECK: no adenopathy, no asymmetry, masses, or scars and thyroid normal to palpation  RESP: lungs clear to auscultation - no rales, rhonchi or wheezes  CV: regular rate and rhythm, normal S1 S2, no S3 or S4, no murmur, click or rub, no peripheral edema and peripheral pulses strong  ABDOMEN: soft, nontender, no hepatosplenomegaly, no masses and bowel sounds normal  MS: Left arm in a sling. Elbow examined with no effusion and tenderness to palpation over olecranon but not epicondyles. Full active ROM.   SKIN: Excoriated papular rash on lateral right ankle.

## 2021-08-13 NOTE — PATIENT INSTRUCTIONS
- Take acetaminophen (Tylenol) 1000 mg three times daily for pain.     - You can add one Tylenol #3, no more than two Tylenol #3 in one day. You can't take more than 8 tablets of any of these Tylenol forms in one day.    - Work on gentle range of motion exercises.     - Reschedule your MRI. [facility] will call you.     - Keep taking your breathing medications regularly.    - Follow-up as planned with Dr. Cook on 8/25, or sooner if worsening.     - Get your COVID vaccine!

## 2021-08-16 DIAGNOSIS — M54.2 CERVICALGIA: ICD-10-CM

## 2021-08-16 NOTE — TELEPHONE ENCOUNTER
[facility] phone call message- medication clarification/question:    Full Medication Name:      acetaminophen-codeine (TYLENOL #3) 300-30 MG tablet 20 tablet      gabapentin (NEURONTIN) 300 MG capsule    Question: BOTH MEDICATIONS NEED TO BE SIGNED BY [facility]    Pharmacy confirmed as      [id number] - [place], [place] - [address]: Yes    OK to leave a message on voice mail? Yes    Primary language: English      needed? No    Call taken on August 16, 2021 at 2:20 PM by Lora Samuel

## 2021-08-17 RX ORDER — GABAPENTIN 300 MG/1
600 CAPSULE ORAL 3 TIMES DAILY
Qty: 180 CAPSULE | Refills: 3 | Status: SHIPPED | OUTPATIENT
Start: 2021-08-17 | End: 2021-10-15

## 2021-08-18 ENCOUNTER — TELEPHONE (OUTPATIENT)
Dept: FAMILY MEDICINE | Facility: CLINIC | Age: 61
End: 2021-08-18

## 2021-08-18 ENCOUNTER — OFFICE VISIT (OUTPATIENT)
Dept: FAMILY MEDICINE | Facility: CLINIC | Age: 61
End: 2021-08-18
Payer: COMMERCIAL

## 2021-08-18 VITALS
BODY MASS INDEX: 19.35 KG/M2 | DIASTOLIC BLOOD PRESSURE: 60 MMHG | OXYGEN SATURATION: 98 % | TEMPERATURE: 97.3 F | WEIGHT: 102.5 LBS | SYSTOLIC BLOOD PRESSURE: 99 MMHG | HEIGHT: 61 IN | HEART RATE: 82 BPM

## 2021-08-18 DIAGNOSIS — J42 CHRONIC BRONCHITIS, UNSPECIFIED CHRONIC BRONCHITIS TYPE (H): ICD-10-CM

## 2021-08-18 DIAGNOSIS — R45.1 AGITATION: Primary | ICD-10-CM

## 2021-08-18 DIAGNOSIS — S59.902D ELBOW INJURY, LEFT, SUBSEQUENT ENCOUNTER: ICD-10-CM

## 2021-08-18 DIAGNOSIS — Z20.822 SUSPECTED COVID-19 VIRUS INFECTION: ICD-10-CM

## 2021-08-18 DIAGNOSIS — N18.31 STAGE 3A CHRONIC KIDNEY DISEASE (H): ICD-10-CM

## 2021-08-18 DIAGNOSIS — H65.02 NON-RECURRENT ACUTE SEROUS OTITIS MEDIA OF LEFT EAR: ICD-10-CM

## 2021-08-18 PROCEDURE — U0005 INFEC AGEN DETEC AMPLI PROBE: HCPCS | Performed by: FAMILY MEDICINE

## 2021-08-18 PROCEDURE — U0003 INFECTIOUS AGENT DETECTION BY NUCLEIC ACID (DNA OR RNA); SEVERE ACUTE RESPIRATORY SYNDROME CORONAVIRUS 2 (SARS-COV-2) (CORONAVIRUS DISEASE [COVID-19]), AMPLIFIED PROBE TECHNIQUE, MAKING USE OF HIGH THROUGHPUT TECHNOLOGIES AS DESCRIBED BY CMS-2020-01-R: HCPCS | Performed by: FAMILY MEDICINE

## 2021-08-18 PROCEDURE — 99214 OFFICE O/P EST MOD 30 MIN: CPT | Performed by: FAMILY MEDICINE

## 2021-08-18 RX ORDER — PREDNISONE 50 MG/1
50 TABLET ORAL DAILY
Qty: 5 TABLET | Refills: 0 | Status: SHIPPED | OUTPATIENT
Start: 2021-08-18 | End: 2021-08-23

## 2021-08-18 ASSESSMENT — MIFFLIN-ST. JEOR: SCORE: 977.32

## 2021-08-18 NOTE — PROGRESS NOTES
ASSESSMENT/PLAN:    (R45.1) Agitation  (primary encounter diagnosis)  Comment: concerning visit w/ agitation, pressured speech; no flight of ideas; unsure if this is her being manic or related to substance use; pt left despite my request to obtain a UDS; we cancelled her T#3 prescription til she returns for UDS  Plan: CANCELED: Urine Drugs of Abuse Screen Panel 13          (H65.02) Non-recurrent acute serous otitis media of left ear  Comment: will treat for acute otitis; precautions given  Plan: amoxicillin-clavulanate (AUGMENTIN) 875-125 MG tablet          (Z20.822) Suspected COVID-19 virus infection  Comment: covid swab sent; I explained to patient that I was very upset that she did not disclose symptoms prior to her visit; she was waiting in our waiting room for several hours per my staff and was roomed with no report of symptoms; she also was not wearing a mask properly and is unvaccinated  Plan: Symptomatic COVID-19 Virus (Coronavirus) by PCR          (S59.902D) Elbow injury, left, subsequent encounter  Comment: see above; likely contusion; unable to examine properly due to pt agitation; pt is restricted to me so she was unable to fill script by Dr Morales from 8/13/2021 visit. Supposedly she was waiting all afternoon for me to fill her script. She was added to my schedule at 3:45 pm in 4 pm slot with no opportunity for me to chart review; I did review  and it was appropriate; I am willing to fill script once she leaves a urine sample  Plan: acetaminophen-codeine (TYLENOL #3) 300-30 MG tablet          (N18.31) Stage 3a chronic kidney disease  Comment:   Plan: unable to prescribe nsaids    (J42) Chronic bronchitis, unspecified chronic bronchitis type (H)  Comment: short course for wheezing; this may help her elbow pain as well   Plan: predniSONE (DELTASONE) 50 MG tablet          Follow-up can be arranged once covid swab is negative          Nikolay Cook MD  August 19, 2021  1:13 PM    Addendum:    COVID swab  "negative   Spoke to pt  She will return to office today for UDS  I have sent over her T#3    Nikolay Cook MD  August 19, 2021  1:23 PM        Pt is a 61 year old female last seen on 8/13/2021 here today for:     1) L elbow pain - Slipped and fell   Went to ED on 8/9/2021  Throughout interview today is agitated and notes sharp pains in elbow  +shooting pain in different places under arm  No neck pain  Shoots from shoulder to elbow  + hx of neuropathy  \"my hands and feet are numb all the time\"; \"can't hold plates\"  Has gabapentin, tizanidine,     2) L ear infection - notes sore throat, cough and ear pain x > 1 wk  No fevers  +wheezing - \"I'm always wheezy\"  Not vaccinated  Wearing her mask around her neck   Did not inform rooming staff she was having symptoms  Last COVID test 1 month ago was negative       Per ED visit :  CHEYENNE Scott is a 61 y.o. old female who presents to the Emergency Department for evaluation of L elbow injury. Patient reports she hit her elbow on the wall today. She is screaming and yelling in pain. She was given tylenol and a sling in triage. She had XR imaging and was able to remain calm per triage nurse. On arrival to the unit she is yelling, screaming and agitated. She is unable to provide further hx due to agitation.  Physical Exam  General: alert, agitated, non-toxic appearing  Eyes: pupils mid-sized, sclera normal appearing  ENT: head atraumatic   Respiratory: normal work of breathing  Musculoskeletal: normal appearing extremities, normal ROM of major joints intact, normal ROM of neck  LUE: no TTP in shoulder, upper arm, forearm, wrist or hand, TTP L elbow, unable to assess strength in the LUE 2/2 patient's agitation and uncooperative behavior; L elbow: able to flex and extend, no swelling, warmth or redness; L wrist: able to flex and extend, no TTP, palpable radial pulse; L hand: able to make a fist and extend fingers; L shoulder: able to flex, extend abduct and adduct, no " TTP  Skin: warm and dry, skin color normal  Neuro: oriented to self and location - unable to assess remainder 2/2 agitation, speech loud - yelling and screaming, moves all extremities appropriately (flailing legs, arms and body)  Psychiatric: uncooperative, hysterical   X-ray left elbow: Anatomic alignment left elbow. No acute displaced left elbow fracture. No sizable elbow joint effusion. Mild elbow arthritis.    Did agree to see patient in F pod On arrival to the unit the patient begins yelling and screaming. She is upset she did not receive anything for pain in the waiting room. We discussed she was given Tylenol which she reports does not treat pain. She is requesting a shot of medication. Patient was given IM droperidol. Patient refuses exam and refuses to comply with exam reporting significant pain in her elbow. Discussed patient has a negative x-ray imaging with her more than once. She continues to ask what is wrong. I explained that I cannot begin to evaluate her when she is screaming and flailing her arms and legs. Discussed it is hard to talk about the etiology of her symptoms if she is yelling and screaming after I say each word. Patient is able to flex and extend at the left elbow. She is able to supinate and pronate the left arm. She is able to flex and extend her left wrist and move her fingers. It is unclear if her sensation is intact and cannot perform a formal exam as she cannot answer my questions due to screaming and agitation however she can tell that I am touching her hand so I suspect her sensation is intact. Patient reports a traumatic injury lessening the suspicion for cellulitis, septic joint, septic bursitis or DVT. Additionally, she does not have any swelling, warmth or redness that would suggest cellulitis, septic joint or septic bursitis. Given her pain out of proportion to exam, considered much less likely etiologies. There is no pain to her forearm or humerus and no evidence of  compartment syndrome. Patient appears to have sensation to arm, palpable radial pulse and no pain distal to her elbow - unlikely arterial occlusion. Based on exam and workup, there is no evidence of emergent etiology low suspicion based on reported trauma, patient's exam and negative imaging. Patient was given a sling in triage which we will reapply at time of discharge. Will encourage patient to use ibuprofen and Tylenol at home as well as RICE therapy. Patient has restricted insurance and I am unable to provide any prescriptions for home.     Of note, patient has a hx of bipolar disorder. She is agitated but thought process is linear - no evidence of psychosis. Did offered SW assessment in the mental health unit if she felt this was her mental illness, patient declined. She does not meet 72 hour hold criteria. Patient did require escort out of the department with security.      Past Medical History:   Diagnosis Date     Alcohol withdrawal seizure (H) 2017     Anxiety      Arthritis      Asthma      Benzodiazepine dependence (H)      CHF (congestive heart failure) (H)      Chronic obstructive pulmonary disease, unspecified COPD type (H) 2017     COPD (chronic obstructive pulmonary disease) (H)      COPD (chronic obstructive pulmonary disease) (H)      Depressive disorder      Epileptic seizure (H)      Hip fracture (H) 2018    left     Hypertension      Pneumonia      Severe alcohol dependence (H)      Shortness of breath      Stroke (H)       Past Surgical History:   Procedure Laterality Date     ABDOMEN SURGERY       ARTHROPLASTY SHOULDER Bilateral      C/SECTION, LOW TRANSVERSE        SECTION       FRACTURE SURGERY       HYSTERECTOMY       HYSTERECTOMY       INGUINAL HERNIA REPAIR Right 2021    Procedure: HERNIORRHAPHY, INGUINAL, OPEN;  Surgeon: Kj Phillips MD;  Location: [facility];  Service: General     JOINT REPLACEMENT, HIP RT/LT Left 2017     SHOULDER SURGERY  Bilateral      TOTAL HIP ARTHROPLASTY Left       Current Outpatient Medications   Medication Sig Dispense Refill     acetaminophen (TYLENOL) 500 MG tablet Take 2 tablets (1,000 mg) by mouth 3 times daily as needed for mild pain 100 tablet 1     acetaminophen-codeine (TYLENOL #3) 300-30 MG tablet Take 1 tablet by mouth every 6 hours as needed for severe pain 20 tablet 0     aclidinium (TUDORZA PRESSAIR) 400 MCG/ACT inhaler Inhale 1 puff into the lungs 2 times daily 1 each 3     albuterol (PROAIR HFA/PROVENTIL HFA/VENTOLIN HFA) 108 (90 Base) MCG/ACT inhaler Inhale 2 puffs into the lungs every 6 hours as needed for shortness of breath / dyspnea or wheezing 8.5 g 11     amoxicillin-clavulanate (AUGMENTIN) 875-125 MG tablet Take 1 tablet by mouth 2 times daily for 10 days 20 tablet 0     bacitracin 500 UNIT/GM external ointment Apply topically 2 times daily 113 g 1     JOVANY-GEST ANTACID 500 MG chewable tablet        calcium carbonate 1250 (500 Ca) MG CHEW Take 1,000 mg by mouth       cetirizine (ZYRTEC) 10 MG tablet Take 1 tablet (10 mg) by mouth daily as needed for allergies 30 tablet 3     clotrimazole-betamethasone (LOTRISONE) 1-0.05 % external lotion Apply topically 2 times daily Apply to lips, continue until healed for 3 days 30 mL 1     divalproex sodium delayed-release (DEPAKOTE) 250 MG DR tablet Take 1 tablet (250 mg) by mouth 2 times daily 60 tablet 11     famotidine (PEPCID) 20 MG tablet Take 1 tablet (20 mg) by mouth 2 times daily 60 tablet 11     fluticasone (FLONASE) 50 MCG/ACT nasal spray Spray 2 sprays in nostril daily 9.9 mL 11     fluticasone-salmeterol (ADVAIR-HFA) 230-21 MCG/ACT inhaler Inhale 2 puffs into the lungs 2 times daily 12 g 3     gabapentin (NEURONTIN) 300 MG capsule Take 2 capsules (600 mg) by mouth 3 times daily 180 capsule 3     hydrocortisone 2.5 % ointment Apply topically 2 times daily 20 g 0     hydrOXYzine (ATARAX) 25 MG tablet Take 1 tablet (25 mg) by mouth every 8 hours as needed for  anxiety 30 tablet 4     ipratropium - albuterol 0.5 mg/2.5 mg/3 mL (DUONEB) 0.5-2.5 (3) MG/3ML neb solution NEBULIZE 1 VIAL BY MOUTH FOUR TIMES DAILY 90 mL 11     lisinopril (ZESTRIL) 10 MG tablet Take 1 tablet (10 mg) by mouth daily 30 tablet 0     Melatonin 10 MG TABS tablet Take 1 tablet (10 mg) by mouth daily (with dinner) , additional tablet as needed for late night awakenings 90 tablet 1     montelukast (SINGULAIR) 10 MG tablet Take 1 tablet (10 mg) by mouth At Bedtime 30 tablet 4     Nebulizers (Unveil AEROSOL DELIVERY SYSTEM) MISC USE WITH NEBULIZER MEDS       ondansetron (ZOFRAN-ODT) 4 MG ODT tab Take 1 tablet (4 mg) by mouth every 8 hours as needed for nausea 20 tablet 11     order for DME DME - pt needs nebulizer tubing 1 Device 0     polyethylene glycol (MIRALAX) 17 GM/Dose powder Take 17 g (1 capful) by mouth daily 255 g 1     polyethylene glycol (MIRALAX) 17 GM/Dose powder Take 17 g (1 capful) by mouth daily as needed for constipation 507 g 1     polyvinyl alcohol (LIQUIFILM TEARS) 1.4 % ophthalmic solution Place 1 drop into both eyes as needed for dry eyes       predniSONE (DELTASONE) 50 MG tablet Take 1 tablet (50 mg) by mouth daily for 5 days 5 tablet 0     QUEtiapine (SEROQUEL) 100 MG tablet Take 1 tablet (100 mg) by mouth 2 times daily as needed (anxiety or panic attacks) 60 tablet 11     QUEtiapine (SEROQUEL) 200 MG tablet Take 1 tablet (200 mg) by mouth At Bedtime 30 tablet 11     Respiratory Therapy Supplies (NEBULIZER/PEDIATRIC MASK) KIT kit Nebulizer device, mask, and tubing to be used to deliver nebulized medications. 1 kit 0     spacer (OPTICHAMBER FAZAL) holding chamber Use with HFA inhalers 1 each 0     tiZANidine (ZANAFLEX) 4 MG tablet Take 2 tablets (8 mg) by mouth 3 times daily as needed for muscle spasms 240 tablet 11     traZODone (DESYREL) 50 MG tablet Take 1 tablet (50 mg) by mouth At Bedtime 30 tablet 11     triamcinolone (KENALOG) 0.1 % external cream Apply topically 2 times  "daily To hands 80 g 3     vitamin B1 (THIAMINE) 100 MG tablet Take 200 mg by mouth 3 times daily       omeprazole (PRILOSEC) 20 MG DR capsule Take 1 capsule (20 mg) by mouth 2 times daily 60 capsule 11      Allergies   Allergen Reactions     Wellbutrin [Bupropion]      Stopped due to history of seizures        ROS:   Gen- no weight change, no fevers/chills;  ENT- see HPI  Cardiac - no palpitations, no chest pain   Respiratory - no shortness of breath , + wheezing     Remainder of ROS negative.     Exam:   BP 99/60 (BP Location: Right arm, Patient Position: Sitting, Cuff Size: Adult Regular)   Pulse 82   Temp 97.3  F (36.3  C) (Oral)   Ht 1.549 m (5' 1\")   Wt 46.5 kg (102 lb 8 oz)   SpO2 98%   BMI 19.37 kg/m     Alert and oriented; pt agitated throughout the visit w/ intermittent audible and visible winces of pain which she states are due to her elbow  HEENT: poor dentition; mild pharyngeal erythema; + TM erythematous and bulging   Neck: no masses, no lymphadenopathy   Resp: clear to auscultation bilaterally, no wheezing/ronchi   CV: rate/rhythm regular, no murmurs/rubs/gallops   Extrem: no clubbing/cyanosis/edema   MSK: L elbow w/ full ROM; unable to palpate as pt guarded and agitated   Neuro: no focal deficits   Derm: no rashes       "

## 2021-08-18 NOTE — TELEPHONE ENCOUNTER
[facility] phone call message- medication clarification/question:    Full Medication Name: CervicalgiaCervicalgia   Dose:    Question: Pt Would like to speak or have Dr. Cook to give Pt a call due to Pt tried to go get her medication and was denied.  Was told that Dr. Cook needs to sign for it, in order to get her medication.     [facility]     [place] [facility] [address] [facility]    OK to leave a message on voice mail? Yes    Primary language: English      needed? No    Call taken on August 18, 2021 at 2:53 PM by Bekah Loving

## 2021-08-19 ENCOUNTER — LAB (OUTPATIENT)
Dept: LAB | Facility: CLINIC | Age: 61
End: 2021-08-19
Payer: COMMERCIAL

## 2021-08-19 DIAGNOSIS — K21.9 GASTROESOPHAGEAL REFLUX DISEASE WITHOUT ESOPHAGITIS: ICD-10-CM

## 2021-08-19 DIAGNOSIS — R45.1 AGITATION: ICD-10-CM

## 2021-08-19 DIAGNOSIS — Z01.89 ENCOUNTER FOR PAIN MANAGEMENT PLANNING: Primary | ICD-10-CM

## 2021-08-19 LAB
AMPHETAMINES UR QL: NOT DETECTED
BARBITURATES UR QL SCN: NOT DETECTED
BENZODIAZ UR QL SCN: NOT DETECTED
BUPRENORPHINE UR QL: NOT DETECTED
CANNABINOIDS UR QL: NOT DETECTED
COCAINE UR QL SCN: NOT DETECTED
D-METHAMPHET UR QL: NOT DETECTED
METHADONE UR QL SCN: NOT DETECTED
OPIATES UR QL SCN: NOT DETECTED
OXYCODONE UR QL SCN: NOT DETECTED
PCP UR QL SCN: NOT DETECTED
PROPOXYPH UR QL: NOT DETECTED
SARS-COV-2 RNA RESP QL NAA+PROBE: NEGATIVE
TRICYCLICS UR QL SCN: NOT DETECTED

## 2021-08-19 PROCEDURE — 80306 DRUG TEST PRSMV INSTRMNT: CPT

## 2021-08-25 ENCOUNTER — OFFICE VISIT (OUTPATIENT)
Dept: FAMILY MEDICINE | Facility: CLINIC | Age: 61
End: 2021-08-25
Payer: COMMERCIAL

## 2021-08-25 VITALS
TEMPERATURE: 98 F | OXYGEN SATURATION: 97 % | HEIGHT: 60 IN | SYSTOLIC BLOOD PRESSURE: 133 MMHG | BODY MASS INDEX: 20.47 KG/M2 | DIASTOLIC BLOOD PRESSURE: 92 MMHG | HEART RATE: 90 BPM | WEIGHT: 104.25 LBS | RESPIRATION RATE: 20 BRPM

## 2021-08-25 DIAGNOSIS — M54.12 CERVICAL RADICULOPATHY: Primary | ICD-10-CM

## 2021-08-25 DIAGNOSIS — J42 CHRONIC BRONCHITIS, UNSPECIFIED CHRONIC BRONCHITIS TYPE (H): ICD-10-CM

## 2021-08-25 DIAGNOSIS — G62.9 NEUROPATHY: ICD-10-CM

## 2021-08-25 DIAGNOSIS — Z23 HIGH PRIORITY FOR 2019-NCOV VACCINE: ICD-10-CM

## 2021-08-25 DIAGNOSIS — S59.902D ELBOW INJURY, LEFT, SUBSEQUENT ENCOUNTER: ICD-10-CM

## 2021-08-25 DIAGNOSIS — F31.62 BIPOLAR DISORDER, CURRENT EPISODE MIXED, MODERATE (H): ICD-10-CM

## 2021-08-25 LAB — TSH SERPL DL<=0.005 MIU/L-ACNC: 1.31 UIU/ML (ref 0.3–5)

## 2021-08-25 PROCEDURE — 99215 OFFICE O/P EST HI 40 MIN: CPT | Performed by: FAMILY MEDICINE

## 2021-08-25 PROCEDURE — 0011A COVID-19,PF,MODERNA: CPT | Performed by: FAMILY MEDICINE

## 2021-08-25 PROCEDURE — 84443 ASSAY THYROID STIM HORMONE: CPT | Performed by: FAMILY MEDICINE

## 2021-08-25 PROCEDURE — 91301 COVID-19,PF,MODERNA: CPT | Performed by: FAMILY MEDICINE

## 2021-08-25 PROCEDURE — 36415 COLL VENOUS BLD VENIPUNCTURE: CPT | Performed by: FAMILY MEDICINE

## 2021-08-25 ASSESSMENT — MIFFLIN-ST. JEOR: SCORE: 969.37

## 2021-08-25 NOTE — NURSING NOTE
I called [facility] and confirmed the order for the TENS unit. They stated that this order will be reviewed by the TENS specialist and that they will then contact the patient for pickup.    Edilberto King, EMT  August 25, 2021  2:44 PM

## 2021-08-25 NOTE — PROGRESS NOTES
ASSESSMENT/PLAN:    (M54.12) Cervical radiculopathy  (primary encounter diagnosis)  Comment: will check c-spine MRI given weakness/ unsteadiness to r/o myelopathy; labwork for neuropathy  Plan: XR Cervical Spine 2/3 Views, MRI CERVICAL SPINE        W/O CONTRAST, acetaminophen-codeine (TYLENOL         #3) 300-30 MG tablet          (G62.9) Neuropathy  Comment: see above; per pt request referral placed to neurology; labwork sent for neuopathy; pt notes previous success w/ tens unit so will order  Plan: Adult Neurology Referral, Vitamin B12, TSH with free T4 reflex, Erythrocyte sedimentation rate         auto, Anti Nuclear Blanca IgG by IFA with Reflex, Tens Unit/Supplies Order          (F31.62) Bipolar disorder, current episode mixed, moderate (H)  Comment: 2nd visit in a row where I am concerned about her bipolar disorder; needs to see a new psychiatrist and therapist asap  Plan: [facility] REFERRAL  -          (S56.584V) Elbow injury, left, subsequent encounter  Comment:   Plan: did not mention at all today despite debilitating pain last week     (Z23) High priority for 2019-nCoV vaccine  Comment: covid vaccine today!  Plan: COVID-19,PF,MODERNA          (J42) Chronic bronchitis, unspecified chronic bronchitis type (H)  Comment:   Plan: otitis resolved; still notes chest congestion and cough; unclear why she is out of Abx given 10-day course provided 6 days ago    > 40 min spent on records review, direct patient care, documentation, and care coordination.    Nikolay Cook MD  August 25, 2021  10:44 AM        Pt is a 61 year old female last seen on 8/18/2021 here today for:     1) ?cervical radic -   Requesting TENS machine for pain   Wants to see neurologist again  Pain is in arms -> feels like 10,000 needles are in both arms -> can last 3-4 hours  Occurs every other day  +weakness in hands  No MRI or EMG  Using gabapentin and tizanidine for pain     2) Pulm - still wheezing bad   Feeling a lot better  after steroids  States she is taking the Abx but also says she is out?   States she is not smoking  Using tudorza and advair      Per my last note:  (R45.1) Agitation  (primary encounter diagnosis)  Comment: concerning visit w/ agitation, pressured speech; no flight of ideas; unsure if this is her being manic or related to substance use; pt left despite my request to obtain a UDS; we cancelled her T#3 prescription til she returns for UDS  Plan: CANCELED: Urine Drugs of Abuse Screen Panel 13          (H65.02) Non-recurrent acute serous otitis media of left ear  Comment: will treat for acute otitis; precautions given  Plan: amoxicillin-clavulanate (AUGMENTIN) 875-125 MG tablet          (Z20.822) Suspected COVID-19 virus infection  Comment: covid swab sent; I explained to patient that I was very upset that she did not disclose symptoms prior to her visit; she was waiting in our waiting room for several hours per my staff and was roomed with no report of symptoms; she also was not wearing a mask properly and is unvaccinated  Plan: Symptomatic COVID-19 Virus (Coronavirus) by PCR          (S59.902D) Elbow injury, left, subsequent encounter  Comment: see above; likely contusion; unable to examine properly due to pt agitation; pt is restricted to me so she was unable to fill script by Dr Morales from 8/13/2021 visit. Supposedly she was waiting all afternoon for me to fill her script. She was added to my schedule at 3:45 pm in 4 pm slot with no opportunity for me to chart review; I did review  and it was appropriate; I am willing to fill script once she leaves a urine sample  Plan: acetaminophen-codeine (TYLENOL #3) 300-30 MG tablet          (N18.31) Stage 3a chronic kidney disease  Comment:   Plan: unable to prescribe nsaids     (J42) Chronic bronchitis, unspecified chronic bronchitis type (H)  Comment: short course for wheezing; this may help her elbow pain as well   Plan: predniSONE (DELTASONE) 50 MG tablet           Follow-up can be arranged once covid swab is negative             Past Medical History:   Diagnosis Date     Alcohol withdrawal seizure (H) 2017     Anxiety      Arthritis      Asthma      Benzodiazepine dependence (H)      CHF (congestive heart failure) (H)      Chronic obstructive pulmonary disease, unspecified COPD type (H) 2017     COPD (chronic obstructive pulmonary disease) (H)      COPD (chronic obstructive pulmonary disease) (H)      Depressive disorder      Epileptic seizure (H)      Hip fracture (H) 2018    left     Hypertension      Pneumonia      Severe alcohol dependence (H)      Shortness of breath      Stroke (H)       Past Surgical History:   Procedure Laterality Date     ABDOMEN SURGERY       ARTHROPLASTY SHOULDER Bilateral      C/SECTION, LOW TRANSVERSE        SECTION       FRACTURE SURGERY       HYSTERECTOMY       HYSTERECTOMY       INGUINAL HERNIA REPAIR Right 2021    Procedure: HERNIORRHAPHY, INGUINAL, OPEN;  Surgeon: Kj Phillips MD;  [facility];  Service: General     JOINT REPLACEMENT, HIP RT/LT Left 2017     SHOULDER SURGERY Bilateral      TOTAL HIP ARTHROPLASTY Left       Current Outpatient Medications   Medication Sig Dispense Refill     acetaminophen (TYLENOL) 500 MG tablet Take 2 tablets (1,000 mg) by mouth 3 times daily as needed for mild pain 100 tablet 1     acetaminophen-codeine (TYLENOL #3) 300-30 MG tablet Take 1 tablet by mouth every 6 hours as needed for severe pain 20 tablet 0     aclidinium (TUDORZA PRESSAIR) 400 MCG/ACT inhaler Inhale 1 puff into the lungs 2 times daily 1 each 3     albuterol (PROAIR HFA/PROVENTIL HFA/VENTOLIN HFA) 108 (90 Base) MCG/ACT inhaler Inhale 2 puffs into the lungs every 6 hours as needed for shortness of breath / dyspnea or wheezing 8.5 g 11     amoxicillin-clavulanate (AUGMENTIN) 875-125 MG tablet Take 1 tablet by mouth 2 times daily for 10 days 20 tablet 0     bacitracin 500 UNIT/GM external  ointment Apply topically 2 times daily 113 g 1     cetirizine (ZYRTEC) 10 MG tablet Take 1 tablet (10 mg) by mouth daily as needed for allergies 30 tablet 3     clotrimazole-betamethasone (LOTRISONE) 1-0.05 % external lotion Apply topically 2 times daily Apply to lips, continue until healed for 3 days 30 mL 1     divalproex sodium delayed-release (DEPAKOTE) 250 MG DR tablet Take 1 tablet (250 mg) by mouth 2 times daily 60 tablet 11     fluticasone (FLONASE) 50 MCG/ACT nasal spray Spray 2 sprays in nostril daily 9.9 mL 11     fluticasone-salmeterol (ADVAIR-HFA) 230-21 MCG/ACT inhaler Inhale 2 puffs into the lungs 2 times daily 12 g 3     gabapentin (NEURONTIN) 300 MG capsule Take 2 capsules (600 mg) by mouth 3 times daily 180 capsule 3     ipratropium - albuterol 0.5 mg/2.5 mg/3 mL (DUONEB) 0.5-2.5 (3) MG/3ML neb solution NEBULIZE 1 VIAL BY MOUTH FOUR TIMES DAILY 90 mL 11     lisinopril (ZESTRIL) 10 MG tablet Take 1 tablet (10 mg) by mouth daily 30 tablet 0     Nebulizers ([employer] AEROSOL DELIVERY SYSTEM) [facility] USE WITH NEBULIZER MEDS       omeprazole (PRILOSEC) 20 MG DR capsule Take 1 capsule (20 mg) by mouth 2 times daily 60 capsule 11     ondansetron (ZOFRAN-ODT) 4 MG ODT tab Take 1 tablet (4 mg) by mouth every 8 hours as needed for nausea 20 tablet 11     order for DME DME - pt needs nebulizer tubing 1 Device 0     polyethylene glycol (MIRALAX) 17 GM/Dose powder Take 17 g (1 capful) by mouth daily 255 g 1     polyethylene glycol (MIRALAX) 17 GM/Dose powder Take 17 g (1 capful) by mouth daily as needed for constipation 507 g 1     QUEtiapine (SEROQUEL) 100 MG tablet Take 1 tablet (100 mg) by mouth 2 times daily as needed (anxiety or panic attacks) 60 tablet 11     QUEtiapine (SEROQUEL) 200 MG tablet Take 1 tablet (200 mg) by mouth At Bedtime 30 tablet 11     Respiratory Therapy Supplies (NEBULIZER/PEDIATRIC MASK) KIT kit Nebulizer device, mask, and tubing to be used to deliver nebulized medications. 1 kit 0      tiZANidine (ZANAFLEX) 4 MG tablet Take 2 tablets (8 mg) by mouth 3 times daily as needed for muscle spasms 240 tablet 11     triamcinolone (KENALOG) 0.1 % external cream Apply topically 2 times daily To hands 80 g 3     JOVANY-GEST ANTACID 500 MG chewable tablet  (Patient not taking: Reported on 8/25/2021)       calcium carbonate 1250 (500 Ca) MG CHEW Take 1,000 mg by mouth (Patient not taking: Reported on 8/25/2021)       famotidine (PEPCID) 20 MG tablet Take 1 tablet (20 mg) by mouth 2 times daily (Patient not taking: Reported on 8/25/2021) 60 tablet 11     hydrocortisone 2.5 % ointment Apply topically 2 times daily (Patient not taking: Reported on 8/25/2021) 20 g 0     hydrOXYzine (ATARAX) 25 MG tablet Take 1 tablet (25 mg) by mouth every 8 hours as needed for anxiety (Patient not taking: Reported on 8/25/2021) 30 tablet 4     Melatonin 10 MG TABS tablet Take 1 tablet (10 mg) by mouth daily (with dinner) , additional tablet as needed for late night awakenings (Patient not taking: Reported on 8/25/2021) 90 tablet 1     montelukast (SINGULAIR) 10 MG tablet Take 1 tablet (10 mg) by mouth At Bedtime (Patient not taking: Reported on 8/25/2021) 30 tablet 4     polyvinyl alcohol (LIQUIFILM TEARS) 1.4 % ophthalmic solution Place 1 drop into both eyes as needed for dry eyes (Patient not taking: Reported on 8/25/2021)       spacer (OPTICHAMBER FAZAL) holding chamber Use with HFA inhalers (Patient not taking: Reported on 8/25/2021) 1 each 0     traZODone (DESYREL) 50 MG tablet Take 1 tablet (50 mg) by mouth At Bedtime (Patient not taking: Reported on 8/25/2021) 30 tablet 11     vitamin B1 (THIAMINE) 100 MG tablet Take 200 mg by mouth 3 times daily (Patient not taking: Reported on 8/25/2021)        Allergies   Allergen Reactions     Wellbutrin [Bupropion]      Stopped due to history of seizures        ROS:   Gen- no weight change, no fevers/chills   Head/ Eyes- no blurred vision, no headaches   ENT- + cough, no congestion, +  URI symptoms   Cardiac - no palpitations, no chest pain   Respiratory - + shortness of breath , + wheezing   GI - no nausea, no vomiting, no diarrhea, no constipation   Neuro - see HPI  Remainder of ROS negative.     Exam:   BP (!) 133/92 (BP Location: Right arm, Patient Position: Sitting, Cuff Size: Adult Regular)   Pulse 90   Temp 98  F (36.7  C) (Oral)   Resp 20   Ht 1.524 m (5')   Wt 47.3 kg (104 lb 4 oz)   SpO2 97%   BMI 20.36 kg/m     Alert and oriented x 3; No acute distress   Extrem: no clubbing/cyanosis/edema   MSK: see below   Derm: no rashes     Neck:   ROM: limited in all planes   Tenderness: Bony - Yes Paraspinal: Yes; Muscular: Yes; Scapula: No   Sensation: intact in bilateral upper extremities; subjective dec in sensation in L arm   Strength: Biceps-4+/5; Triceps- 5/5; wrist flexion 3/5; Extension 5/5; finger abduction- 5/5.   Neuro/ Maneuvers: Negative Spurling bilaterally. Negative Caraballo's bilaterally. DTR's 2+.       DME (Durable Medical Equipment) Orders and Documentation  Orders Placed This Encounter   Procedures     Tens Unit/Supplies Order      The patient was assessed and it was determined the patient is in need of the following listed DME Supplies/Equipment. Please complete supporting documentation below to demonstrate medical necessity.      TENS Unit/Supplies Documentation  The patient needs a TENS unit for the treatment of: Chronic Pain Other than Low Back Pain   TENS Unit for Chronic Pain Other than Low Back Pain  Has pain been present for at least three months? yes  Other appropriate treatment modalities have been tried and failed?  yes  Patient used TENS for a trial basis for a minimum of 30 days? yes  Is the patient is likely to derive significant therapeutic benefit from continuous use of the unit over a long period of time? yes

## 2021-08-26 ENCOUNTER — DOCUMENTATION ONLY (OUTPATIENT)
Dept: PSYCHOLOGY | Facility: CLINIC | Age: 61
End: 2021-08-26

## 2021-08-26 NOTE — PROGRESS NOTES
Patient with poorly controlled bipolar disorder. Currently in need of psychiatry and psychotherapy. Would be best to connect her with a location that offers both medication management and psychotherapy so they can work together.    [facility] [place]- virtual or telephone care only  [address]  [address]  [place] [address]  [phone] Phone  [phone] Fax  Hours:  Monday - Friday 8:30 - 5:00pm  8:00am - 5:00pm Saturday    [facility]- some providers offering in person care  [address]  [address]  [place] [address]  [phone] Phone  [phone] Fax  M-F 7:30AM-7PM  Saturday 8AM-2PM    [facility] ([facility])- some providers offering in person care  [address] [place] [address] [address],   [place] [address]  [phone] Phone  [phone] Fax  M-Th: 8:30-5pm  F: 8:30-4:30pm    [employer]   [address] [place] [address], [address]  [place] [address]  [phone]    [facility] [facility]   [address] [place] [address], [address]  [place], [place] [address] [phone]    Patient Demographics    Patient Name   Lisa Scott MRN   [id number] Legal Sex   Female    1960 [address]   [id number] Address   [address]   [place] [address] Phone   [phone] (Home) *Preferred*   [phone] ([place])   Primary Visit Coverage    Payer Plan Sponsor Code Group Number Group Name Payer Address Payer Phone   [facility] [id number] [phone]        [place] [address]    Primary Visit Coverage Subscriber    Subscriber ID Subscriber Name Subscriber SSN Subscriber Address   [id number] LISA SCOTT [id number]      [place] [address]   Order Information    Date Department Ordering/Authorizing   2021 [facility] [place] Kerwin Cookl, MD   Order Providers    Authorizing Provider Encounter Provider    Nikolay Cook MD Kapur, Rahul, MD   Future Order Information    Expected Expires      8/25/2021 (Approximate) 8/25/2022             Associated Diagnoses    Bipolar disorder, current episode mixed, moderate (H)       Comments     needed: No   Language: English     Pt w/ known bipolar/ depression/anxiety -> has not seen psychiatry or therapist x 3-4 months; at last visit I had concerns she was manic; intermittent outbursts and tearful past two visits; Not sleeping well. Currently on Seroquel, Depakote, Trazodone;          Order Questions    Question Answer Comment   Reason for Referral: Bipolar     Depression     Anxiety    Adult or Child/Adolescent: Adult    Type of Referral (Indicate all that apply): Psychiatry - for diagnosis and medication management     Psychotherapy - for diagnosis and non-pharmacological treatment    Currently receiving mental health services (if 'Yes', use free jessica box - what services and why today's referral?) No    Previous psych hospitalization: No

## 2021-08-27 ENCOUNTER — TELEPHONE (OUTPATIENT)
Dept: FAMILY MEDICINE | Facility: CLINIC | Age: 61
End: 2021-08-27

## 2021-08-27 NOTE — TELEPHONE ENCOUNTER
SW left voicemail for patient confirming that referral for psychotherapy and psychiatry was determined as plan based on 8/25/21 appointment with Dr. Cook. SW stated that if patient is interested, patient can call  for options and contact information to schedule initial appointments. SW left  contact information in voicemail this date.    SHELDON LOERA, ANILA, [facility]

## 2021-08-30 ENCOUNTER — TELEPHONE (OUTPATIENT)
Dept: FAMILY MEDICINE | Facility: CLINIC | Age: 61
End: 2021-08-30

## 2021-08-30 DIAGNOSIS — M54.12 CERVICAL RADICULOPATHY: ICD-10-CM

## 2021-08-30 NOTE — TELEPHONE ENCOUNTER
[facility] phone call message- medication clarification/question:    Full Medication Name: acetaminophen-codeine (TYLENOL #3) 300-30 MG tablet    Dose: Sig - Route: Take 1 tablet by mouth 2 times daily as needed for severe pain - Oral    Question: PT stated that her insurance would no longer cover this medication and she's wondering if  could prescribed her something else similar to it.    Pharmacy confirmed as    [id number] - [place], [place] - [address] AT [facility]: Yes    OK to leave a message on voice mail? Yes    Primary language: English      needed? No    Call taken on August 30, 2021 at 3:55 PM by Bekah Henning

## 2021-08-31 ENCOUNTER — OFFICE VISIT (OUTPATIENT)
Dept: NEUROLOGY | Facility: CLINIC | Age: 61
End: 2021-08-31
Attending: FAMILY MEDICINE
Payer: COMMERCIAL

## 2021-08-31 VITALS
HEIGHT: 60 IN | SYSTOLIC BLOOD PRESSURE: 132 MMHG | BODY MASS INDEX: 20.22 KG/M2 | WEIGHT: 103 LBS | DIASTOLIC BLOOD PRESSURE: 77 MMHG | HEART RATE: 83 BPM

## 2021-08-31 DIAGNOSIS — G62.9 NEUROPATHY: ICD-10-CM

## 2021-08-31 PROBLEM — G40.309 IDIOPATHIC GENERALIZED EPILEPSY (H): Status: RESOLVED | Noted: 2018-11-16 | Resolved: 2021-08-31

## 2021-08-31 PROCEDURE — 99205 OFFICE O/P NEW HI 60 MIN: CPT | Performed by: PSYCHIATRY & NEUROLOGY

## 2021-08-31 ASSESSMENT — MIFFLIN-ST. JEOR: SCORE: 963.7
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
## 2021-08-31 NOTE — NURSING NOTE
Chief Complaint   Patient presents with     Numbness     BUE/BLE     Beverley Cornell CMA on 8/31/2021 at 9:25 AM
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
## 2021-09-03 ENCOUNTER — TELEPHONE (OUTPATIENT)
Dept: FAMILY MEDICINE | Facility: CLINIC | Age: 61
End: 2021-09-03

## 2021-09-03 NOTE — TELEPHONE ENCOUNTER
I called to check up on the pt, but the pt did not answer her phone. I left a vm for the pt to give me a call back. I called the pt on 09/01/2021, 09/02/2021, and today. I have not gotten a hold of the pt, or heard from the pt.

## 2021-09-08 ENCOUNTER — LAB (OUTPATIENT)
Dept: LAB | Facility: CLINIC | Age: 61
End: 2021-09-08

## 2021-09-08 DIAGNOSIS — G62.9 NEUROPATHY: ICD-10-CM

## 2021-09-08 DIAGNOSIS — M54.12 CERVICAL RADICULOPATHY: ICD-10-CM

## 2021-09-08 LAB
ERYTHROCYTE [SEDIMENTATION RATE] IN BLOOD BY WESTERGREN METHOD: 5 MM/HR (ref 0–20)
VIT B12 SERPL-MCNC: 808 PG/ML (ref 213–816)

## 2021-09-08 PROCEDURE — 82607 VITAMIN B-12: CPT

## 2021-09-08 PROCEDURE — 85652 RBC SED RATE AUTOMATED: CPT

## 2021-09-08 PROCEDURE — 36415 COLL VENOUS BLD VENIPUNCTURE: CPT

## 2021-09-08 PROCEDURE — 86038 ANTINUCLEAR ANTIBODIES: CPT

## 2021-09-08 NOTE — TELEPHONE ENCOUNTER
Pt had appointment with me today at 11:20 and was a no-show.  She will need an office visit to discuss pain medication.    Nikolay Cook MD  September 8, 2021  12:39 PM

## 2021-09-10 LAB — ANA SER QL IF: NEGATIVE

## 2021-09-14 NOTE — PROGRESS NOTES
"ASSESSMENT/PLAN:     (K13.0) Angular cheilitis  (primary encounter diagnosis)  Comment: topical tx sent for chelitis  Plan: mupirocin (BACTROBAN) 2 % external ointment,         Miconazole-Zinc Oxide-Petrolat 0.25-15-81.35 % OINT            (M16.10) Arthritis of hip  Comment: med regimen adjusted  Plan: celecoxib (CELEBREX) 200 MG capsule,         acetaminophen-codeine (TYLENOL #3) 300-30 MG tablet            (M25.512,  G89.29) Chronic left shoulder pain  Comment: see above  Plan: celecoxib (CELEBREX) 200 MG capsule,         acetaminophen-codeine (TYLENOL #3) 300-30 MG tablet            (M54.12) Cervical radiculopathy  Comment:   Plan: awaiting c-spine MRI; needs to schedule    Nikolay Cook MD  September 15, 2021  4:43 PM          Pt is a 61 year old female last seen on 8/25/2021 here today for:      1) paresthesias - has not scheduled c-spine MRI yet; was seen by Dr Middleton of neurology; neuropathy labs were all normal  Has EMG pending   He thinks that her neuropathy is from previous EtOH use  Takes gabapentin for neuropathy which \"somewhat helps\"      2) Pain - T#3 not covered by her insurance (?)  Takes for hip/ shoulder pain       3) lesion on edges of lips  Wears dentures that don't fit too well  Uses inhaled steroid (advair)       Per Dr Middleton (neurology) note on 8/31/2021:  Visit Diagnosis  1.          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 Although her history and part of exam raises the possibility of peripheral neuropathy her exam is somewhat inconsistent likely due to her underlying mental health issues. I have scheduled her for EMG of upper and lower extremity and if it confirms the diagnosis, I will order lab work for common causes of neuropathy. I do suspect that this is due to her past use of " alcohol. Additionally MRI of cervical spine was ordered by Dr. Owens and I have encouraged patient to get that scheduled. Follow-up will be the day she gets EMG     Per my last visit:     (M54.12) Cervical radiculopathy  (primary encounter diagnosis)  Comment: will check c-spine MRI given weakness/ unsteadiness to r/o myelopathy; labwork for neuropathy  Plan: XR Cervical Spine 2/3 Views, MRI CERVICAL SPINE        W/O CONTRAST, acetaminophen-codeine (TYLENOL         #3) 300-30 MG tablet           (G62.9) Neuropathy  Comment: see above; per pt request referral placed to neurology; labwork sent for neuopathy; pt notes previous success w/ tens unit so will order  Plan: Adult Neurology Referral, Vitamin B12, TSH with free T4 reflex, Erythrocyte sedimentation rate         auto, Anti Nuclear Blanca IgG by IFA with Reflex, Tens Unit/Supplies Order          (F31.62) Bipolar disorder, current episode mixed, moderate (H)  Comment: 2nd visit in a row where I am concerned about her bipolar disorder; needs to see a new psychiatrist and therapist asap  Plan: [facility] - [facility] REFERRAL  -          (S52.773K) Elbow injury, left, subsequent encounter  Comment:   Plan: did not mention at all today despite debilitating pain last week      (Z23) High priority for 2019-nCoV vaccine  Comment: covid vaccine today!  Plan: COVID-19,PF,MODERNA          (J42) Chronic bronchitis, unspecified chronic bronchitis type (H)  Comment:   Plan: otitis resolved; still notes chest congestion and cough; unclear why she is out of Abx given 10-day course provided 6 days ago        Past Medical History:   Diagnosis Date     Alcohol withdrawal seizure (H) 02/25/2017     Anxiety      Arthritis      Asthma      Benzodiazepine dependence (H)      CHF (congestive heart failure) (H)      Chronic obstructive pulmonary disease, unspecified COPD type (H) 2/23/2017     COPD (chronic obstructive pulmonary disease) (H)      COPD (chronic obstructive pulmonary  disease) (H)      Depressive disorder      Epileptic seizure (H)      Hip fracture (H) 2018    left     Hypertension      Pneumonia      Severe alcohol dependence (H)      Shortness of breath      Stroke (H)       Past Surgical History:   Procedure Laterality Date     ABDOMEN SURGERY       ARTHROPLASTY SHOULDER Bilateral      C/SECTION, LOW TRANSVERSE        SECTION       FRACTURE SURGERY       HYSTERECTOMY       HYSTERECTOMY       INGUINAL HERNIA REPAIR Right 2021    Procedure: HERNIORRHAPHY, INGUINAL, OPEN;  Surgeon: Kj Phillips MD;  [facility]: [facility];  Service: General     JOINT REPLACEMENT, HIP RT/LT Left 2017     SHOULDER SURGERY Bilateral      TOTAL HIP ARTHROPLASTY Left       Current Outpatient Medications   Medication Sig Dispense Refill     celecoxib (CELEBREX) 200 MG capsule Take 1 capsule (200 mg) by mouth daily Take with food 30 capsule 3     Miconazole-Zinc Oxide-Petrolat 0.25-15-81.35 % OINT Externally apply topically 2 times daily 50 g 1     mupirocin (BACTROBAN) 2 % external ointment Apply topically 2 times daily 15 g 1     acetaminophen (TYLENOL) 500 MG tablet Take 2 tablets (1,000 mg) by mouth 3 times daily as needed for mild pain 100 tablet 1     acetaminophen-codeine (TYLENOL #3) 300-30 MG tablet Take 1 tablet by mouth 2 times daily as needed for severe pain 30 tablet 0     aclidinium (TUDORZA PRESSAIR) 400 MCG/ACT inhaler Inhale 1 puff into the lungs 2 times daily 1 each 11     albuterol (PROAIR HFA/PROVENTIL HFA/VENTOLIN HFA) 108 (90 Base) MCG/ACT inhaler Inhale 2 puffs into the lungs every 6 hours as needed for shortness of breath / dyspnea or wheezing 8.5 g 11     bacitracin 500 UNIT/GM external ointment Apply topically 2 times daily 113 g 1     cetirizine (ZYRTEC) 10 MG tablet Take 1 tablet (10 mg) by mouth daily as needed for allergies 30 tablet 3     clotrimazole-betamethasone (LOTRISONE) 1-0.05 % external lotion Apply topically 2 times daily Apply to  lips, continue until healed for 3 days 30 mL 1     divalproex sodium delayed-release (DEPAKOTE) 250 MG DR tablet Take 1 tablet (250 mg) by mouth 2 times daily 60 tablet 11     fluticasone (FLONASE) 50 MCG/ACT nasal spray Spray 2 sprays in nostril daily 9.9 mL 11     fluticasone-salmeterol (ADVAIR-HFA) 230-21 MCG/ACT inhaler Inhale 2 puffs into the lungs 2 times daily 12 g 3     gabapentin (NEURONTIN) 300 MG capsule Take 2 capsules (600 mg) by mouth 3 times daily 180 capsule 3     ipratropium - albuterol 0.5 mg/2.5 mg/3 mL (DUONEB) 0.5-2.5 (3) MG/3ML neb solution NEBULIZE 1 VIAL BY MOUTH FOUR TIMES DAILY 90 mL 11     lisinopril (ZESTRIL) 10 MG tablet Take 1 tablet (10 mg) by mouth daily 30 tablet 0     Nebulizers ([employer] AEROSOL DELIVERY SYSTEM) MISC USE WITH NEBULIZER MEDS       omeprazole (PRILOSEC) 20 MG DR capsule Take 1 capsule (20 mg) by mouth 2 times daily 60 capsule 11     ondansetron (ZOFRAN-ODT) 4 MG ODT tab Take 1 tablet (4 mg) by mouth every 8 hours as needed for nausea 20 tablet 11     order for DME DME - pt needs nebulizer tubing 1 Device 0     polyethylene glycol (MIRALAX) 17 GM/Dose powder Take 17 g (1 capful) by mouth daily 255 g 1     polyethylene glycol (MIRALAX) 17 GM/Dose powder Take 17 g (1 capful) by mouth daily as needed for constipation 507 g 1     QUEtiapine (SEROQUEL) 100 MG tablet Take 1 tablet (100 mg) by mouth 2 times daily as needed (anxiety or panic attacks) 60 tablet 11     QUEtiapine (SEROQUEL) 200 MG tablet Take 1 tablet (200 mg) by mouth At Bedtime 30 tablet 11     Respiratory Therapy Supplies (NEBULIZER/PEDIATRIC MASK) KIT kit Nebulizer device, mask, and tubing to be used to deliver nebulized medications. 1 kit 0     tiZANidine (ZANAFLEX) 4 MG tablet Take 2 tablets (8 mg) by mouth 3 times daily as needed for muscle spasms 240 tablet 11     traZODone (DESYREL) 50 MG tablet Take 1 tablet (50 mg) by mouth At Bedtime 30 tablet 11     triamcinolone (KENALOG) 0.1 % external cream Apply  "topically 2 times daily To hands 80 g 3     vitamin B1 (THIAMINE) 100 MG tablet Take 200 mg by mouth 3 times daily         Allergies   Allergen Reactions     Wellbutrin [Bupropion]      Stopped due to history of seizures        ROS:   Gen- no weight change, no fevers/chills   ENT- see HPI   Cardiac - no palpitations, no chest pain   Respiratory - no shortness of breath , no wheezing   GI - no nausea, no vomiting, no diarrhea, no constipation   Remainder of ROS negative.     Exam:   /73   Pulse 102   Temp 97.3  F (36.3  C) (Oral)   Resp 18   Ht 1.54 m (5' 0.63\")   Wt 47.2 kg (104 lb)   SpO2 99%   BMI 19.89 kg/m     Alert and oriented x 3; No acute distress   HEENT: poor dentition; +excoriations at angles of lips consistent w/ angular chelitis  Neck: no masses, no lymphadenopathy   Resp: clear to auscultation bilaterally, no wheezing/ronchi   Neuro: no focal deficits   Derm: see images           "

## 2021-09-15 ENCOUNTER — OFFICE VISIT (OUTPATIENT)
Dept: FAMILY MEDICINE | Facility: CLINIC | Age: 61
End: 2021-09-15
Payer: COMMERCIAL

## 2021-09-15 VITALS
HEART RATE: 102 BPM | SYSTOLIC BLOOD PRESSURE: 111 MMHG | RESPIRATION RATE: 18 BRPM | TEMPERATURE: 97.3 F | WEIGHT: 104 LBS | HEIGHT: 61 IN | DIASTOLIC BLOOD PRESSURE: 73 MMHG | BODY MASS INDEX: 19.63 KG/M2 | OXYGEN SATURATION: 99 %

## 2021-09-15 DIAGNOSIS — M16.10 ARTHRITIS OF HIP: ICD-10-CM

## 2021-09-15 DIAGNOSIS — M25.512 CHRONIC LEFT SHOULDER PAIN: ICD-10-CM

## 2021-09-15 DIAGNOSIS — M54.12 CERVICAL RADICULOPATHY: ICD-10-CM

## 2021-09-15 DIAGNOSIS — K13.0 ANGULAR CHEILITIS: Primary | ICD-10-CM

## 2021-09-15 DIAGNOSIS — G89.29 CHRONIC LEFT SHOULDER PAIN: ICD-10-CM

## 2021-09-15 PROCEDURE — 99214 OFFICE O/P EST MOD 30 MIN: CPT | Mod: GC | Performed by: FAMILY MEDICINE

## 2021-09-15 RX ORDER — MUPIROCIN 20 MG/G
OINTMENT TOPICAL 2 TIMES DAILY
Qty: 15 G | Refills: 1 | Status: SHIPPED | OUTPATIENT
Start: 2021-09-15 | End: 2021-10-05

## 2021-09-15 RX ORDER — MICONAZOLE NITRATE, ZINC OXIDE, WHITE PETROLATUM 2.5; 150; 813.5 MG/G; MG/G; MG/G
OINTMENT TOPICAL 2 TIMES DAILY
Qty: 50 G | Refills: 1 | Status: SHIPPED | OUTPATIENT
Start: 2021-09-15 | End: 2021-10-05

## 2021-09-15 RX ORDER — CELECOXIB 200 MG/1
200 CAPSULE ORAL DAILY
Qty: 30 CAPSULE | Refills: 3 | Status: SHIPPED | OUTPATIENT
Start: 2021-09-15 | End: 2021-10-05

## 2021-09-15 ASSESSMENT — MIFFLIN-ST. JEOR: SCORE: 978.24

## 2021-09-28 DIAGNOSIS — S46.002D INJURY OF LEFT ROTATOR CUFF, SUBSEQUENT ENCOUNTER: ICD-10-CM

## 2021-09-28 DIAGNOSIS — S59.902D ELBOW INJURY, LEFT, SUBSEQUENT ENCOUNTER: ICD-10-CM

## 2021-09-28 RX ORDER — ACETAMINOPHEN 500 MG
1000 TABLET ORAL 3 TIMES DAILY PRN
Qty: 100 TABLET | Refills: 11 | Status: SHIPPED | OUTPATIENT
Start: 2021-09-28 | End: 2021-10-15

## 2021-10-01 ENCOUNTER — TELEPHONE (OUTPATIENT)
Dept: FAMILY MEDICINE | Facility: CLINIC | Age: 61
End: 2021-10-01

## 2021-10-01 NOTE — TELEPHONE ENCOUNTER
Social Work Note:    Data and Intervention: Talked to patient, she wants to establish care at this clinic. She has attended [facility] for over 10 years and is now unhappy with her doctor, and wants a change. Her PCP recently let her go 5 days without her lisinopril blood pressure medication, and she plans to file a malpractice suit for this.     Would like to switch to:   This Clinic, [place], [address] [place] [address], [id number]  [facility] on [address] (Ph: [phone])    Would prefer a female doctor. Is scheduled to see Dr. Castanon on Tues 10/5/21 at 10:40am. 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 She was happy with this plan.     Called [employer] restricted recipient line ([phone]). Was on hold for 24 minutes before reaching representative. Informed that this would have to go through her , Zana NOLEN ([phone]). Called Piotr and left JADE.     Assessment and Plan:   4:00pm: No answer form Zana. Called patient to let her know. Left JADE and gave Zana's info in case patient wants to reach out to him directly.     ANILA Vaughn

## 2021-10-04 ENCOUNTER — TELEPHONE (OUTPATIENT)
Dept: FAMILY MEDICINE | Facility: CLINIC | Age: 61
End: 2021-10-04

## 2021-10-04 NOTE — TELEPHONE ENCOUNTER
Retun call from Piotr restricted worker at [facility] ([phone]) regarding switching patient over to this clinic from her previous PCP clinic at [facility]. Informed that Dr. Patel is not credentialed with [facility]. Requesting to switch her to different faculty provider.     Called back and left Vm for Piotr.     ANILA Vaughn

## 2021-10-05 ENCOUNTER — OFFICE VISIT (OUTPATIENT)
Dept: FAMILY MEDICINE | Facility: CLINIC | Age: 61
End: 2021-10-05
Payer: COMMERCIAL

## 2021-10-05 VITALS
WEIGHT: 103 LBS | TEMPERATURE: 98.1 F | OXYGEN SATURATION: 100 % | SYSTOLIC BLOOD PRESSURE: 148 MMHG | DIASTOLIC BLOOD PRESSURE: 98 MMHG | HEIGHT: 61 IN | RESPIRATION RATE: 24 BRPM | BODY MASS INDEX: 19.45 KG/M2 | HEART RATE: 71 BPM

## 2021-10-05 DIAGNOSIS — K59.09 OTHER CONSTIPATION: ICD-10-CM

## 2021-10-05 DIAGNOSIS — J44.1 COPD EXACERBATION (H): ICD-10-CM

## 2021-10-05 DIAGNOSIS — Z00.00 HEALTHCARE MAINTENANCE: ICD-10-CM

## 2021-10-05 DIAGNOSIS — J42 CHRONIC BRONCHITIS, UNSPECIFIED CHRONIC BRONCHITIS TYPE (H): ICD-10-CM

## 2021-10-05 DIAGNOSIS — F31.73 BIPOLAR DISORDER, IN PARTIAL REMISSION, MOST RECENT EPISODE MANIC (H): Primary | ICD-10-CM

## 2021-10-05 DIAGNOSIS — G89.29 CHRONIC LEFT SHOULDER PAIN: ICD-10-CM

## 2021-10-05 DIAGNOSIS — I10 BENIGN ESSENTIAL HYPERTENSION: ICD-10-CM

## 2021-10-05 DIAGNOSIS — M16.10 ARTHRITIS OF HIP: ICD-10-CM

## 2021-10-05 DIAGNOSIS — G40.909 SEIZURE DISORDER (H): ICD-10-CM

## 2021-10-05 DIAGNOSIS — H61.22 IMPACTED CERUMEN OF LEFT EAR: ICD-10-CM

## 2021-10-05 DIAGNOSIS — M25.512 CHRONIC LEFT SHOULDER PAIN: ICD-10-CM

## 2021-10-05 PROBLEM — F10.10 ALCOHOL ABUSE: Status: RESOLVED | Noted: 2018-01-07 | Resolved: 2021-10-05

## 2021-10-05 PROCEDURE — 99215 OFFICE O/P EST HI 40 MIN: CPT | Performed by: STUDENT IN AN ORGANIZED HEALTH CARE EDUCATION/TRAINING PROGRAM

## 2021-10-05 RX ORDER — CELECOXIB 200 MG/1
200 CAPSULE ORAL DAILY
Qty: 30 CAPSULE | Refills: 3 | Status: SHIPPED | OUTPATIENT
Start: 2021-10-05 | End: 2022-02-09

## 2021-10-05 RX ORDER — DIVALPROEX SODIUM 250 MG/1
250 TABLET, DELAYED RELEASE ORAL 2 TIMES DAILY
Qty: 180 TABLET | Refills: 3 | Status: SHIPPED | OUTPATIENT
Start: 2021-10-05 | End: 2022-09-08

## 2021-10-05 RX ORDER — DOXYCYCLINE 100 MG/1
100 CAPSULE ORAL 2 TIMES DAILY
Qty: 14 CAPSULE | Refills: 0 | Status: SHIPPED | OUTPATIENT
Start: 2021-10-05 | End: 2021-10-12

## 2021-10-05 RX ORDER — POLYETHYLENE GLYCOL 3350 17 G/17G
1 POWDER, FOR SOLUTION ORAL DAILY PRN
Qty: 507 G | Refills: 1 | Status: SHIPPED | OUTPATIENT
Start: 2021-10-05 | End: 2022-05-17

## 2021-10-05 RX ORDER — LISINOPRIL 10 MG/1
10 TABLET ORAL DAILY
Qty: 90 TABLET | Refills: 3 | Status: SHIPPED | OUTPATIENT
Start: 2021-10-05 | End: 2021-11-16

## 2021-10-05 RX ORDER — PREDNISONE 20 MG/1
40 TABLET ORAL DAILY
Qty: 6 TABLET | Refills: 0 | Status: SHIPPED | OUTPATIENT
Start: 2021-10-05 | End: 2021-10-08

## 2021-10-05 ASSESSMENT — MIFFLIN-ST. JEOR: SCORE: 973.7

## 2021-10-05 ASSESSMENT — PATIENT HEALTH QUESTIONNAIRE - PHQ9: SUM OF ALL RESPONSES TO PHQ QUESTIONS 1-9: 13

## 2021-10-05 NOTE — Clinical Note
Warren Martins, this pt was referred to neurology from [facility] for seizures and is unsure how to make an appointment. Can you assist with this?  Thank you! Mirian

## 2021-10-05 NOTE — Clinical Note
Warren Martins, This patient was referred to neurology by her prior PCP for seizures, and is unsure of how to follow up to get an appointment. She has since transferred clinics from [facility].  Could you help to facilitate this appt?  Thanks! Giselle
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
## 2021-10-05 NOTE — PATIENT INSTRUCTIONS
Please take prednisone 40mg daily for 3 days, with doxycycline 100mg twice daily for 7 days.    I have referred you for mammography and colon cancer screening (to do at home).    Please check your blood pressure daily resting at home and bring readings to me in clinic - to see if we need to adjust your blood pressure medication.    We will plan to check your labs in December.        Patient Education       Earwax, Home Treatment    Everyone produces earwax from the lining of the ear canal. It serves to lubricate and protect the ear. The wax that forms in the canal naturally moves toward the outside of the ear and falls out. Sometimes the ear canal may contain too much wax. This can cause a blockage and loss of hearing. Directions are given below for home treatment.  Home care  If your doctor has advised you to remove a wax blockage yourself, follow these directions:    Unless a medicine was prescribed, you may use an over-the-counter product made for clearing earwax. These contain carbamide peroxide. Lie down with the blocked ear facing upward. Apply one dropper full of medicine and wait a few minutes. Grasp the outer ear and wiggle it to help the solution enter the canal.    Lean over a sink or basin with the blocked ear facing downward. Use a bulb syringe filled with warm (not hot or cold) water to rinse the ear several times. Use gentle pressure only.    If you are having trouble draining the water out of your ear canal, put a few drops of rubbing alcohol (isopropyl alcohol) into the ear canal. This will help remove the remaining water.    Repeat this procedure once a day for up to three days, or until your hearing is back to normal. Do not use this treatment for more than three days in a row.  Don ts    Don t use cold water to rinse the ear. This will make you dizzy.    Don t perform this procedure if you have an ear infection.    Don t perform this procedure if you have a ruptured eardrum.    Don t use cotton  swabs, matches, hairpins, keys, or other objects to  clean  the ear canal. This can cause infection of the ear canal or rupture the eardrum. Because of their size and shape, cotton swabs can push earwax deeper into the ear canal instead of removing it.  Follow-up care  Follow up with your health care provider if you are not improving after three cleaning attempts, or as advised.  When to seek medical advice  Call your health care provider right away if any of these occur:    Worsening ear pain    Fever of 101 F (38.3 C) or higher, or as directed by your health care provider    Hearing does not return to normal after three days of treatment    Fluid drainage or bleeding from the ear canal    Swelling, redness, or tenderness of the outer ear    Headache, neck pain, or stiff neck    8868-4387 [employer]. [address] [place] [address], [place] [address]. All rights reserved. This information is not intended as a substitute for professional medical care. Always follow your healthcare professional's instructions.

## 2021-10-06 ENCOUNTER — DOCUMENTATION ONLY (OUTPATIENT)
Dept: PSYCHOLOGY | Facility: CLINIC | Age: 61
End: 2021-10-06
Payer: COMMERCIAL

## 2021-10-06 NOTE — PROGRESS NOTES
Behavioral Health Team,    Patient is being referred for mental health services by their provider, Dr. Patel.  Please advise if we are able to see patient for in house treatment or if a community option would be best.    Thank you,    Lakshmi Schofield  10/6/2021

## 2021-10-08 ENCOUNTER — TELEPHONE (OUTPATIENT)
Dept: FAMILY MEDICINE | Facility: CLINIC | Age: 61
End: 2021-10-08

## 2021-10-08 NOTE — TELEPHONE ENCOUNTER
I called to check up on the pt, but I got the pt vm. I left a vm for the pt to give me a call back. I tried calling the pt on 10/06/2021, 10/07/2021, and today. I have not gotten a hold of the pt or heard from her. This is the third non contact month, I will dis-enroll the pt from [facility].

## 2021-10-11 PROBLEM — M25.512 CHRONIC LEFT SHOULDER PAIN: Status: ACTIVE | Noted: 2021-10-11

## 2021-10-11 PROBLEM — G89.29 CHRONIC LEFT SHOULDER PAIN: Status: ACTIVE | Noted: 2021-10-11

## 2021-10-11 NOTE — PROGRESS NOTES
SW called patient, she is interested in getting started with a new therapist.     Went to [facility] previously but her previous PCP did not think her psychiatrist and therapist was not a good fit. Now that she has left that PCP, she would like to get connected to both therapy and psychiatry.     LRARY offered her to see Dr. Paul here at clinic, but patient prefers someone she can see for many years, not just 2 years as would be the case with Dr. Paul as she is a student.     Called [facility] ([phone]). No answer, elected to leave VM rather than waiting on hold.     [facility]  [address] [place] [address], [address]  [place] [address]  [phone]    Will wait for return call from [facility] to schedule patient with Dr. Le, psychiatrist, and to establish care with a psychologist.     Erika Carrington, YURIYSW

## 2021-10-14 ENCOUNTER — OFFICE VISIT (OUTPATIENT)
Dept: NEUROLOGY | Facility: CLINIC | Age: 61
End: 2021-10-14
Attending: PSYCHIATRY & NEUROLOGY
Payer: COMMERCIAL

## 2021-10-14 DIAGNOSIS — G62.9 NEUROPATHY: ICD-10-CM

## 2021-10-14 PROCEDURE — 95910 NRV CNDJ TEST 7-8 STUDIES: CPT | Performed by: PSYCHIATRY & NEUROLOGY

## 2021-10-14 NOTE — PROCEDURES
ELECTROMYOGRAPHY (EMG) REPORT       [facility] NEUROLOGY [place] [address] [place] [address]  Tel: [phone]  Fax: [phone]  [email or web address]     Lisa Scott DOB 1960, MRN [id number]  PCP: Mirian Patel  Date: 10/14/2021     Principal Diagnosis: Neuropathy     Height: 5 feet 1 inch  Reason for referral: Evaluate bilateral lowers. c/o pain, numbness in both legs > 1 year. h/o left hip replacement, both shoulder surgery.       Motor NCS      Nerve / Sites Lat Amp Dist Ash    ms mV cm m/s   R Peroneal - EDB      Ankle 6.46 1.3 8       Fib head 14.17 1.0 29 38      Pop fossa 16.93 0.9 10 36   L Peroneal - EDB      Ankle 4.64 3.9 8       Fib head 12.34 3.3 27 35      Pop fossa 14.95 3.2 10 38   R Tibial - AH      Ankle 4.95 4.5 8       Pop fossa 14.27 3.0 36 39   L Tibial - AH      Ankle 5.47 4.6 8       Pop fossa 15.52 4.2 36 36       F  Wave      Nerve Fmin    ms   R Tibial - AH 59.27   L Tibial - AH 62.19       Sensory NCS      Nerve / Sites Onset Lat Pk Lat Amp.2-3 Dist Ash    ms ms  V cm m/s   R Sural - Ankle (Calf)      Calf 2.97 3.85 11.1 14 47   L Sural - Ankle (Calf)      Calf 2.97 3.85 10.1 14 47   R Superficial peroneal - Ankle      Lat leg 2.55 3.18 6.7 12 47   L Superficial peroneal - Ankle      Lat leg 2.66 3.23 5.0 12 45       EMG Summary Table     Spontaneous MUAP Rcmt Note   Muscle Fib PSW Fasc IA # Amp Dur PPP Rate Type   R. Gluteus medius None None None N - - - - - Decr Effort     Test terminated per patient request.      Summary: Nerve conduction and EMG study of lower extremities shows:  1. Low amplitude bilateral peroneal and tibial compound motor action potentials with normal latencies and slow conduction velocities.  2. Delayed left tibial F latency  3. Normal right tibial F latency  4. Normal bilateral sural and superficial peroneal peak sensory latencies and sensory nerve conduction velocities.  5. Patient refused needle exam    Impression:    This is an incomplete exam as patient refused needle exam.  Nerve conduction studies suggests low amplitude borderline slowing of motor conduction that suggests length dependent predominantly motor polyneuropathy.  Clinical correlation is recommended.      Arnold Middleton MD  [facility]  (Formerly, [facility]      This note was dictated using voice recognition software.  Any grammatical or context distortions are unintentional and inherent to the software.

## 2021-10-14 NOTE — LETTER
10/14/2021         RE: Lisa Scott  [address]  [address]  [place] [address]        Dear Colleague,    Thank you for referring your patient, Lisa Scott, to the [facility] [place]. Please see a copy of my visit note below.    See procedure note for EMG report      Again, thank you for allowing me to participate in the care of your patient.        Sincerely,        Arnold Middleton MD

## 2021-10-15 ENCOUNTER — OFFICE VISIT (OUTPATIENT)
Dept: FAMILY MEDICINE | Facility: CLINIC | Age: 61
End: 2021-10-15
Payer: COMMERCIAL

## 2021-10-15 VITALS
OXYGEN SATURATION: 97 % | WEIGHT: 101.8 LBS | SYSTOLIC BLOOD PRESSURE: 94 MMHG | HEART RATE: 88 BPM | DIASTOLIC BLOOD PRESSURE: 62 MMHG | TEMPERATURE: 97.6 F | RESPIRATION RATE: 20 BRPM | BODY MASS INDEX: 19.47 KG/M2

## 2021-10-15 DIAGNOSIS — Z23 HIGH PRIORITY FOR 2019-NCOV VACCINE: Primary | ICD-10-CM

## 2021-10-15 DIAGNOSIS — H61.22 IMPACTED CERUMEN OF LEFT EAR: ICD-10-CM

## 2021-10-15 DIAGNOSIS — S46.002D INJURY OF LEFT ROTATOR CUFF, SUBSEQUENT ENCOUNTER: ICD-10-CM

## 2021-10-15 DIAGNOSIS — G62.1 ALCOHOL-INDUCED POLYNEUROPATHY (H): ICD-10-CM

## 2021-10-15 DIAGNOSIS — Z23 NEED FOR PROPHYLACTIC VACCINATION AND INOCULATION AGAINST INFLUENZA: ICD-10-CM

## 2021-10-15 DIAGNOSIS — J42 CHRONIC BRONCHITIS, UNSPECIFIED CHRONIC BRONCHITIS TYPE (H): ICD-10-CM

## 2021-10-15 DIAGNOSIS — M54.12 CERVICAL RADICULOPATHY: ICD-10-CM

## 2021-10-15 DIAGNOSIS — M54.2 CERVICALGIA: ICD-10-CM

## 2021-10-15 DIAGNOSIS — S59.902D ELBOW INJURY, LEFT, SUBSEQUENT ENCOUNTER: ICD-10-CM

## 2021-10-15 DIAGNOSIS — J44.9 CHRONIC OBSTRUCTIVE PULMONARY DISEASE, UNSPECIFIED COPD TYPE (H): ICD-10-CM

## 2021-10-15 PROCEDURE — 90686 IIV4 VACC NO PRSV 0.5 ML IM: CPT | Performed by: STUDENT IN AN ORGANIZED HEALTH CARE EDUCATION/TRAINING PROGRAM

## 2021-10-15 PROCEDURE — 91301 COVID-19,PF,MODERNA (18+ YRS): CPT | Performed by: STUDENT IN AN ORGANIZED HEALTH CARE EDUCATION/TRAINING PROGRAM

## 2021-10-15 PROCEDURE — 0012A COVID-19,PF,MODERNA (18+ YRS): CPT | Performed by: STUDENT IN AN ORGANIZED HEALTH CARE EDUCATION/TRAINING PROGRAM

## 2021-10-15 PROCEDURE — 99214 OFFICE O/P EST MOD 30 MIN: CPT | Mod: 25 | Performed by: STUDENT IN AN ORGANIZED HEALTH CARE EDUCATION/TRAINING PROGRAM

## 2021-10-15 PROCEDURE — 90471 IMMUNIZATION ADMIN: CPT | Performed by: STUDENT IN AN ORGANIZED HEALTH CARE EDUCATION/TRAINING PROGRAM

## 2021-10-15 RX ORDER — ACETAMINOPHEN 500 MG
1000 TABLET ORAL 3 TIMES DAILY PRN
Qty: 100 TABLET | Refills: 11 | Status: SHIPPED | OUTPATIENT
Start: 2021-10-15 | End: 2021-12-02

## 2021-10-15 RX ORDER — IPRATROPIUM BROMIDE AND ALBUTEROL SULFATE 2.5; .5 MG/3ML; MG/3ML
SOLUTION RESPIRATORY (INHALATION)
Qty: 90 ML | Refills: 11 | Status: SHIPPED | OUTPATIENT
Start: 2021-10-15 | End: 2022-06-06

## 2021-10-15 RX ORDER — PREDNISONE 10 MG/1
TABLET ORAL
Qty: 23 TABLET | Refills: 0 | Status: SHIPPED | OUTPATIENT
Start: 2021-10-15 | End: 2021-11-24

## 2021-10-15 RX ORDER — DOXYCYCLINE 100 MG/1
100 CAPSULE ORAL 2 TIMES DAILY
Qty: 28 CAPSULE | Refills: 0 | Status: SHIPPED | OUTPATIENT
Start: 2021-10-15 | End: 2021-10-29

## 2021-10-15 RX ORDER — GABAPENTIN 300 MG/1
600 CAPSULE ORAL 3 TIMES DAILY
Qty: 180 CAPSULE | Refills: 3 | Status: SHIPPED | OUTPATIENT
Start: 2021-10-15 | End: 2021-11-16
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
## 2021-10-18 DIAGNOSIS — J42 CHRONIC BRONCHITIS, UNSPECIFIED CHRONIC BRONCHITIS TYPE (H): Primary | ICD-10-CM

## 2021-10-27 ENCOUNTER — OFFICE VISIT (OUTPATIENT)
Dept: FAMILY MEDICINE | Facility: CLINIC | Age: 61
End: 2021-10-27
Payer: COMMERCIAL

## 2021-10-27 VITALS
RESPIRATION RATE: 20 BRPM | OXYGEN SATURATION: 98 % | WEIGHT: 102.4 LBS | DIASTOLIC BLOOD PRESSURE: 107 MMHG | TEMPERATURE: 97.7 F | HEART RATE: 85 BPM | BODY MASS INDEX: 19.59 KG/M2 | SYSTOLIC BLOOD PRESSURE: 146 MMHG

## 2021-10-27 DIAGNOSIS — M79.672 PAIN IN BOTH FEET: ICD-10-CM

## 2021-10-27 DIAGNOSIS — M79.671 PAIN IN BOTH FEET: ICD-10-CM

## 2021-10-27 DIAGNOSIS — M79.18 MUSCULOSKELETAL PAIN: Primary | ICD-10-CM

## 2021-10-27 DIAGNOSIS — H61.22 IMPACTED CERUMEN OF LEFT EAR: ICD-10-CM

## 2021-10-27 DIAGNOSIS — M54.30 SCIATICA, UNSPECIFIED LATERALITY: ICD-10-CM

## 2021-10-27 DIAGNOSIS — G89.29 CHRONIC PAIN OF LEFT KNEE: ICD-10-CM

## 2021-10-27 DIAGNOSIS — B35.1 ONYCHOMYCOSIS DUE TO DERMATOPHYTE: ICD-10-CM

## 2021-10-27 DIAGNOSIS — M22.2X2 PATELLOFEMORAL SYNDROME, LEFT: ICD-10-CM

## 2021-10-27 DIAGNOSIS — F31.60 BIPOLAR DISORDER, MIXED (H): ICD-10-CM

## 2021-10-27 DIAGNOSIS — L85.3 XEROSIS CUTIS: ICD-10-CM

## 2021-10-27 DIAGNOSIS — J42 CHRONIC BRONCHITIS, UNSPECIFIED CHRONIC BRONCHITIS TYPE (H): ICD-10-CM

## 2021-10-27 DIAGNOSIS — F29 PSYCHOSIS, UNSPECIFIED PSYCHOSIS TYPE (H): ICD-10-CM

## 2021-10-27 DIAGNOSIS — I10 ESSENTIAL HYPERTENSION: ICD-10-CM

## 2021-10-27 DIAGNOSIS — R74.01 TRANSAMINITIS: ICD-10-CM

## 2021-10-27 DIAGNOSIS — M25.562 CHRONIC PAIN OF LEFT KNEE: ICD-10-CM

## 2021-10-27 LAB
ALBUMIN SERPL-MCNC: 3.5 G/DL (ref 3.5–5)
ALP SERPL-CCNC: 79 U/L (ref 45–120)
ALT SERPL W P-5'-P-CCNC: 112 U/L (ref 0–45)
AST SERPL W P-5'-P-CCNC: 156 U/L (ref 0–40)
BILIRUB DIRECT SERPL-MCNC: <0.1 MG/DL
BILIRUB SERPL-MCNC: 0.2 MG/DL (ref 0–1)
PROT SERPL-MCNC: 6 G/DL (ref 6–8)

## 2021-10-27 PROCEDURE — G0127 TRIM NAIL(S): HCPCS | Performed by: STUDENT IN AN ORGANIZED HEALTH CARE EDUCATION/TRAINING PROGRAM

## 2021-10-27 PROCEDURE — 83690 ASSAY OF LIPASE: CPT | Performed by: STUDENT IN AN ORGANIZED HEALTH CARE EDUCATION/TRAINING PROGRAM

## 2021-10-27 PROCEDURE — 80076 HEPATIC FUNCTION PANEL: CPT | Performed by: STUDENT IN AN ORGANIZED HEALTH CARE EDUCATION/TRAINING PROGRAM

## 2021-10-27 PROCEDURE — 83550 IRON BINDING TEST: CPT | Performed by: STUDENT IN AN ORGANIZED HEALTH CARE EDUCATION/TRAINING PROGRAM

## 2021-10-27 PROCEDURE — 99214 OFFICE O/P EST MOD 30 MIN: CPT | Mod: 25 | Performed by: STUDENT IN AN ORGANIZED HEALTH CARE EDUCATION/TRAINING PROGRAM

## 2021-10-27 PROCEDURE — 84466 ASSAY OF TRANSFERRIN: CPT | Performed by: STUDENT IN AN ORGANIZED HEALTH CARE EDUCATION/TRAINING PROGRAM

## 2021-10-27 PROCEDURE — 36415 COLL VENOUS BLD VENIPUNCTURE: CPT | Performed by: STUDENT IN AN ORGANIZED HEALTH CARE EDUCATION/TRAINING PROGRAM

## 2021-10-27 RX ORDER — QUETIAPINE FUMARATE 200 MG/1
200 TABLET, FILM COATED ORAL AT BEDTIME
Qty: 30 TABLET | Refills: 11 | Status: SHIPPED | OUTPATIENT
Start: 2021-10-27 | End: 2022-02-08

## 2021-10-27 RX ORDER — TERBINAFINE HYDROCHLORIDE 250 MG/1
250 TABLET ORAL DAILY
Qty: 90 TABLET | Refills: 0 | Status: SHIPPED | OUTPATIENT
Start: 2021-10-27 | End: 2021-10-28

## 2021-10-27 RX ORDER — QUETIAPINE FUMARATE 100 MG/1
100 TABLET, FILM COATED ORAL 2 TIMES DAILY PRN
Qty: 60 TABLET | Refills: 11 | Status: SHIPPED | OUTPATIENT
Start: 2021-10-27 | End: 2022-02-08

## 2021-10-27 NOTE — PATIENT INSTRUCTIONS
Please soak your hands and feet for 20 minutes before bedtime. Then, apply vaseline or Eucerin cream and cover with gloves/socks. Leave them on for the night to lock in the moisture.     For your nail fungus, you can take terbinafine daily for 12 weeks.  Check your liver function today and again in 6 weeks.

## 2021-10-27 NOTE — NURSING NOTE
FAXED DME TENS unit to [facility] at fax [phone].     KONG Torres
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
## 2021-10-28 LAB
IRON SATN MFR SERPL: 17 % (ref 15–46)
IRON SERPL-MCNC: 44 UG/DL (ref 35–180)
TIBC SERPL-MCNC: 255 UG/DL (ref 240–430)
TRANSFERRIN SERPL-MCNC: 193 MG/DL (ref 212–360)

## 2021-10-31 ENCOUNTER — HOSPITAL ENCOUNTER (OUTPATIENT)
Dept: MRI IMAGING | Facility: CLINIC | Age: 61
End: 2021-10-31
Attending: FAMILY MEDICINE
Payer: COMMERCIAL

## 2021-10-31 ENCOUNTER — HOSPITAL ENCOUNTER (OUTPATIENT)
Dept: MRI IMAGING | Facility: CLINIC | Age: 61
End: 2021-10-31
Attending: STUDENT IN AN ORGANIZED HEALTH CARE EDUCATION/TRAINING PROGRAM
Payer: COMMERCIAL

## 2021-10-31 DIAGNOSIS — S46.002D INJURY OF LEFT ROTATOR CUFF, SUBSEQUENT ENCOUNTER: ICD-10-CM

## 2021-10-31 DIAGNOSIS — G62.1 ALCOHOL-INDUCED POLYNEUROPATHY (H): ICD-10-CM

## 2021-10-31 DIAGNOSIS — M54.12 CERVICAL RADICULOPATHY: ICD-10-CM

## 2021-10-31 PROCEDURE — 73221 MRI JOINT UPR EXTREM W/O DYE: CPT | Mod: LT

## 2021-10-31 PROCEDURE — 72141 MRI NECK SPINE W/O DYE: CPT

## 2021-10-31 PROCEDURE — 72148 MRI LUMBAR SPINE W/O DYE: CPT

## 2021-11-01 LAB — LIPASE SERPL-CCNC: 72 U/L (ref 73–393)

## 2021-11-03 ENCOUNTER — OFFICE VISIT (OUTPATIENT)
Dept: FAMILY MEDICINE | Facility: CLINIC | Age: 61
End: 2021-11-03
Payer: COMMERCIAL

## 2021-11-03 VITALS
SYSTOLIC BLOOD PRESSURE: 147 MMHG | RESPIRATION RATE: 20 BRPM | WEIGHT: 103 LBS | TEMPERATURE: 98.3 F | HEART RATE: 87 BPM | OXYGEN SATURATION: 98 % | DIASTOLIC BLOOD PRESSURE: 98 MMHG | BODY MASS INDEX: 19.7 KG/M2

## 2021-11-03 DIAGNOSIS — I10 ESSENTIAL HYPERTENSION: ICD-10-CM

## 2021-11-03 DIAGNOSIS — R09.81 NASAL CONGESTION: ICD-10-CM

## 2021-11-03 DIAGNOSIS — M75.102 TEAR OF LEFT SUPRASPINATUS TENDON: ICD-10-CM

## 2021-11-03 DIAGNOSIS — M48.00 NEURAL FORAMINAL STENOSIS, MULTILEVEL: ICD-10-CM

## 2021-11-03 DIAGNOSIS — J44.9 CHRONIC OBSTRUCTIVE PULMONARY DISEASE, UNSPECIFIED COPD TYPE (H): ICD-10-CM

## 2021-11-03 DIAGNOSIS — F42.4 SKIN PICKING HABIT: ICD-10-CM

## 2021-11-03 DIAGNOSIS — M48.00 CENTRAL STENOSIS OF SPINAL CANAL: Primary | ICD-10-CM

## 2021-11-03 PROCEDURE — 99214 OFFICE O/P EST MOD 30 MIN: CPT | Performed by: STUDENT IN AN ORGANIZED HEALTH CARE EDUCATION/TRAINING PROGRAM

## 2021-11-03 RX ORDER — ECHINACEA PURPUREA EXTRACT 125 MG
TABLET ORAL
Qty: 30 ML | Refills: 3 | Status: SHIPPED | OUTPATIENT
Start: 2021-11-03 | End: 2022-05-17

## 2021-11-03 RX ORDER — BACITRACIN ZINC 500 [USP'U]/G
OINTMENT TOPICAL 2 TIMES DAILY
Qty: 113 G | Refills: 1 | Status: SHIPPED | OUTPATIENT
Start: 2021-11-03 | End: 2022-05-17
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
## 2021-11-03 NOTE — Clinical Note
Warren Garcia, This patient desires to be considered for medical cannabis. I understand she needs to see me for a 40 minute pain assessment, then behavioral health, then medical cannabis eval.  I would like to get this going for her and I hear you are the RN for the job.  Could you please help to get her scheduled with me for that 40 minute visit (could take the place of her upcoming 20 minute visit), and do whatever other magic is needed to get this to happen?  Thanks! Dr. Patel

## 2021-11-11 ENCOUNTER — HOSPITAL ENCOUNTER (OUTPATIENT)
Dept: ULTRASOUND IMAGING | Facility: CLINIC | Age: 61
Discharge: HOME | End: 2021-11-11
Attending: STUDENT IN AN ORGANIZED HEALTH CARE EDUCATION/TRAINING PROGRAM | Admitting: STUDENT IN AN ORGANIZED HEALTH CARE EDUCATION/TRAINING PROGRAM
Payer: COMMERCIAL

## 2021-11-11 DIAGNOSIS — R74.01 TRANSAMINITIS: ICD-10-CM

## 2021-11-11 PROCEDURE — 76705 ECHO EXAM OF ABDOMEN: CPT

## 2021-11-16 ENCOUNTER — ANCILLARY PROCEDURE (OUTPATIENT)
Dept: GENERAL RADIOLOGY | Facility: CLINIC | Age: 61
End: 2021-11-16
Attending: STUDENT IN AN ORGANIZED HEALTH CARE EDUCATION/TRAINING PROGRAM
Payer: COMMERCIAL

## 2021-11-16 ENCOUNTER — OFFICE VISIT (OUTPATIENT)
Dept: FAMILY MEDICINE | Facility: CLINIC | Age: 61
End: 2021-11-16
Payer: COMMERCIAL

## 2021-11-16 VITALS
HEART RATE: 84 BPM | DIASTOLIC BLOOD PRESSURE: 96 MMHG | SYSTOLIC BLOOD PRESSURE: 156 MMHG | OXYGEN SATURATION: 100 % | TEMPERATURE: 98.2 F | BODY MASS INDEX: 20.35 KG/M2 | WEIGHT: 106.4 LBS | RESPIRATION RATE: 20 BRPM

## 2021-11-16 DIAGNOSIS — J44.9 CHRONIC OBSTRUCTIVE PULMONARY DISEASE, UNSPECIFIED COPD TYPE (H): ICD-10-CM

## 2021-11-16 DIAGNOSIS — H54.62 DECREASED VISION OF LEFT EYE: Primary | ICD-10-CM

## 2021-11-16 DIAGNOSIS — R74.01 TRANSAMINITIS: ICD-10-CM

## 2021-11-16 DIAGNOSIS — R60.0 BILATERAL LOWER EXTREMITY EDEMA: ICD-10-CM

## 2021-11-16 DIAGNOSIS — I10 ESSENTIAL HYPERTENSION: ICD-10-CM

## 2021-11-16 DIAGNOSIS — I10 BENIGN ESSENTIAL HYPERTENSION: ICD-10-CM

## 2021-11-16 DIAGNOSIS — F31.9 BIPOLAR 1 DISORDER (H): ICD-10-CM

## 2021-11-16 DIAGNOSIS — G47.00 INSOMNIA, UNSPECIFIED TYPE: ICD-10-CM

## 2021-11-16 DIAGNOSIS — M54.2 CERVICALGIA: ICD-10-CM

## 2021-11-16 DIAGNOSIS — G89.29 OTHER CHRONIC PAIN: ICD-10-CM

## 2021-11-16 DIAGNOSIS — Z62.819 HISTORY OF ABUSE IN CHILDHOOD: ICD-10-CM

## 2021-11-16 DIAGNOSIS — M54.12 CERVICAL RADICULOPATHY: ICD-10-CM

## 2021-11-16 LAB
ALBUMIN SERPL-MCNC: 3 G/DL (ref 3.5–5)
ALP SERPL-CCNC: 100 U/L (ref 45–120)
ALT SERPL W P-5'-P-CCNC: 41 U/L (ref 0–45)
ANION GAP SERPL CALCULATED.3IONS-SCNC: 13 MMOL/L (ref 5–18)
AST SERPL W P-5'-P-CCNC: 27 U/L (ref 0–40)
BASOPHILS # BLD AUTO: 0.1 10E3/UL (ref 0–0.2)
BASOPHILS NFR BLD AUTO: 1 %
BILIRUB DIRECT SERPL-MCNC: <0.1 MG/DL
BILIRUB SERPL-MCNC: 0.1 MG/DL (ref 0–1)
BUN SERPL-MCNC: 10 MG/DL (ref 8–22)
CALCIUM SERPL-MCNC: 8.7 MG/DL (ref 8.5–10.5)
CHLORIDE BLD-SCNC: 107 MMOL/L (ref 98–107)
CO2 SERPL-SCNC: 17 MMOL/L (ref 22–31)
CREAT SERPL-MCNC: 0.65 MG/DL (ref 0.6–1.1)
EOSINOPHIL # BLD AUTO: 0.4 10E3/UL (ref 0–0.7)
EOSINOPHIL NFR BLD AUTO: 7 %
ERYTHROCYTE [DISTWIDTH] IN BLOOD BY AUTOMATED COUNT: 13.9 % (ref 10–15)
GFR SERPL CREATININE-BSD FRML MDRD: >90 ML/MIN/1.73M2
GLUCOSE BLD-MCNC: 92 MG/DL (ref 70–125)
HCT VFR BLD AUTO: 33.2 % (ref 35–47)
HGB BLD-MCNC: 10.9 G/DL (ref 11.7–15.7)
IMM GRANULOCYTES # BLD: 0 10E3/UL
IMM GRANULOCYTES NFR BLD: 0 %
LYMPHOCYTES # BLD AUTO: 2.4 10E3/UL (ref 0.8–5.3)
LYMPHOCYTES NFR BLD AUTO: 41 %
MCH RBC QN AUTO: 31.1 PG (ref 26.5–33)
MCHC RBC AUTO-ENTMCNC: 32.8 G/DL (ref 31.5–36.5)
MCV RBC AUTO: 95 FL (ref 78–100)
MONOCYTES # BLD AUTO: 0.7 10E3/UL (ref 0–1.3)
MONOCYTES NFR BLD AUTO: 11 %
NEUTROPHILS # BLD AUTO: 2.3 10E3/UL (ref 1.6–8.3)
NEUTROPHILS NFR BLD AUTO: 39 %
PLATELET # BLD AUTO: 345 10E3/UL (ref 150–450)
POTASSIUM BLD-SCNC: 3.9 MMOL/L (ref 3.5–5)
PROT SERPL-MCNC: 5.6 G/DL (ref 6–8)
RBC # BLD AUTO: 3.51 10E6/UL (ref 3.8–5.2)
SODIUM SERPL-SCNC: 137 MMOL/L (ref 136–145)
WBC # BLD AUTO: 5.7 10E3/UL (ref 4–11)

## 2021-11-16 PROCEDURE — 85025 COMPLETE CBC W/AUTO DIFF WBC: CPT | Performed by: STUDENT IN AN ORGANIZED HEALTH CARE EDUCATION/TRAINING PROGRAM

## 2021-11-16 PROCEDURE — 86706 HEP B SURFACE ANTIBODY: CPT | Performed by: STUDENT IN AN ORGANIZED HEALTH CARE EDUCATION/TRAINING PROGRAM

## 2021-11-16 PROCEDURE — 87340 HEPATITIS B SURFACE AG IA: CPT | Performed by: STUDENT IN AN ORGANIZED HEALTH CARE EDUCATION/TRAINING PROGRAM

## 2021-11-16 PROCEDURE — 86803 HEPATITIS C AB TEST: CPT | Performed by: STUDENT IN AN ORGANIZED HEALTH CARE EDUCATION/TRAINING PROGRAM

## 2021-11-16 PROCEDURE — 82248 BILIRUBIN DIRECT: CPT | Performed by: STUDENT IN AN ORGANIZED HEALTH CARE EDUCATION/TRAINING PROGRAM

## 2021-11-16 PROCEDURE — 83880 ASSAY OF NATRIURETIC PEPTIDE: CPT | Performed by: STUDENT IN AN ORGANIZED HEALTH CARE EDUCATION/TRAINING PROGRAM

## 2021-11-16 PROCEDURE — 36415 COLL VENOUS BLD VENIPUNCTURE: CPT | Performed by: STUDENT IN AN ORGANIZED HEALTH CARE EDUCATION/TRAINING PROGRAM

## 2021-11-16 PROCEDURE — 71046 X-RAY EXAM CHEST 2 VIEWS: CPT | Mod: FY | Performed by: RADIOLOGY

## 2021-11-16 PROCEDURE — 99214 OFFICE O/P EST MOD 30 MIN: CPT | Performed by: STUDENT IN AN ORGANIZED HEALTH CARE EDUCATION/TRAINING PROGRAM

## 2021-11-16 PROCEDURE — 86708 HEPATITIS A ANTIBODY: CPT | Performed by: STUDENT IN AN ORGANIZED HEALTH CARE EDUCATION/TRAINING PROGRAM

## 2021-11-16 PROCEDURE — 80053 COMPREHEN METABOLIC PANEL: CPT | Performed by: STUDENT IN AN ORGANIZED HEALTH CARE EDUCATION/TRAINING PROGRAM

## 2021-11-16 RX ORDER — GABAPENTIN 300 MG/1
600 CAPSULE ORAL 3 TIMES DAILY
Qty: 180 CAPSULE | Refills: 3 | Status: SHIPPED | OUTPATIENT
Start: 2021-11-16 | End: 2022-04-08

## 2021-11-16 RX ORDER — FUROSEMIDE 20 MG
20 TABLET ORAL DAILY
Qty: 14 TABLET | Refills: 0 | Status: SHIPPED | OUTPATIENT
Start: 2021-11-16 | End: 2021-11-24

## 2021-11-16 RX ORDER — LISINOPRIL 20 MG/1
20 TABLET ORAL DAILY
Qty: 90 TABLET | Refills: 3 | Status: SHIPPED | OUTPATIENT
Start: 2021-11-16 | End: 2021-11-24

## 2021-11-16 ASSESSMENT — PAIN SCALES - PAIN ENJOYMENT GENERAL ACTIVITY SCALE (PEG)
INTERFERED_ENJOYMENT_LIFE: 8
PEG_TOTALSCORE: 8
AVG_PAIN_PASTWEEK: 8
INTERFERED_GENERAL_ACTIVITY: 8

## 2021-11-16 NOTE — PATIENT INSTRUCTIONS
Your doctor has put in a mental health referral for psychotherapy and/or psychiatry treatment. Our behavioral health team will evaluate your referral to see if they have any more recommendations or if they have openings for therapy in-house at [facility]. Our referral team would then contact you within 1-2 weeks with this update. However, if you want to get started for psychotherapy or schedule with a psychiatrist sooner, we recommend you call one or two of the following resources.  Let your doctor know if you do connect with a therapist or psychiatrist.     Community Mental Health Agencies:    [facility]   [address]  [place] [address]  [phone] (for appointments)    [facility]  [address]  [place] [address]  Phone:  [phone]    [facility]  [address] [place] [address]  [phone]    [facility]  [address], [place] [address]  [phone]    [facility]  [address]  [place] [address]  [phone]    Community Psychiatry Agencies:    [facility]  [address], [place] [address]  [phone]    [facility]  [address]  [place] [address]  [phone] (for appointments)    [facility]  [address]  [address]  [place] [address]  Phone:  [phone]    [facility]  [address] [place] [address]  [phone]    [facility]  [address]  [place] [address]  [phone]    [facility] - offers psychiatry and psychology services across the lifespan  [facility]  [facility]  [address] [place] [address]  [phone]    11/18/21   EYE REFERRAL     [facility]  Phone:[phone]  Fax:  [phone]    Faxed demographics and referral to [phone]. They will contact patient to schedule.     Lakshmi Schofield

## 2021-11-17 LAB
HAV IGG SER QL IA: NEGATIVE
HBV SURFACE AB SERPLBLD QL IA.RAPID: NEGATIVE
HBV SURFACE AG SERPL QL IA: NONREACTIVE
HCV AB SERPL QL IA: NEGATIVE
NT-PROBNP SERPL-MCNC: 490 PG/ML (ref 0–900)

## 2021-11-18 ENCOUNTER — DOCUMENTATION ONLY (OUTPATIENT)
Dept: PSYCHOLOGY | Facility: CLINIC | Age: 61
End: 2021-11-18
Payer: COMMERCIAL

## 2021-11-18 NOTE — PROGRESS NOTES
Behavioral Health Team,    Patient is being referred for mental health services by their provider, Dr. Patel.  Please advise if we are able to see patient for in house treatment or if a community option would be best.    Thank you,    Lakshmi Schofield  11/18/2021
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
## 2021-11-22 NOTE — PROGRESS NOTES
11/22/2021: Care Coordination  I called [facility] located at:  [address]   [place] [address]. [phone] on behalf of Ms. Scott to get things set up for future appointments. I then called Ms. Scott providing her with there phone number and location.     I then scheduled her with Resident Cy Galvan at 3:50pm 11/23/21 for the Medical Canabis Program. I sent Anne a message on teams to set that appointment.      Juan Daniel Zavala  Care Coordination   Direct: [phone]       11/23/21: Care Coordination    I mistakenly scheduled with a residency doctor. I was notified this morning of the mistake, called Ms. Scott to inform her and found a date available with Dr. Alvarado  Dec 1 st  At 8:00am. Ms. Scott has not picked up her after 2 attempts, but I will continue to call today.    Juan Daniel Zavala  Care Coordination

## 2021-11-23 NOTE — PROGRESS NOTES
11/23/2021: Care Coordination     [facility] 11/22/2021 to update their records so that Ms. Scott can call to make appointment when she is ready.     I was also supposed to set up an office visit for Ms. Scott with a fellow doctor to begin the medical cannabis program. However, I  Mistakenly scheduled with a residency doctor.     I was notified this morning of the mistake. I immediately called Ms. Scott to inform her, however, I had to leave a message. I then contacted Anne to cancel the 3:50pm appointment. I then checked the doctors schedule and found any appointment available with Dr. Alvarado on Dec 1st at 8:00am. I will call Ms. Scott back through the day to let her know about that appointment .    Juan Daniel Zavala   Care Coordination   Direct:[phone]

## 2021-11-24 ENCOUNTER — OFFICE VISIT (OUTPATIENT)
Dept: FAMILY MEDICINE | Facility: CLINIC | Age: 61
End: 2021-11-24
Payer: COMMERCIAL

## 2021-11-24 VITALS
DIASTOLIC BLOOD PRESSURE: 99 MMHG | RESPIRATION RATE: 20 BRPM | SYSTOLIC BLOOD PRESSURE: 156 MMHG | TEMPERATURE: 98.1 F | HEART RATE: 82 BPM | WEIGHT: 103 LBS | OXYGEN SATURATION: 97 % | BODY MASS INDEX: 19.7 KG/M2

## 2021-11-24 DIAGNOSIS — J42 CHRONIC BRONCHITIS, UNSPECIFIED CHRONIC BRONCHITIS TYPE (H): ICD-10-CM

## 2021-11-24 DIAGNOSIS — E61.1 IRON DEFICIENCY: ICD-10-CM

## 2021-11-24 DIAGNOSIS — J30.2 SEASONAL ALLERGIC RHINITIS, UNSPECIFIED TRIGGER: ICD-10-CM

## 2021-11-24 DIAGNOSIS — I10 ESSENTIAL HYPERTENSION: ICD-10-CM

## 2021-11-24 DIAGNOSIS — R11.2 NAUSEA AND VOMITING, INTRACTABILITY OF VOMITING NOT SPECIFIED, UNSPECIFIED VOMITING TYPE: ICD-10-CM

## 2021-11-24 DIAGNOSIS — J44.9 CHRONIC OBSTRUCTIVE PULMONARY DISEASE, UNSPECIFIED COPD TYPE (H): ICD-10-CM

## 2021-11-24 DIAGNOSIS — L85.3 XEROSIS CUTIS: ICD-10-CM

## 2021-11-24 DIAGNOSIS — M79.89 SWELLING OF LOWER EXTREMITY: Primary | ICD-10-CM

## 2021-11-24 PROCEDURE — 99215 OFFICE O/P EST HI 40 MIN: CPT | Performed by: STUDENT IN AN ORGANIZED HEALTH CARE EDUCATION/TRAINING PROGRAM

## 2021-11-24 RX ORDER — DOXYCYCLINE 100 MG/1
100 CAPSULE ORAL 2 TIMES DAILY
Qty: 14 CAPSULE | Refills: 0 | Status: SHIPPED | OUTPATIENT
Start: 2021-11-24 | End: 2021-12-01

## 2021-11-24 RX ORDER — ASCORBIC ACID 500 MG
500 TABLET ORAL DAILY
Qty: 90 TABLET | Refills: 1 | Status: SHIPPED | OUTPATIENT
Start: 2021-11-24 | End: 2023-04-18

## 2021-11-24 RX ORDER — ALBUTEROL SULFATE 90 UG/1
2 AEROSOL, METERED RESPIRATORY (INHALATION) EVERY 6 HOURS PRN
Qty: 8.5 G | Refills: 11 | Status: SHIPPED | OUTPATIENT
Start: 2021-11-24 | End: 2022-03-31

## 2021-11-24 RX ORDER — FERROUS SULFATE 325(65) MG
325 TABLET ORAL EVERY OTHER DAY
Qty: 90 TABLET | Refills: 1 | Status: SHIPPED | OUTPATIENT
Start: 2021-11-24 | End: 2022-05-17

## 2021-11-24 RX ORDER — FLUTICASONE PROPIONATE 50 MCG
2 SPRAY, SUSPENSION (ML) NASAL DAILY
Qty: 15.8 ML | Refills: 3 | Status: SHIPPED | OUTPATIENT
Start: 2021-11-24 | End: 2022-04-22

## 2021-11-24 RX ORDER — LISINOPRIL AND HYDROCHLOROTHIAZIDE 20; 25 MG/1; MG/1
1 TABLET ORAL DAILY
Qty: 90 TABLET | Refills: 1 | Status: SHIPPED | OUTPATIENT
Start: 2021-11-24 | End: 2021-12-09

## 2021-11-24 RX ORDER — ONDANSETRON 4 MG/1
4 TABLET, ORALLY DISINTEGRATING ORAL EVERY 8 HOURS PRN
Qty: 20 TABLET | Refills: 11 | Status: SHIPPED | OUTPATIENT
Start: 2021-11-24 | End: 2022-10-05

## 2021-11-24 RX ORDER — PREDNISONE 10 MG/1
TABLET ORAL
Qty: 23 TABLET | Refills: 0 | Status: SHIPPED | OUTPATIENT
Start: 2021-11-24 | End: 2022-05-17

## 2021-11-24 NOTE — PATIENT INSTRUCTIONS
1) For COPD:  -Take Tudorza every day  -Take 1 puff of Advair twice daily occasionally.  Put it somewhere that you will remember to use it.  -And then you can use your duo nebs up to four times a day and albuterol as needed.  -Take the prednisone taper as prescribed, Which is 40 mg for 3 days then 20 mg for 3 days then 10 mg for 3 days then 5 mg for 3 days  -Take doxycycline 1 tablet twice daily for 7 days    2) For anemia: Wait to start the iron pills until you are done with the doxycycline because it can prevent absorption of that medication    3) I have ordered an echocardiogram, nebulizer tubing and gloves    4) For high blood pressure stop taking the lisinopril 20 mg tablet and start taking the lisinopril-hydrochlorothiazide combination tablet so take    5) For pulmonary rehab, please call [phone]    [facility]  Phone:[phone]     Echocardiogram:   [facility]  ([facility])  Phone:[phone]

## 2021-11-24 NOTE — PROGRESS NOTES
11/24/2021: Care Coordination    I called Ms. Sonia to schedule an upcoming appointment in the   [facility] with Dr. Paul on 11/30/2021 at 3:00pm.       Juan Daniel Zavala  Care Coordination   [phone]
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
## 2021-11-30 ENCOUNTER — OFFICE VISIT (OUTPATIENT)
Dept: PSYCHOLOGY | Facility: CLINIC | Age: 61
End: 2021-11-30
Payer: COMMERCIAL

## 2021-11-30 DIAGNOSIS — F43.10 PTSD (POST-TRAUMATIC STRESS DISORDER): ICD-10-CM

## 2021-11-30 DIAGNOSIS — F31.60 BIPOLAR DISORDER, MIXED (H): Primary | ICD-10-CM

## 2021-11-30 PROCEDURE — 90837 PSYTX W PT 60 MINUTES: CPT | Mod: HN | Performed by: PSYCHOLOGIST

## 2021-11-30 NOTE — PROGRESS NOTES
11/30/21:   Consulted with Dr. Paul after visit today for consult for CPM. Patient was also scheduled with Dr. Sawyer for tomorrow. Canceled this and rescheduled to follow up with Dr. Paul on 12/21. The plan is that Lisa will see Dr. Paul in a bridging capacity and still will get connected to [facility] for long term therapy and potentially psychiatry. Saw psychiatrist there for many years but that provider retired and she is not interested in meds at this time.     Faxed in PAULINO's for [facility] and for , Tracy Freeman at [facility]. Faxed with request for records.     Will work towards getting her scheduled with [facility].     Routing to Dr. Lay, Dr. Paul, Dr. Patel, and Care Coordinator Juan Daniel.     Erika Carrington LARRY
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
## 2021-12-01 DIAGNOSIS — S46.002D INJURY OF LEFT ROTATOR CUFF, SUBSEQUENT ENCOUNTER: ICD-10-CM

## 2021-12-01 DIAGNOSIS — S59.902D ELBOW INJURY, LEFT, SUBSEQUENT ENCOUNTER: ICD-10-CM

## 2021-12-01 NOTE — TELEPHONE ENCOUNTER
[facility] phone call message- patient requesting a refill:    Full Medication Name: Tylenol      Pharmacy confirmed as    [id number] - [place], [place] - [address] AT [facility] & [address] [place] [address]  Phone: [phone] Fax: [phone]  : Yes    Additional Comments: she forgot to ask for this the other day when she was in.    OK to leave a message on voice mail? Yes    Primary language: English      needed? No    Call taken on December 1, 2021 at 2:15 PM by Juliet Mcintosh

## 2021-12-01 NOTE — PATIENT INSTRUCTIONS
[facility]  Mental Health and Addiction Services  [address] [place] [address]  [phone]    First Session Patient Information Sheet    My name is Diallo Paul, Ph.D. and I look forward to working with you! I earned my Ph.D. in Counseling Psychology at [employer]. I am currently in a postdoctoral fellowship at [facility] to receive specialized training in primary care behavioral health. I am also working to complete my supervised hours to become a licensed psychologist in the [facility].     My direct supervisor at the [facility] is licensed psychologist Lucinda Lay, Ph.D. She and I meet individually each week to discuss my training and clinical caseload. Just like the residents you may have worked with, the work you and I complete together will be billed under my supervisor's name. Therefore, you will likely see reports from your insurance provider with Dr. Lay's name rather than mine. Dr. Lay can be reached at [phone] if you have any questions or concerns.     Here is some general information about behavioral health services. Please note that your exact experience may be different based on our discussion today.      Your first 1-2 sessions are focused on assessment.This means that I will be exploring what brings you in today and what you are hoping to get out of behavioral health services. I will likely ask you a lot of questions about your current symptoms, health history, as well as information about your relationships, emotions, behaviors, experiences, childhood, family of origin, etc. I may also ask you to complete several questionnaires and checklists as part of that assessment process.      When the assessment is completed, we will review the results and work together to develop a plan for treatment. This discussion will include recommendations for services that are most appropriate for you based on available  research and practice guidelines. Many times, I will continue with your care unless it becomes clear that we need to refer you to another provider or organization better suited to meet your specific needs. We may also discuss other services that you may benefit from engaging with, such as the social work team or the clinic's legal partnership.      Most patients attend appointments once a week or once every other week at the beginning of treatment. However, we will discuss a schedule that best fits your needs.      If you get primary care services here at the clinic, I will also update your provider about your diagnosis and treatment plan to coordinate your care.      Due to confidentiality, I cannot exchange e-mail with patients. If you need to reach me, please leave a message through the  [phone].    I am committed to providing my patients with the best care possible. That means it is important to me that I provider services that fit for your unique needs and preferences. If there is anything you would like me to do differently, please let me know at any time!    Thank you, and I look forward to working with you!    Diallo Paul, PhD  She/her/hers  Primary Care Behavioral Health Fellow

## 2021-12-02 RX ORDER — ACETAMINOPHEN 500 MG
1000 TABLET ORAL 3 TIMES DAILY PRN
Qty: 100 TABLET | Refills: 11 | Status: SHIPPED | OUTPATIENT
Start: 2021-12-02 | End: 2021-12-29

## 2021-12-02 NOTE — PROGRESS NOTES
"Pt is a 60 year old female last seen on 10/21/20 here today for:     L ear - can't hear out of it x 5 days  Was using Q-tips to clean it out  Constantly ringing and her balance is screwed up -> denies vertigo  Does not seem clogged up   Not painful  +allergies and sinus congestion - not bad though    Mood - was restarted on Buspar   Back to the \"manic state\" now  Is off Wellbutrin  Has emergency appt pending w/ her psychiatrist  Still on seroquel and trazodone  Also on prazosin  Still waiting for  to coordinate counseling through [employer]    Past Medical History:   Diagnosis Date     Alcohol withdrawal seizure (H) 02/25/2017     Chronic obstructive pulmonary disease, unspecified COPD type (H) 2/23/2017     COPD (chronic obstructive pulmonary disease) (H)      Depressive disorder       History reviewed. No pertinent surgical history.   Current Outpatient Medications   Medication Sig Dispense Refill     acetaminophen (TYLENOL) 500 MG tablet Take 1-2 tablets (500-1,000 mg) by mouth every 8 hours as needed for mild pain 100 tablet 11     aclidinium (TUDORZA PRESSAIR) 400 MCG/ACT inhaler Inhale 1 puff into the lungs 2 times daily 1 Inhaler 11     albuterol (PROAIR HFA/PROVENTIL HFA/VENTOLIN HFA) 108 (90 Base) MCG/ACT inhaler Inhale 2 puffs into the lungs every 6 hours as needed for shortness of breath / dyspnea or wheezing 2 Inhaler 11     bacitracin 500 UNIT/GM external ointment Apply topically 2 times daily 14 g 0     buPROPion (WELLBUTRIN XL) 300 MG 24 hr tablet Take 300 mg by mouth daily       busPIRone HCl (BUSPAR) 30 MG tablet Take 0.5 tablets (15 mg) by mouth 3 times daily 45 tablet 11     calcium carbonate (TUMS) 500 MG chewable tablet Take 2 tablets (1,000 mg) by mouth 4 times daily as needed for heartburn 240 tablet 4     divalproex sodium delayed-release (DEPAKOTE) 250 MG DR tablet Take 1 tablet (250 mg) by mouth 2 times daily 60 tablet 11     famotidine (PEPCID) 20 MG tablet Take 1 tablet (20 mg) by " Pt reports to the ED via private vehicle. PT states he has a hx of afib, and he felt his heart rate was elevated. Pt states he thought he may have forgotten to take his metoprolol this morning, so he took another pill. Pt states that he rechecked his heart rate and it was in the 30's. Pts heart rate is normal here.  Pt denies any dizziness, heart palpitations, chest pain, etc. mouth 2 times daily 60 tablet 11     fexofenadine (ALLEGRA) 180 MG tablet Take 1 tablet (180 mg) by mouth daily 30 tablet 11     fluticasone (FLONASE) 50 MCG/ACT nasal spray Spray 2 sprays in nostril daily 9.9 mL 11     fluticasone-salmeterol (ADVAIR-HFA) 230-21 MCG/ACT inhaler Inhale 2 puffs into the lungs 2 times daily 1 Inhaler 11     gabapentin (NEURONTIN) 300 MG capsule Take 2 capsules (600 mg) by mouth 3 times daily 180 capsule 11     hydrOXYzine (ATARAX) 25 MG tablet Take 1 tablet (25 mg) by mouth every 8 hours as needed for anxiety 30 tablet 4     ibuprofen (ADVIL/MOTRIN) 400 MG tablet Take 1 tablet (400 mg) by mouth every 6 hours as needed for moderate pain 90 tablet 3     ipratropium - albuterol 0.5 mg/2.5 mg/3 mL (DUONEB) 0.5-2.5 (3) MG/3ML neb solution NEBULIZE 1 VIAL BY MOUTH FOUR TIMES DAILY 90 mL 3     lisinopril (ZESTRIL) 10 MG tablet Take 1 tablet (10 mg) by mouth daily 30 tablet 11     loperamide (IMODIUM) 2 MG capsule Take 2 mg by mouth every 6 hours as needed       Melatonin 10 MG TABS tablet Take 1 tablet (10 mg) by mouth daily (with dinner) , additional tablet as needed for late night awakenings 90 tablet 1     meloxicam (MOBIC) 15 MG tablet Take 1 tablet (15 mg) by mouth daily 30 tablet 3     montelukast (SINGULAIR) 10 MG tablet Take 1 tablet (10 mg) by mouth At Bedtime 30 tablet 4     omeprazole (PRILOSEC) 20 MG DR capsule Take 1 capsule (20 mg) by mouth 2 times daily 60 capsule 11     ondansetron (ZOFRAN-ODT) 4 MG ODT tab Take 1 tablet (4 mg) by mouth every 8 hours as needed for nausea 20 tablet 11     order for DME DME - pt needs nebulizer tubing 1 Device 0     OXcarbazepine (TRILEPTAL) 300 MG tablet Take 1 tablet (300 mg) by mouth At Bedtime 30 tablet 4     polyethylene glycol (MIRALAX/GLYCOLAX) powder Take 17 g (1 capful) by mouth daily as needed for constipation       polyvinyl alcohol (LIQUIFILM TEARS) 1.4 % ophthalmic solution Place 1 drop into both eyes as needed for dry eyes        "prazosin (MINIPRESS) 2 MG capsule Take 2 capsules (4 mg) by mouth At Bedtime 60 capsule 3     QUEtiapine (SEROQUEL) 100 MG tablet Take 1 tablet (100 mg) by mouth 2 times daily as needed (anxiety or panic attacks) 60 tablet 4     QUEtiapine (SEROQUEL) 200 MG tablet Take 1 tablet (200 mg) by mouth At Bedtime 30 tablet 11     QUEtiapine (SEROQUEL) 25 MG tablet Take 25 mg by mouth daily as needed       Respiratory Therapy Supplies (NEBULIZER/PEDIATRIC MASK) KIT kit Nebulizer device, mask, and tubing to be used to deliver nebulized medications. 1 kit 0     sennosides (SENOKOT) 8.6 MG tablet Take 1 tablet by mouth 2 times daily as needed for constipation 60 tablet 3     spacer (OPTICHAMBER FAZAL) holding chamber Use with inhaler 2 each 3     spacer (OPTICHAMBER FAZAL) holding chamber For use with inhaler 1 each 0     tiZANidine (ZANAFLEX) 4 MG tablet Take 2 tablets (8 mg) by mouth 3 times daily as needed for muscle spasms 240 tablet 11     traZODone (DESYREL) 50 MG tablet Take 1 tablet (50 mg) by mouth At Bedtime 30 tablet 4     vitamin B1 (THIAMINE) 100 MG tablet Take 200 mg by mouth 3 times daily        Allergies   Allergen Reactions     Nkda [No Known Drug Allergies]         ROS:   Gen- no weight change, no fevers/chills   Head/ Eyes- no blurred vision, no headaches   ENT- no cough, no congestion, no URI symptoms   Cardiac - no palpitations, no chest pain   Respiratory - no shortness of breath , no wheezing   GI - no nausea, no vomiting, no diarrhea, no constipation   Urinary - no dysuria, no polyuria   Neuro - no numbness, no tingling   Remainder of ROS negative.     Exam:   /82 (BP Location: Right arm, Patient Position: Sitting, Cuff Size: Adult Regular)   Pulse 92   Temp 97.8  F (36.6  C) (Oral)   Resp 20   Ht 1.54 m (5' 0.63\")   Wt 59.1 kg (130 lb 3.2 oz)   SpO2 100%   BMI 24.90 kg/m     Alert and oriented x 3; Pressured speech, tangential today; No acute distress   HEENT: R TM w/ serous otitis; L " TM w/ cerumen, external canal w/o excoriation; oropharynx clear   Neck: no masses, no lymphadenopathy   Resp: clear to auscultation bilaterally, no wheezing/ronchi   CV: rate/rhythm regular, no murmurs/rubs/gallops   Derm: no rashes     (H65.00) Non-recurrent acute serous otitis media, unspecified laterality  (primary encounter diagnosis)  Comment:   Plan: recommended to take her Flonase regularly; if no better, can check hearing screen/ consider further workup    (J30.2) Seasonal allergic rhinitis, unspecified trigger  Comment: see above  Plan: fluticasone (FLONASE) 50 MCG/ACT nasal spray            (F31.60) Bipolar disorder, mixed (H)  Comment:   Plan: pt was quite manic today; she has reached out to her psychiatrist for an urgent eval; No SI/HI; will forward chart to [facility] to follow-up as well    Nikolay Cook MD  December 9, 2020  1:06 PM

## 2021-12-05 PROBLEM — F43.10 PTSD (POST-TRAUMATIC STRESS DISORDER): Status: ACTIVE | Noted: 2021-12-05

## 2021-12-06 NOTE — PROGRESS NOTES
I have reviewed and agree with the behavioral health fellow's documentation for this visit.  I did not personally see the patient.  Lucinda Lay, ., LP

## 2021-12-07 ENCOUNTER — OFFICE VISIT (OUTPATIENT)
Dept: FAMILY MEDICINE | Facility: CLINIC | Age: 61
End: 2021-12-07
Payer: COMMERCIAL

## 2021-12-07 ENCOUNTER — ANCILLARY PROCEDURE (OUTPATIENT)
Dept: GENERAL RADIOLOGY | Facility: CLINIC | Age: 61
End: 2021-12-07
Attending: STUDENT IN AN ORGANIZED HEALTH CARE EDUCATION/TRAINING PROGRAM
Payer: COMMERCIAL

## 2021-12-07 VITALS
WEIGHT: 105.6 LBS | SYSTOLIC BLOOD PRESSURE: 144 MMHG | TEMPERATURE: 98.3 F | HEART RATE: 94 BPM | DIASTOLIC BLOOD PRESSURE: 91 MMHG | OXYGEN SATURATION: 97 % | BODY MASS INDEX: 20.2 KG/M2

## 2021-12-07 DIAGNOSIS — J42 CHRONIC BRONCHITIS, UNSPECIFIED CHRONIC BRONCHITIS TYPE (H): ICD-10-CM

## 2021-12-07 DIAGNOSIS — L30.1 DYSHIDROTIC ECZEMA: ICD-10-CM

## 2021-12-07 DIAGNOSIS — I10 ESSENTIAL HYPERTENSION: ICD-10-CM

## 2021-12-07 DIAGNOSIS — Z18.81 GLASS FOREIGN BODY IN SKIN: ICD-10-CM

## 2021-12-07 DIAGNOSIS — Z18.81 GLASS FOREIGN BODY IN SKIN: Primary | ICD-10-CM

## 2021-12-07 DIAGNOSIS — T14.8XXA GLASS FOREIGN BODY IN SKIN: Primary | ICD-10-CM

## 2021-12-07 DIAGNOSIS — T14.8XXA GLASS FOREIGN BODY IN SKIN: ICD-10-CM

## 2021-12-07 LAB
ANION GAP SERPL CALCULATED.3IONS-SCNC: 13 MMOL/L (ref 5–18)
BASOPHILS # BLD AUTO: 0 10E3/UL (ref 0–0.2)
BASOPHILS NFR BLD AUTO: 0 %
BUN SERPL-MCNC: 15 MG/DL (ref 8–22)
CALCIUM SERPL-MCNC: 8.7 MG/DL (ref 8.5–10.5)
CHLORIDE BLD-SCNC: 96 MMOL/L (ref 98–107)
CO2 SERPL-SCNC: 18 MMOL/L (ref 22–31)
CREAT SERPL-MCNC: 0.75 MG/DL (ref 0.6–1.1)
EOSINOPHIL # BLD AUTO: 0 10E3/UL (ref 0–0.7)
EOSINOPHIL NFR BLD AUTO: 0 %
ERYTHROCYTE [DISTWIDTH] IN BLOOD BY AUTOMATED COUNT: 12.3 % (ref 10–15)
GFR SERPL CREATININE-BSD FRML MDRD: 88 ML/MIN/1.73M2
GLUCOSE BLD-MCNC: 122 MG/DL (ref 70–125)
HCT VFR BLD AUTO: 33.2 % (ref 35–47)
HGB BLD-MCNC: 11 G/DL (ref 11.7–15.7)
IMM GRANULOCYTES # BLD: 0 10E3/UL
IMM GRANULOCYTES NFR BLD: 0 %
LYMPHOCYTES # BLD AUTO: 0.8 10E3/UL (ref 0.8–5.3)
LYMPHOCYTES NFR BLD AUTO: 13 %
MCH RBC QN AUTO: 29.8 PG (ref 26.5–33)
MCHC RBC AUTO-ENTMCNC: 33.1 G/DL (ref 31.5–36.5)
MCV RBC AUTO: 90 FL (ref 78–100)
MONOCYTES # BLD AUTO: 0.3 10E3/UL (ref 0–1.3)
MONOCYTES NFR BLD AUTO: 5 %
NEUTROPHILS # BLD AUTO: 5 10E3/UL (ref 1.6–8.3)
NEUTROPHILS NFR BLD AUTO: 82 %
NRBC # BLD AUTO: 0 10E3/UL
NRBC BLD AUTO-RTO: 0 /100
PLATELET # BLD AUTO: 388 10E3/UL (ref 150–450)
POTASSIUM BLD-SCNC: 4.6 MMOL/L (ref 3.5–5)
RBC # BLD AUTO: 3.69 10E6/UL (ref 3.8–5.2)
SODIUM SERPL-SCNC: 127 MMOL/L (ref 136–145)
WBC # BLD AUTO: 6.1 10E3/UL (ref 4–11)

## 2021-12-07 PROCEDURE — 36415 COLL VENOUS BLD VENIPUNCTURE: CPT | Performed by: STUDENT IN AN ORGANIZED HEALTH CARE EDUCATION/TRAINING PROGRAM

## 2021-12-07 PROCEDURE — 85025 COMPLETE CBC W/AUTO DIFF WBC: CPT | Performed by: STUDENT IN AN ORGANIZED HEALTH CARE EDUCATION/TRAINING PROGRAM

## 2021-12-07 PROCEDURE — 99214 OFFICE O/P EST MOD 30 MIN: CPT | Performed by: STUDENT IN AN ORGANIZED HEALTH CARE EDUCATION/TRAINING PROGRAM

## 2021-12-07 PROCEDURE — 73120 X-RAY EXAM OF HAND: CPT | Mod: FY | Performed by: RADIOLOGY

## 2021-12-07 PROCEDURE — 80048 BASIC METABOLIC PNL TOTAL CA: CPT | Performed by: STUDENT IN AN ORGANIZED HEALTH CARE EDUCATION/TRAINING PROGRAM

## 2021-12-07 RX ORDER — PREDNISONE 20 MG/1
40 TABLET ORAL DAILY
Qty: 20 TABLET | Refills: 0 | Status: SHIPPED | OUTPATIENT
Start: 2021-12-07 | End: 2021-12-17

## 2021-12-07 RX ORDER — TIOTROPIUM BROMIDE AND OLODATEROL 3.124; 2.736 UG/1; UG/1
2 SPRAY, METERED RESPIRATORY (INHALATION) DAILY
Qty: 4 G | Refills: 11 | Status: SHIPPED | OUTPATIENT
Start: 2021-12-07 | End: 2022-09-08

## 2021-12-07 RX ORDER — TRIAMCINOLONE ACETONIDE 1 MG/G
OINTMENT TOPICAL 2 TIMES DAILY
Qty: 80 G | Refills: 1 | Status: SHIPPED | OUTPATIENT
Start: 2021-12-07 | End: 2023-04-18

## 2021-12-07 NOTE — PATIENT INSTRUCTIONS
STOP your Advair and Tudorza.  START Stiolto Respimat 2 puffs daily.  This does NOT contain steroid.  START Combivent Respimat 1 puff 4 times daily.  START prednisone 40mg daily for 10 days.    I have referred you to a pulmonologist for further care.  Please call to schedule pulmonary rehab.    I have also left a message for our referral coordinator to help with your echo and nebulizer tubing.    For your hands, you can apply a thin layer of triamcinolone twice daily, followed by lotion.
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
## 2021-12-07 NOTE — Clinical Note
Warren Garcia,  Please assist me with contacting this patient.  I have reviewed her labs and saw that her sodium fell to 127 after starting lisinopril-hydrochlorothiazide 20-25mg tablet.  So, I think we need to find a different blood pressure agent for her.  She was previously on lisinopril 20 mg alone.  I would like to adjust her lisinopril dosage to 40 mg daily and add amlodipine 5 mg daily.  Can you please contact her to let her know of this medication change?  Thanks so much,  Giselle

## 2021-12-07 NOTE — Clinical Note
Warren Martins, this patient has multiple outstanding pending referrals.  I am unsure why they have not been approved.  Can you help to look into this?  I am most particularly concerned for her spine referral, pulmonary rehab, eye referral and echocardiogram.  I see she is restricted to certain providers.  Does this have to do with her referrals not being approved?  Thanks much for your help,  Giselle

## 2021-12-09 ENCOUNTER — TELEPHONE (OUTPATIENT)
Dept: FAMILY MEDICINE | Facility: CLINIC | Age: 61
End: 2021-12-09

## 2021-12-09 RX ORDER — LISINOPRIL 40 MG/1
40 TABLET ORAL DAILY
Qty: 90 TABLET | Refills: 1 | Status: SHIPPED | OUTPATIENT
Start: 2021-12-09 | End: 2022-08-23

## 2021-12-09 RX ORDER — AMLODIPINE BESYLATE 5 MG/1
5 TABLET ORAL DAILY
Qty: 90 TABLET | Refills: 1 | Status: SHIPPED | OUTPATIENT
Start: 2021-12-09 | End: 2022-06-24

## 2021-12-09 NOTE — TELEPHONE ENCOUNTER
Requested information regarding lab results and medication changes relayed to pt who was able to repeat instructions back correctly    btrn

## 2021-12-09 NOTE — TELEPHONE ENCOUNTER
Please assist me with contacting this patient.  I have reviewed her labs and saw that her sodium fell to 127 after starting lisinopril-hydrochlorothiazide 20-25mg tablet.  So, I think we need to find a different blood pressure agent for her.  She was previously on lisinopril 20 mg alone.  I would like to adjust her lisinopril dosage to 40 mg daily and add amlodipine 5 mg daily.  Can you please contact her to let her know of this medication change?   Thanks so much,        Message left for pt to return call regarding changes in medication    btrn

## 2021-12-23 ENCOUNTER — TELEPHONE (OUTPATIENT)
Dept: FAMILY MEDICINE | Facility: CLINIC | Age: 61
End: 2021-12-23
Payer: COMMERCIAL

## 2021-12-23 NOTE — TELEPHONE ENCOUNTER
[facility] phone call message- general phone call:    Reason for call: She would like a call back from  or a nurse re her l rotor cup she is in a lot of pain she was told she cant get surgery right new because she has some other things going on at them moment but she wants to know if she can get any kind of pain meds to help holds her off till she can get surgery.    Action desired: call back.    Return call needed: Yes    OK to leave a message on voice mail? Yes    Advised patient to response may take up to 2 business days: Yes    Primary language: English      needed? No    Call taken on December 23, 2021 at 2:33 PM by Ana Serna

## 2021-12-23 NOTE — TELEPHONE ENCOUNTER
Patient requesting a stronger pain med for her shoulder pain the tylenol is not working. Patient informed she will need to be seen in clinic prior to pain meds being ordered. conversion ended patient getting on elevator she will back.    btrn

## 2021-12-29 ENCOUNTER — OFFICE VISIT (OUTPATIENT)
Dept: FAMILY MEDICINE | Facility: CLINIC | Age: 61
End: 2021-12-29
Payer: COMMERCIAL

## 2021-12-29 ENCOUNTER — ANCILLARY PROCEDURE (OUTPATIENT)
Dept: GENERAL RADIOLOGY | Facility: CLINIC | Age: 61
End: 2021-12-29
Payer: COMMERCIAL

## 2021-12-29 VITALS
OXYGEN SATURATION: 97 % | SYSTOLIC BLOOD PRESSURE: 145 MMHG | RESPIRATION RATE: 16 BRPM | BODY MASS INDEX: 19.66 KG/M2 | WEIGHT: 102.8 LBS | TEMPERATURE: 98.2 F | DIASTOLIC BLOOD PRESSURE: 90 MMHG | HEART RATE: 88 BPM

## 2021-12-29 DIAGNOSIS — S59.902D ELBOW INJURY, LEFT, SUBSEQUENT ENCOUNTER: ICD-10-CM

## 2021-12-29 DIAGNOSIS — S46.002D INJURY OF LEFT ROTATOR CUFF, SUBSEQUENT ENCOUNTER: ICD-10-CM

## 2021-12-29 DIAGNOSIS — M25.572 PAIN IN JOINT INVOLVING ANKLE AND FOOT, LEFT: ICD-10-CM

## 2021-12-29 DIAGNOSIS — M25.572 PAIN IN JOINT INVOLVING ANKLE AND FOOT, LEFT: Primary | ICD-10-CM

## 2021-12-29 PROCEDURE — 73610 X-RAY EXAM OF ANKLE: CPT | Mod: TC | Performed by: RADIOLOGY

## 2021-12-29 PROCEDURE — 99214 OFFICE O/P EST MOD 30 MIN: CPT | Mod: GC | Performed by: FAMILY MEDICINE

## 2021-12-29 RX ORDER — ACETAMINOPHEN 500 MG
1000 TABLET ORAL 3 TIMES DAILY PRN
Qty: 100 TABLET | Refills: 11 | Status: SHIPPED | OUTPATIENT
Start: 2021-12-29 | End: 2022-02-11

## 2021-12-29 RX ORDER — NAPROXEN 500 MG/1
500 TABLET ORAL 2 TIMES DAILY WITH MEALS
Qty: 14 TABLET | Refills: 0 | Status: SHIPPED | OUTPATIENT
Start: 2021-12-29 | End: 2022-01-10
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
## 2021-12-29 NOTE — PATIENT INSTRUCTIONS
Patient Education     Treating Ankle Sprains  Treatment will depend on how bad your sprain is. For a severe sprain, healing may take 3 months or more.  Right after your injury: Use R.I.C.E.    BIG: Rest: At first, keep weight off the ankle as much as you can. You may be given crutches to help you walk without putting weight on the ankle.    BIG: Ice: Put an ice pack on the ankle for 20 minutes. Remove the pack and wait at least 30 minutes. Repeat for up to 3 days. This helps reduce swelling.    BIG: Compression: To reduce swelling and keep the joint stable, you may need to wrap the ankle with an elastic bandage. For more severe sprains, you may need an ankle brace, a boot, or a cast.    BIG: Elevation: To reduce swelling, keep your ankle raised above your heart when you sit or lie down.  Medicine  Your healthcare provider may suggest oral nonsteroidal anti-inflammatory medicine (NSAIDs), such as ibuprofen. This relieves the pain and helps reduce swelling. Be sure to take your medicine as directed.  Exercises    After about 2 to 3 weeks, you may be given exercises to strengthen the ligaments and muscles in the ankle. Doing these exercises will help prevent another ankle sprain. Exercises may include standing on your toes and then on your heels and doing ankle curls.    Sit on the edge of a sturdy table or lie on your back.    Pull your toes toward you. Then point them away from you. Repeat for 2 to 3 minutes.  [employer] last reviewed this educational content on 1/1/2018 2000-2021 [employer]. All rights reserved. This information is not intended as a substitute for professional medical care. Always follow your healthcare professional's instructions.

## 2021-12-29 NOTE — PROGRESS NOTES
Preceptor Attestation:    I discussed the patient with the resident and evaluated the patient in person. I have verified the content of the note, which accurately reflects my assessment of the patient and the plan of care.   Supervising Physician:  Reji Butler DO.

## 2022-01-04 DIAGNOSIS — M25.572 PAIN IN JOINT INVOLVING ANKLE AND FOOT, LEFT: ICD-10-CM

## 2022-01-06 ENCOUNTER — TELEPHONE (OUTPATIENT)
Dept: FAMILY MEDICINE | Facility: CLINIC | Age: 62
End: 2022-01-06
Payer: COMMERCIAL

## 2022-01-06 ENCOUNTER — TELEPHONE (OUTPATIENT)
Dept: PSYCHOLOGY | Facility: CLINIC | Age: 62
End: 2022-01-06
Payer: COMMERCIAL

## 2022-01-06 NOTE — TELEPHONE ENCOUNTER
Placed a call to Lisa after she did not check-in for scheduled in-person counseling appointment at 9:30 AM on 1/6/2022. This is patient's first no-show with this provider. She had one previous cancellation on 12/21/21 due to illness.     Attempted to contact patient by phone number recorded in medical record ending [id number] at 10:40 AM. Patient did not answer.    Left a voicemail message requesting the patient call the [facility]  at [phone] to re-schedule the appointment should she be interested.     Plan:   Primary Care/Referring Provider Dr. Patel will be alerted to this note for information only.    Patient has follow up appointment with Dr. Patel on 2/08/22.    One additional outreach call will be placed by this provider to reschedule this appointment. If that attempt is unsuccessful, a letter will be mailed to the address on file. Following that letter, no additional outreach attempts will be made unless this provider is contacted by another referring provider.     Diallo Paul, PhD  She/her/hers  Primary Care Health Behavior Fellow

## 2022-01-06 NOTE — TELEPHONE ENCOUNTER
01/06/2022: Care Coordination   I placed calls to Marva Mack, [facility], [facility], and [facility] today to get Ms. Scott re-established. I was melody to get scheduled with Psychiatry PA  Mary Ann Torres Monday January 10th at  3:00pm at their [facility] Location.     I have 2 calls into Ms. Scott to let her know of her upcoming appointment. I left 2 voice messages.    Juan Daniel Zavala  Care Coordination  Direct: [phone]

## 2022-01-07 ENCOUNTER — TELEPHONE (OUTPATIENT)
Dept: FAMILY MEDICINE | Facility: CLINIC | Age: 62
End: 2022-01-07
Payer: COMMERCIAL

## 2022-01-07 NOTE — LETTER
January 7, 2022      Lisa Scott  [address]  [place] [address]        Dear Lisa,  I have been trying to reach you over the past 2 days. Dr. Patel requested that you be reconnected with [facility] and Therapy. Your appointment scheduled for January 10 2022 has been canceled until you complete a  Diagnostic intake. From that appointment they will reschedule your psychiatry appointment. If you have questions please contact them directly at: [phone].    Appointment Information:   2:00pm January 19 2022    Location:    [address], [place] [address]      Sincerely,      Juan Daniel Zavala  Care Coordination  Direct: [phone]

## 2022-01-07 NOTE — TELEPHONE ENCOUNTER
I have reviewed and agree with the behavioral health fellow's summary and recommendations.  Lucinda Lay, PhD., LP

## 2022-01-07 NOTE — TELEPHONE ENCOUNTER
01/07/2022: Care Coordination     [facility] has requested that Ms. Scott complete an Diagnostic intake  before they begin therapy with them. Therefore, her appointment that was scheduled for January 10 2022 has been canceled. The diagnostic intake has been scheduled for January 19 2022 at 2:00pm in there [facility] location.    I immediately reached out to Ms. Scott, however, my calls went straight to voicemail. I atempted to reach her stepfather who's phone number is listed in our files. However, he has a voicemail box that has not been set up yet.         The intake appointment has been scheduled for January 19 2022 at 2:00pm in their [place] office. I sent Ms. Scott a letter with all of the details. Appointment location:    [address] [address]. [phone]        Juan Daniel Zavala  Care Coordination   Direct: [phone]

## 2022-01-10 RX ORDER — NAPROXEN 500 MG/1
500 TABLET ORAL 2 TIMES DAILY WITH MEALS
Qty: 14 TABLET | Refills: 0 | Status: SHIPPED | OUTPATIENT
Start: 2022-01-10 | End: 2022-01-17

## 2022-01-15 DIAGNOSIS — M25.572 PAIN IN JOINT INVOLVING ANKLE AND FOOT, LEFT: ICD-10-CM

## 2022-01-17 RX ORDER — NAPROXEN 500 MG/1
TABLET ORAL
Qty: 14 TABLET | Refills: 0 | Status: SHIPPED | OUTPATIENT
Start: 2022-01-17 | End: 2022-05-17

## 2022-01-25 ENCOUNTER — TELEPHONE (OUTPATIENT)
Dept: CARE COORDINATION | Facility: CLINIC | Age: 62
End: 2022-01-25
Payer: COMMERCIAL

## 2022-01-25 NOTE — TELEPHONE ENCOUNTER
01/25/2022: Care Coordination     Dr. Paul requested that I check in with Ms. Scott today to see if she made her diagnostic assessment  appointment on January 19 2022. Dr. Paul is also concerned with Ms. Scott getting connected with therapy as well as psychiatry. Ms Scott is  Also in need of completing a chronic pain evaluation with Dr. Paul.     I called Ms. Scott today, but was forces to leave a message and send a letter requesting that she please contact me or the  to schedule with Dr. Paul.         Juan Daniel Zavala  Care Coordination   Direct: [phone]

## 2022-01-25 NOTE — LETTER
January 25, 2022      Lisa Scott  [address]  [place] [address]        Dear Lisa,  I am one of the Care Coordinators with [facility]. Dr. Diallo Paul requested that I contact you regarding the the Mental Health Diagnostic Assessment as well as connection to Therapy and Psychiatry. She is also concerned with getting you back into our office for your Chronic Pain Evaluation.    If you are Interested in discussing theses things as well getting connected with   [facility] as well as getting evaluated for Chronic Pain? Please feel free to contact me directly at:[phone] or by calling the  at: [phone]        Sincerely,      Juan Daniel Zavala  Care Coordination   Direct:[phone]

## 2022-02-01 ENCOUNTER — TELEPHONE (OUTPATIENT)
Dept: FAMILY MEDICINE | Facility: CLINIC | Age: 62
End: 2022-02-01
Payer: COMMERCIAL

## 2022-02-01 NOTE — TELEPHONE ENCOUNTER
02/01/2022: Care Coordination     Ms. Scott called this afternoon requesting that I provide her all of her future appointments details. I informed Ms. Scott that she only has one appointment scheduled for 2/8/2022 at 1:50pm. I offered to schedule a ride for her, but she declined stating that she had already scheduled one.      Juan Daniel Zavala  Care Coordination

## 2022-02-07 DIAGNOSIS — G89.29 CHRONIC LEFT SHOULDER PAIN: ICD-10-CM

## 2022-02-07 DIAGNOSIS — M16.10 ARTHRITIS OF HIP: ICD-10-CM

## 2022-02-07 DIAGNOSIS — M25.512 CHRONIC LEFT SHOULDER PAIN: ICD-10-CM

## 2022-02-08 ENCOUNTER — OFFICE VISIT (OUTPATIENT)
Dept: FAMILY MEDICINE | Facility: CLINIC | Age: 62
End: 2022-02-08
Payer: COMMERCIAL

## 2022-02-08 VITALS
TEMPERATURE: 99.5 F | RESPIRATION RATE: 20 BRPM | OXYGEN SATURATION: 100 % | WEIGHT: 97 LBS | SYSTOLIC BLOOD PRESSURE: 130 MMHG | HEART RATE: 93 BPM | DIASTOLIC BLOOD PRESSURE: 70 MMHG | BODY MASS INDEX: 18.55 KG/M2

## 2022-02-08 DIAGNOSIS — L03.113 CELLULITIS OF RIGHT HAND: ICD-10-CM

## 2022-02-08 DIAGNOSIS — F29 PSYCHOSIS, UNSPECIFIED PSYCHOSIS TYPE (H): ICD-10-CM

## 2022-02-08 DIAGNOSIS — R05.9 COUGH: Primary | ICD-10-CM

## 2022-02-08 PROCEDURE — U0003 INFECTIOUS AGENT DETECTION BY NUCLEIC ACID (DNA OR RNA); SEVERE ACUTE RESPIRATORY SYNDROME CORONAVIRUS 2 (SARS-COV-2) (CORONAVIRUS DISEASE [COVID-19]), AMPLIFIED PROBE TECHNIQUE, MAKING USE OF HIGH THROUGHPUT TECHNOLOGIES AS DESCRIBED BY CMS-2020-01-R: HCPCS | Mod: 90 | Performed by: STUDENT IN AN ORGANIZED HEALTH CARE EDUCATION/TRAINING PROGRAM

## 2022-02-08 PROCEDURE — 99214 OFFICE O/P EST MOD 30 MIN: CPT | Mod: CS | Performed by: STUDENT IN AN ORGANIZED HEALTH CARE EDUCATION/TRAINING PROGRAM

## 2022-02-08 PROCEDURE — 99000 SPECIMEN HANDLING OFFICE-LAB: CPT | Performed by: STUDENT IN AN ORGANIZED HEALTH CARE EDUCATION/TRAINING PROGRAM

## 2022-02-08 PROCEDURE — U0005 INFEC AGEN DETEC AMPLI PROBE: HCPCS | Mod: 90 | Performed by: STUDENT IN AN ORGANIZED HEALTH CARE EDUCATION/TRAINING PROGRAM

## 2022-02-08 RX ORDER — SULFAMETHOXAZOLE/TRIMETHOPRIM 800-160 MG
1 TABLET ORAL 2 TIMES DAILY
Qty: 10 TABLET | Refills: 0 | Status: SHIPPED | OUTPATIENT
Start: 2022-02-08 | End: 2022-02-13

## 2022-02-08 RX ORDER — QUETIAPINE FUMARATE 200 MG/1
200 TABLET, FILM COATED ORAL AT BEDTIME
Qty: 90 TABLET | Refills: 3 | Status: SHIPPED | OUTPATIENT
Start: 2022-02-08 | End: 2022-10-05

## 2022-02-08 RX ORDER — QUETIAPINE FUMARATE 100 MG/1
100 TABLET, FILM COATED ORAL 2 TIMES DAILY PRN
Qty: 180 TABLET | Refills: 3 | Status: SHIPPED | OUTPATIENT
Start: 2022-02-08 | End: 2022-07-14
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
## 2022-02-09 LAB — SARS-COV-2 RNA RESP QL NAA+PROBE: NOT DETECTED

## 2022-02-09 RX ORDER — CELECOXIB 200 MG/1
CAPSULE ORAL
Qty: 30 CAPSULE | Refills: 3 | Status: SHIPPED | OUTPATIENT
Start: 2022-02-09 | End: 2022-06-08

## 2022-02-10 DIAGNOSIS — S46.002D INJURY OF LEFT ROTATOR CUFF, SUBSEQUENT ENCOUNTER: ICD-10-CM

## 2022-02-10 DIAGNOSIS — S59.902D ELBOW INJURY, LEFT, SUBSEQUENT ENCOUNTER: ICD-10-CM

## 2022-02-10 NOTE — TELEPHONE ENCOUNTER
She needs a call back re needing some medications filled she left a VM but I couldn't understand which meds she was saying she needed filled

## 2022-02-11 ENCOUNTER — TELEPHONE (OUTPATIENT)
Dept: FAMILY MEDICINE | Facility: CLINIC | Age: 62
End: 2022-02-11
Payer: COMMERCIAL

## 2022-02-11 RX ORDER — ACETAMINOPHEN 500 MG
1000 TABLET ORAL 3 TIMES DAILY PRN
Qty: 100 TABLET | Refills: 11 | Status: SHIPPED | OUTPATIENT
Start: 2022-02-11 | End: 2022-03-08

## 2022-02-11 NOTE — TELEPHONE ENCOUNTER
Called and left message for patient to call the clinic back for what medications she need a refill. She can call the pharmacy and they can send the clinic a request.  LINN PayanA

## 2022-02-11 NOTE — TELEPHONE ENCOUNTER
Spoke with patient regarding negative Covid-19 results but she also asked about prescription Tylenol and would like Dr. Patel to call some in for her.    Cris Sandhu, DNP, BSN, RN, PHN

## 2022-02-11 NOTE — TELEPHONE ENCOUNTER
PCT patient and advised of negative Covid-19 results.  Patient would like a call from Dr. Patel regarding some RX Tylenol; advise I will let Dr. Patel know.    Cris Sandhu, DNP, BSN, RN, PHN

## 2022-02-22 ENCOUNTER — TELEPHONE (OUTPATIENT)
Dept: OPHTHALMOLOGY | Facility: CLINIC | Age: 62
End: 2022-02-22

## 2022-02-22 ENCOUNTER — TELEPHONE (OUTPATIENT)
Dept: FAMILY MEDICINE | Facility: CLINIC | Age: 62
End: 2022-02-22

## 2022-02-22 ENCOUNTER — VIRTUAL VISIT (OUTPATIENT)
Dept: FAMILY MEDICINE | Facility: CLINIC | Age: 62
End: 2022-02-22
Payer: COMMERCIAL

## 2022-02-22 DIAGNOSIS — H54.62 VISION LOSS OF LEFT EYE: Primary | ICD-10-CM

## 2022-02-22 PROCEDURE — 99214 OFFICE O/P EST MOD 30 MIN: CPT | Mod: TEL | Performed by: STUDENT IN AN ORGANIZED HEALTH CARE EDUCATION/TRAINING PROGRAM

## 2022-02-22 NOTE — TELEPHONE ENCOUNTER
Lisa Scott [phone] home/cell    Spoke to pt at 1552    Pt states head injury about a week ago-- unsure if concussion diagnosed or not.    Pt with vision symptoms that are constant now    Pt states new constant blurring in each eye.  Pt also noting intermittent diplopia/dizziness    Pt not able to read now-- only able to 'read lips'    Scheduled in Acute clinic tomorrow    If had head injury/concussion with visual symptoms may benefit with exam with Dr. Jin in 60 minute time slot/TBI time slot    Varun Rick RN 4:08 PM 02/22/22               Health Call Center    Phone Message    May a detailed message be left on voicemail: yes     Reason for Call: Symptoms or Concerns     If patient has red-flag symptoms, warm transfer to triage line    Current symptom or concern: Patient with sudden onset severely blurred vision in left eye 1 week ago with dizziness and possible peripheral vision deficit. Please assess for source of vision loss.    Symptoms have been present for:  ? day(s)    Has patient previously been seen for this? yes    By : Dr Patel    Date: 2/22/22    Are there any new or worsening symptoms? Yes    Pt has referral in as Emergency 1-2  Days by Mirian Patel MD in [facility]      Action Taken: Message routed to:  [facility]    Travel Screening: Not Applicable

## 2022-02-22 NOTE — TELEPHONE ENCOUNTER
Called [facility] opthalmology [phone]) per request of Dr. Patel as patient needs urgent appointment due to acute onset blurred vision. No available appointments until April,  will send message to clinic high priority with symptoms for possible sooner appointment. They will reach out to patient directly to schedule. Routed to referrals coordinator. ./LR
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
## 2022-02-22 NOTE — PATIENT INSTRUCTIONS
02/22/22     EYE REFERRAL     [facility]  Phone:[phone]  Fax:  [phone]    Faxed demographics and referral to [phone]. They will contact patient to schedule.     Rut Brennan

## 2022-02-23 NOTE — TELEPHONE ENCOUNTER
Scheduled with Dr. Kyle on Thursday (tomorrow) at 2 PM    Care coordinator will be in contact with pt and will call direct triage number if need to make any adjustment to scheduling    Varun Rick RN 12:41 PM 02/23/22

## 2022-02-23 NOTE — TELEPHONE ENCOUNTER
Joi from [facility] called to report that Mirian Patel was wanting Thresa to be seen earlier than 03/08. Writer was unsure if she should reschedule, as this was na urgent referral and Varun had originally scheduled the appointment. Joi is requesting that Varun reach out to her to discuss earlier scheduling options as soon as possible at [phone].

## 2022-02-25 ENCOUNTER — TELEPHONE (OUTPATIENT)
Dept: OPHTHALMOLOGY | Facility: CLINIC | Age: 62
End: 2022-02-25
Payer: COMMERCIAL

## 2022-02-25 NOTE — TELEPHONE ENCOUNTER
Spoke to pt at 1745    Was unable to reach pt Wednesday by care coordinator to confirm appt yesterday    Pt having more problems following fall recently in right eye and told had cataract in right eye in past at [facility]    Scheduled Monday 2-  at [facility] location with Dr. Smiley    Pt aware of date/time/location at [facility] and has main clinic to call for any generalized questions    Varun Rick RN 5:51 PM 02/25/22

## 2022-03-04 ENCOUNTER — TELEPHONE (OUTPATIENT)
Dept: FAMILY MEDICINE | Facility: CLINIC | Age: 62
End: 2022-03-04
Payer: COMMERCIAL

## 2022-03-04 NOTE — TELEPHONE ENCOUNTER
[facility] phone call message-patient reporting a symptom:     Symptom: pt has not seen the ophthmologist yet. She sees him on Monday.  She has fallen two more times.  She hurt her back, whole left side and butt.  She is wondering if you would be willing to give her something for the pain. She is also wondering what her next step is to getting the medical marIjuana.    Same Day Visit Offered: no     Additional comments:none    OK to leave message on voice mail? Yes    Primary language: English      needed? No    Call taken on March 4, 2022 at 2:02 PM by Juliet Mcintosh

## 2022-03-07 NOTE — TELEPHONE ENCOUNTER
Spoke with patient. Per [facility] she had a visit with opthalmology today that she missed, however she tells me this visit is not until the 14th. She thinks it is on [facility] but is not sure. I am wondering if the 3/14 visit was at [facility]?     He pain since the fall has improved. No further falls.     She does have an appointment with Dr. Patel tomorrow which she will be coming to. At that time she would like to speak with referrals coordinator regarding her optho appointment. She would also like help learning how to use her inhaler from the pharmacist. Routed to Dr. Patel, referrals coordinator. ./LR

## 2022-03-08 ENCOUNTER — OFFICE VISIT (OUTPATIENT)
Dept: FAMILY MEDICINE | Facility: CLINIC | Age: 62
End: 2022-03-08
Payer: COMMERCIAL

## 2022-03-08 ENCOUNTER — OFFICE VISIT (OUTPATIENT)
Dept: PHARMACY | Facility: CLINIC | Age: 62
End: 2022-03-08
Payer: COMMERCIAL

## 2022-03-08 VITALS
BODY MASS INDEX: 18.51 KG/M2 | OXYGEN SATURATION: 99 % | HEART RATE: 83 BPM | WEIGHT: 96.8 LBS | SYSTOLIC BLOOD PRESSURE: 110 MMHG | RESPIRATION RATE: 24 BRPM | TEMPERATURE: 97.9 F | DIASTOLIC BLOOD PRESSURE: 70 MMHG

## 2022-03-08 DIAGNOSIS — J42 CHRONIC BRONCHITIS, UNSPECIFIED CHRONIC BRONCHITIS TYPE (H): ICD-10-CM

## 2022-03-08 DIAGNOSIS — R64 PULMONARY CACHEXIA DUE TO CHRONIC OBSTRUCTIVE PULMONARY DISEASE (H): Primary | ICD-10-CM

## 2022-03-08 DIAGNOSIS — I10 ESSENTIAL HYPERTENSION: ICD-10-CM

## 2022-03-08 DIAGNOSIS — M25.512 CHRONIC LEFT SHOULDER PAIN: ICD-10-CM

## 2022-03-08 DIAGNOSIS — S46.002D INJURY OF LEFT ROTATOR CUFF, SUBSEQUENT ENCOUNTER: ICD-10-CM

## 2022-03-08 DIAGNOSIS — F31.60 BIPOLAR DISORDER, MIXED (H): ICD-10-CM

## 2022-03-08 DIAGNOSIS — G89.29 CHRONIC LEFT SHOULDER PAIN: ICD-10-CM

## 2022-03-08 DIAGNOSIS — F33.1 MAJOR DEPRESSIVE DISORDER, RECURRENT EPISODE, MODERATE (H): ICD-10-CM

## 2022-03-08 DIAGNOSIS — G31.84 MILD COGNITIVE IMPAIRMENT: ICD-10-CM

## 2022-03-08 DIAGNOSIS — J44.9 PULMONARY CACHEXIA DUE TO CHRONIC OBSTRUCTIVE PULMONARY DISEASE (H): Primary | ICD-10-CM

## 2022-03-08 PROCEDURE — 99215 OFFICE O/P EST HI 40 MIN: CPT | Performed by: STUDENT IN AN ORGANIZED HEALTH CARE EDUCATION/TRAINING PROGRAM

## 2022-03-08 PROCEDURE — 99207 PR NO CHARGE LOS: CPT | Performed by: PHARMACIST

## 2022-03-08 RX ORDER — ZINC OXIDE 216 MG/ML
1 LOTION TOPICAL 2 TIMES DAILY
Qty: 6636 ML | Refills: 11 | Status: SHIPPED | OUTPATIENT
Start: 2022-03-08 | End: 2023-11-03

## 2022-03-08 RX ORDER — ACETAMINOPHEN 500 MG
1000 TABLET ORAL 3 TIMES DAILY PRN
Qty: 100 TABLET | Refills: 11 | Status: SHIPPED | OUTPATIENT
Start: 2022-03-08 | End: 2022-03-30

## 2022-03-08 ASSESSMENT — PATIENT HEALTH QUESTIONNAIRE - PHQ9: SUM OF ALL RESPONSES TO PHQ QUESTIONS 1-9: 9
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
## 2022-03-08 NOTE — PROGRESS NOTES
Clinical Pharmacy Consult:                                                    Lisa Scott is a 61 year old female seen for a clinical pharmacist consult.  She was referred to me from Dr. Patel.     Reason for Consult: Learn how to use her Stiolto inhaler.    Discussion: She did not have her inhalers with her and doesn't know her inhaler names very well. I showed her google images pictures to identify them.  She is taking Tudorza Pressair (LAMA)- prescribed 1 puff twice daily, she thinks she is taking it 1 puff once daily.  She is taking albuterol inhaler as needed.  She uses duonebs when her symptoms are more severe.    Per dispense report, she has been filling both Stiolto and Combivent Respimat inhalers monthly since 12/21. Today she endorses that she does not know how to use these inhalers. She was not able to identify that she had both or either of these inhalers when I showed her google image pictures (Combivent has orange cap and Stiolto has green cap). She also was not able to specify whether the inhalers were already put together or if the canister and inhaler were separate in the box.    Plan:     Using a demo inhaler, I tried to first show her how to take off the clear cap to put the canister in, but she was not physically able to take off the clear cap. She became frustrated and the endorsed not feeling well and wanted to leave and go home. She told me to ask the pharmacy to show her how to use it. I also told her I would ask the pharmacy to put the respimat inhalers together for her. I briefly told her and also wrote out the instructions on dosing with both inhalers (green top- Stiolto- 2 puffs once daily every day; orange top- Combivent- 1 puff 4 times daily as needed). Informed her to discontinue Tudorza; informed pharmacy.    I called [facility] and requested that they put together the Stolto and Combivent for her. They will put a note in her file to do this, and said it was best if the pt also  reminded them. They said they would not do the priming for the new inhalers.  I requested and they agreed to educate her on how to prime and use the Respimat inhalers the next time she comes in.    I recommend she brings in all her inhalers to her next visit and go over them again as she did not seem to understand her inhalers very well.    Lorri Page, Pharm.D.

## 2022-03-08 NOTE — PATIENT INSTRUCTIONS
1) Make an appointment with Dr. Paul to do the behavioral health medical cannabis evaluation.  After you see Dr. Paul you will need to see one of our clinic's medical cannabis certifiers (likely Dr. Zee). We will get you scheduled after your visit with Dr. Paul is done.    2) Call to make a physical therapy appointment for your shoulder: [facility], [phone]    3) Call you , Tracy Freeman, for assistance with connecting with a new psychiatrist.     4) STOP Tudorza (current inhaler with green cap)    5) Start 2 new inhalers like I showed you: one has an orange cap (Combivent) and one has a green cap (Stiolto). I will ask pharmacy to put together for you.  Stiolto (green cap): use 2 puffs once daily every day  Combivent (orange cap): use 1 puff 4 times a day IF NEEDED for shortness of breath. If still need more, then use albuterol.    Your doctor has put in a mental health referral for psychotherapy and/or psychiatry treatment. Our behavioral health team will evaluate your referral to see if they have any more recommendations or if they have openings for therapy in-house at [facility]. Our referral team would then contact you within 1-2 weeks with this update. However, if you want to get started for psychotherapy or schedule with a psychiatrist sooner, we recommend you call one or two of the following resources.  Let your doctor know if you do connect with a therapist or psychiatrist.     Community Mental Health Agencies:    [facility]   [address]   [address]  [place] [address]  [phone] (for appointments)    [facility] [place] [facility]  [address]  [address]  [place] [address]  Phone:  [phone]    [facility]  [address]  [place] [address] [phone]    [facility]  [address]  [address]  [place], [place] [address]  [phone]    [facility]  [address]  [place] [address]  [phone]    Community Psychiatry Agencies:    [facility]  [address]  [address]  [place], [place] [address]  [phone]    [facility] [place] [facility]  [address]   [address]  [place] [address]  [phone] (for appointments)    [facility] [place]  [address]  [address]  [place] [address]  Phone:  [phone]    [facility]  [address]  [address]  [place] [address]  [phone]    [facility]  [address]  [place] [address]  [phone]    [facility] - offers psychiatry and psychology services across the lifespan  [facility]  [address] [place] [address]  [address]  [place], [place]  [phone]    4) I printed out your Ensure prescription.

## 2022-03-11 DIAGNOSIS — M54.30 SCIATICA, UNSPECIFIED LATERALITY: ICD-10-CM

## 2022-03-14 ENCOUNTER — OFFICE VISIT (OUTPATIENT)
Dept: OPHTHALMOLOGY | Facility: CLINIC | Age: 62
End: 2022-03-14
Attending: OPHTHALMOLOGY
Payer: COMMERCIAL

## 2022-03-14 DIAGNOSIS — H04.123 DRY EYE SYNDROME, BILATERAL: ICD-10-CM

## 2022-03-14 DIAGNOSIS — H25.13 SENILE NUCLEAR SCLEROSIS, BILATERAL: Primary | ICD-10-CM

## 2022-03-14 PROCEDURE — G0463 HOSPITAL OUTPT CLINIC VISIT: HCPCS | Mod: 25

## 2022-03-14 PROCEDURE — 99204 OFFICE O/P NEW MOD 45 MIN: CPT | Mod: GC | Performed by: OPHTHALMOLOGY

## 2022-03-14 PROCEDURE — 92015 DETERMINE REFRACTIVE STATE: CPT

## 2022-03-14 ASSESSMENT — VISUAL ACUITY
OS_PH_SC: 20/30
OS_PH_SC+: -1
METHOD: SNELLEN - LINEAR
OS_SC: 20/70
OD_PH_SC+: -2
OD_SC: 20/50
OD_PH_SC: 20/25

## 2022-03-14 ASSESSMENT — TONOMETRY
OS_IOP_MMHG: 17
OD_IOP_MMHG: 20
IOP_METHOD: TONOPEN

## 2022-03-14 ASSESSMENT — CONF VISUAL FIELD
OD_NORMAL: 1
METHOD: COUNTING FINGERS
OS_NORMAL: 1

## 2022-03-14 ASSESSMENT — EXTERNAL EXAM - RIGHT EYE: OD_EXAM: NORMAL

## 2022-03-14 ASSESSMENT — REFRACTION_WEARINGRX
OS_CYLINDER: SPHERE
OD_CYLINDER: SPHERE
OD_SPHERE: +2.50
OS_SPHERE: +2.50

## 2022-03-14 ASSESSMENT — REFRACTION_MANIFEST
OD_SPHERE: +0.25
OS_SPHERE: +0.75
OD_CYLINDER: +0.50
OD_AXIS: 010
OS_CYLINDER: +0.50
OS_AXIS: 160
OS_ADD: +2.75
OD_ADD: +2.75

## 2022-03-14 ASSESSMENT — SLIT LAMP EXAM - LIDS
COMMENTS: NORMAL
COMMENTS: NORMAL

## 2022-03-14 ASSESSMENT — CUP TO DISC RATIO
OS_RATIO: 0.3
OD_RATIO: 0.3

## 2022-03-14 ASSESSMENT — EXTERNAL EXAM - LEFT EYE: OS_EXAM: NORMAL

## 2022-03-14 NOTE — NURSING NOTE
Chief Complaints and History of Present Illnesses   Patient presents with     Vision Loss Both Eyes     Chief Complaint(s) and History of Present Illness(es)     Vision Loss Both Eyes     Laterality: both eyes    Course: gradually worsening    Associated symptoms: headache, dryness, eye pain and floaters    Treatments tried: artificial tears    Pain scale: 7/10              Comments     Patient states that she has fallen three times int he past year.  She feel like she has lost vision (not sure which eye) since her last fall.  She tells me that her doctor sent her here for a vision evaluation.  She had an eye exam at [facility] 18 months ago, was given glasses but she could not adapt to them, so now just wears OTC reading glasses.    Nita Miranda, COT 2:16 PM  March 14, 2022

## 2022-03-14 NOTE — PROGRESS NOTES
I have confirmed the patient's and reviewed Past Medical History, Past Surgical History, Social History, Family History, Problem List, Medication List and agree with Tech note.    CC -  Blurred vision.     INTERVAL HISTORY - Initial visit    HPI -   Lisa Scott is a  61 year old year-old patient presenting for subjective blurred vision in both eyes, and eye irritation. She is monocular diplopia that improves with blinking.     History of slip and falls. No Sudden darkening of vision.     No flashing lights or floaters.     Has history of HTN.       PAST OCULAR SURGERY  denies    ASSESSMENT & PLAN    # dry eyes  - gave AT samples and recommend AT 4x  - return if symptoms not resolved    # cataract  - not visually significant, monitor      return to clinic: 1 year        Harsh Ferrer MD  Vitreoretinal Surgery Fellow  [facility]      Attestation:  I have seen and examined the patient with Dr. Harsh Ferrer MD and agree with the findings in this note,   Maria Alejandra Giordano MD PhD.  Professor & Chair

## 2022-03-15 ENCOUNTER — TELEPHONE (OUTPATIENT)
Dept: FAMILY MEDICINE | Facility: CLINIC | Age: 62
End: 2022-03-15
Payer: COMMERCIAL

## 2022-03-15 NOTE — TELEPHONE ENCOUNTER
Nutritional Supplement LIQD (Ensure Vanilla Liquid) does not cover by patient's insurance plan per pharmacy.    Want to do PA?    Please advise.    LINN PayanA

## 2022-03-16 ENCOUNTER — TELEPHONE (OUTPATIENT)
Dept: FAMILY MEDICINE | Facility: CLINIC | Age: 62
End: 2022-03-16
Payer: COMMERCIAL

## 2022-03-16 NOTE — TELEPHONE ENCOUNTER
[facility] phone call message- general phone call:    Reason for call: Pt went to see the ophthalmologist yesterday. She would like to discuss this with Dr Patel and also talk about poss shoulder surgery.    Return call needed: Yes    OK to leave a message on voice mail? Yes    Primary language: English      needed? No    Call taken on March 16, 2022 at 9:44 AM by Juliet Mcintosh

## 2022-03-17 NOTE — TELEPHONE ENCOUNTER
RCT patient and she only wanted to talk to Dr. Patel.   Advised her that I have sent her message to Dr. Patel who's currently not in clinic but she will call her as soon as she can.     Cris Sandhu DNP, BSN, RN, PHN

## 2022-03-30 ENCOUNTER — VIRTUAL VISIT (OUTPATIENT)
Dept: FAMILY MEDICINE | Facility: CLINIC | Age: 62
End: 2022-03-30
Payer: COMMERCIAL

## 2022-03-30 DIAGNOSIS — J42 CHRONIC BRONCHITIS, UNSPECIFIED CHRONIC BRONCHITIS TYPE (H): ICD-10-CM

## 2022-03-30 DIAGNOSIS — S46.002D INJURY OF LEFT ROTATOR CUFF, SUBSEQUENT ENCOUNTER: ICD-10-CM

## 2022-03-30 DIAGNOSIS — H04.123 DRY EYES: ICD-10-CM

## 2022-03-30 DIAGNOSIS — M75.122 COMPLETE TEAR OF LEFT ROTATOR CUFF, UNSPECIFIED WHETHER TRAUMATIC: Primary | ICD-10-CM

## 2022-03-30 PROCEDURE — 99214 OFFICE O/P EST MOD 30 MIN: CPT | Mod: TEL | Performed by: STUDENT IN AN ORGANIZED HEALTH CARE EDUCATION/TRAINING PROGRAM

## 2022-03-30 RX ORDER — POLYVINYL ALCOHOL 14 MG/ML
2 SOLUTION/ DROPS OPHTHALMIC PRN
Qty: 30 ML | Refills: 1 | Status: SHIPPED | OUTPATIENT
Start: 2022-03-30 | End: 2023-04-18

## 2022-03-30 RX ORDER — INHALER, ASSIST DEVICES
SPACER (EA) MISCELLANEOUS
Qty: 1 EACH | Refills: 0 | Status: SHIPPED | OUTPATIENT
Start: 2022-03-30

## 2022-03-30 RX ORDER — ACETAMINOPHEN 500 MG
1000 TABLET ORAL 3 TIMES DAILY PRN
Qty: 180 TABLET | Refills: 11 | Status: SHIPPED | OUTPATIENT
Start: 2022-03-30 | End: 2022-12-15

## 2022-03-30 NOTE — PROGRESS NOTES
"Family Practice Telehealth Clinic Visit: in Lieu of Clinic Visit for today (COVID-19 precautions)    Phone call outcome: Spoke with patient over the phone.     Verify name: yes  Verify : yes  Patient consents to a telephone visit: yes      S:  Ms. Scott is a 61yoF for follow up, 3 major concerns:    1) Pt went to eye doctor for eye drops but lost drops and eye prescription, wondering if I can help her w this. Left them in the cab on the way back.    2) Primary concern: wondering if she can have a shoulder surgery - causing lots of problems, affecting her whole left side, right side is hurting  States she is a basket case and the shoulder if keeping her from doing things like visiting family or getting things done around the house.   Left shoulder is \"causing all her problems\"  Pt has been having a lot of pain, has tried ice, tylenol. Biofreeze works well but very expensive. Waiting on medical cannabis still.  Has had a R shoulder injection and it was not very helpful and was painful.   Wants referral for surgery.     3) Still working with [facility], medical cannabis -     4) Hasn't learned to use inhaler - needs to learn.    O:  GEN: WD/WN in NAD  RESP: no respiratory distress   NEURO: A&Ox3, speech clear  PSYCH: Somewhat pressured speech, mild emotional distress.   DERM: No visible rashes    A/P: Lisa Scott is a 61 year old female with f/u for 3 main issues - shoulder pain, lost Rx's after ophthalmology, and needs to learn to use inhaler, also needs connection to psychiatry and medical cannabis psych evalMarcia Gonzalez was seen today for follow up and medication reconciliation.    Diagnoses and all orders for this visit:    Complete tear of left rotator cuff, unspecified whether traumatic:   - can try shoulder injection when we see each other next  - Advised to contact PT for supervised tx and pain management  -     Orthopedic  Referral; Future - discussed there will be some delay before surgery could " occur, so need to manage pain w other modalities now  -     acetaminophen-codeine (TYLENOL #3) 300-30 MG tablet; Take 1 tablet by mouth daily as needed for severe pain - #10 tablets sent to pharmacy  - Continue tylenol    Injury of left rotator cuff, subsequent encounter  -     acetaminophen (TYLENOL) 500 MG tablet; Take 2 tablets (1,000 mg) by mouth 3 times daily as needed for mild pain    Chronic bronchitis, unspecified chronic bronchitis type (H): Asked Judy to schedule w pharmacy for instruction. New spacer.  -     spacer (OPTICHAMBER FAZAL) holding chamber; Use with inhalers as indicated.    Dry eyes:   - Call back ophthalmology office to get new copy of eye Rx.  -     polyvinyl alcohol (LIQUIFILM TEARS) 1.4 % ophthalmic solution; Place 2 drops into both eyes as needed for dry eyes    Bipolar disorder, memory deficits: Pt has a , Tracy Freeman - advised she work with her on scheduling w psychiatry. Also with arranging any further evaluations for CADI waiver.     No future appointments.  F/u 3-4 weeks in person    Total phone time 28 minutes    Mirian Patel MD    There are no Patient Instructions on file for this visit.

## 2022-03-30 NOTE — Clinical Note
Hey! I think we talked about follow up for Ms. Scott but I don't see scheduled follow up in the chart.  Did she decline follow up for now?   Thanks! Dr. Patel

## 2022-03-31 ENCOUNTER — TELEPHONE (OUTPATIENT)
Dept: FAMILY MEDICINE | Facility: CLINIC | Age: 62
End: 2022-03-31
Payer: COMMERCIAL

## 2022-03-31 DIAGNOSIS — J42 CHRONIC BRONCHITIS, UNSPECIFIED CHRONIC BRONCHITIS TYPE (H): ICD-10-CM

## 2022-03-31 NOTE — TELEPHONE ENCOUNTER
Copy Dr. Patel's notes for documentation purpose    Mirian Patel MD Sheng Yang, Mai Hey! I think we talked about follow up for Ms. Scott but I don't see scheduled follow up in the chart.  Did she decline follow up for now?   Thanks!  Dr. Patel

## 2022-03-31 NOTE — TELEPHONE ENCOUNTER
Called , no answer and left message for patient to call the clinic back to make appointment.     If patient call back, please schedule patient with Dr. Patel for 40 min.    KONG Payan

## 2022-04-01 ENCOUNTER — TELEPHONE (OUTPATIENT)
Dept: FAMILY MEDICINE | Facility: CLINIC | Age: 62
End: 2022-04-01
Payer: COMMERCIAL

## 2022-04-01 ENCOUNTER — TELEPHONE (OUTPATIENT)
Dept: FAMILY MEDICINE | Facility: CLINIC | Age: 62
End: 2022-04-01

## 2022-04-01 NOTE — TELEPHONE ENCOUNTER
[facility] phone call message- general phone call:    Reason for call: ?    Action desired: ?      Return call needed: Yes    OK to leave a message on voice mail? Yes    Advised patient to response may take up to 2 business days: Yes    Primary language: English      needed? No    Call taken on April 1, 2022 at 12:05 PM by Ana Serna

## 2022-04-04 RX ORDER — ALBUTEROL SULFATE 90 UG/1
2 AEROSOL, METERED RESPIRATORY (INHALATION) EVERY 6 HOURS PRN
Qty: 8.5 G | Refills: 0 | Status: SHIPPED | OUTPATIENT
Start: 2022-04-04 | End: 2022-07-06

## 2022-04-04 NOTE — TELEPHONE ENCOUNTER
Third attempted to call and left detailed message to call clinic back. Please advise if you would like for me to send a letter. Jaymie TRIANA

## 2022-04-05 ENCOUNTER — TELEPHONE (OUTPATIENT)
Dept: FAMILY MEDICINE | Facility: CLINIC | Age: 62
End: 2022-04-05
Payer: COMMERCIAL

## 2022-04-05 NOTE — LETTER
April 5, 2022      Lisa Scott  [address]  [address]  [place] [address]        Dear Lisa,  I am one of the Care Coordinators at [facility]. I have been trying to reach your today via phone. However, my attempts have not been successful. How are your doing and feeling? I pray that all thing are going well. Dr. Paul asked that I reach out to you regarding getting you reconnected to Community Mental Health. If you need help doing so or if you simply need to speak with one of us please feel free to call the clinic office.    My suggestions  [facility]   [address]  [place] [address]  Phone: [phone]  Fax: [phone]      [address]  [place], [place] [address]  Phone:[phone]      ([facility]  [address]  [place] [address]  Phone: [phone]  Fax: [phone]                  Sincerely,          Juan Daniel Zavala Sr.  Care Coordinator  [address] [address]  [email or web address]   Office: [phone]  Direct: [phone]  [facility]

## 2022-04-05 NOTE — TELEPHONE ENCOUNTER
04/05/22: Care Coordination     I reached to Ms. Scott this morning to try and get her connected with community Mental Health. However, her phone is not accepting knew messages at this time. Therefore, I am sending her letter with a few suggestions for [facility]. I will follow up with another call in a few weeks.    My letter with resources:    April 5, 2022      Lisa cSott  [address]  [place] [address]        Dear Lisa,  I am one of the Care Coordinators at [facility]. I have been trying to reach your today via phone. However, my attempts have not been successful. How are your doing and feeling? I pray that all thing are going well. Dr. Paul asked that I reach out to you regarding getting you reconnected to [facility] Mental Health. If you need help doing so or if you simply need to speak with one of us please feel free to call the clinic office.    My suggestions  [facility]   [address]  [place] [address]  Phone: [phone]  Fax: [phone]      [address]  [place], [place] [address]  Phone:[phone]      ([facility]  [address]  [place] [address]  Phone: [phone]  Fax: [phone]        Sincerely,          Juan Daniel Zavala Sr.  Care Coordinator  [address] [address]  [email or web address]   Office: [phone]  Direct: [phone]  [facility]

## 2022-04-08 DIAGNOSIS — M54.12 CERVICAL RADICULOPATHY: ICD-10-CM

## 2022-04-08 DIAGNOSIS — M54.2 CERVICALGIA: ICD-10-CM

## 2022-04-08 RX ORDER — ACLIDINIUM BROMIDE 400 UG/1
400 POWDER, METERED RESPIRATORY (INHALATION) 2 TIMES DAILY
COMMUNITY
Start: 2022-03-07 | End: 2022-05-17

## 2022-04-08 RX ORDER — GABAPENTIN 300 MG/1
CAPSULE ORAL
Qty: 180 CAPSULE | Refills: 3 | Status: SHIPPED | OUTPATIENT
Start: 2022-04-08 | End: 2022-08-15

## 2022-04-12 RX ORDER — ACLIDINIUM BROMIDE 400 UG/1
1 POWDER, METERED RESPIRATORY (INHALATION) 2 TIMES DAILY
Qty: 400 EACH | OUTPATIENT
Start: 2022-04-12

## 2022-04-12 NOTE — TELEPHONE ENCOUNTER
Patient has transferred care to new PCP, Dr Patel. Refills should be sent to her office. Also, per my review of records, Tudorza was discontinued on 3/8/22 during visit w/ PharmD.     Nikolay Cook MD  April 12, 2022  5:27 PM

## 2022-04-19 DIAGNOSIS — J30.2 SEASONAL ALLERGIC RHINITIS, UNSPECIFIED TRIGGER: ICD-10-CM

## 2022-04-21 DIAGNOSIS — J30.2 SEASONAL ALLERGIC RHINITIS, UNSPECIFIED TRIGGER: ICD-10-CM

## 2022-04-22 RX ORDER — FLUTICASONE PROPIONATE 50 MCG
SPRAY, SUSPENSION (ML) NASAL
Qty: 16 G | Refills: 2 | Status: SHIPPED | OUTPATIENT
Start: 2022-04-22 | End: 2022-09-08

## 2022-04-22 RX ORDER — FLUTICASONE PROPIONATE 50 MCG
2 SPRAY, SUSPENSION (ML) NASAL DAILY
Qty: 15.8 ML | Refills: 3 | Status: SHIPPED | OUTPATIENT
Start: 2022-04-22 | End: 2022-05-17

## 2022-05-10 ENCOUNTER — TELEPHONE (OUTPATIENT)
Dept: CARE COORDINATION | Facility: OTHER | Age: 62
End: 2022-05-10

## 2022-05-10 ENCOUNTER — TELEPHONE (OUTPATIENT)
Dept: PSYCHOLOGY | Facility: CLINIC | Age: 62
End: 2022-05-10
Payer: COMMERCIAL

## 2022-05-10 NOTE — TELEPHONE ENCOUNTER
5/10/22:Care Coordination     Behavioral Health reported today that Ms. Scott was a no show for her appointment today. We discussed strategy's to help Ms. Scott life a little easer such as; Reminder calls for appointments, Rides to and from her appointments, Medications being delivered.     Dr. Paul and I are also working with Ms Scott's  to help her remain in her apartment. Dr. Mirian Patel also requested that  Requested that we offer to get Ms. Scott rescheduled with her.     I called Ms. Scott this afternoon, however, my call was sent to voice mail. I did leave a message with my contact info and am following up with a letter.    My letter to Ms. Scott:    May 10, 2022      Lisa Scott  [address] [place] [address]        Dear Lisa,  I am one of the Care Coordinators at [facility]. I have been trying to reach you via phone. However, my attempts have not been successful. I am writing you to check on you and offer my support. Your medical team is concerned about your missed appointments and would like to offer the opportunity to reschedule with them if you are interested.      I also wanted to discuss with you strategy's that we think will help you make it to your appointments such as; Reminder calls from the Care Coordination Staff, Scheduling transportation to and from your medical appointments as well as the rescheduling of missed appointments.     We also found pharmacy's that can deliver your medications which should help your with missed medications. Please review the list below for pharmacy's that can deliver your medications. If you have questions moving forward, feel free to contact me directly.    [facility]  [address] [place] [address] [address], [place] [address]  [phone]    [facility]  [address] [place] [address]  [phone]      Sincerely,      Juan Daniel Zavala Sr.  Care Coordinator  [address] [place] [address]  [email or web address]   Office: [phone]  Direct: [phone]  [facility]

## 2022-05-10 NOTE — LETTER
May 10, 2022      Lisa Scott  [address]  [place] [address]        Dear Lisa,  I am one of the Care Coordinators at [facility]. I have been trying to reach you via phone. However, my attempts have not been successful. I am writing you to check on you and offer my support. Your medical team is concerned about your missed appointments and would like to offer the opportunity to reschedule with them if you are interested.      I also wanted to discuss with you strategy's that we think will help you make it to your appointments such as; Reminder calls from the Care Coordination Staff, Scheduling transportation to and from your medical appointments as well as the rescheduling of missed appointments.     We also found pharmacy's that can deliver your medications which should help your with missed medications. Please review the list below for pharmacy's that can deliver your medications. If you have questions moving forward, feel free to contact me directly.    [facility]  [address] [place], [place] [address] [address]  [phone]    [facility]  [address] [place] [address]  [phone]      Sincerely,      Juan Daniel Zavala Sr.  Care Coordinator  [address] [address]  [email or web address]   Office: [phone]  Direct: [phone]  [facility]

## 2022-05-10 NOTE — TELEPHONE ENCOUNTER
Placed call to Tracy Freeman ( through [facility]) to discuss Lisa's services, given her frequent no shows to clinic and lack of connection to mental health services.     Housing   -lives in public housing with no formal supports, although  (Anita) has been helpful   -was at risk of losing housing since she was $2000 behind in rent   -manager suspects patient is being taken advantage of by another resident in the building   -Tracy notes a decline in Lisa's presentation since a year ago when she moved into the building   -Lisa declined a program that would help with house keeping and delivering some meals because she did not want to pay for service   -Tracy and Anita suspect she is frequently falling because she has frequent bruises on her body   -Tracy is hoping to find Lisa a more supportive living arrangement, perhaps through a [employer] housing program  -Lisa is resistant to assisted living but Tracy believes this might be the best option    CADI Waiver   -Tracy reports that  helped fill out online intake for assessment and patient was denied  -this may be because Lisa does not typically answer her phone   -Tracy told by [facility] that she cannot re-apply until July 2022     Transportation  -has medical transportation but unclear if Lisa is able to remember to schedule rides for appointments    Medications   -Tracy asked if prescriptions could be mailed to patient because she has a difficult time picking them up     Therapy/psychiatry  -Tracy referred patient to [facility] (?) but patient did to make intake appointments   -Records indicate clinic SW scheduling psychiatry intake through [facility] and patient no showing     PCA   -discussed benefits of PCA services but only some insurances will cover     Referrals   -Tracy notes that Lisa often does not follow up on referrals or answer the phone   -Lisa avoids Tracy sometimes    Plan:      Routing to PCP (Dr. Patel) to see if prescriptions can be delivered    Discussed case with SW team (Juan Daniel and Erika) and helping patient more with scheduling rides to appointments     Diallo [place], PhD  she/her/hers  Primary Care Behavioral Health Fellow

## 2022-05-17 ENCOUNTER — OFFICE VISIT (OUTPATIENT)
Dept: FAMILY MEDICINE | Facility: CLINIC | Age: 62
End: 2022-05-17
Payer: COMMERCIAL

## 2022-05-17 ENCOUNTER — OFFICE VISIT (OUTPATIENT)
Dept: PHARMACY | Facility: CLINIC | Age: 62
End: 2022-05-17
Payer: COMMERCIAL

## 2022-05-17 VITALS
SYSTOLIC BLOOD PRESSURE: 185 MMHG | TEMPERATURE: 98.5 F | WEIGHT: 105.6 LBS | OXYGEN SATURATION: 100 % | BODY MASS INDEX: 20.2 KG/M2 | HEART RATE: 65 BPM | DIASTOLIC BLOOD PRESSURE: 109 MMHG | RESPIRATION RATE: 20 BRPM

## 2022-05-17 DIAGNOSIS — J30.2 SEASONAL ALLERGIC RHINITIS, UNSPECIFIED TRIGGER: Primary | ICD-10-CM

## 2022-05-17 DIAGNOSIS — J30.89 NON-SEASONAL ALLERGIC RHINITIS, UNSPECIFIED TRIGGER: ICD-10-CM

## 2022-05-17 DIAGNOSIS — Z86.2 HISTORY OF IRON DEFICIENCY ANEMIA: ICD-10-CM

## 2022-05-17 DIAGNOSIS — Z23 HIGH PRIORITY FOR 2019-NCOV VACCINE: ICD-10-CM

## 2022-05-17 DIAGNOSIS — I10 ESSENTIAL HYPERTENSION: ICD-10-CM

## 2022-05-17 DIAGNOSIS — R73.09 ELEVATED GLUCOSE: ICD-10-CM

## 2022-05-17 DIAGNOSIS — J42 CHRONIC BRONCHITIS, UNSPECIFIED CHRONIC BRONCHITIS TYPE (H): ICD-10-CM

## 2022-05-17 DIAGNOSIS — Z13.220 LIPID SCREENING: ICD-10-CM

## 2022-05-17 LAB
ALBUMIN SERPL-MCNC: 4.2 G/DL (ref 3.5–5)
ALP SERPL-CCNC: 117 U/L (ref 45–120)
ALT SERPL W P-5'-P-CCNC: 11 U/L (ref 0–45)
ANION GAP SERPL CALCULATED.3IONS-SCNC: 13 MMOL/L (ref 5–18)
AST SERPL W P-5'-P-CCNC: 14 U/L (ref 0–40)
BILIRUB SERPL-MCNC: 0.2 MG/DL (ref 0–1)
BUN SERPL-MCNC: 8 MG/DL (ref 8–22)
CALCIUM SERPL-MCNC: 9.4 MG/DL (ref 8.5–10.5)
CHLORIDE BLD-SCNC: 102 MMOL/L (ref 98–107)
CHOLEST SERPL-MCNC: 186 MG/DL
CO2 SERPL-SCNC: 22 MMOL/L (ref 22–31)
CREAT SERPL-MCNC: 0.74 MG/DL (ref 0.6–1.1)
ERYTHROCYTE [DISTWIDTH] IN BLOOD BY AUTOMATED COUNT: 13.5 % (ref 10–15)
FASTING STATUS PATIENT QL REPORTED: NORMAL
FERRITIN SERPL-MCNC: 21 NG/ML (ref 10–130)
GFR SERPL CREATININE-BSD FRML MDRD: >90 ML/MIN/1.73M2
GLUCOSE BLD-MCNC: 73 MG/DL (ref 70–125)
HBA1C MFR BLD: 5.5 % (ref 0–5.6)
HCT VFR BLD AUTO: 40.3 % (ref 35–47)
HDLC SERPL-MCNC: 65 MG/DL
HGB BLD-MCNC: 12.9 G/DL (ref 11.7–15.7)
IRON SATN MFR SERPL: 12 % (ref 20–50)
IRON SERPL-MCNC: 53 UG/DL (ref 42–175)
IRON SERPL-MCNC: 53 UG/DL (ref 42–175)
LDLC SERPL CALC-MCNC: 99 MG/DL
MCH RBC QN AUTO: 29.6 PG (ref 26.5–33)
MCHC RBC AUTO-ENTMCNC: 32 G/DL (ref 31.5–36.5)
MCV RBC AUTO: 92 FL (ref 78–100)
PLATELET # BLD AUTO: 269 10E3/UL (ref 150–450)
POTASSIUM BLD-SCNC: 4.2 MMOL/L (ref 3.5–5)
PROT SERPL-MCNC: 7.4 G/DL (ref 6–8)
RBC # BLD AUTO: 4.36 10E6/UL (ref 3.8–5.2)
SODIUM SERPL-SCNC: 137 MMOL/L (ref 136–145)
TIBC SERPL-MCNC: 446 UG/DL (ref 313–563)
TRANSFERRIN SERPL-MCNC: 357 MG/DL (ref 212–360)
TRIGL SERPL-MCNC: 108 MG/DL
WBC # BLD AUTO: 6.5 10E3/UL (ref 4–11)

## 2022-05-17 PROCEDURE — 99607 MTMS BY PHARM ADDL 15 MIN: CPT | Performed by: PHARMACIST

## 2022-05-17 PROCEDURE — 80061 LIPID PANEL: CPT | Performed by: STUDENT IN AN ORGANIZED HEALTH CARE EDUCATION/TRAINING PROGRAM

## 2022-05-17 PROCEDURE — 84466 ASSAY OF TRANSFERRIN: CPT | Performed by: STUDENT IN AN ORGANIZED HEALTH CARE EDUCATION/TRAINING PROGRAM

## 2022-05-17 PROCEDURE — 85027 COMPLETE CBC AUTOMATED: CPT | Performed by: STUDENT IN AN ORGANIZED HEALTH CARE EDUCATION/TRAINING PROGRAM

## 2022-05-17 PROCEDURE — 91306 COVID-19,PF,MODERNA (18+ YRS BOOSTER .25ML): CPT | Performed by: STUDENT IN AN ORGANIZED HEALTH CARE EDUCATION/TRAINING PROGRAM

## 2022-05-17 PROCEDURE — 0064A COVID-19,PF,MODERNA (18+ YRS BOOSTER .25ML): CPT | Performed by: STUDENT IN AN ORGANIZED HEALTH CARE EDUCATION/TRAINING PROGRAM

## 2022-05-17 PROCEDURE — 80053 COMPREHEN METABOLIC PANEL: CPT | Performed by: STUDENT IN AN ORGANIZED HEALTH CARE EDUCATION/TRAINING PROGRAM

## 2022-05-17 PROCEDURE — 83036 HEMOGLOBIN GLYCOSYLATED A1C: CPT | Performed by: STUDENT IN AN ORGANIZED HEALTH CARE EDUCATION/TRAINING PROGRAM

## 2022-05-17 PROCEDURE — 83540 ASSAY OF IRON: CPT | Performed by: STUDENT IN AN ORGANIZED HEALTH CARE EDUCATION/TRAINING PROGRAM

## 2022-05-17 PROCEDURE — 99214 OFFICE O/P EST MOD 30 MIN: CPT | Mod: 25 | Performed by: STUDENT IN AN ORGANIZED HEALTH CARE EDUCATION/TRAINING PROGRAM

## 2022-05-17 PROCEDURE — 82728 ASSAY OF FERRITIN: CPT | Performed by: STUDENT IN AN ORGANIZED HEALTH CARE EDUCATION/TRAINING PROGRAM

## 2022-05-17 PROCEDURE — 36415 COLL VENOUS BLD VENIPUNCTURE: CPT | Performed by: STUDENT IN AN ORGANIZED HEALTH CARE EDUCATION/TRAINING PROGRAM

## 2022-05-17 PROCEDURE — 99605 MTMS BY PHARM NP 15 MIN: CPT | Performed by: PHARMACIST

## 2022-05-17 RX ORDER — ECHINACEA PURPUREA EXTRACT 125 MG
TABLET ORAL
Qty: 30 ML | Refills: 3 | Status: SHIPPED | OUTPATIENT
Start: 2022-05-17 | End: 2023-07-05

## 2022-05-17 RX ORDER — CETIRIZINE HYDROCHLORIDE 10 MG/1
10 TABLET ORAL DAILY PRN
Qty: 30 TABLET | Refills: 3 | Status: SHIPPED | OUTPATIENT
Start: 2022-05-17 | End: 2022-09-08

## 2022-05-17 RX ORDER — AMLODIPINE BESYLATE 5 MG/1
5 TABLET ORAL DAILY
Qty: 30 TABLET | Refills: 0 | Status: SHIPPED | OUTPATIENT
Start: 2022-05-17 | End: 2022-05-17

## 2022-05-17 RX ORDER — OLOPATADINE HYDROCHLORIDE 1 MG/ML
1 SOLUTION/ DROPS OPHTHALMIC 2 TIMES DAILY
Qty: 5 ML | Refills: 1 | Status: SHIPPED | OUTPATIENT
Start: 2022-05-17 | End: 2022-10-05

## 2022-05-17 NOTE — PROGRESS NOTES
Preceptor Attestation:    I discussed the patient with the resident and evaluated the patient in person. I have verified the content of the note, which accurately reflects my assessment of the patient and the plan of care.   Supervising Physician:  Jeremy Archuleta MD.

## 2022-05-17 NOTE — PATIENT INSTRUCTIONS
STOP Tudorza inhaler (green)  STOP albuterol inhaler- only use if still having trouble with new inhalers  STOP duo nebs- only use if still having trouble with new inhalers    START new green inhaler (Stiolto): TWO puffs once daily every day  START new orange inhaler (Combivent): ONE puff up to 4 times daily as needed    START cetirizine daily for allergies (sent prescription)  START flonase nasal spray (should have one ready at [facility])    START NEW BLOOD PRESSURE MEDICINE (AMLODIPINE 5MG)    If still having trouble with your inahlers,  can make an appointment with a pharmacist at [facility].    WHEN YOU SEE DR. PALAFOX, BRING IN ALL MEDICATIONS AND INHALERS AND PILL BOX.
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
## 2022-05-17 NOTE — LETTER
"May 20, 2022      Lisa Scott  [address]  [place] [address]        Therese Gonzalez,     I hope you're well. I wanted to communicate with you the results of the tests that we did.     The laboratory results show normal iron and hemoglobin, normal kidney and liver function. Your \"good\" and \"bad\" cholesterol both look great. Your diabetes test was NORMAL. Please let me know if you have any other questions or concerns.       Resulted Orders   Hemoglobin A1c   Result Value Ref Range    Hemoglobin A1C 5.5 0.0 - 5.6 %      Comment:      Normal <5.7%   Prediabetes 5.7-6.4%    Diabetes 6.5% or higher     Note: Adopted from ADA consensus guidelines.   Lipid panel reflex to direct LDL Fasting   Result Value Ref Range    Cholesterol 186 <=199 mg/dL    Triglycerides 108 <=149 mg/dL    Direct Measure HDL 65 >=50 mg/dL      Comment:      HDL Cholesterol Reference Range:     0-2 years:   No reference ranges established for patients under 2 years old  at [facility] for lipid analytes.    2-8 years:  Greater than 45 mg/dL     18 years and older:   Female: Greater than or equal to 50 mg/dL   Male:   Greater than or equal to 40 mg/dL    LDL Cholesterol Calculated 99 <=129 mg/dL    Patient Fasting > 8hrs? Unknown    CBC with platelets   Result Value Ref Range    WBC Count 6.5 4.0 - 11.0 10e3/uL    RBC Count 4.36 3.80 - 5.20 10e6/uL    Hemoglobin 12.9 11.7 - 15.7 g/dL    Hematocrit 40.3 35.0 - 47.0 %    MCV 92 78 - 100 fL    MCH 29.6 26.5 - 33.0 pg    MCHC 32.0 31.5 - 36.5 g/dL    RDW 13.5 10.0 - 15.0 %    Platelet Count 269 150 - 450 10e3/uL   Comprehensive metabolic panel   Result Value Ref Range    Sodium 137 136 - 145 mmol/L    Potassium 4.2 3.5 - 5.0 mmol/L    Chloride 102 98 - 107 mmol/L    Carbon Dioxide (CO2) 22 22 - 31 mmol/L    Anion Gap 13 5 - 18 mmol/L    Urea Nitrogen 8 8 - 22 mg/dL    Creatinine 0.74 0.60 - 1.10 mg/dL    Calcium 9.4 8.5 - 10.5 mg/dL    Glucose 73 70 - 125 mg/dL    Alkaline " Phosphatase 117 45 - 120 U/L    AST 14 0 - 40 U/L    ALT 11 0 - 45 U/L    Protein Total 7.4 6.0 - 8.0 g/dL    Albumin 4.2 3.5 - 5.0 g/dL    Bilirubin Total 0.2 0.0 - 1.0 mg/dL    GFR Estimate >90 >60 mL/min/1.73m2      Comment:      Effective December 21, 2021 eGFRcr in adults is calculated using the 2021 CKD-EPI creatinine equation which includes age and gender (Jose De Jesus et al., NEJM, DOI: 10.1056/PMBMtg7197712)   Iron Transferrin Saturat   Result Value Ref Range    Iron 53 42 - 175 ug/dL    Transferrin 357 212 - 360 mg/dL    Transferrin Saturation, Calculated 12 (L) 20 - 50 %    Transferrin IBC, Calculated 446 313 - 563 ug/dL   Ferritin   Result Value Ref Range    Ferritin 21 10 - 130 ng/mL   Iron   Result Value Ref Range    Iron 53 42 - 175 ug/dL       If you have any questions or concerns, please call the clinic at the number listed above.       Sincerely,      Lakshmi Samson MD

## 2022-05-17 NOTE — PROGRESS NOTES
Medication Therapy Management (MTM) Encounter    ASSESSMENT:                            Medication Adherence/Access: Uncertain; she appears to know some of her medications but doesn't fill them regularly after running out.    COPD:   She would benefit from learning how to put together and use her Stiolto and Combivent respimats, and switch her current inhalers to the new ones.    Hypertension:   Goal BP: <140/90 due to lower CV risk  Meeting goal BP? No  Elevated BP today may be partially due to running out of her lisinopril and taking a lower dose, then none for several days, then just restarting the 40mg a couple days ago. Also due to no longer taking her amlodipine. Appears that she just ran out and didn't refill it due to misunderstanding of the need to continue it.  Patient would benefit from: Restarting her amlodipine 5mg/d.    Allergic rhinitis:   uncontrolled   Patient would benefit from: Restarting Flonase, cetirizine and eyedrops.    PLAN:                                STOP Tudorza inhaler (green)    STOP albuterol inhaler- only use if still having trouble with new inhalers    STOP duo nebs- only use if still having trouble with new inhalers    START new green inhaler (Stiolto Respimat): TWO puffs once daily every day    START new orange inhaler (Combivent Respimat): ONE puff up to 4 times daily as needed    Showed her videos on how to put together, prime and use her Respimat inhalers. While I observed, she correctly put together and primed her 2 inhalers, and correctly inhaled a puff of the Combivent. I also provided written information.    START cetirizine daily for allergies    START Flonase; Max once daily; educated her on proper technique of spraying     Dr. Samson started pataday eyedrops    Restart amlodipine 5mg/d. Called pharmacy and told them to fill it and get it ready for her.    She was told to bring in ALL her medicines to her next visit to do complete med rec.    If still having trouble  with your inhalers,  can make an appointment with a pharmacist at [facility].    * Before Dr. Samson could discuss the HTN plan with the patient, she left the clinic. *    Follow-up: Not scheduled as she left the clinic.      Medication issues to be addressed at a future visit:      Consider HHN in future for medication set up.    SUBJECTIVE/OBJECTIVE:                          Lisa Scott is a 62 year old female seen for an initial visit. She was referred to me from Dr. Samson. Today's visit is a co-visit with Dr. Samson.     Reason for visit: Initial Medication Therapy Management visit.    Allergies/ADRs: Reviewed in chart  Past Medical History: Reviewed in chart  Social History     Tobacco Use     Smoking status: Former Smoker     Smokeless tobacco: Never Used     Tobacco comment: once in a while   Substance Use Topics     Alcohol use: Not Currently     Alcohol/week: 0.0 standard drinks     Comment: not for 3 yr     Drug use: No     ^Reviewed today    Medication Adherence/Access: She appears to have some issues with running out of medications and not refilling them or not refilling them on time. Other than that, unable to get a good handle on how she takes her medications.    COPD: She was prescribed Stiolto and Combivent inhalers. She hasn't started them yet because her and her boyfriend couldn't figure out how to put them together and use them. Therefore, she is still taking her old inhaled medications:  Tudorza- taking 1 puff daily every day  Albuterol MDI with spacer- using on average 4 times daily  Duonebs- using about 4 times daily  She is having symptoms daily.    Hypertension:  Taking lisinopril 40mg daily. Was out for a while- she endorsed having 5mg tabs and taking 3 tabs daily until last week- NO lisinopril Fri, Sat, maybe Sunday. She said she used to take another BP medicine (did fill amlodipine 5mg 4/11 per pharmacy; did not picked up the amlodipine 5mg that they filled 5/9) but she is not  "taking it now.    BP Readings from Last 6 Encounters:   05/17/22 (!) 185/109   03/08/22 110/70   02/08/22 130/70   12/29/21 (!) 145/90   12/07/21 (!) 144/91   11/24/21 (!) 156/99         The 10-year ASCVD risk score (Essie BROWN Jr., et al., 2013) is: 7.7%    Values used to calculate the score:      Age: 62 years      Sex: Female      Is Non- : No      Diabetic: No      Tobacco smoker: No      Systolic Blood Pressure: 185 mmHg      Is BP treated: Yes      HDL Cholesterol: 65 mg/dL      Total Cholesterol: 186 mg/dL    Allergic rhinitis: Her allergies have returned. She used to take cetirizine last year and it worked but she doesn't have it anymore. She is taking some allergy medicine over the counter now and would like a prescription for cetirizine to go back on it. She also wants to restart her Flonase and wants a prescription. When she used to use it, she used it sometimes daily but sometimes several times per day. Dr. Samson discussed further with her and she endorsed eye symptoms as well.      BP Readings from Last 1 Encounters:   05/17/22 (!) 185/109     Pulse Readings from Last 1 Encounters:   05/17/22 65     Wt Readings from Last 1 Encounters:   05/17/22 105 lb 9.6 oz (47.9 kg)     Ht Readings from Last 1 Encounters:   10/05/21 5' 0.63\" (1.54 m)     Estimated body mass index is 20.2 kg/m  as calculated from the following:    Height as of 10/5/21: 5' 0.63\" (1.54 m).    Weight as of an earlier encounter on 5/17/22: 105 lb 9.6 oz (47.9 kg).    Temp Readings from Last 1 Encounters:   05/17/22 98.5  F (36.9  C) (Oral)       ----------------    Due to time constraints, I was only able to assess the above with the patient today. Will follow-up on other disease states in future encounters      I spent 40 minutes with this patient today. Dr. Patel was provided the recommendations above  via routed note and Dr. Rapp is the authorizing prescriber for this visit through the pharmacist collaborative " practice agreement. A copy of the visit note was provided to the patient's provider(s).    The patient was mailed a summary of these recommendations per Dr. Samson as she left before her visit was completed. See Provider note/AVS from today.     Lorri Page Pharm.D.         Medication Therapy Recommendations  Atopic rhinitis    Current Medication: cetirizine (ZYRTEC) 10 MG tablet   Rationale: Synergistic therapy - Needs additional medication therapy - Indication   Recommendation: Start Medication   Status: Accepted per CPA          Current Medication: fluticasone (FLONASE) 50 MCG/ACT nasal spray   Rationale: Synergistic therapy - Needs additional medication therapy - Indication   Recommendation: Start Medication   Status: Accepted per CPA         COPD (chronic obstructive pulmonary disease) (H)    Current Medication: TUDORZA PRESSAIR 400 MCG/ACT inhaler (Discontinued)   Rationale: More effective medication available - Ineffective medication - Effectiveness   Recommendation: Change Medication - Stiolto Respimat 2.5-2.5 MCG/ACT Aers   Status: Accepted per CPA          Current Medication: albuterol (PROAIR HFA/PROVENTIL HFA/VENTOLIN HFA) 108 (90 Base) MCG/ACT inhaler   Rationale: More effective medication available - Ineffective medication - Effectiveness   Recommendation: Change Medication - Combivent Respimat  MCG/ACT Aers   Status: Accepted per CPA          Current Medication: tiotropium-olodaterol (STIOLTO RESPIMAT) 2.5-2.5 MCG/ACT AERS   Rationale: Does not understand instructions - Adherence - Adherence   Recommendation: Provide Education   Status: Patient Agreed - Adherence/Education

## 2022-05-18 NOTE — RESULT ENCOUNTER NOTE
"Attempted to call patient with results, no answer. Will have MA try to call again.     Hello,    Please call the following patient with the results below. Thank you!    Therese Gonzalez,    I hope you're well. I wanted to communicate with you the results of the tests that we did.     The laboratory results show normal iron and hemoglobin, normal kidney and liver function. Your \"good\" and \"bad\" cholesterol both look great. Your diabetes test was NORMAL. Please let me know if you have any other questions or concerns.     Thank you!  Lakshmi Samson MD PGY3  "

## 2022-05-19 DIAGNOSIS — J44.1 OBSTRUCTIVE CHRONIC BRONCHITIS WITH EXACERBATION (H): Primary | ICD-10-CM

## 2022-05-24 ENCOUNTER — DOCUMENTATION ONLY (OUTPATIENT)
Dept: FAMILY MEDICINE | Facility: CLINIC | Age: 62
End: 2022-05-24
Payer: COMMERCIAL
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
## 2022-05-26 ENCOUNTER — OFFICE VISIT (OUTPATIENT)
Dept: FAMILY MEDICINE | Facility: CLINIC | Age: 62
End: 2022-05-26
Payer: COMMERCIAL

## 2022-05-26 VITALS
OXYGEN SATURATION: 99 % | BODY MASS INDEX: 19.13 KG/M2 | WEIGHT: 100 LBS | RESPIRATION RATE: 18 BRPM | DIASTOLIC BLOOD PRESSURE: 74 MMHG | SYSTOLIC BLOOD PRESSURE: 105 MMHG | HEART RATE: 79 BPM | TEMPERATURE: 97.2 F

## 2022-05-26 DIAGNOSIS — H61.22 IMPACTED CERUMEN OF LEFT EAR: Primary | ICD-10-CM

## 2022-05-26 DIAGNOSIS — M75.122 COMPLETE TEAR OF LEFT ROTATOR CUFF, UNSPECIFIED WHETHER TRAUMATIC: ICD-10-CM

## 2022-05-26 DIAGNOSIS — F31.60 BIPOLAR DISORDER, MIXED (H): ICD-10-CM

## 2022-05-26 PROCEDURE — 99213 OFFICE O/P EST LOW 20 MIN: CPT | Mod: GC

## 2022-05-26 NOTE — PROGRESS NOTES
Patient declined to see pharmacist today. Will attempt at future visit to address medicine changes 5/17/22.     Erica Fernando, Pharm.D., [facility]  [address], [place] [address]  Phone: [phone]  May 26, 2022 at 11:01 AM
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
## 2022-05-27 ENCOUNTER — TELEPHONE (OUTPATIENT)
Dept: FAMILY MEDICINE | Facility: CLINIC | Age: 62
End: 2022-05-27
Payer: COMMERCIAL
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
## 2022-05-27 NOTE — TELEPHONE ENCOUNTER
Patient requesting pain medication states she can barely move both sides and both shoulders are hurting if she does not receive medication she will end up in the ER by the end of the weekend and she does not want to go to the ER. Patient was under the impression that tylenol #3, regular tylenol and an antibiotic was to be ordered for her after her 5/26  Appointment. Patient states she is having a panic attack also related to the amount of pain she is in. Patient crying and yelling during the call. Patient informed that policy of clinic is to discuss pain meds at an appointment not by phone call, patient ended conversation abruptly    Note routed to  and   /BTRN

## 2022-05-27 NOTE — TELEPHONE ENCOUNTER
[facility] phone call message-patient reporting a symptom:     Symptom: pain in rotator cuff / infection     Same Day Visit Offered: was seen     Additional comments:     OK to leave message on voice mail? Yes    Primary language: English      needed? No    Call taken on May 27, 2022 at 2:07 PM by Rick Bronson
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
## 2022-05-28 DIAGNOSIS — M54.30 SCIATICA, UNSPECIFIED LATERALITY: ICD-10-CM

## 2022-05-31 NOTE — PROGRESS NOTES
Preceptor Attestation:    I discussed the patient with the resident and evaluated the patient in person. I have verified the content of the note, which accurately reflects my assessment of the patient and the plan of care.   Supervising Physician:  Carlo Ward MD.

## 2022-06-07 DIAGNOSIS — M25.512 CHRONIC LEFT SHOULDER PAIN: ICD-10-CM

## 2022-06-07 DIAGNOSIS — G89.29 CHRONIC LEFT SHOULDER PAIN: ICD-10-CM

## 2022-06-07 DIAGNOSIS — M16.10 ARTHRITIS OF HIP: ICD-10-CM

## 2022-06-08 ENCOUNTER — OFFICE VISIT (OUTPATIENT)
Dept: FAMILY MEDICINE | Facility: CLINIC | Age: 62
End: 2022-06-08
Payer: COMMERCIAL

## 2022-06-08 VITALS
SYSTOLIC BLOOD PRESSURE: 129 MMHG | RESPIRATION RATE: 20 BRPM | HEART RATE: 84 BPM | HEIGHT: 60 IN | WEIGHT: 100 LBS | TEMPERATURE: 98.2 F | BODY MASS INDEX: 19.63 KG/M2 | OXYGEN SATURATION: 99 % | DIASTOLIC BLOOD PRESSURE: 89 MMHG

## 2022-06-08 DIAGNOSIS — R05.9 COUGH: ICD-10-CM

## 2022-06-08 DIAGNOSIS — J44.1 COPD EXACERBATION (H): Primary | ICD-10-CM

## 2022-06-08 LAB — SARS-COV-2 RNA RESP QL NAA+PROBE: NEGATIVE

## 2022-06-08 PROCEDURE — U0005 INFEC AGEN DETEC AMPLI PROBE: HCPCS | Performed by: STUDENT IN AN ORGANIZED HEALTH CARE EDUCATION/TRAINING PROGRAM

## 2022-06-08 PROCEDURE — 99214 OFFICE O/P EST MOD 30 MIN: CPT | Mod: CS | Performed by: STUDENT IN AN ORGANIZED HEALTH CARE EDUCATION/TRAINING PROGRAM

## 2022-06-08 PROCEDURE — U0003 INFECTIOUS AGENT DETECTION BY NUCLEIC ACID (DNA OR RNA); SEVERE ACUTE RESPIRATORY SYNDROME CORONAVIRUS 2 (SARS-COV-2) (CORONAVIRUS DISEASE [COVID-19]), AMPLIFIED PROBE TECHNIQUE, MAKING USE OF HIGH THROUGHPUT TECHNOLOGIES AS DESCRIBED BY CMS-2020-01-R: HCPCS | Performed by: STUDENT IN AN ORGANIZED HEALTH CARE EDUCATION/TRAINING PROGRAM

## 2022-06-08 RX ORDER — PREDNISONE 20 MG/1
40 TABLET ORAL DAILY
Qty: 10 TABLET | Refills: 0 | Status: SHIPPED | OUTPATIENT
Start: 2022-06-08 | End: 2022-06-13

## 2022-06-08 RX ORDER — AZITHROMYCIN 250 MG/1
TABLET, FILM COATED ORAL
Qty: 6 TABLET | Refills: 0 | Status: SHIPPED | OUTPATIENT
Start: 2022-06-08 | End: 2022-06-13

## 2022-06-08 RX ORDER — CELECOXIB 200 MG/1
CAPSULE ORAL
Qty: 30 CAPSULE | Refills: 3 | Status: SHIPPED | OUTPATIENT
Start: 2022-06-08 | End: 2022-10-11

## 2022-06-08 NOTE — LETTER
June 16, 2022      Lisa Scott  [address]  [place] [address]        Dear ,    We are writing to inform you of your test results.    I am writing you this letter regarding your results because we were unable to reach you by phone. Your lab results below:       Please call the clinic to update your phone number and other demographics. Thanks.      I hope you're well. I wanted to communicate with you the results of the tests that we did.     Which is good news! The laboratory results show that you do not have Covid 19. Please let me know if you have any other questions or concerns.          Resulted Orders   Symptomatic; Yes; 5/25/2022 COVID-19 Virus (Coronavirus) by PCR Nose   Result Value Ref Range    SARS CoV2 PCR Negative Negative      Comment:      NEGATIVE: SARS-CoV-2 (COVID-19) RNA not detected, presumed negative.    Narrative    Testing was performed using the Aptima SARS-CoV-2 Assay on the  V[employer] Vol[employer] Instrument System. Additional information about this  Emergency Use Authorization (EUA) assay can be found via the Lab  Guide. This test should be ordered for the detection of SARS-CoV-2 in  individuals who meet SARS-CoV-2 clinical and/or epidemiological  criteria. Test performance is unknown in asymptomatic patients. This  test is for in vitro diagnostic use under the FDA EUA for  laboratories certified under CLIA to perform high complexity testing.  This test has not been FDA cleared or approved. A negative result  does not rule out the presence of PCR inhibitors in the specimen or  target RNA in concentration below the limit of detection for the  assay. The possibility of a false negative should be considered if  the patient's recent exposure or clinical presentation suggests  COVID-19. This test was validated by the [facility] Infectious  Diseases Diagnostic Laboratory. This laboratory is certified under  the Clinical Laboratory Improvement Amendments of 1988 (CLIA-88)  as  qualified to perform high complexity laboratory testing.       If you have any questions or concerns, please call the clinic at the number listed above.       Sincerely,      Sonal Bain MD

## 2022-06-08 NOTE — PROGRESS NOTES
Assessment & Plan     COPD exacerbation (H)  With history of COPD, most likely exacerbation. Would recommend adding ICS if regimen not working. However the number of ED visits has gone down tremendously since she changed her regimen with current PCP. Normal SpO2 reassuring against hospitalization.   - Symptomatic; Yes; 5/25/2022 COVID-19 Virus (Coronavirus) by PCR Nose  - predniSONE (DELTASONE) 20 MG tablet; Take 2 tablets (40 mg) by mouth daily for 5 days  - azithromycin (ZITHROMAX) 250 MG tablet; Take 2 tablets (500 mg) by mouth daily for 1 day, THEN 1 tablet (250 mg) daily for 4 days.    Cough  Rule out covid  - Symptomatic; Yes; 5/25/2022 COVID-19 Virus (Coronavirus) by PCR Nose     See Patient Instructions    No follow-ups on file.    Sonal Bain MD  [facility]    Suzy Gonzalez is a 62 year old who presents for the following health issues     HPI     Lisa is here to discuss recent cough and fevers. She has been using her inhalers as prescribed and needing her rescue more often. She is finally using her inhalers right because the pharmacy team were very patient teaching her how to use them. She has coughed up more phlegm, sometimes yellowgreen. The last time she felt this way she got steroids and antibiotics which helped. She has had subjective fevers and chills and fatigue. She has not been near anyone with Covid. No chest pain. No nausea or vomiting.      Review of Systems   Constitutional, HEENT, cardiovascular, pulmonary, gi and gu systems are negative, except as otherwise noted.      Objective    /89 (BP Location: Right arm, Patient Position: Sitting, Cuff Size: Adult Regular)   Pulse 84   Temp 98.2  F (36.8  C) (Tympanic)   Resp 20   Ht 1.524 m (5')   Wt 45.4 kg (100 lb)   SpO2 99%   BMI 19.53 kg/m    Body mass index is 19.53 kg/m .  Physical Exam   GENERAL: healthy, alert and no distress  HEENTL Left ear with impacted cerumen against ear drum. No  erythema or drainage.  NECK: no adenopathy, no asymmetry, masses, or scars and thyroid normal to palpation  RESP: intermittent dry cough.  Wheezing throughout lung fields. Normal work of breathing.   CV: regular rate and rhythm, normal S1 S2, no S3 or S4, no murmur, click or rub, no peripheral edema and peripheral pulses strong  ABDOMEN: soft, nontender, no hepatosplenomegaly, no masses and bowel sounds normal  MS: no gross musculoskeletal defects noted, no edema    Results for orders placed or performed in visit on 06/08/22   Symptomatic; Yes; 5/25/2022 COVID-19 Virus (Coronavirus) by PCR Nose     Status: Normal    Specimen: Nose; Swab   Result Value Ref Range    SARS CoV2 PCR Negative Negative    Narrative    Testing was performed using the [employer] Melons SARS-CoV-2 Assay on the  [employer] Instrument System. Additional information about this  Emergency Use Authorization (EUA) assay can be found via the Lab  Guide. This test should be ordered for the detection of SARS-CoV-2 in  individuals who meet SARS-CoV-2 clinical and/or epidemiological  criteria. Test performance is unknown in asymptomatic patients. This  test is for in vitro diagnostic use under the FDA EUA for  laboratories certified under CLIA to perform high complexity testing.  This test has not been FDA cleared or approved. A negative result  does not rule out the presence of PCR inhibitors in the specimen or  target RNA in concentration below the limit of detection for the  assay. The possibility of a false negative should be considered if  the patient's recent exposure or clinical presentation suggests  COVID-19. This test was validated by the [facility] Infectious  Diseases Diagnostic Laboratory. This laboratory is certified under  the Clinical Laboratory Improvement Amendments of 1988 (CLIA-88) as  qualified to perform high complexity laboratory testing.       ----- Service Performed and Documented by Resident or Fellow ------

## 2022-06-08 NOTE — PATIENT INSTRUCTIONS
Thank you for discussing your health with us today!    We discussed the following during your visit:    We think you are having a COPD exacerbation.   Please continue using your inhalers  I have prescribed you prednisone and antibiotics for 5 days  If you aren't feeling better after a week, please let us know  Please follow up with your primary doctor on your COPD medication regimen     As always, please call the clinic if you have any questions or concerns.

## 2022-06-08 NOTE — PROGRESS NOTES
Preceptor attestation:  Vital signs reviewed: /89 (BP Location: Right arm, Patient Position: Sitting, Cuff Size: Adult Regular)   Pulse 84   Temp 98.2  F (36.8  C) (Tympanic)   Resp 20   Ht 1.524 m (5')   Wt 45.4 kg (100 lb)   SpO2 99%   BMI 19.53 kg/m      Patient seen, evaluated, and discussed with the resident.  I have verified the content of the note, which accurately reflects my assessment of the patient and the plan of care.    Supervising physician: Emeli Parikh MD  [facility]

## 2022-06-09 ENCOUNTER — TELEPHONE (OUTPATIENT)
Dept: PSYCHOLOGY | Facility: CLINIC | Age: 62
End: 2022-06-09

## 2022-06-09 NOTE — TELEPHONE ENCOUNTER
Attempted to contact Lisa, for outreach after being unable to reach her for in-person lifestyle clinic intake appointment with this provider on 6/9 at 10:30 AM. This is patient's fourth no-show or late cancellation with this provider.    Attempted to contact patient by phone number recorded in medical record ending [id number] at 10:35 AM. Patient did not answer.    There was no option to leave voicemail.     Plan:   Primary Care/Referring Provider Dr. Patel will be alerted to this note for awareness.    Patient has follow up appointment with Dr. Patel on 7/12    Provider will add permanent comment to patient's chart that FO needs to check prior to scheduling any appointments with this provider.     Diallo Paul, PhD  Pronouns: She/her/hers  Primary Care Health Behavior Fellow

## 2022-06-15 ENCOUNTER — TELEPHONE (OUTPATIENT)
Dept: CARE COORDINATION | Facility: CLINIC | Age: 62
End: 2022-06-15
Payer: COMMERCIAL

## 2022-06-15 NOTE — TELEPHONE ENCOUNTER
6/15/2022: Care Coordination     This is my third attempt to reach Ms. Scott. (2) calls with voice messages , and (1) letter.            Juan Daniel Zavala Sr.  Care Coordinator  [address] [address]  [email or web address]   Office: [phone]  Direct: [phone]  [facility]

## 2022-06-17 ENCOUNTER — OFFICE VISIT (OUTPATIENT)
Dept: FAMILY MEDICINE | Facility: CLINIC | Age: 62
End: 2022-06-17
Payer: COMMERCIAL

## 2022-06-17 ENCOUNTER — ANCILLARY PROCEDURE (OUTPATIENT)
Dept: GENERAL RADIOLOGY | Facility: CLINIC | Age: 62
End: 2022-06-17
Attending: FAMILY MEDICINE
Payer: COMMERCIAL

## 2022-06-17 VITALS
TEMPERATURE: 97.8 F | WEIGHT: 92.8 LBS | HEART RATE: 76 BPM | SYSTOLIC BLOOD PRESSURE: 113 MMHG | DIASTOLIC BLOOD PRESSURE: 79 MMHG | OXYGEN SATURATION: 100 % | RESPIRATION RATE: 18 BRPM | BODY MASS INDEX: 18.12 KG/M2
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
## 2022-06-17 DIAGNOSIS — Z12.4 CERVICAL CANCER SCREENING: ICD-10-CM
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
## 2022-06-17 DIAGNOSIS — Z12.11 SCREEN FOR COLON CANCER: ICD-10-CM

## 2022-06-17 PROCEDURE — 73502 X-RAY EXAM HIP UNI 2-3 VIEWS: CPT | Mod: TC | Performed by: RADIOLOGY

## 2022-06-17 PROCEDURE — 99214 OFFICE O/P EST MOD 30 MIN: CPT | Performed by: FAMILY MEDICINE

## 2022-06-17 RX ORDER — DICLOFENAC POTASSIUM 50 MG/1
50 TABLET, FILM COATED ORAL 2 TIMES DAILY PRN
Qty: 28 TABLET | Refills: 0 | Status: SHIPPED | OUTPATIENT
Start: 2022-06-17 | End: 2022-07-01

## 2022-06-17 NOTE — PROGRESS NOTES
Assessment & Plan   Mechanical fall on to left hip.  Able to ambulate.  Patient concerned for damage to existing hardware in place for previous fracture.  Imaging done today, which is reassuring on my review.  -- She wants a stronger anti-inflammatory for now.  Ordered a short course of diclofenac 50 mg BID; hold celecoxib while on that.    Follow-up as scheduled with PCP next month.  Future Appointments   Date Time Provider Department Center   7/12/2022 10:40 AM [facility]   8/9/2022  1:00 [facility]   8/9/2022  2:30 PM Onofre Solares MD hospitals     Subjective   Lisa Scott is a 62 year old female with a history including alcohol dependence in remission, COPD, HTN, and CKD who presents for fall.    She reports that she had her hands full with some laundry yesterday, and she was trying to open a door with her hip.  She fell to the ground.  She did not hit her head or lose consciousness.  She denies any lightheadedness or symptoms prior to the fall.  She has been able to ambulate but endorses pain.  She has tried taking acetaminophen and an extra tab of tizanidine.  She has a history of left hip fracture repair, and her concern is that she may have damaged the hardware.    Social: She reports that she has quit smoking. She has never used smokeless tobacco. She reports previous alcohol use. She reports that she does not use drugs.    Objective   Vitals: /79 (BP Location: Right arm, Patient Position: Sitting, Cuff Size: Child)   Pulse 76   Temp 97.8  F (36.6  C) (Tympanic)   Resp 18   Wt 42.1 kg (92 lb 12.8 oz)   SpO2 100%   BMI 18.12 kg/m    General: Pleasant. Middle-aged woman. Thin. No distress.  Hips: Normal to inspection.  Able to bear weight.  Full ROM in flexion, extension, abduction and adduction.  Left has decreased IR and ER compared to the right.  Palpation along the femurs and trochanters is non-tender and without stepoffs.  Strength 5/5 in all movements.  Psych: Appropriate grooming and hygiene. Speech accelerated. Appropriate mood and affect.    Imaging:  Hip x-ray today: Per my interpretation, no evidence of fracture, and no damage or malposition of the existing hardware.  Formal radiology read pending.

## 2022-06-22 DIAGNOSIS — J44.9 CHRONIC OBSTRUCTIVE PULMONARY DISEASE, UNSPECIFIED COPD TYPE (H): ICD-10-CM

## 2022-06-24 RX ORDER — IPRATROPIUM BROMIDE AND ALBUTEROL SULFATE 2.5; .5 MG/3ML; MG/3ML
SOLUTION RESPIRATORY (INHALATION)
Qty: 90 ML | Refills: 1 | Status: SHIPPED | OUTPATIENT
Start: 2022-06-24 | End: 2022-07-12

## 2022-06-27 NOTE — RESULT ENCOUNTER NOTE
EXAM: XR HIP LEFT 2-3 VIEWS  LOCATION: [facility]  DATE/TIME: 6/17/2022 2:19 PM     INDICATION: Left hip pain after a fall.  COMPARISON: None.       IMPRESSION: Operative changes of left proximal femoral fixation within an IM nail, a femoral neck screw, and a single distal interlocking screw. Prior fracture appears well-healed. No acute fracture identified. Normal left hip joint alignment with   maintained joint spacing. No pelvic fracture visualized.

## 2022-07-01 DIAGNOSIS — J42 CHRONIC BRONCHITIS, UNSPECIFIED CHRONIC BRONCHITIS TYPE (H): ICD-10-CM

## 2022-07-06 ENCOUNTER — TELEPHONE (OUTPATIENT)
Dept: CARE COORDINATION | Facility: CLINIC | Age: 62
End: 2022-07-06

## 2022-07-06 RX ORDER — ALBUTEROL SULFATE 90 UG/1
AEROSOL, METERED RESPIRATORY (INHALATION)
Qty: 8.5 G | Refills: 0 | Status: SHIPPED | OUTPATIENT
Start: 2022-07-06 | End: 2022-12-29

## 2022-07-06 NOTE — TELEPHONE ENCOUNTER
7/6/2022: Care Coordination    CC: Providing a reminder call and letter to Ms. Scott who has an in person appointment seeing Dr. Mirian Patel on July 12 2022 at 10:40am.      July 6, 2022      Lisa DACIA Scott  [address] [place] [address]        Dear Lisa,  I am one of the Care Coordinators at [facility]. I am writing you today to remind you of your upcoming appointment in our clinic seeing Dr. Mirian Patel on: July 12 2022 at 10:40am. If you are not able to make it to this appointment please contact our office at:[phone]        Sincerely,          Juan Daniel Zavala Sr.  Care Coordinator  [address] [address]  [email or web address]   Office: [phone]  Direct: [phone]  [facility]

## 2022-07-06 NOTE — LETTER
July 6, 2022      Lisa Scott  [address]  [place] [address]        Dear Lisa,  I am one of the Care Coordinators at [facility]. I am writing you today to remind you of your upcoming appointment in our clinic seeing Dr. Mirian Patel on: July 12 2022 at 10:40am. If you are not able to make it to this appointment please contact our office at:[phone]        Sincerely,      Juan aDniel Zavala Sr.  Care Coordinator  [address] [address]  [email or web address]   Office: [phone]  Direct: [phone]  [facility]

## 2022-07-08 DIAGNOSIS — J44.9 CHRONIC OBSTRUCTIVE PULMONARY DISEASE, UNSPECIFIED COPD TYPE (H): ICD-10-CM

## 2022-07-12 RX ORDER — IPRATROPIUM BROMIDE AND ALBUTEROL SULFATE 2.5; .5 MG/3ML; MG/3ML
SOLUTION RESPIRATORY (INHALATION)
Qty: 90 ML | Refills: 1 | Status: SHIPPED | OUTPATIENT
Start: 2022-07-12 | End: 2022-07-25

## 2022-07-14 DIAGNOSIS — F29 PSYCHOSIS, UNSPECIFIED PSYCHOSIS TYPE (H): ICD-10-CM

## 2022-07-15 RX ORDER — QUETIAPINE FUMARATE 100 MG/1
100 TABLET, FILM COATED ORAL 2 TIMES DAILY PRN
Qty: 180 TABLET | Refills: 0 | Status: SHIPPED | OUTPATIENT
Start: 2022-07-15 | End: 2022-10-05

## 2022-07-22 ENCOUNTER — TELEPHONE (OUTPATIENT)
Dept: FAMILY MEDICINE | Facility: CLINIC | Age: 62
End: 2022-07-22

## 2022-07-22 NOTE — TELEPHONE ENCOUNTER
[facility] phone call message- patient requesting a refill:    Full Medication Name: Divalalprox potassium   Dose: 25mg    Pharmacy confirmed as   [id number] - [place], [place] - [address] [place] [address]  Phone: [phone] Fax: [phone]  : Yes    Additional Comments: call back she needs this filled today      OK to leave a message on voice mail? Yes    Primary language: English      needed? No    Call taken on July 22, 2022 at 11:18 AM by Ana Serna

## 2022-07-24 DIAGNOSIS — J44.9 CHRONIC OBSTRUCTIVE PULMONARY DISEASE, UNSPECIFIED COPD TYPE (H): ICD-10-CM

## 2022-07-25 RX ORDER — IPRATROPIUM BROMIDE AND ALBUTEROL SULFATE 2.5; .5 MG/3ML; MG/3ML
SOLUTION RESPIRATORY (INHALATION)
Qty: 90 ML | Refills: 1 | Status: SHIPPED | OUTPATIENT
Start: 2022-07-25 | End: 2023-01-11

## 2022-07-26 NOTE — TELEPHONE ENCOUNTER
Patient request a refill on Divalalprox potassium 25 mg and this med is not on the medication list.    Please advise.    LINN PayanA

## 2022-07-29 ENCOUNTER — OFFICE VISIT (OUTPATIENT)
Dept: FAMILY MEDICINE | Facility: CLINIC | Age: 62
End: 2022-07-29
Payer: COMMERCIAL

## 2022-07-29 VITALS
OXYGEN SATURATION: 100 % | HEART RATE: 76 BPM | TEMPERATURE: 97.7 F | RESPIRATION RATE: 18 BRPM | WEIGHT: 102 LBS | BODY MASS INDEX: 19.92 KG/M2 | DIASTOLIC BLOOD PRESSURE: 71 MMHG | SYSTOLIC BLOOD PRESSURE: 117 MMHG

## 2022-07-29 DIAGNOSIS — Z12.11 SCREEN FOR COLON CANCER: ICD-10-CM

## 2022-07-29 DIAGNOSIS — M25.571 PAIN IN JOINT, ANKLE AND FOOT, RIGHT: Primary | ICD-10-CM

## 2022-07-29 DIAGNOSIS — Z79.899 ENCOUNTER FOR LONG-TERM (CURRENT) USE OF MEDICATIONS: ICD-10-CM

## 2022-07-29 DIAGNOSIS — Z12.4 CERVICAL CANCER SCREENING: ICD-10-CM

## 2022-07-29 PROCEDURE — 99213 OFFICE O/P EST LOW 20 MIN: CPT | Mod: GC

## 2022-07-29 RX ORDER — ACETAMINOPHEN 500 MG
500-1000 TABLET ORAL EVERY 4 HOURS PRN
Qty: 90 TABLET | Refills: 1 | Status: SHIPPED | OUTPATIENT
Start: 2022-07-29 | End: 2022-10-05

## 2022-07-29 RX ORDER — LIDOCAINE 40 MG/G
CREAM TOPICAL
Status: CANCELLED | OUTPATIENT
Start: 2022-07-29

## 2022-07-29 ASSESSMENT — ANXIETY QUESTIONNAIRES
7. FEELING AFRAID AS IF SOMETHING AWFUL MIGHT HAPPEN: SEVERAL DAYS
GAD7 TOTAL SCORE: 8
GAD7 TOTAL SCORE: 8
3. WORRYING TOO MUCH ABOUT DIFFERENT THINGS: SEVERAL DAYS
5. BEING SO RESTLESS THAT IT IS HARD TO SIT STILL: SEVERAL DAYS
1. FEELING NERVOUS, ANXIOUS, OR ON EDGE: MORE THAN HALF THE DAYS
6. BECOMING EASILY ANNOYED OR IRRITABLE: SEVERAL DAYS
2. NOT BEING ABLE TO STOP OR CONTROL WORRYING: SEVERAL DAYS

## 2022-07-29 ASSESSMENT — PATIENT HEALTH QUESTIONNAIRE - PHQ9
5. POOR APPETITE OR OVEREATING: SEVERAL DAYS
SUM OF ALL RESPONSES TO PHQ QUESTIONS 1-9: 5

## 2022-07-29 NOTE — PROGRESS NOTES
Assessment & Plan       Pain in joint, ankle and foot, right   -XR Ankle Right 2 Views  -Refilled Tylenol: Take 1-2 tablets (500-1,000 mg) by mouth every 4 hours as needed for mild pain, Disp-90 tablet, R-1, E-Prescribe  -Patient advised that she could continue using Biofreeze OTC PRN    Screen for colon cancer  -Patient agreeable to FOBT at next visit with her PCP Dr. Patel, did not have time today   -Attempted to schedule F/U with Dr. Patel for preventative visit, patient said she would call to schedule herself    Encounter for long-term (current) use of medications   -Patient agreeable to urine drug screen at next visit with her PCP Dr. Patel, did not have time today  -Community paramedic referral made for medication compliance check per her PCP Dr. Patel        60 minutes spent on the date of the encounter doing chart review, review of test results, interpretation of tests, patient visit, documentation and discussion with other provider(s)        MEDICATIONS:   Orders Placed This Encounter   Medications     acetaminophen (TYLENOL) 500 MG tablet     Sig: Take 1-2 tablets (500-1,000 mg) by mouth every 4 hours as needed for mild pain     Dispense:  90 tablet     Refill:  1          - Continue other medications without change    FUTURE APPOINTMENTS:       - Follow-up visit at earliest convenience with PCP Dr. Patel, patient will call to schedule    No follow-ups on file.    Shy Yanez [facility] OSCAR Gonzalez is a 62 year old female, presenting for the following health issues:  Ankle Pain (Right ankle is very painful per patient. ), Hypertension (Also need to check blood pressure per patient. ), and Medication Reconciliation (Reviewed.  )      HPI     Pain in joint, ankle and foot, right   Right ankle pain and swelling that started a week ago without inciting event. The pain is largely located at the posterior ankle and radiates up the right leg to her hip. She  "endorses that it is a \"throbbing\" pain that is worse with movement and mildly relieved by rest, ice, tylenol 600 mg q4hrs, and biofreeze. She can bear weight on her ankle but it is painful and she is now using a walker and cane to get around when she is independently mobile at baseline. She sprained her left ankle last winter and the episode resolved with a brace and tylenol at home. Patient left encounter frustrated that I was not prescribing a topical lidocaine at this time.           Review of Systems   Constitutional, HEENT, cardiovascular, pulmonary, gi and gu systems are negative, except as otherwise noted.      Objective    /71 (BP Location: Left arm, Patient Position: Sitting, Cuff Size: Adult Regular)   Pulse 76   Temp 97.7  F (36.5  C) (Oral)   Resp 18   Wt 46.3 kg (102 lb)   SpO2 100%   BMI 19.92 kg/m    Body mass index is 19.92 kg/m .  Physical Exam   GENERAL: healthy, alert and no distress  EYES: Eyes grossly normal to inspection, PERRL and conjunctivae and sclerae normal  RESP: wheezing appreciated throughout bilateral lungs  CV: regular rate and rhythm, no murmur,  no peripheral edema and peripheral pulses intact  MS: Right foot and ankle tender to palpation throughout, pulses distal to joint +2, sensation intact + squeeze test, + external rotation stress test, + anterior drawer test, - Alonso test, extremely tender at medial and lateral malleoli and mid-foot at navicular bone and base of 5th metatarsal   NEURO: mentation intact and speech rushed but normal  PSYCH: mentation appears normal, affect normal/bright    Xray - Reviewed and interpreted by me.      Physician Attestation   I, Shy Yanez DO, saw this patient and agree with the findings and plan of care as documented in the note. I discussed this patient and the plan with my attending, Dr. Daniel Anand.     Items personally reviewed/procedural attestation: vitals and imaging and agree with the interpretation documented in the " note.    DO Marcia Granda  ..

## 2022-07-29 NOTE — PROGRESS NOTES
Preceptor Attestation:    I discussed the patient with the resident and evaluated the patient in person. I have verified the content of the note, which accurately reflects my assessment of the patient and the plan of care.   Supervising Physician:  Daniel Anand MD.

## 2022-08-01 ENCOUNTER — PATIENT OUTREACH (OUTPATIENT)
Dept: CARE COORDINATION | Facility: CLINIC | Age: 62
End: 2022-08-01

## 2022-08-01 NOTE — PROGRESS NOTES
Community Paramedic Program  Community Health Worker Outreach    UTC/Voicemail    Outreach attempted x 1.      Left message on patient's voicemail with call back information and requested return call.    CHW Follow-up Plan: will try to reach patient again in 1-2 business days.    Note: Available with CP5 on 8/16 pm?    Referral source: Pt's PCP  Referral reason:   Reason(s) for visit: Med Check/Setup     Goals for visit(s): Medication Compliance     How often should patient be seen: Every Other Week     Preference on when patient should be seen: 3-7 Days       Comments   PCP: Mirian Patel  Other info that would be helpful:

## 2022-08-08 DIAGNOSIS — J44.9 CHRONIC OBSTRUCTIVE PULMONARY DISEASE, UNSPECIFIED COPD TYPE (H): ICD-10-CM

## 2022-08-08 DIAGNOSIS — J42 CHRONIC BRONCHITIS, UNSPECIFIED CHRONIC BRONCHITIS TYPE (H): ICD-10-CM

## 2022-08-08 RX ORDER — ALBUTEROL SULFATE 90 UG/1
AEROSOL, METERED RESPIRATORY (INHALATION)
Qty: 8.5 G | Refills: 0 | OUTPATIENT
Start: 2022-08-08

## 2022-08-08 RX ORDER — IPRATROPIUM BROMIDE AND ALBUTEROL SULFATE 2.5; .5 MG/3ML; MG/3ML
SOLUTION RESPIRATORY (INHALATION)
Qty: 90 ML | Refills: 1 | OUTPATIENT
Start: 2022-08-08

## 2022-08-08 NOTE — TELEPHONE ENCOUNTER
Called and spoke with [facility] staff re: Proair, DuoNeb refill requests.     [facility] notes that both medications are on an automated refill list but unclear whether requests were received via automated system vs pt request.     DuoNeb was picked up 7/26. Proair was picked up 7/6.     Called pt to discuss. No answer. No option to LVM. Note pt seeing pulmonary tomorrow.     Therefore, refill refused for now until more info can be obtained.    Future Appointments   Date Time Provider Department Center   8/9/2022  1:00 [facility]   8/9/2022  2:30 PM Onofre Solares MD [facility]       GAYLE AdamsN, RN, PHN, CHFN, HNB-BC   8/8/2022 at 9:36 AM

## 2022-08-09 NOTE — PROGRESS NOTES
Community Paramedic Program  Community Health Worker Outreach    UTC/Voicemail    Outreach attempted x 1.      Left message on patient's voicemail with call back information and requested return call.    CHW Follow-up Plan: will try to reach patient again in 1-2 business days.    Note: Available with CP5 on 8/17?    Referral source: Pt's PCP  Referral reason:   Reason(s) for visit: Med Check/Setup     Goals for visit(s): Medication Compliance     How often should patient be seen: Every Other Week     Preference on when patient should be seen: 3-7 Days       Comments   PCP: Mirian Patel  Other info that would be helpful:

## 2022-08-10 NOTE — PROGRESS NOTES
Community Paramedic Program  Community Health Worker Outreach    UTC/Voicemail    Outreach attempted x 2.      Left message on patient's voicemail with call back information and requested return call.    CHW Follow-up Plan: will try to reach patient again in 1-2 business days.    Note: Available with CP2 on 8/15 am?     Referral source: Pt's PCP  Referral reason:   Reason(s) for visit: Med Check/Setup     Goals for visit(s): Medication Compliance     How often should patient be seen: Every Other Week     Preference on when patient should be seen: 3-7 Days       Comments   PCP: Mirian Patel  Other info that would be helpful:

## 2022-08-12 DIAGNOSIS — M54.2 CERVICALGIA: ICD-10-CM

## 2022-08-12 DIAGNOSIS — M54.12 CERVICAL RADICULOPATHY: ICD-10-CM

## 2022-08-12 NOTE — PROGRESS NOTES
Community Paramedic Program  Community Health Worker Outreach    UTC/Voicemail    Outreach attempted x 3.      Left message on patient's voicemail with call back information and requested return call.    CHW Follow-up Plan: will do no further outreaches at this time.

## 2022-08-15 RX ORDER — GABAPENTIN 300 MG/1
CAPSULE ORAL
Qty: 180 CAPSULE | Refills: 3 | Status: SHIPPED | OUTPATIENT
Start: 2022-08-15 | End: 2023-02-22

## 2022-08-19 DIAGNOSIS — M54.30 SCIATICA, UNSPECIFIED LATERALITY: ICD-10-CM

## 2022-08-22 DIAGNOSIS — K21.9 GASTROESOPHAGEAL REFLUX DISEASE WITHOUT ESOPHAGITIS: ICD-10-CM

## 2022-08-26 DIAGNOSIS — I10 ESSENTIAL HYPERTENSION: ICD-10-CM

## 2022-08-26 RX ORDER — LISINOPRIL 40 MG/1
TABLET ORAL
Qty: 90 TABLET | Refills: 0 | Status: SHIPPED | OUTPATIENT
Start: 2022-08-26 | End: 2022-11-26

## 2022-09-07 DIAGNOSIS — J30.2 SEASONAL ALLERGIC RHINITIS, UNSPECIFIED TRIGGER: ICD-10-CM

## 2022-09-08 ENCOUNTER — OFFICE VISIT (OUTPATIENT)
Dept: FAMILY MEDICINE | Facility: CLINIC | Age: 62
End: 2022-09-08
Payer: COMMERCIAL

## 2022-09-08 VITALS
SYSTOLIC BLOOD PRESSURE: 123 MMHG | RESPIRATION RATE: 24 BRPM | OXYGEN SATURATION: 98 % | HEART RATE: 72 BPM | TEMPERATURE: 98.2 F | BODY MASS INDEX: 19.92 KG/M2 | DIASTOLIC BLOOD PRESSURE: 81 MMHG | WEIGHT: 102 LBS

## 2022-09-08 DIAGNOSIS — L25.3 CHEMICAL DERMATITIS: ICD-10-CM

## 2022-09-08 DIAGNOSIS — T07.XXXA MULTIPLE EXCORIATIONS: ICD-10-CM

## 2022-09-08 DIAGNOSIS — K21.9 GASTROESOPHAGEAL REFLUX DISEASE WITHOUT ESOPHAGITIS: ICD-10-CM

## 2022-09-08 DIAGNOSIS — Z79.899 ENCOUNTER FOR LONG-TERM (CURRENT) USE OF MEDICATIONS: ICD-10-CM

## 2022-09-08 DIAGNOSIS — J44.9 CHRONIC OBSTRUCTIVE PULMONARY DISEASE, UNSPECIFIED COPD TYPE (H): Primary | ICD-10-CM

## 2022-09-08 DIAGNOSIS — J42 CHRONIC BRONCHITIS, UNSPECIFIED CHRONIC BRONCHITIS TYPE (H): ICD-10-CM

## 2022-09-08 DIAGNOSIS — G40.909 SEIZURE DISORDER (H): ICD-10-CM

## 2022-09-08 DIAGNOSIS — J30.89 NON-SEASONAL ALLERGIC RHINITIS, UNSPECIFIED TRIGGER: ICD-10-CM

## 2022-09-08 DIAGNOSIS — J30.2 SEASONAL ALLERGIC RHINITIS, UNSPECIFIED TRIGGER: ICD-10-CM

## 2022-09-08 PROCEDURE — 99214 OFFICE O/P EST MOD 30 MIN: CPT | Mod: GC | Performed by: STUDENT IN AN ORGANIZED HEALTH CARE EDUCATION/TRAINING PROGRAM

## 2022-09-08 RX ORDER — FLUTICASONE PROPIONATE 50 MCG
SPRAY, SUSPENSION (ML) NASAL
Qty: 16 G | Refills: 2 | Status: SHIPPED | OUTPATIENT
Start: 2022-09-08 | End: 2022-09-08

## 2022-09-08 RX ORDER — FLUTICASONE PROPIONATE 50 MCG
SPRAY, SUSPENSION (ML) NASAL
Qty: 16 G | Refills: 2 | Status: SHIPPED | OUTPATIENT
Start: 2022-09-08 | End: 2023-01-11

## 2022-09-08 RX ORDER — FLUOCINONIDE 0.5 MG/G
CREAM TOPICAL 2 TIMES DAILY
Qty: 30 G | Refills: 0 | Status: SHIPPED | OUTPATIENT
Start: 2022-09-08 | End: 2023-04-18

## 2022-09-08 RX ORDER — DIVALPROEX SODIUM 250 MG/1
250 TABLET, DELAYED RELEASE ORAL 2 TIMES DAILY
Qty: 180 TABLET | Refills: 3 | Status: SHIPPED | OUTPATIENT
Start: 2022-09-08 | End: 2022-10-13

## 2022-09-08 RX ORDER — CETIRIZINE HYDROCHLORIDE 10 MG/1
10 TABLET ORAL DAILY
Qty: 30 TABLET | Refills: 3 | Status: SHIPPED | OUTPATIENT
Start: 2022-09-08 | End: 2022-09-08

## 2022-09-08 RX ORDER — TIOTROPIUM BROMIDE AND OLODATEROL 3.124; 2.736 UG/1; UG/1
2 SPRAY, METERED RESPIRATORY (INHALATION) DAILY
Qty: 4 G | Refills: 11 | Status: SHIPPED | OUTPATIENT
Start: 2022-09-08 | End: 2023-01-15

## 2022-09-08 RX ORDER — CETIRIZINE HYDROCHLORIDE 10 MG/1
10 TABLET ORAL 2 TIMES DAILY
Qty: 30 TABLET | Refills: 3 | Status: SHIPPED | OUTPATIENT
Start: 2022-09-08 | End: 2023-02-07

## 2022-09-08 NOTE — PATIENT INSTRUCTIONS
Thank you for coming into the clinic today!  Here is what we discussed:  Use your inhalers  Return in 1 week with Dr. Patel or Dr. Parikh   Use the creams on your hand  Discuss Elavil at your next appointment     If you have any questions, please call the clinic at [phone].

## 2022-09-08 NOTE — Clinical Note
FYI Pt had a form for medical opinion stating she needs to change housing, told her to f/up with primary or you since you've seen her before to discuss further. A few other things a swell, but form was a big thing for her.  Thanks, Gwyn
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
## 2022-09-08 NOTE — PROGRESS NOTES
Preceptor Attestation:   Patient seen, evaluated and discussed with the resident. I have verified the content of the note, which accurately reflects my assessment of the patient and the plan of care.   Supervising Physician:  Nitish Veliz MD

## 2022-09-09 ENCOUNTER — PATIENT OUTREACH (OUTPATIENT)
Dept: CARE COORDINATION | Facility: CLINIC | Age: 62
End: 2022-09-09

## 2022-09-09 NOTE — PROGRESS NOTES
Community Paramedic Program  Community Health Worker Outreach    UTC/Voicemail    Outreach attempted x 1.      Left message on patient's voicemail with call back information and requested return call.    CHW Follow-up Plan: will try to reach patient again in 1-2 business days.    Unable to leave message on pt's phone; out of service/minutes? Was referred previously to CP program and was UTC x 3.    Note: Available with CP 1 or CP2 on 9/14?    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

## 2022-09-13 ENCOUNTER — TELEPHONE (OUTPATIENT)
Dept: FAMILY MEDICINE | Facility: CLINIC | Age: 62
End: 2022-09-13

## 2022-09-13 NOTE — PROGRESS NOTES
Community Paramedic Program  Community Health Worker Outreach    UTC/Voicemail    Outreach attempted x 2.      Left message on patient's voicemail with call back information and requested return call.    CHW Follow-up Plan: will try to reach patient again in 1-2 business days.    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

## 2022-09-13 NOTE — PROGRESS NOTES
Community Paramedic Program  Community Health Worker Outreach    UTC/Voicemail    Outreach attempted x 3.      Left message on patient's voicemail with call back information and requested return call.    CHW Follow-up Plan: will do no further outreaches at this time.    Pt's phone is out of service and isn't accepting calls. Unable to lvm. Routing to provider for awareness; pt has in clinic visit this afternoon. Will suggest that Care Coordination assist or that pt's  (Tracy Petit from [facility]) be notified of pt's needs for potential services/support at home.     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

## 2022-09-13 NOTE — TELEPHONE ENCOUNTER
[facility] phone call message- general phone call:    Reason for call: the pt called  to ask for the Dr to call  her  She is having issues with bugs and needs a call back     Return call needed: Yes    OK to leave a message on voice mail? Yes    Primary language: English      needed? No    Call taken on September 13, 2022 at 5:09 PM by Rick Bronson

## 2022-09-14 NOTE — TELEPHONE ENCOUNTER
Return call made.no answer at given number unable to leave message another attempt will be made.l    BTRN

## 2022-09-15 ENCOUNTER — OFFICE VISIT (OUTPATIENT)
Dept: FAMILY MEDICINE | Facility: CLINIC | Age: 62
End: 2022-09-15
Payer: COMMERCIAL

## 2022-09-15 VITALS
BODY MASS INDEX: 20.27 KG/M2 | DIASTOLIC BLOOD PRESSURE: 58 MMHG | WEIGHT: 103.8 LBS | TEMPERATURE: 96.9 F | OXYGEN SATURATION: 97 % | SYSTOLIC BLOOD PRESSURE: 140 MMHG | RESPIRATION RATE: 24 BRPM | HEART RATE: 82 BPM

## 2022-09-15 DIAGNOSIS — T07.XXXA MULTIPLE EXCORIATIONS: Primary | ICD-10-CM

## 2022-09-15 DIAGNOSIS — G89.29 OTHER CHRONIC PAIN: ICD-10-CM

## 2022-09-15 DIAGNOSIS — G47.00 INSOMNIA, UNSPECIFIED TYPE: ICD-10-CM

## 2022-09-15 PROCEDURE — 99214 OFFICE O/P EST MOD 30 MIN: CPT | Mod: GC | Performed by: STUDENT IN AN ORGANIZED HEALTH CARE EDUCATION/TRAINING PROGRAM

## 2022-09-15 RX ORDER — MINERAL OIL/HYDROPHIL PETROLAT
OINTMENT (GRAM) TOPICAL PRN
Qty: 396 G | Refills: 0 | Status: SHIPPED | OUTPATIENT
Start: 2022-09-15 | End: 2022-09-21

## 2022-09-15 NOTE — TELEPHONE ENCOUNTER
Attempt made to contact patient; no answer at listed number;unable to leave a message. Writer will remain available if additional contact info received    BTRN

## 2022-09-15 NOTE — Clinical Note
Katherin Patel, Ms. Scott is scheduled to see you next week to discuss her thoughts of bugs in the house and go over housing form. I Cc'd her chart to  to help with housing change also. I don't see any obvious signs of bug bites and she keeps bringing in items with bugs in them that just looks like dirt and potentially mold. I do think she needs different housing but do not think she has active bugs or worms infestation. It doesn't appear to be a manic-hypomanic episode as she seems to be taking her medications as well.  I also tried scheduling her with Dr. Parikh per her request, but she no-showed that appointment on 9/14 unfortunately.  Did not get community paramedic referral done as she didn't  their call, she says she never got a phone call but also said she's been having phone issues.   Wanted to give you a heads up, Thanks Gwyn

## 2022-09-15 NOTE — Clinical Note
Katherin Frances,  Could you call Ms. Scott and see what you can do to help her with changing her housing?  She has a , Tracy Petit from [facility]. But Lisa says that Tracy isn't helping her too much and wanted to see what we can offer. Thanks, Gwyn

## 2022-09-15 NOTE — PROGRESS NOTES
Preceptor attestation:  Vital signs reviewed: BP (!) 140/58 (BP Location: Right arm, Patient Position: Sitting, Cuff Size: Adult Regular)   Pulse 82   Temp 96.9  F (36.1  C) (Tympanic)   Resp 24   Wt 47.1 kg (103 lb 12.8 oz)   SpO2 97%   BMI 20.27 kg/m      Patient seen, evaluated, and discussed with the resident.  I have verified the content of the note, which accurately reflects my assessment of the patient and the plan of care.    Supervising physician: Emeli Parikh MD  [facility]
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
## 2022-09-16 ENCOUNTER — TELEPHONE (OUTPATIENT)
Dept: CARE COORDINATION | Facility: OTHER | Age: 62
End: 2022-09-16

## 2022-09-16 NOTE — LETTER
September 16, 2022      Lisa Scott  [address] [place] [address]        Dear Lisa,  I am one of the Care Coordinators at [address] [place] [address]. I have been trying to reach you via phone. However, my attempts have not been successful. Your doctor notified me that you wanted me to begin a housing search on your behalf. Before I begin  I just need a little demographic  information from you before I begin the search. When you have some free time, please give a call at:[phone]      Sincerely,      Juan Daniel Zavala Sr.  Social Work  Care Coordination  [address] [address]  [email or web address]   Office: [phone]  Direct: [phone]  [facility]

## 2022-09-16 NOTE — TELEPHONE ENCOUNTER
9/16/2022: Care Coordination    CC: asked to contact Ms. Scott regarding her housing needs / concerns. I was forced to leave a message and following up with a letter.          September 16, 2022      Lisa Scott  [address] [place] [address]        Dear Lisa,  I am one of the Care Coordinators at [address] [place] [address]. I have been trying to reach you via phone. However, my attempts have not been successful. Your doctor notified me that you wanted me to begin a housing search on your behalf. Before I begin  I just need a little demographic  information from you before I begin the search. When you have some free time, please give a call at:[phone]      Sincerely,      Juan Daniel Zavala Sr.  Social Work  [place] [address]  [email or web address]   Office: [phone]  Direct: [phone]  [facility]

## 2022-09-21 ENCOUNTER — OFFICE VISIT (OUTPATIENT)
Dept: FAMILY MEDICINE | Facility: CLINIC | Age: 62
End: 2022-09-21
Payer: COMMERCIAL

## 2022-09-21 ENCOUNTER — TELEPHONE (OUTPATIENT)
Dept: CARE COORDINATION | Facility: OTHER | Age: 62
End: 2022-09-21

## 2022-09-21 VITALS
HEART RATE: 83 BPM | BODY MASS INDEX: 19.92 KG/M2 | OXYGEN SATURATION: 100 % | RESPIRATION RATE: 16 BRPM | SYSTOLIC BLOOD PRESSURE: 192 MMHG | DIASTOLIC BLOOD PRESSURE: 117 MMHG | WEIGHT: 102 LBS | TEMPERATURE: 97.7 F

## 2022-09-21 DIAGNOSIS — F22 DELUSIONS OF PARASITOSIS (H): ICD-10-CM

## 2022-09-21 DIAGNOSIS — Z12.11 SCREEN FOR COLON CANCER: ICD-10-CM

## 2022-09-21 DIAGNOSIS — J44.1 COPD EXACERBATION (H): Primary | ICD-10-CM

## 2022-09-21 DIAGNOSIS — T07.XXXA MULTIPLE EXCORIATIONS: ICD-10-CM

## 2022-09-21 DIAGNOSIS — B35.9 TINEA: ICD-10-CM

## 2022-09-21 DIAGNOSIS — Z79.899 ENCOUNTER FOR LONG-TERM (CURRENT) USE OF MEDICATIONS: ICD-10-CM

## 2022-09-21 DIAGNOSIS — Z12.4 CERVICAL CANCER SCREENING: ICD-10-CM

## 2022-09-21 PROCEDURE — 99214 OFFICE O/P EST MOD 30 MIN: CPT | Performed by: STUDENT IN AN ORGANIZED HEALTH CARE EDUCATION/TRAINING PROGRAM

## 2022-09-21 RX ORDER — CLOTRIMAZOLE 1 %
CREAM (GRAM) TOPICAL 2 TIMES DAILY
Qty: 15 G | Refills: 0 | Status: SHIPPED | OUTPATIENT
Start: 2022-09-21 | End: 2022-10-05

## 2022-09-21 RX ORDER — PREDNISONE 20 MG/1
40 TABLET ORAL DAILY
Qty: 10 TABLET | Refills: 0 | Status: SHIPPED | OUTPATIENT
Start: 2022-09-21 | End: 2022-09-26

## 2022-09-21 RX ORDER — MINERAL OIL/HYDROPHIL PETROLAT
OINTMENT (GRAM) TOPICAL PRN
Qty: 420 G | Refills: 1 | Status: SHIPPED | OUTPATIENT
Start: 2022-09-21 | End: 2022-09-28

## 2022-09-21 RX ORDER — AZITHROMYCIN 250 MG/1
TABLET, FILM COATED ORAL
Qty: 6 TABLET | Refills: 0 | Status: SHIPPED | OUTPATIENT
Start: 2022-09-21 | End: 2022-09-26

## 2022-09-21 ASSESSMENT — ANXIETY QUESTIONNAIRES
1. FEELING NERVOUS, ANXIOUS, OR ON EDGE: MORE THAN HALF THE DAYS
5. BEING SO RESTLESS THAT IT IS HARD TO SIT STILL: MORE THAN HALF THE DAYS
6. BECOMING EASILY ANNOYED OR IRRITABLE: MORE THAN HALF THE DAYS
7. FEELING AFRAID AS IF SOMETHING AWFUL MIGHT HAPPEN: MORE THAN HALF THE DAYS
3. WORRYING TOO MUCH ABOUT DIFFERENT THINGS: MORE THAN HALF THE DAYS
GAD7 TOTAL SCORE: 14
GAD7 TOTAL SCORE: 14
2. NOT BEING ABLE TO STOP OR CONTROL WORRYING: MORE THAN HALF THE DAYS
IF YOU CHECKED OFF ANY PROBLEMS ON THIS QUESTIONNAIRE, HOW DIFFICULT HAVE THESE PROBLEMS MADE IT FOR YOU TO DO YOUR WORK, TAKE CARE OF THINGS AT HOME, OR GET ALONG WITH OTHER PEOPLE: EXTREMELY DIFFICULT

## 2022-09-21 ASSESSMENT — PATIENT HEALTH QUESTIONNAIRE - PHQ9
SUM OF ALL RESPONSES TO PHQ QUESTIONS 1-9: 15
5. POOR APPETITE OR OVEREATING: MORE THAN HALF THE DAYS

## 2022-09-21 NOTE — TELEPHONE ENCOUNTER
9/21/2022: Care Coordination     CC: responding to a message from Dr. Arias regarding patient find housing. Multiple calls and letters to patient without any response. Therefore, I am closing this job/ request. Patient does have my contact info should she decide that she wants my help.          Juan Daniel Zavala Sr.  Social Work  Care Coordination  [address] [address]  [email or web address]   Office: [phone]  Direct: [phone]  [facility]
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
## 2022-09-23 ENCOUNTER — TELEPHONE (OUTPATIENT)
Dept: CARE COORDINATION | Facility: OTHER | Age: 62
End: 2022-09-23

## 2022-09-23 NOTE — TELEPHONE ENCOUNTER
9/23/2022: Care Coordination     CC: received a call today from Ms. Scott's : Iraida Falconmussen [phone]. We discussed Ms. Scott's housing situation. She has been having a problem with bed bugs, and keeping her apartment clean. The apartment landlord/ management  has not been able to inspect or clean  Ms. Scott's apartment as scheduled because Ms. Scott arcos's not open the door. This behavior has caused tension placed her housing at risk.     Iraida and I discussed options such as: ARMHS, ILS, and a waiver reassessment as she was denied the last time. Iraida and I discussed the problem with the reassessment is, [facility] is between 6 to 9 months behind for assessments and Ms. Scott is not the very best at answering her phone. If [facility] calls for the reassessment  and she does not answer, then she could be waiting for another 6 - 9 months.     Iraida and I also discussed assisted living options for Ms. Scott such as: [facility], and [facility] [phone]. As they may have some options that don't require a waiver to pay. Iraida also reported that Ms. Scott wants to change her pharmacy to [facility] [phone].       CC: Called Ms Scott to discuss the important's of answering her phone and following through with scheduled inspections planned by her apartment bldg management . As she is in danger of losing her apartment each time she arcos's not follow.  Through. I called Ms. Scott 3 times today to help with transferring her prescriptions to the [facility] in the [place] and [facility]             Juan Daniel Zavala Sr.  Social Work  Care Coordination  [address] [address]  [email or web address]   Office: [phone]  Direct: [phone]  [facility]

## 2022-09-28 ENCOUNTER — OFFICE VISIT (OUTPATIENT)
Dept: FAMILY MEDICINE | Facility: CLINIC | Age: 62
End: 2022-09-28
Payer: COMMERCIAL

## 2022-09-28 ENCOUNTER — TELEPHONE (OUTPATIENT)
Dept: CARE COORDINATION | Facility: OTHER | Age: 62
End: 2022-09-28

## 2022-09-28 VITALS
RESPIRATION RATE: 24 BRPM | SYSTOLIC BLOOD PRESSURE: 94 MMHG | DIASTOLIC BLOOD PRESSURE: 61 MMHG | OXYGEN SATURATION: 98 % | WEIGHT: 101.8 LBS | TEMPERATURE: 98.5 F | BODY MASS INDEX: 19.88 KG/M2 | HEART RATE: 89 BPM

## 2022-09-28 DIAGNOSIS — J44.1 COPD EXACERBATION (H): ICD-10-CM

## 2022-09-28 DIAGNOSIS — I10 ESSENTIAL HYPERTENSION: Primary | ICD-10-CM

## 2022-09-28 DIAGNOSIS — Z12.11 SCREEN FOR COLON CANCER: ICD-10-CM

## 2022-09-28 DIAGNOSIS — Z79.899 ENCOUNTER FOR LONG-TERM (CURRENT) USE OF MEDICATIONS: ICD-10-CM

## 2022-09-28 DIAGNOSIS — Z12.4 CERVICAL CANCER SCREENING: ICD-10-CM

## 2022-09-28 DIAGNOSIS — T07.XXXA MULTIPLE EXCORIATIONS: ICD-10-CM

## 2022-09-28 PROCEDURE — 0134A COVID-19,PF,MODERNA BIVALENT: CPT | Performed by: STUDENT IN AN ORGANIZED HEALTH CARE EDUCATION/TRAINING PROGRAM

## 2022-09-28 PROCEDURE — 91313 COVID-19,PF,MODERNA BIVALENT: CPT | Performed by: STUDENT IN AN ORGANIZED HEALTH CARE EDUCATION/TRAINING PROGRAM

## 2022-09-28 PROCEDURE — 90471 IMMUNIZATION ADMIN: CPT | Performed by: STUDENT IN AN ORGANIZED HEALTH CARE EDUCATION/TRAINING PROGRAM

## 2022-09-28 PROCEDURE — 99214 OFFICE O/P EST MOD 30 MIN: CPT | Mod: 25 | Performed by: STUDENT IN AN ORGANIZED HEALTH CARE EDUCATION/TRAINING PROGRAM

## 2022-09-28 PROCEDURE — 90686 IIV4 VACC NO PRSV 0.5 ML IM: CPT | Performed by: STUDENT IN AN ORGANIZED HEALTH CARE EDUCATION/TRAINING PROGRAM

## 2022-09-28 RX ORDER — PREDNISONE 20 MG/1
40 TABLET ORAL DAILY
Qty: 10 TABLET | Refills: 0 | Status: SHIPPED | OUTPATIENT
Start: 2022-09-28 | End: 2022-10-03

## 2022-09-28 RX ORDER — MINERAL OIL/HYDROPHIL PETROLAT
OINTMENT (GRAM) TOPICAL PRN
Qty: 420 G | Refills: 1 | Status: SHIPPED | OUTPATIENT
Start: 2022-09-28 | End: 2023-03-08
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
## 2022-09-28 NOTE — TELEPHONE ENCOUNTER
9/28/2022: Care Coordination     Dr. Patel asked that I step in and follow up my notes after trying to reach  last week. CC: discussed the conversation that I had with Ms. Scott's  Tracy last week, regarding inspections of her apartment and transportation to get medications and grocery shopping.    Ms. Scott reported that Dr. Maldonado was completing paperwork that will allow her to move to a different bldg. CC: explained what was needed to be done to get her prescriptions moved to [facility] on [address]. I also provided Dr. Patel with a [employer] Application to be mailed back to solve some of the Transportation concerns.  The [employer] form was mailed back today by Dr. Patel and her prescriptions where going to be changed to [facility] when Ms Scott returned home.         Juan Daniel Zavala Sr.  Social Work  Care Coordination  [address] [address]  [email or web address]   Office: [phone]  Direct: [phone]  [facility]

## 2022-09-30 ENCOUNTER — TELEPHONE (OUTPATIENT)
Dept: FAMILY MEDICINE | Facility: CLINIC | Age: 62
End: 2022-09-30

## 2022-09-30 NOTE — TELEPHONE ENCOUNTER
Return call attempted.no answer at given number unable to leave a message another attempt to contact patient will  be made    Radha LONGO

## 2022-09-30 NOTE — TELEPHONE ENCOUNTER
[facility] phone call message-patient reporting a symptom:     Symptom: swollen eyes vomiting     Same Day Visit Offered: would like to talk to the nurse      Additional comments:       OK to leave message on voice mail? Yes    Primary language: English      needed? No    Call taken on September 30, 2022 at 8:03 AM by Rick Bronson

## 2022-10-03 ENCOUNTER — TELEPHONE (OUTPATIENT)
Dept: CARE COORDINATION | Facility: OTHER | Age: 62
End: 2022-10-03

## 2022-10-03 NOTE — TELEPHONE ENCOUNTER
Unable to contact patient, patient has an appt scheduled for 10/5. Writer will remain available as needed    btrn

## 2022-10-03 NOTE — TELEPHONE ENCOUNTER
10/3/2022: Care Coordination    Ms. Scott call CC today requesting what ER room should she go to because she thinks that she has bed bugs. Ms. Scott reported that her hands are covered in a rash as she has been using cleaning material to rid her home of the bed bugs.    CC: Explained that we are [place] and that all of our doctors work out of either [facility], or [facility] , so either one of those will work. Ms. Scott reported that the last time that she went to the hospital, she requested each one of those hospitals but was told that they are too far away from her house and she would have to choose [facility] or [facility]. CC: explained that she already knows the answer and that she should go back to the same  One that she went too last time, which was [facility].     Juan Daniel Zavala Sr.  Social Work  Care Coordination  [address] [address]  [email or web address]   Office: [phone]  Direct: [phone]  [facility]

## 2022-10-04 ENCOUNTER — TELEPHONE (OUTPATIENT)
Dept: FAMILY MEDICINE | Facility: CLINIC | Age: 62
End: 2022-10-04

## 2022-10-04 NOTE — TELEPHONE ENCOUNTER
Pt left a vm yesterday that she needs a call back. She stated it was very important but did not say what it was regarding.

## 2022-10-05 ENCOUNTER — OFFICE VISIT (OUTPATIENT)
Dept: FAMILY MEDICINE | Facility: CLINIC | Age: 62
End: 2022-10-05
Payer: COMMERCIAL

## 2022-10-05 VITALS
SYSTOLIC BLOOD PRESSURE: 133 MMHG | BODY MASS INDEX: 21.91 KG/M2 | HEART RATE: 95 BPM | RESPIRATION RATE: 20 BRPM | TEMPERATURE: 98.2 F | WEIGHT: 112.2 LBS | DIASTOLIC BLOOD PRESSURE: 84 MMHG | OXYGEN SATURATION: 100 %

## 2022-10-05 DIAGNOSIS — J30.2 SEASONAL ALLERGIC RHINITIS, UNSPECIFIED TRIGGER: ICD-10-CM

## 2022-10-05 DIAGNOSIS — J44.1 COPD EXACERBATION (H): Primary | ICD-10-CM

## 2022-10-05 DIAGNOSIS — F29 PSYCHOSIS, UNSPECIFIED PSYCHOSIS TYPE (H): ICD-10-CM

## 2022-10-05 DIAGNOSIS — R11.0 NAUSEA: ICD-10-CM

## 2022-10-05 DIAGNOSIS — M54.30 SCIATICA, UNSPECIFIED LATERALITY: ICD-10-CM

## 2022-10-05 PROCEDURE — 99214 OFFICE O/P EST MOD 30 MIN: CPT | Performed by: FAMILY MEDICINE

## 2022-10-05 RX ORDER — OLOPATADINE HYDROCHLORIDE 1 MG/ML
1 SOLUTION/ DROPS OPHTHALMIC 2 TIMES DAILY
Qty: 5 ML | Refills: 1 | Status: SHIPPED | OUTPATIENT
Start: 2022-10-05 | End: 2023-04-18

## 2022-10-05 RX ORDER — QUETIAPINE FUMARATE 200 MG/1
200 TABLET, FILM COATED ORAL AT BEDTIME
Qty: 90 TABLET | Refills: 3 | Status: SHIPPED | OUTPATIENT
Start: 2022-10-05 | End: 2022-10-20

## 2022-10-05 RX ORDER — QUETIAPINE FUMARATE 100 MG/1
100 TABLET, FILM COATED ORAL 2 TIMES DAILY PRN
Qty: 180 TABLET | Refills: 0 | Status: SHIPPED | OUTPATIENT
Start: 2022-10-05 | End: 2023-01-05

## 2022-10-05 RX ORDER — PREDNISONE 20 MG/1
40 TABLET ORAL DAILY
Qty: 10 TABLET | Refills: 0 | Status: SHIPPED | OUTPATIENT
Start: 2022-10-05 | End: 2022-10-10

## 2022-10-05 RX ORDER — ONDANSETRON 4 MG/1
4 TABLET, ORALLY DISINTEGRATING ORAL EVERY 8 HOURS PRN
Qty: 20 TABLET | Refills: 11 | Status: SHIPPED | OUTPATIENT
Start: 2022-10-05 | End: 2023-02-02

## 2022-10-05 ASSESSMENT — PATIENT HEALTH QUESTIONNAIRE - PHQ9: SUM OF ALL RESPONSES TO PHQ QUESTIONS 1-9: 12

## 2022-10-05 NOTE — PROGRESS NOTES
Assessment & Plan   Visit to discuss bugs that she attributes to her housing.  I encouraged her to continue working with public housing.  We also discussed the possibility that this is Morgellon's disease.  In that case, the focus is on remaining adherent with her quetiapine and being patient, working closely with a small team of providers.  She has been seeing multiple providers as of late.    Follow-up in 1 week with PCP, as scheduled.  Future Appointments   Date Time Provider Department Center   10/12/2022 10:00 AM Mirian Patel MD [facility]   Lisa Scott is a 62 year old female with a history including alcohol dependence in remission, COPD, HTN, and CKD who presents for bugs.    She has had multiple visits over the past month for bugs, specifically coming out of her skin.  She has brought in reportedly captured bugs, though upon inspection, the samples appeared to be lint, skin, paper, etc.  She is prescribed quetiapine, taking 100 mg BID PRN + 200 mg QHS.  She fills this regularly, though she's due to run out soon.    She attributes the bugs to her current housing.  She is spending $45 per day for bug eradication.  She says other people in her housing building also have bugs.  Time-jerry, she noticed the bugs after the lady who lived next door  but was not discovered for several days.  She is working with a [facility]  as well as .    Social: She reports that she has quit smoking. She has never used smokeless tobacco. She reports previous alcohol use. She reports that she does not use drugs.    Objective   Vitals: /84   Pulse 95   Temp 98.2  F (36.8  C) (Oral)   Resp 20   Wt 50.9 kg (112 lb 3.2 oz)   SpO2 100%   BMI 21.91 kg/m    General: Pleasant. Middle-aged woman. No distress.  Skin: Excoriations on skin, primarily forearms bilaterally.  No burrows.  Psych: Appropriate grooming and hygiene. Speech normal rate. Appropriate mood and  affect.

## 2022-10-11 ENCOUNTER — DOCUMENTATION ONLY (OUTPATIENT)
Dept: FAMILY MEDICINE | Facility: CLINIC | Age: 62
End: 2022-10-11
Payer: COMMERCIAL

## 2022-10-11 ENCOUNTER — TELEPHONE (OUTPATIENT)
Dept: FAMILY MEDICINE | Facility: CLINIC | Age: 62
End: 2022-10-11

## 2022-10-11 DIAGNOSIS — G89.29 CHRONIC LEFT SHOULDER PAIN: ICD-10-CM

## 2022-10-11 DIAGNOSIS — M25.512 CHRONIC LEFT SHOULDER PAIN: ICD-10-CM

## 2022-10-11 DIAGNOSIS — M16.10 ARTHRITIS OF HIP: ICD-10-CM

## 2022-10-11 DIAGNOSIS — G40.909 SEIZURE DISORDER (H): ICD-10-CM

## 2022-10-11 PROCEDURE — 99207 PR NO CHARGE LOS: CPT | Performed by: PSYCHOLOGIST

## 2022-10-11 RX ORDER — CELECOXIB 200 MG/1
CAPSULE ORAL
Qty: 30 CAPSULE | Refills: 3 | Status: SHIPPED | OUTPATIENT
Start: 2022-10-11 | End: 2023-03-15
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
## 2022-10-11 NOTE — TELEPHONE ENCOUNTER
Message left on identified VM, reminding patient of appt for 10/12 and requesting for her to bring her meds and inhalers with her to the appt    BTRN

## 2022-10-11 NOTE — TELEPHONE ENCOUNTER
Outreach call made no answer at mobile number message left for pt to return call regarding 10/12 appt , to bring her meds with her to the appt    BTRN

## 2022-10-12 ENCOUNTER — OFFICE VISIT (OUTPATIENT)
Dept: FAMILY MEDICINE | Facility: CLINIC | Age: 62
End: 2022-10-12
Payer: COMMERCIAL

## 2022-10-12 VITALS
BODY MASS INDEX: 21.72 KG/M2 | OXYGEN SATURATION: 100 % | HEART RATE: 82 BPM | TEMPERATURE: 97.7 F | SYSTOLIC BLOOD PRESSURE: 132 MMHG | DIASTOLIC BLOOD PRESSURE: 83 MMHG | WEIGHT: 111.2 LBS | RESPIRATION RATE: 24 BRPM

## 2022-10-12 DIAGNOSIS — N18.30 STAGE 3 CHRONIC KIDNEY DISEASE, UNSPECIFIED WHETHER STAGE 3A OR 3B CKD (H): ICD-10-CM

## 2022-10-12 DIAGNOSIS — Z12.4 CERVICAL CANCER SCREENING: ICD-10-CM

## 2022-10-12 DIAGNOSIS — Z79.899 ENCOUNTER FOR LONG-TERM (CURRENT) USE OF MEDICATIONS: ICD-10-CM

## 2022-10-12 DIAGNOSIS — Z12.31 VISIT FOR SCREENING MAMMOGRAM: ICD-10-CM

## 2022-10-12 DIAGNOSIS — J43.9 PULMONARY EMPHYSEMA, UNSPECIFIED EMPHYSEMA TYPE (H): Primary | ICD-10-CM

## 2022-10-12 PROCEDURE — 99215 OFFICE O/P EST HI 40 MIN: CPT | Performed by: STUDENT IN AN ORGANIZED HEALTH CARE EDUCATION/TRAINING PROGRAM

## 2022-10-12 RX ORDER — PREDNISONE 10 MG/1
TABLET ORAL
Qty: 18 TABLET | Refills: 0 | Status: SHIPPED | OUTPATIENT
Start: 2022-10-12 | End: 2022-10-21

## 2022-10-12 RX ORDER — FLUTICASONE PROPIONATE 110 UG/1
2 AEROSOL, METERED RESPIRATORY (INHALATION) 2 TIMES DAILY
Qty: 12 G | Refills: 11 | Status: SHIPPED | OUTPATIENT
Start: 2022-10-12 | End: 2023-01-11
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
## 2022-10-13 RX ORDER — DIVALPROEX SODIUM 250 MG/1
TABLET, DELAYED RELEASE ORAL
Qty: 180 TABLET | Refills: 3 | Status: SHIPPED | OUTPATIENT
Start: 2022-10-13 | End: 2023-03-17

## 2022-10-18 ENCOUNTER — OFFICE VISIT (OUTPATIENT)
Dept: FAMILY MEDICINE | Facility: CLINIC | Age: 62
End: 2022-10-18
Payer: COMMERCIAL

## 2022-10-18 VITALS
RESPIRATION RATE: 24 BRPM | SYSTOLIC BLOOD PRESSURE: 175 MMHG | WEIGHT: 103.6 LBS | HEART RATE: 104 BPM | BODY MASS INDEX: 20.23 KG/M2 | TEMPERATURE: 97.6 F | DIASTOLIC BLOOD PRESSURE: 122 MMHG | OXYGEN SATURATION: 95 %

## 2022-10-18 DIAGNOSIS — F31.9 BIPOLAR 1 DISORDER (H): ICD-10-CM

## 2022-10-18 DIAGNOSIS — B96.89 ACUTE BACTERIAL RHINOSINUSITIS: Primary | ICD-10-CM

## 2022-10-18 DIAGNOSIS — J01.90 ACUTE BACTERIAL RHINOSINUSITIS: Primary | ICD-10-CM

## 2022-10-18 DIAGNOSIS — L98.8 MORGELLONS DISEASE: ICD-10-CM

## 2022-10-18 DIAGNOSIS — L85.3 XEROSIS CUTIS: ICD-10-CM

## 2022-10-18 DIAGNOSIS — J42 CHRONIC BRONCHITIS, UNSPECIFIED CHRONIC BRONCHITIS TYPE (H): ICD-10-CM

## 2022-10-18 PROCEDURE — 99214 OFFICE O/P EST MOD 30 MIN: CPT | Performed by: STUDENT IN AN ORGANIZED HEALTH CARE EDUCATION/TRAINING PROGRAM

## 2022-10-18 RX ORDER — QUETIAPINE 400 MG/1
400 TABLET, FILM COATED, EXTENDED RELEASE ORAL
Qty: 30 TABLET | Refills: 1 | Status: SHIPPED | OUTPATIENT
Start: 2022-10-18 | End: 2023-03-17

## 2022-10-18 NOTE — PATIENT INSTRUCTIONS
STOP your immediate acting seroquel and switch to the extended release seroquel 400mg with dinner.    - You can still take 100mg as needed of immediate release seroquel for anxiety attacks. Let Dr. Patel know how often you need the as needed seroquel.   _________________________    Inhalers:  Scheduled:  Stiolto respimat (tiotropium-olodaterol) 2 puffs daily  Flovent (fluticasone) 2 puffs twice a day - RINSE your mouth after    As needed: Albuterol 1-2 puffs every 4 hours as needed OR duonebs  _________________________    Take Augmentin 1 tablet twice daily for 7 days.  _________________________    For your hands and feet: Apply thick lotion (Vanicream, Eucerin, Aquaphor) twice daily. You can sleep in cotton gloves and socks after you apply it.
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
## 2022-11-04 ENCOUNTER — OFFICE VISIT (OUTPATIENT)
Dept: FAMILY MEDICINE | Facility: CLINIC | Age: 62
End: 2022-11-04
Payer: COMMERCIAL

## 2022-11-04 ENCOUNTER — OFFICE VISIT (OUTPATIENT)
Dept: PSYCHOLOGY | Facility: CLINIC | Age: 62
End: 2022-11-04
Payer: COMMERCIAL

## 2022-11-04 ENCOUNTER — ANCILLARY PROCEDURE (OUTPATIENT)
Dept: GENERAL RADIOLOGY | Facility: CLINIC | Age: 62
End: 2022-11-04
Attending: FAMILY MEDICINE
Payer: COMMERCIAL

## 2022-11-04 VITALS
BODY MASS INDEX: 21.72 KG/M2 | TEMPERATURE: 98 F | WEIGHT: 111.2 LBS | DIASTOLIC BLOOD PRESSURE: 89 MMHG | RESPIRATION RATE: 24 BRPM | SYSTOLIC BLOOD PRESSURE: 144 MMHG | OXYGEN SATURATION: 100 % | HEART RATE: 91 BPM

## 2022-11-04 DIAGNOSIS — G89.4 CHRONIC PAIN SYNDROME: Primary | ICD-10-CM

## 2022-11-04 DIAGNOSIS — J42 CHRONIC BRONCHITIS, UNSPECIFIED CHRONIC BRONCHITIS TYPE (H): ICD-10-CM

## 2022-11-04 DIAGNOSIS — L98.8 MORGELLONS DISEASE: ICD-10-CM

## 2022-11-04 DIAGNOSIS — J01.90 ACUTE BACTERIAL RHINOSINUSITIS: Primary | ICD-10-CM

## 2022-11-04 DIAGNOSIS — I10 ESSENTIAL HYPERTENSION: ICD-10-CM

## 2022-11-04 DIAGNOSIS — B96.89 ACUTE BACTERIAL RHINOSINUSITIS: Primary | ICD-10-CM

## 2022-11-04 PROCEDURE — 99214 OFFICE O/P EST MOD 30 MIN: CPT | Mod: GC

## 2022-11-04 PROCEDURE — 71046 X-RAY EXAM CHEST 2 VIEWS: CPT | Mod: TC | Performed by: RADIOLOGY

## 2022-11-04 PROCEDURE — 96156 HLTH BHV ASSMT/REASSESSMENT: CPT | Mod: HN | Performed by: PSYCHOLOGIST

## 2022-11-04 RX ORDER — DOXYCYCLINE 100 MG/1
100 CAPSULE ORAL 2 TIMES DAILY
Qty: 20 CAPSULE | Refills: 0 | Status: SHIPPED | OUTPATIENT
Start: 2022-11-04 | End: 2022-11-14

## 2022-11-04 RX ORDER — DOXYCYCLINE 100 MG/1
200 CAPSULE ORAL DAILY
Qty: 14 CAPSULE | Refills: 0 | Status: SHIPPED | OUTPATIENT
Start: 2022-11-04 | End: 2022-11-11

## 2022-11-04 ASSESSMENT — ANXIETY QUESTIONNAIRES
5. BEING SO RESTLESS THAT IT IS HARD TO SIT STILL: NEARLY EVERY DAY
3. WORRYING TOO MUCH ABOUT DIFFERENT THINGS: SEVERAL DAYS
GAD7 TOTAL SCORE: 15
7. FEELING AFRAID AS IF SOMETHING AWFUL MIGHT HAPPEN: NEARLY EVERY DAY
2. NOT BEING ABLE TO STOP OR CONTROL WORRYING: SEVERAL DAYS
GAD7 TOTAL SCORE: 15
1. FEELING NERVOUS, ANXIOUS, OR ON EDGE: SEVERAL DAYS
6. BECOMING EASILY ANNOYED OR IRRITABLE: NEARLY EVERY DAY

## 2022-11-04 ASSESSMENT — PATIENT HEALTH QUESTIONNAIRE - PHQ9
SUM OF ALL RESPONSES TO PHQ QUESTIONS 1-9: 8
SUM OF ALL RESPONSES TO PHQ QUESTIONS 1-9: 16
5. POOR APPETITE OR OVEREATING: NEARLY EVERY DAY

## 2022-11-04 NOTE — Clinical Note
Hi Dr. Patel,  I was finally able to meet with Lisa in early November for the  CPM assessment. We intended to meet a second time to gather some additional information that is usually included in our assessments; however, she missed the next appointment so I updated the assessment so that an additional  visit isn't holding up the process. Please let me know if you have any questions.   Thanks! Diallo

## 2022-11-04 NOTE — PROGRESS NOTES
Preceptor Attestation:    I discussed the patient with the resident and evaluated the patient in person. I have verified the content of the note, which accurately reflects my assessment of the patient and the plan of care.   Supervising Physician:  Varun Rapp MD.

## 2022-11-04 NOTE — PROGRESS NOTES
Assessment & Plan       Acute bacterial rhinosinusitis  Chronic bronchitis, unspecified chronic bronchitis type (H)  Patient recently seen for acute bacterial rhinosinusitis and treated with Augmentin. Believe her to have failed this therapy as she is still experiencing sinus pain, worsening rhinorrhea and productive cough. Perform CXR in clinic to rule out pneumonia as a cause of her symptoms. Interpreted images with attending physician in clinic and there was no consolidation or effusion. Will prescribed her 200mg doxycycline every day for 7 days. She will follow up with her PCP Dr. Patel here in clinic in 1 week for management of her chronic conditions and to re-evaluate for improvement in symptoms.  - Doxycycline 200mg PO every day for 7 days.  - XR CHEST 2 VW; Future    Essential hypertension  Hypertensive today in clinic. States she has been taking her medications regularly. No symptoms of hypertensive emergency. This may be a falsely elevated as patient states she was coughing while getting her BP measured. She will follow up with her PCP for re-evaluation of her HTN.      Morgellons disease  Visualized sputum in clinic and reassured patient that there were no bugs in her sputum, and that I do not believe bugs are causing her sinusitis. She voiced understanding and would like close follow up with her PCP for this. She says that she is working on finding new housing. She will schedule an appointment to see Dr. Patel in a week.      Jules Francis [facility] is a 62 year old female presenting to the clinic with nasal congestion, sinus tenderness, and a productive cough. Recently seen in clinic for these symptoms on 10/18/22 and prescribed Augmentin. She states that her symptoms mildly improved while on Augmentin but after her last dose they returned and worsened. She complains of sinus tenderness and green rhinorrhea, as well as a productive cough with green  "sputum. She coughs up green sputum during the encounter, and states she is concerned that there are bugs in her sputum and that they are causing her symptoms. The severity of her symptoms are \"about as bad as when I came in\" in reference her 10/18 visit. Denies fevers or chills, myalgias, worsening SOB, chest pain, abdominal pain, N/V/D.      Review of Systems   Constitutional, HEENT, cardiovascular, pulmonary, gi and gu systems are negative, except as otherwise noted.      Objective    BP (!) 144/89 (BP Location: Left arm, Patient Position: Sitting, Cuff Size: Adult Regular)   Pulse 91   Temp 98  F (36.7  C) (Tympanic)   Resp 24   Wt 50.4 kg (111 lb 3.2 oz)   SpO2 100%   BMI 21.72 kg/m    Body mass index is 21.72 kg/m .  Physical Exam   Constitutional: Awake, alert, cooperative, no apparent distress, and appears stated age.  Eyes: Lids and lashes normal, pupils equal, round and reactive to light, extra ocular muscles intact, sclera clear, conjunctiva normal.  ENT: Normocephalic, atraumatic. Moist mucous membranes. Maxillary and frontal sinus tenderness to palpation left worse than right. Nares non-edematous and non-erythematous bilaterally. Green rhinorrhea.  Hematologic / Lymphatic: No cervical lymphadenopathy.  Respiratory: Tachypneic. Expiratory wheezes bilaterally. Coarse lung sound bilaterally. No accessory muscle use.  Cardiovascular: Regular rate and rhythm, normal S1 and S2, no S3 or S4, and no murmur noted  GI: Abdomen soft, non-tender, non-distended, no masses palpated. Bowel sounds present.  Skin: No bruising or bleeding and normal skin color, texture, turgor.  Musculoskeletal: There is no redness, warmth, or swelling of the joints.  Full range of motion noted.  Motor strength is 5 out of 5 all extremities bilaterally.  Tone is normal.  Neurologic: Awake, alert, oriented to name, place and time.  Cranial nerves II-XII are grossly intact.  Sensation intact bilaterally.  Mental Status: Cooperative, " engaged, bright affect. Poor insight. Talkative, tangential thought process.
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
## 2022-11-05 DIAGNOSIS — M54.30 SCIATICA, UNSPECIFIED LATERALITY: ICD-10-CM

## 2022-11-10 ENCOUNTER — TELEPHONE (OUTPATIENT)
Dept: FAMILY MEDICINE | Facility: CLINIC | Age: 62
End: 2022-11-10

## 2022-11-10 NOTE — TELEPHONE ENCOUNTER
[facility] phone call message-patient reporting a symptom:     Symptom: pt fell yesterday and her body hurts.  She is wondering if she should come in or not.    Same Day Visit Offered: no     Additional comments: none    OK to leave message on voice mail? Yes    Primary language: English      needed? No    Call taken on November 10, 2022 at 8:32 AM by Juliet Mcintosh

## 2022-11-11 ENCOUNTER — VIRTUAL VISIT (OUTPATIENT)
Dept: FAMILY MEDICINE | Facility: CLINIC | Age: 62
End: 2022-11-11
Payer: COMMERCIAL
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
## 2022-11-11 PROCEDURE — 99207 PR NO BILLABLE SERVICE THIS VISIT: CPT | Mod: TEL

## 2022-11-11 NOTE — PROGRESS NOTES
Lisa is a 62 year old who is being evaluated via a billable telephone visit.      What phone number would you like to be contacted at? [phone]  How would you like to obtain your AVS? Will get copy of AVS at next visit    Assessment & Plan     Fall, initial encounter  Unable to reach patient for telephone visit after checked in. Attempted to call patient x3.    Yomaira Us MD PGY2  [facility]  11/11/22    I precepted today with Dr. Cheng Almonte   Lisa is a 62 year old presenting for the following health issues:  Fall (Hit rear end, believe that she have a concussion, feeling dizzy, loss of appetite)      HPI   See telephone encounter 11/10 regarding recent fall. Attempted to call patient x3 before clinic closed, no answer. Left voicemail for patient that she should call clinic with any changes in her symptoms and seek medical care.        Review of Systems   Not done       Objective           Vitals:  No vitals were obtained today due to virtual visit.    Physical Exam   Not examined     Phone call duration: 0 minutes

## 2022-11-11 NOTE — PROGRESS NOTES
Preceptor Attestation:    I talked to the patient on the phone and discussed the patient with the resident. I have verified the content of the note, which accurately reflects my assessment of the patient and the plan of care.   Supervising Physician:  Daniel Anand MD.

## 2022-11-11 NOTE — TELEPHONE ENCOUNTER
"Pt states that she fell 3 days ago and landed on her rt \"butt cheek\" but did hit her head on the left temple.  She states that she has a small bump on her head and has been nauseated and some blurry vision.  She tried her Zofran and it did not help.  Told pt that she needs to be seen ASAP and she states that she has no transportation but could do a phone visit.  Appt made with Dr Us and discussed with Dr Us in clinic.  Routed note to Dr Us./NG  "

## 2022-11-14 NOTE — TELEPHONE ENCOUNTER
"Pt states that she is having issues with her phone ringer and has to watch her phone closely in order to know if someone is calling and that is why she missed calls from Dr Us.  She states that her pain has improved but she is still a \"little\" dizzy when moving an vision is still \"little messed up\".  Pt has an appt with Dr Paul on 11/17/22 and would like to see a doctor after that appt for eval due to her h/o frequent falls. Appt made with Dr Us on 11/17/22 at 10:20 AM.   Pt would like Dr Paul to remind at her appt to stay and see Dr Us.  Routed note to Dr Us and Dr Paul./NG  "

## 2022-11-16 ENCOUNTER — TELEPHONE (OUTPATIENT)
Dept: PSYCHOLOGY | Facility: CLINIC | Age: 62
End: 2022-11-16

## 2022-11-16 NOTE — TELEPHONE ENCOUNTER
Placed call to Tracy () to discuss patient's services.     -Lisa has completed [employer] assessment so should be eligible for services through [facility] waiver; just waiting on [facility] to process. [employer] waiver would allow Lisa to have PCA hours, additional housing options, metro mobility transportation, etc.   -Currently $1000 behind in rent   -Tracy has gotten approval for Lisa to get medication monitoring through [facility] but needs to make sure Lisa can make appointment.   -provider shared concern about Lsia, given that she called clinic after a fall last week and missed virtual appt on 11/11 to discuss fall.       Plan     -CPM visit follow up with this provider on 11/17    -Appointment with Dr. Us on 11/17

## 2022-11-17 ENCOUNTER — VIRTUAL VISIT (OUTPATIENT)
Dept: FAMILY MEDICINE | Facility: CLINIC | Age: 62
End: 2022-11-17
Payer: COMMERCIAL

## 2022-11-17 ENCOUNTER — TELEPHONE (OUTPATIENT)
Dept: PSYCHOLOGY | Facility: CLINIC | Age: 62
End: 2022-11-17

## 2022-11-17 DIAGNOSIS — S09.90XA TRAUMATIC INJURY OF HEAD, INITIAL ENCOUNTER: Primary | ICD-10-CM

## 2022-11-17 PROCEDURE — 99213 OFFICE O/P EST LOW 20 MIN: CPT | Mod: TEL

## 2022-11-17 NOTE — PROGRESS NOTES
Lisa is a 62 year old who is being evaluated via a billable telephone visit.      What phone number would you like to be contacted at? [phone]  How would you like to obtain your AVS? Mail a copy    Assessment & Plan     Traumatic injury of head, initial encounter  Blurry vision, headaches, nausea, fatigue, dizziness after mechanical fall a week ago. Fell onto right hip and hit back of head against concrete wall. Reports LOC of 20 min, however no witness. No hip pain, mild bruise, and able to walk therefore will not order hip xray. Hx of multiple falls, which patient believes is due to her back pain. Reportedly follows with spine clinic.  Sx most likely due to concussion, however since unable to examine patient in person and given severity of fall and sx would recommend CT head to r/o brain bleed. Discussed with referral coordinator to get this scheduled as soon as possible. Discussed importance of seeking medical care if sx worsen.   - CT HEAD W/O CONTRAST    Reviewed ED visit 10/3 to [facility] for COPD  Reviewed psychiatry admission 10/12 for delusions.     Return in about 1 week (around 11/24/2022) for f/u fall, BP.    Yomaira Us MD PGY2  [facility]  11/17/22    I precepted today with Dr. Ward      Subjective   Lisa is a 62 year old presenting for the following health issues:  Follow Up (Follow up on last visit for high BP ), Fall (Fell down last couple weeks, got injury on the chin but feel little better), Dizziness (Also feel dizziness), and Medication Reconciliation (Cannot review)      HPI   Fell approx a week ago. Slipped and fell on to her right hip and the back of her head hit against a concrete wall. Thinks she had LOC for 20min.     Reports multiple falls for a year due to lower back issues. Reports she can't get back surgery until COPD improved, which she was recently admitted for.  States her symptoms feel like a concussion. She has dizziness, headaches, and her  vision is blurry. Has to lay down due to the dizziness. Feeling a lot more sleepy. Nausea with throwing up every once in awhile (also happens when she is overwhelmed). Symptoms are getting a little better.     Hip replacement on left side. Fell on right hip. Able to walk, only mild bruising present. She is not sure if there is bruising on the back of her head. Not on a blood thinner.    Due to telephone visit, unable to assess blood pressure. Does not have BP cuff.     Review of Systems   Constitutional, HEENT, cardiovascular, pulmonary, gi and gu systems are negative, except as otherwise noted.      Objective           Vitals:  No vitals were obtained today due to virtual visit.    Physical Exam   General: alert and no distress  PSYCH: Alert and oriented times 3; coherent speech but slightly pressured, increased  Rate, normal volume, able to articulate logical thoughts, able   to abstract reason, no hallucinations   or delusions  Her affect is normal  RESP: No cough, no audible wheezing, able to talk in full sentences  Remainder of exam unable to be completed due to telephone visits          Phone call duration: 13 minutes

## 2022-11-17 NOTE — TELEPHONE ENCOUNTER
Called Lisa at time of visit. FO had changed visit to telephone. Lisa said her ride didn't come, which is why they changed to telephone. Discussed how completing evaluation may be difficult over phone but gave Lisa option. She was coughing a lot and said she wouldn't be able to talk because of the coughing and she preferred to reschedule.     Plan:     Visit with Dr. Us at 10:20AM today     Diallo Paul, PhD  she/her/hers  Primary Care Behavioral Health Fellow

## 2022-11-18 NOTE — PROGRESS NOTES
I have reviewed and agree with the behavioral health fellow's documentation for this visit.  I did not personally see the patient.  Lucinda Lay, PhD., LP

## 2022-11-20 DIAGNOSIS — I10 ESSENTIAL HYPERTENSION: ICD-10-CM

## 2022-11-20 NOTE — PATIENT INSTRUCTIONS
Return to follow up in person on blood pressure in the next week. At that visit, we can also better assess your symptoms from the fall.

## 2022-11-26 RX ORDER — LISINOPRIL 40 MG/1
TABLET ORAL
Qty: 90 TABLET | Refills: 0 | Status: SHIPPED | OUTPATIENT
Start: 2022-11-26 | End: 2023-02-28

## 2022-12-02 NOTE — PROGRESS NOTES
Preceptor Attestation:   I talked to the patient on the phone. I have verified the content of the note, which accurately reflects my assessment of the patient and the plan of care.   Supervising Physician:  Carlo Ward MD.

## 2022-12-12 ENCOUNTER — DOCUMENTATION ONLY (OUTPATIENT)
Dept: FAMILY MEDICINE | Facility: CLINIC | Age: 62
End: 2022-12-12

## 2022-12-12 NOTE — PROGRESS NOTES
To be completed in Nursing note:    Please reference list for forms that require a visit for completion.  Please remind patients that providers are given 3-5 business days to complete and return forms.      [facility]    Date form received: 12/12/2022    Date form completed by Physician: 12/12/2022    How was form returned to patient (mailed, faxed, or at  for patient to ):    Fax to RADHA BLAKE; FAX: [phone], PHONE: [phone]    Date form mailed/faxed/left at  for patient and sent to HIM for scanning:    Fax on 1/3/2023    Once form is left for patient, faxed, or mailed PCS will then close the documentation only encounter.

## 2022-12-15 ENCOUNTER — OFFICE VISIT (OUTPATIENT)
Dept: FAMILY MEDICINE | Facility: CLINIC | Age: 62
End: 2022-12-15
Payer: COMMERCIAL

## 2022-12-15 VITALS
WEIGHT: 107.4 LBS | BODY MASS INDEX: 20.98 KG/M2 | HEART RATE: 83 BPM | RESPIRATION RATE: 28 BRPM | DIASTOLIC BLOOD PRESSURE: 99 MMHG | SYSTOLIC BLOOD PRESSURE: 166 MMHG | TEMPERATURE: 98.2 F | OXYGEN SATURATION: 100 %

## 2022-12-15 DIAGNOSIS — J44.1 COPD EXACERBATION (H): Primary | ICD-10-CM

## 2022-12-15 DIAGNOSIS — R06.02 SHORTNESS OF BREATH: ICD-10-CM

## 2022-12-15 DIAGNOSIS — S46.002D INJURY OF LEFT ROTATOR CUFF, SUBSEQUENT ENCOUNTER: ICD-10-CM

## 2022-12-15 LAB
FLUAV AG SPEC QL IA: NEGATIVE
FLUBV AG SPEC QL IA: NEGATIVE
SARS-COV-2 RNA RESP QL NAA+PROBE: NEGATIVE

## 2022-12-15 PROCEDURE — 99213 OFFICE O/P EST LOW 20 MIN: CPT | Mod: GC | Performed by: STUDENT IN AN ORGANIZED HEALTH CARE EDUCATION/TRAINING PROGRAM

## 2022-12-15 PROCEDURE — 87804 INFLUENZA ASSAY W/OPTIC: CPT | Performed by: STUDENT IN AN ORGANIZED HEALTH CARE EDUCATION/TRAINING PROGRAM

## 2022-12-15 PROCEDURE — U0005 INFEC AGEN DETEC AMPLI PROBE: HCPCS | Performed by: STUDENT IN AN ORGANIZED HEALTH CARE EDUCATION/TRAINING PROGRAM

## 2022-12-15 PROCEDURE — 2894A VOIDCORRECT: CPT | Mod: GC | Performed by: STUDENT IN AN ORGANIZED HEALTH CARE EDUCATION/TRAINING PROGRAM

## 2022-12-15 PROCEDURE — U0003 INFECTIOUS AGENT DETECTION BY NUCLEIC ACID (DNA OR RNA); SEVERE ACUTE RESPIRATORY SYNDROME CORONAVIRUS 2 (SARS-COV-2) (CORONAVIRUS DISEASE [COVID-19]), AMPLIFIED PROBE TECHNIQUE, MAKING USE OF HIGH THROUGHPUT TECHNOLOGIES AS DESCRIBED BY CMS-2020-01-R: HCPCS | Performed by: STUDENT IN AN ORGANIZED HEALTH CARE EDUCATION/TRAINING PROGRAM

## 2022-12-15 RX ORDER — ACETAMINOPHEN 500 MG
1000 TABLET ORAL 3 TIMES DAILY PRN
Qty: 180 TABLET | Refills: 11 | Status: SHIPPED | OUTPATIENT
Start: 2022-12-15 | End: 2023-01-31

## 2022-12-15 RX ORDER — PREDNISONE 20 MG/1
40 TABLET ORAL DAILY
Qty: 10 TABLET | Refills: 0 | Status: SHIPPED | OUTPATIENT
Start: 2022-12-15 | End: 2023-03-15

## 2022-12-15 RX ORDER — DOXYCYCLINE 100 MG/1
100 CAPSULE ORAL 2 TIMES DAILY
Qty: 10 CAPSULE | Refills: 0 | Status: SHIPPED | OUTPATIENT
Start: 2022-12-15 | End: 2023-04-18

## 2022-12-15 NOTE — LETTER
December 19, 2022      Lisa Scott  [address]  [address]  [place] [address]        Dear ,    We are writing to inform you of your test results.    Negative for covid and flu test results.       Resulted Orders   Symptomatic COVID-19 Virus (Coronavirus) by PCR Nose   Result Value Ref Range    SARS CoV2 PCR Negative Negative      Comment:      NEGATIVE: SARS-CoV-2 (COVID-19) RNA not detected, presumed negative.    Narrative    Testing was performed using the Markado Xpress SARS-CoV-2 Assay on the Cepheid Gene-Xpert Instrument Systems. Additional information about this Emergency Use Authorization (EUA) assay can be found via the Lab Guide. This test should be ordered for the detection of SARS-CoV-2 in individuals who meet SARS-CoV-2 clinical and/or epidemiological criteria as well as from individuals without symptoms or other reasons to suspect COVID-19. Test performance for asymptomatic patients has only been established in anterior nasal swab specimens. This test is for in vitro diagnostic use under the FDA EUA for laboratories certified under CLIA to perform high complexity testing. This test has not been FDA cleared or approved. A negative result does not rule out the presence of PCR inhibitors in the specimen or target RNA concentration below the limit of detection for the assay. The possibility of a false negative should be considered if the patient's recent exposure or clinical presentation suggests COVID-19. This test was validated by [facility]. These Laboratories are certified under the Clinical Laboratory Improvement Amendments (CLIA) as qualified to perform high complexity testing.     Influenza A & B Antigen - Clinic Collect   Result Value Ref Range    Influenza A antigen Negative Negative    Influenza B antigen Negative Negative    Narrative    Test results must be correlated with clinical data. If necessary, results should be confirmed by a molecular assay or viral  culture.           If you have any questions or concerns, please call the clinic at the number listed above.       Sincerely,      Lili Sims MD

## 2022-12-15 NOTE — PROGRESS NOTES
Assessment & Plan     Shortness of breath  COPD exacerbation (H)  History and physical exam are most consistent with exacerbation at this time.  Could have been precipitated by viral infection.  Recommended prednisone and doxycycline for 5 days.  Placed community paramedic referral to help with her medications, specifically the, event, as this could be useful in managing her wheezing and underlying lung issues.  Recommended that she follow-up in 2 weeks with her PCP for reassessment of care.  - Symptomatic COVID-19 Virus (Coronavirus) by PCR Nose  - Influenza A & B Antigen - Clinic Collect  - predniSONE (DELTASONE) 20 MG tablet; Take 2 tablets (40 mg) by mouth daily  - doxycycline hyclate (VIBRAMYCIN) 100 MG capsule; Take 1 capsule (100 mg) by mouth 2 times daily  - Community Paramedic Referral; Future    Injury of left rotator cuff, subsequent encounter  Desired refill  - acetaminophen (TYLENOL) 500 MG tablet; Take 2 tablets (1,000 mg) by mouth 3 times daily as needed for mild pain        Review of prior external note(s) from - previous office visits  30 minutes spent on the date of the encounter doing chart review, history and exam, documentation and further activities per the note       Lili Sims MD PGY3  [facility]  Precepted with Dr. Sylvia Almonte   Lisa Scott is a 62 year old who presents for the following health issues    HPI     Presents for 1 week history of increased shortness of breath and sputum production.  Patient says that she has felt sick for the past week.  She says that she has been using her inhalers except the Combivent because she did not know how to put it together.  Secondary to her symptoms, she is feeling increasingly weak.  She says that her sputum is white in color.  She also mentions some fevers and chills on and off.    Review of Systems   Complete ROS normal aside noted in HPI      Objective    BP (!) 166/99 (BP Location: Right arm, Patient  Position: Sitting, Cuff Size: Adult Regular)   Pulse 83   Temp 98.2  F (36.8  C) (Oral)   Resp 28   Wt 48.7 kg (107 lb 6.4 oz)   SpO2 100%   BMI 20.98 kg/m    Body mass index is 20.98 kg/m .    Physical Exam   GENERAL: healthy, alert and no distress  RESP: lungs generally wheezy, no crackles or rales  CV: regular rate and rhythm, normal S1 S2, no S3 or S4, no murmur, click or rub, no peripheral edema and peripheral pulses strong  ABDOMEN: soft, nontender, no hepatosplenomegaly, no masses and bowel sounds normal  MS: no gross musculoskeletal defects noted, no edema    Results for orders placed or performed in visit on 12/15/22 (from the past 24 hour(s))   Influenza A & B Antigen - Clinic Collect    Specimen: Nose; Swab   Result Value Ref Range    Influenza A antigen Negative Negative    Influenza B antigen Negative Negative    Narrative    Test results must be correlated with clinical data. If necessary, results should be confirmed by a molecular assay or viral culture.     *Note: Due to a large number of results and/or encounters for the requested time period, some results have not been displayed. A complete set of results can be found in Results Review.     COVID pending    ----- Service Performed and Documented by Resident or Fellow ------

## 2022-12-19 ENCOUNTER — TELEPHONE (OUTPATIENT)
Dept: FAMILY MEDICINE | Facility: CLINIC | Age: 62
End: 2022-12-19

## 2022-12-19 DIAGNOSIS — M54.30 SCIATICA, UNSPECIFIED LATERALITY: ICD-10-CM

## 2022-12-19 NOTE — CONFIDENTIAL NOTE
Message received 12/15/22 from [facility]  regarding pt having hallucinations and other mental health issues Return call made to [facility]  Ryan regarding pt mental health symptoms message left requesting a return call  [facility]  [phone]    Radha Dumont RN on 12/19/2022 at 9:08 AM

## 2022-12-19 NOTE — TELEPHONE ENCOUNTER
Radha Dumont, RN 57 minutes ago (9:09 AM)     BT  Message received 12/15/22 from [facility]  regarding pt having hallucinations and other mental health issues Return call made to [facility]  Ryan regarding pt mental health symptoms message left requesting a return call     Radha Dumont RN on 12/19/2022 at 9:08 AM

## 2022-12-20 ENCOUNTER — TELEPHONE (OUTPATIENT)
Dept: FAMILY MEDICINE | Facility: CLINIC | Age: 62
End: 2022-12-20

## 2022-12-20 NOTE — TELEPHONE ENCOUNTER
[facility] phone call message- medication clarification/question:    Full Medication Name:     tiZANidine (ZANAFLEX) 4 MG tablet  Sig - Route: Take 1 tablet (4 mg) by mouth 3 times daily as needed for muscle spasms - Oral    Question:    Pt is calling because she is having issues with her referrals.     Pharmacy confirmed as [id number] - [place], [place] - [address]: Yes    OK to leave a message on voice mail? Yes    Primary language: English      needed? No    Call taken on December 20, 2022 at 4:38 PM by Rut Brennan

## 2022-12-20 NOTE — TELEPHONE ENCOUNTER
Mirian Patel MD  You 34 minutes ago (3:26 PM)     AN  Thank you for this.  I held on to the forms because the note attached asked whether they could be done by 1/3 - so they were shuffled toward the bottom of the stack and I haven't gotten to them yet.  I'm on vacation 12/21-12/29 so will not be doing them before at least the 29th. I have looked through them and can do them without seeing Thresa in person, though I would still like to see her in person given her complexity!   Take care,   Dr. Patel

## 2022-12-20 NOTE — TELEPHONE ENCOUNTER
Talked to Ryan and she said when she first met pt on 12/8/22 pt was having a mental breakdown. She said pt was not doing well at the time. She that she was concerned about pt and did a safety check visit on 12/16/22 the day after she was seen in clinic on 12/15/22. She said pt was doing better when she did the safety check. Also noted not mental break down or hallucinates noted in Dr. Sims's office visit on 12/15/22.     She would also like to see what happened to the forms she dropped off. Notified her will send a message MANGO Kim, for Dr. Patel regarding status of the [facility] application form.     Routing to Judy to call Ryan w/ a status update on the form she dropped off on 12/12/22 per documentation encounter dated 12/12/22.     Also sending to Dr. Patel as FYI that pt was having hallucinations, restless, and unable to follow the conversation w/ Ryan when she was seen on 12/8/22. Ryan reports pt said she saw bug all over the place. Pt said she saw bed bugs and had blisters on her hands that pt said was due to bed bugs but Ryan thinks it's due to dry skin. When she saw pt on 12/16/22 pt was not having any mental health issues. She will see pt again next week.    Greer Lopez RN on 12/20/2022 at 9:57 AM

## 2022-12-20 NOTE — TELEPHONE ENCOUNTER
[facility] w/ info below.    Thank you for this.  I held on to the forms because the note attached asked whether they could be done by 1/3 - so they were shuffled toward the bottom of the stack and I haven't gotten to them yet.  I'm on vacation 12/21-12/29 so will not be doing them before at least the 29th. I have looked through them and can do them without seeing Lisasa in person, though I would still like to see her in person given her complexity!   Take care,   Dr. Jorge Lopez, RN on 12/20/2022 at 4:03 PM

## 2022-12-23 ENCOUNTER — TELEPHONE (OUTPATIENT)
Dept: FAMILY MEDICINE | Facility: CLINIC | Age: 62
End: 2022-12-23

## 2022-12-29 ENCOUNTER — OFFICE VISIT (OUTPATIENT)
Dept: FAMILY MEDICINE | Facility: CLINIC | Age: 62
End: 2022-12-29
Payer: COMMERCIAL

## 2022-12-29 VITALS
HEIGHT: 60 IN | HEART RATE: 72 BPM | OXYGEN SATURATION: 98 % | RESPIRATION RATE: 20 BRPM | BODY MASS INDEX: 21.48 KG/M2 | TEMPERATURE: 97.9 F | DIASTOLIC BLOOD PRESSURE: 91 MMHG | WEIGHT: 109.4 LBS | SYSTOLIC BLOOD PRESSURE: 154 MMHG

## 2022-12-29 DIAGNOSIS — M54.30 SCIATICA, UNSPECIFIED LATERALITY: ICD-10-CM

## 2022-12-29 DIAGNOSIS — F99 MENTAL HEALTH DISORDER: ICD-10-CM

## 2022-12-29 DIAGNOSIS — J42 CHRONIC BRONCHITIS, UNSPECIFIED CHRONIC BRONCHITIS TYPE (H): Primary | ICD-10-CM

## 2022-12-29 DIAGNOSIS — R06.02 SHORTNESS OF BREATH: ICD-10-CM

## 2022-12-29 PROCEDURE — 99214 OFFICE O/P EST MOD 30 MIN: CPT | Mod: GC

## 2022-12-29 RX ORDER — LEVALBUTEROL TARTRATE 45 UG/1
2 AEROSOL, METERED ORAL EVERY 6 HOURS PRN
Qty: 15 G | Refills: 4 | Status: SHIPPED | OUTPATIENT
Start: 2022-12-29 | End: 2023-02-28

## 2022-12-29 NOTE — PATIENT INSTRUCTIONS
Thank you for coming to see me today in clinic!    Today we discussed:  - Your difficulty breathing:   May take your Combivent 4 times a day  I have sent a refill of your albuterol to the pharmacy    If you have any further questions, please call the clinic!    Have a wonderful rest of your day!
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
## 2022-12-29 NOTE — TELEPHONE ENCOUNTER
[facility] phone call message- patient requesting a refill:    Full Medication Name: tiZANidine (ZANAFLEX) 4 MG tablet    Dose: Sig - Route: Take 1 tablet (4 mg) by mouth 3 times daily as needed for muscle spasms - Oral    Pharmacy confirmed as   [id number] - [place], [place] - [address] [place] [address]  Phone: [phone] Fax: [phone]  : Yes    Additional Comments: Pt needs refill on medication give a call once complete.     OK to leave a message on voice mail? Yes    Primary language: English      needed? No    Call taken on December 29, 2022 at 11:24 AM by Grisel Flores-Cardona

## 2023-01-02 DIAGNOSIS — F29 PSYCHOSIS, UNSPECIFIED PSYCHOSIS TYPE (H): ICD-10-CM

## 2023-01-03 ENCOUNTER — MEDICAL CORRESPONDENCE (OUTPATIENT)
Dept: HEALTH INFORMATION MANAGEMENT | Facility: CLINIC | Age: 63
End: 2023-01-03

## 2023-01-05 RX ORDER — QUETIAPINE FUMARATE 100 MG/1
TABLET, FILM COATED ORAL
Qty: 180 TABLET | Refills: 0 | Status: SHIPPED | OUTPATIENT
Start: 2023-01-05 | End: 2023-03-09

## 2023-01-06 NOTE — PROGRESS NOTES
Preceptor Attestation:    I discussed the patient with the resident and evaluated the patient in person. I have verified the content of the note, which accurately reflects my assessment of the patient and the plan of care.   Supervising Physician:  Cesar De Paz MD.

## 2023-01-11 ENCOUNTER — OFFICE VISIT (OUTPATIENT)
Dept: FAMILY MEDICINE | Facility: CLINIC | Age: 63
End: 2023-01-11
Payer: COMMERCIAL

## 2023-01-11 VITALS
BODY MASS INDEX: 21.33 KG/M2 | RESPIRATION RATE: 16 BRPM | OXYGEN SATURATION: 99 % | TEMPERATURE: 98.4 F | SYSTOLIC BLOOD PRESSURE: 157 MMHG | HEART RATE: 68 BPM | DIASTOLIC BLOOD PRESSURE: 78 MMHG | WEIGHT: 107.4 LBS

## 2023-01-11 DIAGNOSIS — J42 CHRONIC BRONCHITIS, UNSPECIFIED CHRONIC BRONCHITIS TYPE (H): ICD-10-CM

## 2023-01-11 DIAGNOSIS — H60.502 ACUTE OTITIS EXTERNA OF LEFT EAR, UNSPECIFIED TYPE: Primary | ICD-10-CM

## 2023-01-11 DIAGNOSIS — J43.9 PULMONARY EMPHYSEMA, UNSPECIFIED EMPHYSEMA TYPE (H): ICD-10-CM

## 2023-01-11 DIAGNOSIS — Z12.4 CERVICAL CANCER SCREENING: ICD-10-CM

## 2023-01-11 DIAGNOSIS — L29.9 ITCHING: ICD-10-CM

## 2023-01-11 DIAGNOSIS — Z12.11 SCREEN FOR COLON CANCER: ICD-10-CM

## 2023-01-11 DIAGNOSIS — J30.2 SEASONAL ALLERGIC RHINITIS, UNSPECIFIED TRIGGER: ICD-10-CM

## 2023-01-11 PROCEDURE — 99214 OFFICE O/P EST MOD 30 MIN: CPT | Performed by: FAMILY MEDICINE

## 2023-01-11 RX ORDER — FLUTICASONE PROPIONATE AND SALMETEROL XINAFOATE 115; 21 UG/1; UG/1
2 AEROSOL, METERED RESPIRATORY (INHALATION) 2 TIMES DAILY
Qty: 12 G | Refills: 3 | Status: SHIPPED | OUTPATIENT
Start: 2023-01-11 | End: 2023-01-31

## 2023-01-11 RX ORDER — PREDNISONE 20 MG/1
20 TABLET ORAL DAILY
Qty: 5 TABLET | Refills: 0 | Status: SHIPPED | OUTPATIENT
Start: 2023-01-11 | End: 2023-01-16

## 2023-01-11 RX ORDER — HYDROXYZINE HYDROCHLORIDE 25 MG/1
25 TABLET, FILM COATED ORAL 3 TIMES DAILY PRN
Qty: 30 TABLET | Refills: 0 | Status: SHIPPED | OUTPATIENT
Start: 2023-01-11 | End: 2023-02-10

## 2023-01-11 RX ORDER — HYDROCORTISONE AND ACETIC ACID 20.75; 10.375 MG/ML; MG/ML
3 SOLUTION AURICULAR (OTIC) 2 TIMES DAILY
Qty: 10 ML | Refills: 1 | Status: SHIPPED | OUTPATIENT
Start: 2023-01-11 | End: 2023-02-07

## 2023-01-11 RX ORDER — TIOTROPIUM BROMIDE 18 UG/1
18 CAPSULE ORAL; RESPIRATORY (INHALATION) DAILY
Qty: 30 CAPSULE | Refills: 3 | Status: SHIPPED | OUTPATIENT
Start: 2023-01-11 | End: 2023-02-28

## 2023-01-11 RX ORDER — FLUTICASONE PROPIONATE 50 MCG
SPRAY, SUSPENSION (ML) NASAL
Qty: 16 G | Refills: 2 | Status: SHIPPED | OUTPATIENT
Start: 2023-01-11 | End: 2023-04-06

## 2023-01-11 ASSESSMENT — ANXIETY QUESTIONNAIRES
3. WORRYING TOO MUCH ABOUT DIFFERENT THINGS: NEARLY EVERY DAY
GAD7 TOTAL SCORE: 21
1. FEELING NERVOUS, ANXIOUS, OR ON EDGE: NEARLY EVERY DAY
IF YOU CHECKED OFF ANY PROBLEMS ON THIS QUESTIONNAIRE, HOW DIFFICULT HAVE THESE PROBLEMS MADE IT FOR YOU TO DO YOUR WORK, TAKE CARE OF THINGS AT HOME, OR GET ALONG WITH OTHER PEOPLE: EXTREMELY DIFFICULT
2. NOT BEING ABLE TO STOP OR CONTROL WORRYING: NEARLY EVERY DAY
5. BEING SO RESTLESS THAT IT IS HARD TO SIT STILL: NEARLY EVERY DAY
GAD7 TOTAL SCORE: 21
6. BECOMING EASILY ANNOYED OR IRRITABLE: NEARLY EVERY DAY
7. FEELING AFRAID AS IF SOMETHING AWFUL MIGHT HAPPEN: NEARLY EVERY DAY

## 2023-01-11 ASSESSMENT — PATIENT HEALTH QUESTIONNAIRE - PHQ9
5. POOR APPETITE OR OVEREATING: NEARLY EVERY DAY
SUM OF ALL RESPONSES TO PHQ QUESTIONS 1-9: 19

## 2023-01-11 NOTE — PROGRESS NOTES
Assessment & Plan   Acute shortness of breath in setting of COPD.  O2 sats are reassuring (99%) but diffuse expiratory wheezing and mild conversational dyspnea.  Potentially related to recent use of cleaning chemicals.  -- Prednisone burst x5d.  20 mg is ~0.5 mg/kg.  -- No increased cough or sputum production to indicate need for antibiotics.  -- She's been using Combivent for her daily COPD regimen.  Switch inhaler regimen to longer lasting, specifically from ipratropium to tiotropium.  Change regimen from ipratropium-albuterol and fluticasone to tritropium and fluticasone-salmeterol.  Refilled levalbuterol PRN.    Follow-up with PCP for Pap.    Subjective   Lisa Scott is a 62 year old female with a history including alcohol dependence in remission, COPD, HTN, and CKD who presents for trouble breathing.    Her last PFTs (11/15/2019) showed mild obstructive disease, without reversibility.  She is a former smoker.  She is prescribed tiotropium-olodaterol but reports that her current respiratory regimen is ipratropium-albuterol and fluticasone, with levoalbuterol PRN.  She also has allergy medications including cetirizine and intranasal steroids.    For the past month, she has had increased shortness of breath.  She has to hurry to get her speech out because she can't complete a whole sentence.  She has been cleaning a lot lately, using chemicals, because she's concerned for a bug infestation.  Sputum production is unchanged from previous, still string-like and white.  No fevers.    Social: She reports that she has quit smoking. She has never used smokeless tobacco. She reports that she does not currently use alcohol. She reports that she does not use drugs.    Objective   Vitals: BP (!) 157/78 (BP Location: Left arm, Patient Position: Sitting, Cuff Size: Adult Regular)   Pulse 68   Temp 98.4  F (36.9  C) (Oral)   Resp 16   Wt 48.7 kg (107 lb 6.4 oz)   SpO2 99%   BMI 21.33 kg/m    General: Pleasant.  Middle-aged woman. No distress.  Heart: Regular rate and rhythm. No murmurs, rubs, or gallops.  Lungs: Mild conversational dyspnea.  Diffuse expiratory wheezing. No crackles.  Decent air movement, even at bases.  Psych: Speech normal rate. Labile mood and affect.

## 2023-01-31 ENCOUNTER — OFFICE VISIT (OUTPATIENT)
Dept: FAMILY MEDICINE | Facility: CLINIC | Age: 63
End: 2023-01-31
Payer: COMMERCIAL

## 2023-01-31 VITALS
OXYGEN SATURATION: 99 % | SYSTOLIC BLOOD PRESSURE: 139 MMHG | HEART RATE: 81 BPM | BODY MASS INDEX: 22.4 KG/M2 | TEMPERATURE: 98 F | WEIGHT: 112.8 LBS | DIASTOLIC BLOOD PRESSURE: 89 MMHG | RESPIRATION RATE: 24 BRPM

## 2023-01-31 DIAGNOSIS — Z20.822 EXPOSURE TO 2019 NOVEL CORONAVIRUS: Primary | ICD-10-CM

## 2023-01-31 DIAGNOSIS — S46.002D INJURY OF LEFT ROTATOR CUFF, SUBSEQUENT ENCOUNTER: ICD-10-CM

## 2023-01-31 DIAGNOSIS — Z12.11 SCREEN FOR COLON CANCER: ICD-10-CM

## 2023-01-31 DIAGNOSIS — G47.39 OTHER SLEEP APNEA: ICD-10-CM

## 2023-01-31 DIAGNOSIS — J42 CHRONIC BRONCHITIS, UNSPECIFIED CHRONIC BRONCHITIS TYPE (H): ICD-10-CM

## 2023-01-31 DIAGNOSIS — Z12.4 CERVICAL CANCER SCREENING: ICD-10-CM

## 2023-01-31 DIAGNOSIS — M47.9 SPONDYLOSIS: ICD-10-CM

## 2023-01-31 PROCEDURE — U0005 INFEC AGEN DETEC AMPLI PROBE: HCPCS | Performed by: FAMILY MEDICINE

## 2023-01-31 PROCEDURE — 99214 OFFICE O/P EST MOD 30 MIN: CPT | Mod: CS | Performed by: FAMILY MEDICINE

## 2023-01-31 PROCEDURE — U0003 INFECTIOUS AGENT DETECTION BY NUCLEIC ACID (DNA OR RNA); SEVERE ACUTE RESPIRATORY SYNDROME CORONAVIRUS 2 (SARS-COV-2) (CORONAVIRUS DISEASE [COVID-19]), AMPLIFIED PROBE TECHNIQUE, MAKING USE OF HIGH THROUGHPUT TECHNOLOGIES AS DESCRIBED BY CMS-2020-01-R: HCPCS | Performed by: FAMILY MEDICINE

## 2023-01-31 RX ORDER — FLUTICASONE PROPIONATE AND SALMETEROL XINAFOATE 115; 21 UG/1; UG/1
2 AEROSOL, METERED RESPIRATORY (INHALATION) 2 TIMES DAILY
Qty: 12 G | Refills: 3 | Status: SHIPPED | OUTPATIENT
Start: 2023-01-31 | End: 2023-02-28

## 2023-01-31 RX ORDER — ACETAMINOPHEN 500 MG
1000 TABLET ORAL 3 TIMES DAILY PRN
Qty: 180 TABLET | Refills: 11 | Status: SHIPPED | OUTPATIENT
Start: 2023-01-31 | End: 2023-08-23

## 2023-01-31 NOTE — PROGRESS NOTES
Assessment & Plan   Follow-up COPD exacerbation and associated inhaler regimen change.  She reports doing well on this, and exacerbation has resolved.  -- Continue tiotropium and fluticasone-salmeterol.  -- COVID swab; exposure.  -- Reports of apneic episodes while sleeping.  Sleep study referral.    Back pain, with severe degenerative changes and canal/foraminal stenosis at L4-L5 on previous MRI (Oct 2021).  She would like to discuss intervention options - e.g. injection, procedures, etc.  She understands that there is surgical risk with her underlying lung disease, so less invasive options should also be considered.  -- Spine referral.    Follow-up with PCP after sleep study and spine consultation.  Future Appointments   Date Time Provider Department Center   2/24/2023  9:30 AM Diallo Paul, PhD [facility]   Lisa Scott is a 62 year old female with a history including alcohol dependence in remission, COPD, HTN, and CKD who presents for follow-up of COPD.    Her last visit was ~2 weeks ago (1/11/23), at which time she was treated with prednisone for a COPD exacerbation.  At that time, she was using ipratropium-albuterol and fluticasone for her COPD regimen.  Given that these are short-acting agents, her inhaler regimen was switched to tiotropium and fluticasone-salmeterol.  We didn't changed her PRN (levalbuterol).  Today, she says that she continues to have occasional wheezing, but she finds the new inhalers effective.  She had no issues picking them up or using them.  No fevers, and her productive cough is improved.    Other:  - Somebody in her building has COVID and isn't isolating.  She would like to be tested.  - In discussing her breathing, she mentions that her bed partner often notes that she stops breathing while sleeping.  - She reports that she was seen previously for back pain and interventions were discussed but she wasn't referred.  She would like a referral.    Social: She  reports that she has quit smoking. She has never used smokeless tobacco. She reports that she does not currently use alcohol. She reports that she does not use drugs.    Objective   Vitals: /89 (BP Location: Left arm, Patient Position: Sitting, Cuff Size: Adult Regular)   Pulse 81   Temp 98  F (36.7  C) (Oral)   Resp 24   Wt 51.2 kg (112 lb 12.8 oz)   SpO2 99%   BMI 22.40 kg/m    General: Pleasant. Middle-aged woman. No distress.  Heart: Regular rate and rhythm. No murmurs, rubs, or gallops.  Lungs: Clear to auscultation bilaterally. No wheezes or crackles. Good air movement.  Psych: Appropriate grooming and hygiene. Speech normal rate. Appropriate mood and affect.    Imaging report reviewed:  EXAM: MR LUMBAR SPINE W/O CONTRAST  LOCATION: [facility]  DATE/TIME: 10/31/2021 9:55 AM  IMPRESSION:  1.  Multilevel lumbar spondylosis with severe degenerative changes and canal/foraminal stenosis at L4-L5.  2.  Degenerative findings at other levels as above.

## 2023-01-31 NOTE — LETTER
February 2, 2023      Lisa Scott  [address]  [place] [address]        Dear Lisa,     Your COVID test was negative. That's good. No COVID.       Resulted Orders   Asymptomatic COVID-19 Virus (Coronavirus) by PCR Nose   Result Value Ref Range    SARS CoV2 PCR Negative Negative      Comment:      NEGATIVE: SARS-CoV-2 (COVID-19) RNA not detected, presumed negative.    Narrative    Testing was performed using the edwin SARS-CoV-2 assay on the edwin  [facility] System. This test should be ordered for the detection of  SARS-CoV-2 in individuals who meet SARS-CoV-2 clinical and/or  epidemiological criteria. Test performance is unknown in asymptomatic  patients. This test is for in vitro diagnostic use under the FDA EUA  for laboratories certified under CLIA to perform high and/or moderate  complexity testing. This test has not been FDA cleared or approved. A  negative result does not rule out the presence of PCR inhibitors in  the specimen or target RNA in concentration below the limit of  detection for the assay. The possibility of a false negative should  be considered if the patient's recent exposure or clinical  presentation suggests COVID-19. This test was validated by the [facility]. This  laboratory is certified under the Clinical Laboratory Improvement  Amendments of 1988 (CLIA-88) as qualified to perform high and/or  moderate complexity laboratory testing.   If you have any questions or concerns, please call the clinic at the number listed above.       Sincerely,      Emeli Parikh MD

## 2023-02-01 ENCOUNTER — TELEPHONE (OUTPATIENT)
Dept: FAMILY MEDICINE | Facility: CLINIC | Age: 63
End: 2023-02-01
Payer: COMMERCIAL

## 2023-02-01 DIAGNOSIS — R11.0 NAUSEA: ICD-10-CM

## 2023-02-01 LAB — SARS-COV-2 RNA RESP QL NAA+PROBE: NEGATIVE

## 2023-02-01 NOTE — PATIENT INSTRUCTIONS
02/01/23  SLEEP EVAL REFERRAL    [facility]  Floor 1, [address]  [address] [place] [address]. [place] [address]  Appointments: [phone]  Fax: [phone]    Demographics and referral faxed to [phone]. They will contact patient to schedule.     Liz Lopez      02/01/23   [facility]  [address]  [place] [address]  Phone: [phone]  Referral Fax: [phone]    Referral, demographics, office visit and medication list faxed to [phone], They will contact patient to schedule.     Liz Lopez

## 2023-02-01 NOTE — TELEPHONE ENCOUNTER
Patient requesting refill of zofran  Patient given negative covid results from 1/31/23  Patient states refills from 1/31/23 have not been received by pharmacy    Note routed Dr Jorge Dumont, RN

## 2023-02-02 RX ORDER — ONDANSETRON 4 MG/1
4 TABLET, ORALLY DISINTEGRATING ORAL EVERY 8 HOURS PRN
Qty: 20 TABLET | Refills: 11 | Status: SHIPPED | OUTPATIENT
Start: 2023-02-02

## 2023-02-02 NOTE — RESULT ENCOUNTER NOTE
Results for orders placed or performed in visit on 01/31/23  -Asymptomatic COVID-19 Virus (Coronavirus) by PCR Nose:      Status: Normal  Specimen: Nose; Swab       Result                                            Value                         Ref Range                       SARS CoV2 PCR                                     Negative                      Negative

## 2023-02-07 ENCOUNTER — OFFICE VISIT (OUTPATIENT)
Dept: FAMILY MEDICINE | Facility: CLINIC | Age: 63
End: 2023-02-07
Payer: COMMERCIAL

## 2023-02-07 VITALS
SYSTOLIC BLOOD PRESSURE: 78 MMHG | RESPIRATION RATE: 16 BRPM | BODY MASS INDEX: 20.63 KG/M2 | HEART RATE: 68 BPM | WEIGHT: 103.9 LBS | DIASTOLIC BLOOD PRESSURE: 53 MMHG | TEMPERATURE: 98.8 F | OXYGEN SATURATION: 97 %

## 2023-02-07 DIAGNOSIS — R19.7 DIARRHEA, UNSPECIFIED TYPE: Primary | ICD-10-CM

## 2023-02-07 DIAGNOSIS — J30.89 NON-SEASONAL ALLERGIC RHINITIS, UNSPECIFIED TRIGGER: ICD-10-CM

## 2023-02-07 DIAGNOSIS — Z12.4 CERVICAL CANCER SCREENING: ICD-10-CM

## 2023-02-07 DIAGNOSIS — H60.502 ACUTE OTITIS EXTERNA OF LEFT EAR, UNSPECIFIED TYPE: ICD-10-CM

## 2023-02-07 DIAGNOSIS — Z12.11 SCREEN FOR COLON CANCER: ICD-10-CM

## 2023-02-07 DIAGNOSIS — J42 CHRONIC BRONCHITIS, UNSPECIFIED CHRONIC BRONCHITIS TYPE (H): ICD-10-CM

## 2023-02-07 DIAGNOSIS — L98.8 MORGELLONS DISEASE: ICD-10-CM

## 2023-02-07 PROCEDURE — 99214 OFFICE O/P EST MOD 30 MIN: CPT | Performed by: STUDENT IN AN ORGANIZED HEALTH CARE EDUCATION/TRAINING PROGRAM

## 2023-02-07 RX ORDER — HYDROCORTISONE AND ACETIC ACID 20.75; 10.375 MG/ML; MG/ML
3 SOLUTION AURICULAR (OTIC) 2 TIMES DAILY
Qty: 10 ML | Refills: 1 | Status: SHIPPED | OUTPATIENT
Start: 2023-02-07 | End: 2023-05-12

## 2023-02-07 RX ORDER — LOPERAMIDE HYDROCHLORIDE 2 MG/1
2 TABLET ORAL 3 TIMES DAILY PRN
Qty: 20 TABLET | Refills: 0 | Status: SHIPPED | OUTPATIENT
Start: 2023-02-07

## 2023-02-07 RX ORDER — CETIRIZINE HYDROCHLORIDE 10 MG/1
10 TABLET ORAL 2 TIMES DAILY
Qty: 30 TABLET | Refills: 3 | Status: SHIPPED | OUTPATIENT
Start: 2023-02-07 | End: 2023-05-17

## 2023-02-07 ASSESSMENT — ANXIETY QUESTIONNAIRES
2. NOT BEING ABLE TO STOP OR CONTROL WORRYING: NEARLY EVERY DAY
7. FEELING AFRAID AS IF SOMETHING AWFUL MIGHT HAPPEN: NEARLY EVERY DAY
5. BEING SO RESTLESS THAT IT IS HARD TO SIT STILL: NEARLY EVERY DAY
IF YOU CHECKED OFF ANY PROBLEMS ON THIS QUESTIONNAIRE, HOW DIFFICULT HAVE THESE PROBLEMS MADE IT FOR YOU TO DO YOUR WORK, TAKE CARE OF THINGS AT HOME, OR GET ALONG WITH OTHER PEOPLE: EXTREMELY DIFFICULT
1. FEELING NERVOUS, ANXIOUS, OR ON EDGE: MORE THAN HALF THE DAYS
GAD7 TOTAL SCORE: 18
6. BECOMING EASILY ANNOYED OR IRRITABLE: NEARLY EVERY DAY
3. WORRYING TOO MUCH ABOUT DIFFERENT THINGS: NEARLY EVERY DAY
GAD7 TOTAL SCORE: 18

## 2023-02-07 ASSESSMENT — PATIENT HEALTH QUESTIONNAIRE - PHQ9
SUM OF ALL RESPONSES TO PHQ QUESTIONS 1-9: 20
5. POOR APPETITE OR OVEREATING: SEVERAL DAYS

## 2023-02-07 NOTE — PATIENT INSTRUCTIONS
Follow a BRAT diet for the next few days:  Bananas  Rice   Applesauce   Toast    You can use Imodium (loperamide) if the diarrhea returns  Try to eat yogurt with live active cultures in it
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
## 2023-02-07 NOTE — NURSING NOTE
Patient blood pressure was low, but she declined to get it recheck the second time.

## 2023-02-14 PROBLEM — L98.8 MORGELLONS DISEASE: Status: ACTIVE | Noted: 2023-02-14

## 2023-02-21 DIAGNOSIS — M54.12 CERVICAL RADICULOPATHY: ICD-10-CM

## 2023-02-21 DIAGNOSIS — M54.2 CERVICALGIA: ICD-10-CM

## 2023-02-22 RX ORDER — GABAPENTIN 300 MG/1
CAPSULE ORAL
Qty: 180 CAPSULE | Refills: 0 | Status: SHIPPED | OUTPATIENT
Start: 2023-02-22 | End: 2023-03-21

## 2023-02-24 ENCOUNTER — OFFICE VISIT (OUTPATIENT)
Dept: PSYCHOLOGY | Facility: CLINIC | Age: 63
End: 2023-02-24
Payer: COMMERCIAL

## 2023-02-24 ENCOUNTER — LAB (OUTPATIENT)
Dept: LAB | Facility: CLINIC | Age: 63
End: 2023-02-24
Payer: COMMERCIAL

## 2023-02-24 DIAGNOSIS — G89.4 CHRONIC PAIN SYNDROME: Primary | ICD-10-CM

## 2023-02-24 LAB
ANION GAP SERPL CALCULATED.3IONS-SCNC: 14 MMOL/L (ref 7–15)
BUN SERPL-MCNC: 24.2 MG/DL (ref 8–23)
CALCIUM SERPL-MCNC: 9.1 MG/DL (ref 8.8–10.2)
CHLORIDE SERPL-SCNC: 103 MMOL/L (ref 98–107)
CREAT SERPL-MCNC: 0.72 MG/DL (ref 0.51–0.95)
DEPRECATED HCO3 PLAS-SCNC: 22 MMOL/L (ref 22–29)
GFR SERPL CREATININE-BSD FRML MDRD: >90 ML/MIN/1.73M2
GLUCOSE SERPL-MCNC: 87 MG/DL (ref 70–99)
POTASSIUM SERPL-SCNC: 4.8 MMOL/L (ref 3.4–5.3)
SODIUM SERPL-SCNC: 139 MMOL/L (ref 136–145)

## 2023-02-24 PROCEDURE — 80048 BASIC METABOLIC PNL TOTAL CA: CPT

## 2023-02-24 PROCEDURE — 96158 HLTH BHV IVNTJ INDIV 1ST 30: CPT | Mod: HN | Performed by: PSYCHOLOGIST

## 2023-02-24 PROCEDURE — 36415 COLL VENOUS BLD VENIPUNCTURE: CPT
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
## 2023-02-28 ENCOUNTER — OFFICE VISIT (OUTPATIENT)
Dept: FAMILY MEDICINE | Facility: CLINIC | Age: 63
End: 2023-02-28
Payer: COMMERCIAL

## 2023-02-28 VITALS
RESPIRATION RATE: 16 BRPM | OXYGEN SATURATION: 100 % | SYSTOLIC BLOOD PRESSURE: 79 MMHG | BODY MASS INDEX: 20.65 KG/M2 | WEIGHT: 104 LBS | HEART RATE: 78 BPM | TEMPERATURE: 97.4 F | DIASTOLIC BLOOD PRESSURE: 56 MMHG

## 2023-02-28 DIAGNOSIS — J06.9 UPPER RESPIRATORY TRACT INFECTION, UNSPECIFIED TYPE: Primary | ICD-10-CM

## 2023-02-28 DIAGNOSIS — L85.3 XEROSIS CUTIS: ICD-10-CM

## 2023-02-28 DIAGNOSIS — J42 CHRONIC BRONCHITIS, UNSPECIFIED CHRONIC BRONCHITIS TYPE (H): ICD-10-CM

## 2023-02-28 DIAGNOSIS — Z12.4 CERVICAL CANCER SCREENING: ICD-10-CM

## 2023-02-28 DIAGNOSIS — I10 ESSENTIAL HYPERTENSION: ICD-10-CM

## 2023-02-28 DIAGNOSIS — Z12.11 SCREEN FOR COLON CANCER: ICD-10-CM

## 2023-02-28 LAB
FLUAV RNA SPEC QL NAA+PROBE: NEGATIVE
FLUBV RNA RESP QL NAA+PROBE: NEGATIVE
RSV RNA SPEC NAA+PROBE: NEGATIVE
SARS-COV-2 RNA RESP QL NAA+PROBE: NEGATIVE

## 2023-02-28 PROCEDURE — 99214 OFFICE O/P EST MOD 30 MIN: CPT | Mod: CS | Performed by: STUDENT IN AN ORGANIZED HEALTH CARE EDUCATION/TRAINING PROGRAM

## 2023-02-28 PROCEDURE — 87637 SARSCOV2&INF A&B&RSV AMP PRB: CPT | Performed by: STUDENT IN AN ORGANIZED HEALTH CARE EDUCATION/TRAINING PROGRAM

## 2023-02-28 RX ORDER — HYDROXYZINE HYDROCHLORIDE 25 MG/1
25 TABLET, FILM COATED ORAL 3 TIMES DAILY PRN
Qty: 30 TABLET | Refills: 1 | Status: SHIPPED | OUTPATIENT
Start: 2023-02-28 | End: 2023-03-10

## 2023-02-28 RX ORDER — TIOTROPIUM BROMIDE 18 UG/1
18 CAPSULE ORAL; RESPIRATORY (INHALATION) DAILY
Qty: 30 CAPSULE | Refills: 3 | Status: SHIPPED | OUTPATIENT
Start: 2023-02-28 | End: 2023-05-17

## 2023-02-28 RX ORDER — LISINOPRIL 20 MG/1
20 TABLET ORAL DAILY
Qty: 60 TABLET | Refills: 1 | Status: SHIPPED | OUTPATIENT
Start: 2023-02-28 | End: 2023-03-15

## 2023-02-28 RX ORDER — FLUTICASONE PROPIONATE AND SALMETEROL XINAFOATE 115; 21 UG/1; UG/1
2 AEROSOL, METERED RESPIRATORY (INHALATION) 2 TIMES DAILY
Qty: 12 G | Refills: 3 | Status: SHIPPED | OUTPATIENT
Start: 2023-02-28 | End: 2023-05-19

## 2023-02-28 RX ORDER — BENZONATATE 100 MG/1
100 CAPSULE ORAL 3 TIMES DAILY PRN
Qty: 30 CAPSULE | Refills: 1 | Status: SHIPPED | OUTPATIENT
Start: 2023-02-28 | End: 2023-03-15

## 2023-02-28 RX ORDER — ALBUTEROL SULFATE 90 UG/1
2 AEROSOL, METERED RESPIRATORY (INHALATION) EVERY 4 HOURS PRN
Qty: 18 G | Refills: 1 | Status: SHIPPED | OUTPATIENT
Start: 2023-02-28 | End: 2023-05-05

## 2023-02-28 RX ORDER — GUAIFENESIN 600 MG/1
1200 TABLET, EXTENDED RELEASE ORAL 2 TIMES DAILY
Qty: 30 TABLET | Refills: 1 | Status: SHIPPED | OUTPATIENT
Start: 2023-02-28 | End: 2023-03-15

## 2023-02-28 RX ORDER — MINERAL OIL/HYDROPHIL PETROLAT
OINTMENT (GRAM) TOPICAL PRN
Qty: 198 G | Refills: 1 | Status: SHIPPED | OUTPATIENT
Start: 2023-02-28 | End: 2023-11-03

## 2023-02-28 NOTE — LETTER
March 3, 2023      Lisa Scott  [email or web address]  [address]  [place] [address]        Dear ,    We are writing to inform you of your test results.    Negative COVID, flu and RSV results.    Resulted Orders   Symptomatic Influenza A/B, RSV, & SARS-CoV2 PCR (COVID-19) Nasopharyngeal   Result Value Ref Range    Influenza A PCR Negative Negative    Influenza B PCR Negative Negative    RSV PCR Negative Negative    SARS CoV2 PCR Negative Negative      Comment:      NEGATIVE: SARS-CoV-2 (COVID-19) RNA not detected, presumed negative.    Narrative    Testing was performed using the Xpert Xpress CoV2/Flu/RSV Assay on the [employer] GeneXpert Instrument. This test should be ordered for the detection of SARS-CoV-2, influenza, and RSV viruses in individuals who meet clinical and/or epidemiological criteria. Test performance is unknown in asymptomatic patients. This test is for in vitro diagnostic use under the FDA EUA for laboratories certified under CLIA to perform high or moderate complexity testing. This test has not been FDA cleared or approved. A negative result does not rule out the presence of PCR inhibitors in the specimen or target RNA in concentration below the limit of detection for the assay. If only one viral target is positive but coinfection with multiple targets is suspected, the sample should be re-tested with another FDA cleared, approved, or authorized test, if coinfection would change clinical management. This test was validated by the [facility]. These laboratories are certified under the Clinical Laboratory Improvement Amendments of 1988 (CLIA-88) as qualified to perform high complexity laboratory testing.   If you have any questions or concerns, please call the clinic at the number listed above.       Sincerely,      Mirian Patel MD
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
## 2023-02-28 NOTE — PATIENT INSTRUCTIONS
STOP your amlodipine for now  DECREASE your lisinopril from 40 to 20mg.     Come back at 9 am next Tuesday morning to recheck your cough.     RESTART your Advair 2 puffs two times daily.

## 2023-03-01 ENCOUNTER — TELEPHONE (OUTPATIENT)
Dept: FAMILY MEDICINE | Facility: CLINIC | Age: 63
End: 2023-03-01

## 2023-03-06 DIAGNOSIS — T07.XXXA MULTIPLE EXCORIATIONS: ICD-10-CM

## 2023-03-06 DIAGNOSIS — M54.30 SCIATICA, UNSPECIFIED LATERALITY: ICD-10-CM

## 2023-03-06 NOTE — TELEPHONE ENCOUNTER
[facility] phone call message- patient requesting a refill:    Full Medication Name:tiZANidine (ZANAFLEX) 4 MG tablet  -Take 1 tablet (4 mg) by mouth 3 times daily as needed for muscle spasms - Oral  NOT DOING SURGERY NOW    mineral oil-hydrophilic petrolatum (AQUAPHOR) external ointment -   Apply topically as needed for dry skin - Topical INSURANCE WILL NOT PAY FOR - NEED ALTERNATIVE    Pharmacy confirmed as   [id number] - [place], [place] - [address] [place] [address]  Phone: [phone] Fax: [phone]  : Yes    Additional Comments: Pt would like to speak with Dr Patel if possible     OK to leave a message on voice mail? Yes    Primary language: English      needed? No    Call taken on March 6, 2023 at 3:17 PM by Juliet Mcintosh

## 2023-03-08 DIAGNOSIS — F29 PSYCHOSIS, UNSPECIFIED PSYCHOSIS TYPE (H): ICD-10-CM

## 2023-03-08 RX ORDER — MINERAL OIL/HYDROPHIL PETROLAT
OINTMENT (GRAM) TOPICAL PRN
Qty: 420 G | Refills: 1 | Status: SHIPPED | OUTPATIENT
Start: 2023-03-08

## 2023-03-09 ENCOUNTER — TELEPHONE (OUTPATIENT)
Dept: FAMILY MEDICINE | Facility: CLINIC | Age: 63
End: 2023-03-09

## 2023-03-09 RX ORDER — QUETIAPINE FUMARATE 100 MG/1
TABLET, FILM COATED ORAL
Qty: 180 TABLET | Refills: 0 | Status: SHIPPED | OUTPATIENT
Start: 2023-03-09 | End: 2023-07-11

## 2023-03-09 NOTE — TELEPHONE ENCOUNTER
[facility] phone call message- patient requesting a refill:    Full Medication Name: antibiotic     Dose:       Pharmacy confirmed as   [id number] - [place], [place] - [address] [place] [address]  Phone: [phone] Fax: [phone]  : Yes    Additional Comments: the Pt called to ask for a medication to treat a sinus infection and would like a call back      OK to leave a message on voice mail? Yes    Primary language: English      needed? No    Call taken on March 9, 2023 at 12:12 PM by Rick Bronson

## 2023-03-10 ENCOUNTER — OFFICE VISIT (OUTPATIENT)
Dept: FAMILY MEDICINE | Facility: CLINIC | Age: 63
End: 2023-03-10
Payer: COMMERCIAL

## 2023-03-10 VITALS
OXYGEN SATURATION: 97 % | BODY MASS INDEX: 20.38 KG/M2 | RESPIRATION RATE: 16 BRPM | HEART RATE: 85 BPM | WEIGHT: 102.6 LBS

## 2023-03-10 DIAGNOSIS — L98.8 MORGELLONS DISEASE: Primary | ICD-10-CM

## 2023-03-10 DIAGNOSIS — J06.9 UPPER RESPIRATORY TRACT INFECTION, UNSPECIFIED TYPE: ICD-10-CM

## 2023-03-10 DIAGNOSIS — L85.3 XEROSIS CUTIS: ICD-10-CM

## 2023-03-10 PROCEDURE — 99000 SPECIMEN HANDLING OFFICE-LAB: CPT

## 2023-03-10 PROCEDURE — 99213 OFFICE O/P EST LOW 20 MIN: CPT | Mod: GC

## 2023-03-10 RX ORDER — HYDROXYZINE HYDROCHLORIDE 25 MG/1
50 TABLET, FILM COATED ORAL 3 TIMES DAILY PRN
Qty: 30 TABLET | Refills: 1 | Status: SHIPPED | OUTPATIENT
Start: 2023-03-10 | End: 2023-04-13

## 2023-03-10 NOTE — PROGRESS NOTES
Assessment & Plan     Morgellons disease  Pruritis  Patient has a history of longstanding sensation of bugs in various parts of her body as well as itching and discomfort throughout.  Patient is reporting it now currently in her sputum, in her stomach, reporting GI disturbances, reporting vaginal itching, see itching in her skin, reporting black/for years which I was not able to appreciate on exam.  Exam is noteworthy for dry skin.  Patient brought in a sample of various body fluids including sputum, urine, and very skin and hair scrapings that she believes contains the worms that she is seeing.  We will examine this to see if we find any bugs, not likely given history.  - SPECIMEN HANDLING,PT->LAB  - Surgical Pathology Exam  - hydrOXYzine (ATARAX) 25 MG tablet; Take 2 tablets (50 mg) by mouth 3 times daily as needed for itching (increased from 25mg dose)    Xerosis cutis  Has dry, cracked skin on extremities of hands and feet.  Previously was prescribed Aquaphor.  Was not covered by insurance.  Advised patient to apply liberal amounts of Vaseline with socks or gloves on to help walk in the moisture.  - White Petrolatum ointment; Apply topically daily as needed for dry skin (apply to dry skin (feet in particular))    Diagnosis or treatment significantly limited by social determinants of health - needs forms filled out for moving to new apartment, unsteady on feet, lives at home, receiving food assistance  Ordering of each unique test  Prescription drug management  50 minutes spent on the date of the encounter doing chart review, history and exam, documentation and further activities per the note       MEDICATIONS:   Orders Placed This Encounter   Medications     hydrOXYzine (ATARAX) 25 MG tablet     Sig: Take 2 tablets (50 mg) by mouth 3 times daily as needed for itching     Dispense:  30 tablet     Refill:  1     White Petrolatum ointment     Sig: Apply topically daily as needed for dry skin (apply to dry skin  (feet in particular))     Dispense:  59064 g     Refill:  1          - Continue other medications without change    Return if symptoms worsen or fail to improve.    Mirian Castanon MD  PGY-2 Family Medicine Resident  [facility] Gonzalez is a 62 year old presenting for the following health issues:  Other (Pt state loss of appetite and sleep, possible due to the bugs), Forms (Public Housing, Request to Transfer), and Medication Reconciliation (Did not review, provider to review)      HPI   Patient came in with complaints of diffuse bug infestation of body.     Leg was acutely bleeding on intake, so took time to address first. Leg was spurting blood from small pinprick in R shin. After holding pressure and elevating leg, cauterized wound with silver nitrate and achieved hemostasis.     Once the bleeding calm down, we discussed with patient her very symptoms.  She reports diffuse itching, black/brown discharge coming out of her hair and ears.  It clear discharge in her hair causing it to get thick and gummy, figuration of her face, worsening of her teeth, persistent cough productive of green sputum(of note she does report this is feeling slightly better after her last appointment with Dr. Patel), abdominal distress, vomiting, diarrhea, dehydration, toenail disturbances(bugs eating toenails) and concern for warm coming out of the pinprick in her leg where bleeding was coming from.      Does not endorse recently itching the area, does not endorse trying to get the worm out of her leg at any point in time, said that the bleeding was spontaneous.  Is not currently on blood thinners.  However, does have a history of liver disease.    Patient was prescribed Aquaphor for her itchy skin in the past, was not covered by insurance.  Has used Vaseline and foot oil at home to try and moisturize feet.    Patient brought in a sample of various body fluids including urine, spit, and very skin and  hair scrapings containing what she believes are of the bugs.  She would like for us to test them and to see if we can find out what it can be treated for her.    Also brought a form with her for moving into a new housing unit, we discussed with her that this is something she can fill out on her own and only needs to provide her medical providers name.      Review of Systems   Constitutional, HEENT, cardiovascular, pulmonary, gi and gu systems are negative, except as otherwise noted.  Itching, poor energy, bleeding leg, feeling dehydrated, stomach pain, diarrhea, vaginal itching, black dots on skin and head, green mucous, productive cough      Objective    Pulse 85   Resp 16   Wt 46.5 kg (102 lb 9.6 oz)   SpO2 97%   BMI 20.38 kg/m    Body mass index is 20.38 kg/m .  Physical Exam   GENERAL: healthy, alert and no distress  HENT: ear canals and TM's normal, nose and mouth without ulcers or lesions, no brown discharge outside of small amount of earwax seen in right ear canal.  Not obstructing.  RESP: lungs clear to auscultation - no rales, rhonchi or wheezes, patient cough productive of foamy white sputum  CV: regular rate and rhythm, normal S1 S2, no S3 or S4, no murmur, click or rub, no peripheral edema and peripheral pulses strong  MS: no gross musculoskeletal defects noted, no edema  SKIN: no suspicious lesions or rashes, silver nitrate on right shin in place.  Bleeding lesion was 1 mm in circumference, round, nearby a varicose vein.  Various varicose veins along bilateral legs.  No noted brown or black spots in scalp as patient described after rubbing her hands on head.  PSYCH: mentation appears fixated on bug infestation, redirectable, poor reality testing for bugs, otherwise intact for other areas, affect anxious, mood sad.  Judgment poor, insight poor.    Patient brought in sample of various body fluids including urine, spit, and very skin clippings that she is convinced contain the bugs.  We will be doing a  lab analysis of this at this time to ensure no signs of parasites.    ----- Service Performed and Documented by Resident or Fellow ------

## 2023-03-10 NOTE — PATIENT INSTRUCTIONS
Take Vaseline, lotion, or foot oil and apply to feet at night with socks on.  This will help locking the moisture help moisturize her foot, may reduce some itching as well.    And give you a refill of hydroxyzine to help with itching.    Please have your  help you fill out the form for moving apartments.    Finally, I will call you with the results of what we find under the microscope and the samples you brought.  If we find something treatable and there, we will give you medication for what ever we find.    Also need to call us back or schedule another visit if you have more concerns.

## 2023-03-10 NOTE — TELEPHONE ENCOUNTER
Pt is calling back to speak with either the nurse or the doctor about her sinus infection.  Please call.

## 2023-03-13 NOTE — TELEPHONE ENCOUNTER
Routing to provider as HENNA. Pt was seen for URI but now she fell on Saturday. Her left side is hurting and she is unable to move it. She is doesn't have a ride for today but can come in tomorrow. She will try to get a ride to be seen and if unable then will call back.     Will have Dr. Patel sign restriction form tomorrow so she can see Dr. Pyle.    Greer Lopez RN on 3/13/2023 at 1:26 PM

## 2023-03-14 NOTE — TELEPHONE ENCOUNTER
Routing to provider as FYI. Pt was seen for URI but now she fell on Saturday. Her left side is hurting and she is unable to move it. She is doesn't have a ride for on Monday but can come in Tuesday. Scheduled her with Dr. Pyle but since Dr. Patel is not here today to sign RRP form rescheduled her for tomorrow. She requested for pain medication like oxycodone but notified pt since Dr. Patel is out she will most likely not get a response back today. Pt to see Dr. Patel tomorrow to discuss further. Advised her she can go to ER or urgent care today but she declined.     Also notified pt per her request that the foreign body samples she brought in have been sent to lab to be processed. Pt verbalized understanding.     Greer Lopez RN on 3/14/2023 at 9:00 AM

## 2023-03-14 NOTE — PROGRESS NOTES
Preceptor Attestation:  Vitals:    03/10/23 1028   Pulse: 85   Resp: 16   SpO2: 97%   Weight: 46.5 kg (102 lb 9.6 oz)          I discussed the patient with the resident and evaluated the patient in person. I have verified the content of the note, which accurately reflects my assessment of the patient and the plan of care.   Supervising Physician:  Mirian Patel MD

## 2023-03-15 ENCOUNTER — E-CONSULT (OUTPATIENT)
Dept: PSYCHIATRY | Facility: CLINIC | Age: 63
End: 2023-03-15
Payer: COMMERCIAL

## 2023-03-15 ENCOUNTER — OFFICE VISIT (OUTPATIENT)
Dept: FAMILY MEDICINE | Facility: CLINIC | Age: 63
End: 2023-03-15
Payer: COMMERCIAL

## 2023-03-15 ENCOUNTER — ANCILLARY PROCEDURE (OUTPATIENT)
Dept: GENERAL RADIOLOGY | Facility: CLINIC | Age: 63
End: 2023-03-15
Attending: STUDENT IN AN ORGANIZED HEALTH CARE EDUCATION/TRAINING PROGRAM
Payer: COMMERCIAL

## 2023-03-15 VITALS
RESPIRATION RATE: 20 BRPM | OXYGEN SATURATION: 98 % | WEIGHT: 100.2 LBS | BODY MASS INDEX: 19.9 KG/M2 | TEMPERATURE: 98.2 F | HEART RATE: 86 BPM

## 2023-03-15 DIAGNOSIS — M25.512 ACUTE PAIN OF LEFT SHOULDER DUE TO TRAUMA: ICD-10-CM

## 2023-03-15 DIAGNOSIS — J06.9 UPPER RESPIRATORY TRACT INFECTION, UNSPECIFIED TYPE: ICD-10-CM

## 2023-03-15 DIAGNOSIS — L98.8 MORGELLONS DISEASE: Primary | ICD-10-CM

## 2023-03-15 DIAGNOSIS — Z12.11 SCREEN FOR COLON CANCER: ICD-10-CM

## 2023-03-15 DIAGNOSIS — F31.9 BIPOLAR I DISORDER (H): ICD-10-CM

## 2023-03-15 DIAGNOSIS — Z12.4 CERVICAL CANCER SCREENING: ICD-10-CM

## 2023-03-15 DIAGNOSIS — G89.11 ACUTE PAIN OF LEFT SHOULDER DUE TO TRAUMA: ICD-10-CM

## 2023-03-15 PROBLEM — N18.30 CHRONIC KIDNEY DISEASE, STAGE 3 (H): Status: RESOLVED | Noted: 2021-08-13 | Resolved: 2023-03-15

## 2023-03-15 PROCEDURE — 99207 E-CONSULT TO BEHAVIORAL HEALTH (ADULT OUTPT PROVIDER TO SPECIALIST WRITTEN QUESTION & RESPONSE): CPT | Performed by: STUDENT IN AN ORGANIZED HEALTH CARE EDUCATION/TRAINING PROGRAM

## 2023-03-15 PROCEDURE — 73030 X-RAY EXAM OF SHOULDER: CPT | Mod: TC | Performed by: RADIOLOGY

## 2023-03-15 PROCEDURE — 99214 OFFICE O/P EST MOD 30 MIN: CPT | Performed by: STUDENT IN AN ORGANIZED HEALTH CARE EDUCATION/TRAINING PROGRAM

## 2023-03-15 PROCEDURE — 99451 NTRPROF PH1/NTRNET/EHR 5/>: CPT | Performed by: PSYCHIATRY & NEUROLOGY

## 2023-03-15 RX ORDER — GUAIFENESIN 600 MG/1
1200 TABLET, EXTENDED RELEASE ORAL 2 TIMES DAILY
Qty: 30 TABLET | Refills: 1 | Status: SHIPPED | OUTPATIENT
Start: 2023-03-15 | End: 2023-04-26

## 2023-03-15 RX ORDER — BENZONATATE 100 MG/1
100 CAPSULE ORAL 3 TIMES DAILY PRN
Qty: 30 CAPSULE | Refills: 1 | Status: SHIPPED | OUTPATIENT
Start: 2023-03-15 | End: 2023-04-18

## 2023-03-15 RX ORDER — IBUPROFEN 600 MG/1
600 TABLET, FILM COATED ORAL EVERY 6 HOURS PRN
Qty: 30 TABLET | Refills: 0 | Status: SHIPPED | OUTPATIENT
Start: 2023-03-15 | End: 2023-04-13

## 2023-03-15 NOTE — PROGRESS NOTES
3/15/2023     E-Consult has been accepted.    Interprofessional consultation requested by:  Mirian Patel MD      Clinical Question/Purpose: MY CLINICAL QUESTION IS: Pt with bipolar disorder currently uncontrolled, with severe delusions of parasitosis, sleeping 2h/night, not eating, states compliant with medications which are ineffective. Please help with recommendations for med management.     Patient assessment and information reviewed: chart review    Recommendations:  There are many issues going on that may not be medication related.  It appears she is in the process of getting more social support which will be great.  I like the CAD[employer] waiver to get more support.  An [facility] worker might be very helpful.  I also think a long term therapist would be helpful.  It is a way to try to work on some coping skills with anxiety and grounding techniques with her delusions.  There is a history of alcohol use disorder.  The symptoms seem to be much worse than they have been in the past.  Did she start drinking again (or using another substance)?  This should be checked out.  Also, it appears she was put on prednisone in Dec for COPD.  It looks like she is still on it.  This could also be making her mental health symptoms worse.  Is this still needed?      It looks like she is on depakote as well.  It is a low dose of 250 mg bid.  Is she really still on this?  If so, checking a depakote level would be helpful.  Most likely she is under treated and this could be increased.  We would like a level between .  Would consider going up to 500 mg bid if her level looks like it can tolerate that.  This could be increased even more if symptoms persist and her level is okay.  It is best to get a trough level in the morning before she takes her morning depakote dose.  A level should be checked around 3-5 days after a dose change.      I like the seroquel dosing she is on, but wonder if she is taking it.  Is she taking the  prn dosing and the long acting seroquel at bedtime.  We can go up to a total of 900 mg of seroquel in a day.  If not taking the prn's, could consider increasing the seroquel xr to 500 mg, or could increase the prn to tid as the half life of the short acting seroquel is around 8 hours.      I see that she has had community paramedic order in the past but it does not seem like that ever came to fruition.  This is an excellent idea to help get a sense of whether the meds are being taking, is there other substances going on and what kind of condition is her place of living.      If all of the above has been optimized, then a switch to a different antipsychotic would make sense.  Abilify or latuda would be okay.  Cross tapering is also okay where you decrease the dosing of the seroquel by 100 mg a week and start titrating the other medication (ie start abilify at 2 mg while seroquel was at 300 mg and increase to 4 mg when seroquel down to 200 mg, then up to 6 mg with 100 mg of seroquel and then go to 10 mg of abilify).  It also might be time after the above has all been done, to consider her seeing a psychiatrist to help optimize treatment as this is a complicated case.      Thank you for the consult.      The recommendations provided in this E-Consult are based on a review of clinical data pertinent to the clinical question presented, without a review of the patient's complete medical record or, the benefit of a comprehensive in-person or virtual patient evaluation. This consultation should not replace the clinical judgement and evaluation of the provider ordering this E-Consult. Any new clinical issues, or changes in patient status since the filing of this E-Consult will need to be taken into account when assessing these recommendations. Please contact me if you have further questions.    My total time spent reviewing clinical information and formulating assessment was 20 minutes.        Hari Fitzgerald MD
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
## 2023-03-15 NOTE — PATIENT INSTRUCTIONS
Please call [phone] to schedule your referral.     I am waiting on a psychiatry recommendation to alter your medications.    For your shoulder, wear the arm sling.  Ice 2-3 times per day for 20 minutes.   Take ibuprofen 600mg every 6 hours as needed for pain.    STOP your lisinopril.    Come back in one week for a reevaluation.

## 2023-03-17 ENCOUNTER — TELEPHONE (OUTPATIENT)
Dept: FAMILY MEDICINE | Facility: CLINIC | Age: 63
End: 2023-03-17

## 2023-03-17 DIAGNOSIS — F31.9 BIPOLAR 1 DISORDER (H): ICD-10-CM

## 2023-03-17 DIAGNOSIS — G40.909 SEIZURE DISORDER (H): ICD-10-CM

## 2023-03-17 RX ORDER — QUETIAPINE 400 MG/1
400 TABLET, FILM COATED, EXTENDED RELEASE ORAL
Qty: 90 TABLET | Refills: 3 | Status: SHIPPED | OUTPATIENT
Start: 2023-03-17 | End: 2023-08-23

## 2023-03-17 RX ORDER — DIVALPROEX SODIUM 250 MG/1
TABLET, DELAYED RELEASE ORAL
Qty: 180 TABLET | Refills: 3 | Status: SHIPPED | OUTPATIENT
Start: 2023-03-17 | End: 2023-06-02

## 2023-03-17 NOTE — TELEPHONE ENCOUNTER
3/15: Attempted to call pt back regarding psychiatry recommendations but no answer on phone.    3/17: Called patient and she had just gotten out of the shower - crying, asked if I could call back.    Will call back in 30 minutes.    Mirian Patel MD

## 2023-03-17 NOTE — PROGRESS NOTES
Called pt to discuss psych recommendatoins.    Will increase depakote to 500 qam/250 at bedtime.  Pt has stopped her quetiapine 400mg XR which may explain much of her sx - ran out and couldn't get refills. Re-sent Rx to pharmacy and she will .    Will follow up next week.    Mirian Patel MD

## 2023-03-18 DIAGNOSIS — M54.2 CERVICALGIA: ICD-10-CM

## 2023-03-18 DIAGNOSIS — M54.12 CERVICAL RADICULOPATHY: ICD-10-CM

## 2023-03-21 RX ORDER — GABAPENTIN 300 MG/1
CAPSULE ORAL
Qty: 180 CAPSULE | Refills: 0 | Status: SHIPPED | OUTPATIENT
Start: 2023-03-21 | End: 2023-07-07

## 2023-03-23 ENCOUNTER — OFFICE VISIT (OUTPATIENT)
Dept: FAMILY MEDICINE | Facility: CLINIC | Age: 63
End: 2023-03-23
Payer: COMMERCIAL

## 2023-03-23 VITALS
WEIGHT: 110.6 LBS | TEMPERATURE: 98.2 F | RESPIRATION RATE: 16 BRPM | SYSTOLIC BLOOD PRESSURE: 114 MMHG | HEART RATE: 82 BPM | DIASTOLIC BLOOD PRESSURE: 79 MMHG | OXYGEN SATURATION: 100 % | BODY MASS INDEX: 21.96 KG/M2

## 2023-03-23 DIAGNOSIS — L85.3 XEROSIS CUTIS: ICD-10-CM

## 2023-03-23 DIAGNOSIS — J42 CHRONIC BRONCHITIS, UNSPECIFIED CHRONIC BRONCHITIS TYPE (H): ICD-10-CM

## 2023-03-23 DIAGNOSIS — G89.11 ACUTE PAIN OF LEFT SHOULDER DUE TO TRAUMA: Primary | ICD-10-CM

## 2023-03-23 DIAGNOSIS — J43.9 PULMONARY EMPHYSEMA, UNSPECIFIED EMPHYSEMA TYPE (H): ICD-10-CM

## 2023-03-23 DIAGNOSIS — L98.8 MORGELLONS DISEASE: ICD-10-CM

## 2023-03-23 DIAGNOSIS — M25.512 ACUTE PAIN OF LEFT SHOULDER DUE TO TRAUMA: Primary | ICD-10-CM

## 2023-03-23 DIAGNOSIS — R29.6 RECURRENT FALLS: ICD-10-CM

## 2023-03-23 PROCEDURE — 99214 OFFICE O/P EST MOD 30 MIN: CPT | Mod: 25

## 2023-03-23 PROCEDURE — 90471 IMMUNIZATION ADMIN: CPT

## 2023-03-23 PROCEDURE — 90677 PCV20 VACCINE IM: CPT
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
## 2023-03-23 NOTE — Clinical Note
HENNA, patient wondering about next steps for medical cannabis authorization. Otherwise doing better with the seroquel. Great job encouraging her to take that!

## 2023-03-24 ENCOUNTER — TELEPHONE (OUTPATIENT)
Dept: FAMILY MEDICINE | Facility: CLINIC | Age: 63
End: 2023-03-24
Payer: COMMERCIAL

## 2023-03-24 NOTE — TELEPHONE ENCOUNTER
Copy Dr. Patel's message for documentation purpose    Message  Received: Today  Mirian Patel MD Sheng Yang, Mai Hi!   Could you get this patient scheduled with me for a CPM evaluation at my soonest available 40 minute slot?   Thanks!   Dr. Patel

## 2023-03-24 NOTE — TELEPHONE ENCOUNTER
Called, patient answer and stated that she is busy with something and will call the clinic back.    Judy Arias, A

## 2023-04-05 ENCOUNTER — TELEPHONE (OUTPATIENT)
Dept: FAMILY MEDICINE | Facility: CLINIC | Age: 63
End: 2023-04-05

## 2023-04-05 DIAGNOSIS — J30.2 SEASONAL ALLERGIC RHINITIS, UNSPECIFIED TRIGGER: ICD-10-CM

## 2023-04-05 NOTE — TELEPHONE ENCOUNTER
[facility] phone call message- general phone call:    Reason for call:     Patient cancel her appt today and would like her primary Dr Patel to give her a call.    Return call needed: Yes    OK to leave a message on voice mail? Yes    Primary language: English      needed? No    Call taken on April 5, 2023 at 12:43 PM by Chinyere Arias

## 2023-04-06 RX ORDER — FLUTICASONE PROPIONATE 50 MCG
SPRAY, SUSPENSION (ML) NASAL
Qty: 16 G | Refills: 0 | Status: SHIPPED | OUTPATIENT
Start: 2023-04-06 | End: 2023-04-24

## 2023-04-13 DIAGNOSIS — J06.9 UPPER RESPIRATORY TRACT INFECTION, UNSPECIFIED TYPE: ICD-10-CM

## 2023-04-13 DIAGNOSIS — G89.11 ACUTE PAIN OF LEFT SHOULDER DUE TO TRAUMA: ICD-10-CM

## 2023-04-13 DIAGNOSIS — M25.512 ACUTE PAIN OF LEFT SHOULDER DUE TO TRAUMA: ICD-10-CM

## 2023-04-13 RX ORDER — IBUPROFEN 600 MG/1
600 TABLET, FILM COATED ORAL EVERY 6 HOURS PRN
Qty: 30 TABLET | Refills: 0 | Status: SHIPPED | OUTPATIENT
Start: 2023-04-13 | End: 2023-04-19

## 2023-04-13 RX ORDER — HYDROXYZINE HYDROCHLORIDE 25 MG/1
50 TABLET, FILM COATED ORAL 3 TIMES DAILY PRN
Qty: 30 TABLET | Refills: 0 | Status: SHIPPED | OUTPATIENT
Start: 2023-04-13 | End: 2023-04-18

## 2023-04-17 DIAGNOSIS — G89.11 ACUTE PAIN OF LEFT SHOULDER DUE TO TRAUMA: ICD-10-CM

## 2023-04-17 DIAGNOSIS — M25.512 ACUTE PAIN OF LEFT SHOULDER DUE TO TRAUMA: ICD-10-CM

## 2023-04-18 ENCOUNTER — OFFICE VISIT (OUTPATIENT)
Dept: FAMILY MEDICINE | Facility: CLINIC | Age: 63
End: 2023-04-18
Payer: COMMERCIAL

## 2023-04-18 VITALS
BODY MASS INDEX: 21.92 KG/M2 | TEMPERATURE: 98.1 F | OXYGEN SATURATION: 98 % | SYSTOLIC BLOOD PRESSURE: 138 MMHG | WEIGHT: 110.4 LBS | RESPIRATION RATE: 20 BRPM | DIASTOLIC BLOOD PRESSURE: 79 MMHG | HEART RATE: 77 BPM

## 2023-04-18 DIAGNOSIS — Z12.11 SCREEN FOR COLON CANCER: ICD-10-CM

## 2023-04-18 DIAGNOSIS — Z12.31 ENCOUNTER FOR SCREENING MAMMOGRAM FOR BREAST CANCER: ICD-10-CM

## 2023-04-18 DIAGNOSIS — H04.123 DRY EYES: ICD-10-CM

## 2023-04-18 DIAGNOSIS — L30.1 DYSHIDROTIC ECZEMA: ICD-10-CM

## 2023-04-18 DIAGNOSIS — B96.89 ACUTE BACTERIAL SINUSITIS: ICD-10-CM

## 2023-04-18 DIAGNOSIS — R21 SKIN RASH: Primary | ICD-10-CM

## 2023-04-18 DIAGNOSIS — Z12.2 SCREENING FOR LUNG CANCER: ICD-10-CM

## 2023-04-18 DIAGNOSIS — K21.9 GASTROESOPHAGEAL REFLUX DISEASE WITHOUT ESOPHAGITIS: ICD-10-CM

## 2023-04-18 DIAGNOSIS — L98.8 MORGELLONS DISEASE: ICD-10-CM

## 2023-04-18 DIAGNOSIS — Z12.4 CERVICAL CANCER SCREENING: ICD-10-CM

## 2023-04-18 DIAGNOSIS — J06.9 UPPER RESPIRATORY TRACT INFECTION, UNSPECIFIED TYPE: ICD-10-CM

## 2023-04-18 DIAGNOSIS — J30.2 SEASONAL ALLERGIC RHINITIS, UNSPECIFIED TRIGGER: ICD-10-CM

## 2023-04-18 DIAGNOSIS — J01.90 ACUTE BACTERIAL SINUSITIS: ICD-10-CM

## 2023-04-18 PROCEDURE — 99214 OFFICE O/P EST MOD 30 MIN: CPT | Performed by: STUDENT IN AN ORGANIZED HEALTH CARE EDUCATION/TRAINING PROGRAM

## 2023-04-18 RX ORDER — POLYVINYL ALCOHOL 14 MG/ML
2 SOLUTION/ DROPS OPHTHALMIC PRN
Qty: 30 ML | Refills: 1 | Status: SHIPPED | OUTPATIENT
Start: 2023-04-18 | End: 2023-05-17

## 2023-04-18 RX ORDER — TRIAMCINOLONE ACETONIDE 1 MG/G
OINTMENT TOPICAL 2 TIMES DAILY
Qty: 80 G | Refills: 1 | Status: SHIPPED | OUTPATIENT
Start: 2023-04-18 | End: 2023-05-17

## 2023-04-18 RX ORDER — HYDROXYZINE HYDROCHLORIDE 25 MG/1
50 TABLET, FILM COATED ORAL 3 TIMES DAILY PRN
Qty: 30 TABLET | Refills: 0 | Status: SHIPPED | OUTPATIENT
Start: 2023-04-18 | End: 2023-05-17

## 2023-04-18 RX ORDER — OLOPATADINE HYDROCHLORIDE 1 MG/ML
1 SOLUTION/ DROPS OPHTHALMIC 2 TIMES DAILY
Qty: 5 ML | Refills: 1 | Status: SHIPPED | OUTPATIENT
Start: 2023-04-18 | End: 2023-05-12

## 2023-04-18 RX ORDER — BENZONATATE 100 MG/1
100 CAPSULE ORAL 3 TIMES DAILY PRN
Qty: 30 CAPSULE | Refills: 1 | Status: SHIPPED | OUTPATIENT
Start: 2023-04-18 | End: 2023-04-26
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
## 2023-04-19 RX ORDER — IBUPROFEN 600 MG/1
600 TABLET, FILM COATED ORAL EVERY 6 HOURS PRN
Qty: 30 TABLET | Refills: 0 | Status: SHIPPED | OUTPATIENT
Start: 2023-04-19 | End: 2023-06-07

## 2023-04-21 DIAGNOSIS — J30.2 SEASONAL ALLERGIC RHINITIS, UNSPECIFIED TRIGGER: ICD-10-CM

## 2023-04-24 RX ORDER — FLUTICASONE PROPIONATE 50 MCG
SPRAY, SUSPENSION (ML) NASAL
Qty: 16 G | Refills: 0 | Status: SHIPPED | OUTPATIENT
Start: 2023-04-24 | End: 2023-04-26

## 2023-04-26 ENCOUNTER — OFFICE VISIT (OUTPATIENT)
Dept: FAMILY MEDICINE | Facility: CLINIC | Age: 63
End: 2023-04-26
Payer: COMMERCIAL

## 2023-04-26 VITALS
SYSTOLIC BLOOD PRESSURE: 98 MMHG | HEART RATE: 80 BPM | WEIGHT: 113 LBS | OXYGEN SATURATION: 99 % | BODY MASS INDEX: 22.44 KG/M2 | TEMPERATURE: 98.7 F | DIASTOLIC BLOOD PRESSURE: 70 MMHG | RESPIRATION RATE: 20 BRPM

## 2023-04-26 DIAGNOSIS — M16.10 ARTHRITIS OF HIP: ICD-10-CM

## 2023-04-26 DIAGNOSIS — M54.30 SCIATICA, UNSPECIFIED LATERALITY: ICD-10-CM

## 2023-04-26 DIAGNOSIS — J30.2 SEASONAL ALLERGIC RHINITIS, UNSPECIFIED TRIGGER: ICD-10-CM

## 2023-04-26 DIAGNOSIS — M75.02 ADHESIVE CAPSULITIS OF BOTH SHOULDERS: ICD-10-CM

## 2023-04-26 DIAGNOSIS — M75.01 ADHESIVE CAPSULITIS OF BOTH SHOULDERS: ICD-10-CM

## 2023-04-26 DIAGNOSIS — Z74.09 IMPAIRED FUNCTIONAL MOBILITY, BALANCE, AND ENDURANCE: Primary | ICD-10-CM

## 2023-04-26 DIAGNOSIS — J06.9 UPPER RESPIRATORY TRACT INFECTION, UNSPECIFIED TYPE: ICD-10-CM

## 2023-04-26 DIAGNOSIS — G89.11 ACUTE PAIN OF LEFT SHOULDER DUE TO TRAUMA: ICD-10-CM

## 2023-04-26 DIAGNOSIS — M25.512 ACUTE PAIN OF LEFT SHOULDER DUE TO TRAUMA: ICD-10-CM

## 2023-04-26 PROCEDURE — 99214 OFFICE O/P EST MOD 30 MIN: CPT | Performed by: STUDENT IN AN ORGANIZED HEALTH CARE EDUCATION/TRAINING PROGRAM

## 2023-04-26 RX ORDER — BENZONATATE 100 MG/1
100 CAPSULE ORAL 3 TIMES DAILY PRN
Qty: 30 CAPSULE | Refills: 1 | Status: SHIPPED | OUTPATIENT
Start: 2023-04-26 | End: 2023-08-01

## 2023-04-26 RX ORDER — FLUTICASONE PROPIONATE 50 MCG
2 SPRAY, SUSPENSION (ML) NASAL DAILY
Qty: 16 G | Refills: 11 | Status: SHIPPED | OUTPATIENT
Start: 2023-04-26 | End: 2023-08-23

## 2023-04-26 RX ORDER — GUAIFENESIN 600 MG/1
1200 TABLET, EXTENDED RELEASE ORAL 2 TIMES DAILY
Qty: 30 TABLET | Refills: 1 | Status: SHIPPED | OUTPATIENT
Start: 2023-04-26 | End: 2023-05-12

## 2023-04-26 ASSESSMENT — PATIENT HEALTH QUESTIONNAIRE - PHQ9: SUM OF ALL RESPONSES TO PHQ QUESTIONS 1-9: 14
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
## 2023-05-04 DIAGNOSIS — J42 CHRONIC BRONCHITIS, UNSPECIFIED CHRONIC BRONCHITIS TYPE (H): ICD-10-CM

## 2023-05-05 RX ORDER — ALBUTEROL SULFATE 90 UG/1
AEROSOL, METERED RESPIRATORY (INHALATION)
Qty: 18 G | Refills: 3 | Status: SHIPPED | OUTPATIENT
Start: 2023-05-05 | End: 2023-07-24

## 2023-05-11 DIAGNOSIS — H60.502 ACUTE OTITIS EXTERNA OF LEFT EAR, UNSPECIFIED TYPE: ICD-10-CM

## 2023-05-11 DIAGNOSIS — J30.2 SEASONAL ALLERGIC RHINITIS, UNSPECIFIED TRIGGER: ICD-10-CM

## 2023-05-11 DIAGNOSIS — J06.9 UPPER RESPIRATORY TRACT INFECTION, UNSPECIFIED TYPE: ICD-10-CM

## 2023-05-11 NOTE — TELEPHONE ENCOUNTER
[facility] phone call message- patient requesting a refill:    Full Medication Name: olopatadine (PATADAY) 0.1 % ophthalmic solution - Place 1 drop into both eyes 2 times daily - Both Eyes    guaiFENesin (MUCINEX) 600 MG 12 hr tablet - Take 2 tablets (1,200 mg) by mouth 2 times daily - Oral    acetic acid-hydrocortisone (VOSOL-HC) 1-2 % otic solution - Place 3 drops into both ears 2 times daily - Both Ears    Pharmacy confirmed as   [id number] - [place], [place] - [address] [place] [address]  Phone: [phone] Fax: [phone]  : Yes    Additional Comments: pt has her sinus, eye and ear infection back again.  She is wondering if the above meds could be sent in and also an antibiotic.     OK to leave a message on voice mail? Yes    Primary language: English      needed? No    Call taken on May 11, 2023 at 1:46 PM by Juliet Mcintosh

## 2023-05-12 RX ORDER — HYDROCORTISONE AND ACETIC ACID 20.75; 10.375 MG/ML; MG/ML
3 SOLUTION AURICULAR (OTIC) 2 TIMES DAILY
Qty: 10 ML | Refills: 1 | Status: SHIPPED | OUTPATIENT
Start: 2023-05-12 | End: 2023-08-16

## 2023-05-12 RX ORDER — GUAIFENESIN 600 MG/1
1200 TABLET, EXTENDED RELEASE ORAL 2 TIMES DAILY
Qty: 30 TABLET | Refills: 1 | Status: SHIPPED | OUTPATIENT
Start: 2023-05-12 | End: 2023-06-02

## 2023-05-12 RX ORDER — OLOPATADINE HYDROCHLORIDE 1 MG/ML
1 SOLUTION/ DROPS OPHTHALMIC 2 TIMES DAILY
Qty: 5 ML | Refills: 1 | Status: SHIPPED | OUTPATIENT
Start: 2023-05-12 | End: 2023-05-17

## 2023-05-17 ENCOUNTER — OFFICE VISIT (OUTPATIENT)
Dept: FAMILY MEDICINE | Facility: CLINIC | Age: 63
End: 2023-05-17
Payer: COMMERCIAL

## 2023-05-17 VITALS
WEIGHT: 107 LBS | SYSTOLIC BLOOD PRESSURE: 167 MMHG | OXYGEN SATURATION: 99 % | HEART RATE: 73 BPM | RESPIRATION RATE: 24 BRPM | BODY MASS INDEX: 21.25 KG/M2 | DIASTOLIC BLOOD PRESSURE: 108 MMHG

## 2023-05-17 DIAGNOSIS — D64.9 ANEMIA, UNSPECIFIED TYPE: ICD-10-CM

## 2023-05-17 DIAGNOSIS — J30.89 NON-SEASONAL ALLERGIC RHINITIS, UNSPECIFIED TRIGGER: ICD-10-CM

## 2023-05-17 DIAGNOSIS — J01.10 SUBACUTE FRONTAL SINUSITIS: ICD-10-CM

## 2023-05-17 DIAGNOSIS — J06.9 UPPER RESPIRATORY TRACT INFECTION, UNSPECIFIED TYPE: ICD-10-CM

## 2023-05-17 DIAGNOSIS — J42 CHRONIC BRONCHITIS, UNSPECIFIED CHRONIC BRONCHITIS TYPE (H): ICD-10-CM

## 2023-05-17 DIAGNOSIS — J30.2 SEASONAL ALLERGIC RHINITIS, UNSPECIFIED TRIGGER: ICD-10-CM

## 2023-05-17 DIAGNOSIS — L30.1 DYSHIDROTIC ECZEMA: ICD-10-CM

## 2023-05-17 DIAGNOSIS — I10 ESSENTIAL HYPERTENSION: ICD-10-CM

## 2023-05-17 DIAGNOSIS — G89.29 CHRONIC RIGHT SHOULDER PAIN: ICD-10-CM

## 2023-05-17 DIAGNOSIS — M16.10 ARTHRITIS OF HIP: ICD-10-CM

## 2023-05-17 DIAGNOSIS — M25.511 CHRONIC RIGHT SHOULDER PAIN: ICD-10-CM

## 2023-05-17 DIAGNOSIS — Z12.4 CERVICAL CANCER SCREENING: Primary | ICD-10-CM

## 2023-05-17 DIAGNOSIS — H04.123 DRY EYES: ICD-10-CM

## 2023-05-17 LAB
BASOPHILS # BLD AUTO: 0 10E3/UL (ref 0–0.2)
BASOPHILS NFR BLD AUTO: 1 %
EOSINOPHIL # BLD AUTO: 0.3 10E3/UL (ref 0–0.7)
EOSINOPHIL NFR BLD AUTO: 6 %
ERYTHROCYTE [DISTWIDTH] IN BLOOD BY AUTOMATED COUNT: 13.3 % (ref 10–15)
HCT VFR BLD AUTO: 34.4 % (ref 35–47)
HGB BLD-MCNC: 10.7 G/DL (ref 11.7–15.7)
IMM GRANULOCYTES # BLD: 0 10E3/UL
IMM GRANULOCYTES NFR BLD: 0 %
LYMPHOCYTES # BLD AUTO: 2.2 10E3/UL (ref 0.8–5.3)
LYMPHOCYTES NFR BLD AUTO: 39 %
MCH RBC QN AUTO: 27.3 PG (ref 26.5–33)
MCHC RBC AUTO-ENTMCNC: 31.1 G/DL (ref 31.5–36.5)
MCV RBC AUTO: 88 FL (ref 78–100)
MONOCYTES # BLD AUTO: 1 10E3/UL (ref 0–1.3)
MONOCYTES NFR BLD AUTO: 18 %
NEUTROPHILS # BLD AUTO: 2.1 10E3/UL (ref 1.6–8.3)
NEUTROPHILS NFR BLD AUTO: 37 %
PLATELET # BLD AUTO: 316 10E3/UL (ref 150–450)
RBC # BLD AUTO: 3.92 10E6/UL (ref 3.8–5.2)
WBC # BLD AUTO: 5.7 10E3/UL (ref 4–11)

## 2023-05-17 PROCEDURE — 85025 COMPLETE CBC W/AUTO DIFF WBC: CPT | Performed by: STUDENT IN AN ORGANIZED HEALTH CARE EDUCATION/TRAINING PROGRAM

## 2023-05-17 PROCEDURE — 36415 COLL VENOUS BLD VENIPUNCTURE: CPT | Performed by: STUDENT IN AN ORGANIZED HEALTH CARE EDUCATION/TRAINING PROGRAM

## 2023-05-17 PROCEDURE — 99214 OFFICE O/P EST MOD 30 MIN: CPT | Performed by: STUDENT IN AN ORGANIZED HEALTH CARE EDUCATION/TRAINING PROGRAM

## 2023-05-17 PROCEDURE — 82728 ASSAY OF FERRITIN: CPT | Performed by: STUDENT IN AN ORGANIZED HEALTH CARE EDUCATION/TRAINING PROGRAM

## 2023-05-17 PROCEDURE — 83550 IRON BINDING TEST: CPT | Performed by: STUDENT IN AN ORGANIZED HEALTH CARE EDUCATION/TRAINING PROGRAM

## 2023-05-17 PROCEDURE — 83540 ASSAY OF IRON: CPT | Performed by: STUDENT IN AN ORGANIZED HEALTH CARE EDUCATION/TRAINING PROGRAM

## 2023-05-17 RX ORDER — TIOTROPIUM BROMIDE 18 UG/1
18 CAPSULE ORAL; RESPIRATORY (INHALATION) DAILY
Qty: 30 CAPSULE | Refills: 3 | Status: SHIPPED | OUTPATIENT
Start: 2023-05-17 | End: 2024-02-29

## 2023-05-17 RX ORDER — LISINOPRIL 2.5 MG/1
2.5 TABLET ORAL DAILY
Qty: 30 TABLET | Refills: 3 | Status: SHIPPED | OUTPATIENT
Start: 2023-05-17 | End: 2023-06-06

## 2023-05-17 RX ORDER — POLYVINYL ALCOHOL 14 MG/ML
2 SOLUTION/ DROPS OPHTHALMIC PRN
Qty: 30 ML | Refills: 1 | Status: SHIPPED | OUTPATIENT
Start: 2023-05-17 | End: 2023-08-16

## 2023-05-17 RX ORDER — OLOPATADINE HYDROCHLORIDE 1 MG/ML
1 SOLUTION/ DROPS OPHTHALMIC 2 TIMES DAILY
Qty: 5 ML | Refills: 1 | Status: SHIPPED | OUTPATIENT
Start: 2023-05-17 | End: 2023-08-16

## 2023-05-17 RX ORDER — TRIAMCINOLONE ACETONIDE 1 MG/G
OINTMENT TOPICAL 2 TIMES DAILY
Qty: 80 G | Refills: 1 | Status: SHIPPED | OUTPATIENT
Start: 2023-05-17 | End: 2023-08-16

## 2023-05-17 RX ORDER — CETIRIZINE HYDROCHLORIDE 10 MG/1
10 TABLET ORAL 2 TIMES DAILY
Qty: 30 TABLET | Refills: 3 | Status: SHIPPED | OUTPATIENT
Start: 2023-05-17 | End: 2023-07-05

## 2023-05-17 RX ORDER — HYDROXYZINE HYDROCHLORIDE 25 MG/1
50 TABLET, FILM COATED ORAL 3 TIMES DAILY PRN
Qty: 30 TABLET | Refills: 0 | Status: SHIPPED | OUTPATIENT
Start: 2023-05-17 | End: 2023-05-30
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
## 2023-05-18 LAB
FERRITIN SERPL-MCNC: 17 NG/ML (ref 11–328)
IRON BINDING CAPACITY (ROCHE): 336 UG/DL (ref 240–430)
IRON SATN MFR SERPL: 5 % (ref 15–46)
IRON SERPL-MCNC: 18 UG/DL (ref 37–145)

## 2023-05-19 RX ORDER — FLUTICASONE PROPIONATE AND SALMETEROL XINAFOATE 230; 21 UG/1; UG/1
2 AEROSOL, METERED RESPIRATORY (INHALATION) 2 TIMES DAILY
Qty: 12 G | Refills: 11 | Status: SHIPPED | OUTPATIENT
Start: 2023-05-19 | End: 2023-12-20

## 2023-05-26 ENCOUNTER — TELEPHONE (OUTPATIENT)
Dept: PHARMACY | Facility: OTHER | Age: 63
End: 2023-05-26
Payer: COMMERCIAL

## 2023-05-26 NOTE — TELEPHONE ENCOUNTER
Patient referred to Medication Therapy Management (MTM) by their health insurance [employer]. Called today for scheduling, no answer. Left  with [facility] scheduling number [phone].

## 2023-06-02 ENCOUNTER — TELEPHONE (OUTPATIENT)
Dept: FAMILY MEDICINE | Facility: CLINIC | Age: 63
End: 2023-06-02
Payer: COMMERCIAL

## 2023-06-02 DIAGNOSIS — J06.9 UPPER RESPIRATORY TRACT INFECTION, UNSPECIFIED TYPE: ICD-10-CM

## 2023-06-02 DIAGNOSIS — G40.909 SEIZURE DISORDER (H): ICD-10-CM

## 2023-06-02 RX ORDER — GUAIFENESIN 600 MG/1
1200 TABLET, EXTENDED RELEASE ORAL 2 TIMES DAILY
Qty: 30 TABLET | Refills: 1 | Status: SHIPPED | OUTPATIENT
Start: 2023-06-02 | End: 2023-08-01

## 2023-06-02 RX ORDER — DIVALPROEX SODIUM 250 MG/1
TABLET, DELAYED RELEASE ORAL
Qty: 180 TABLET | Refills: 0 | Status: SHIPPED | OUTPATIENT
Start: 2023-06-02

## 2023-06-02 NOTE — TELEPHONE ENCOUNTER
Reviewed dispense report, regarding depakote.  It has been managed by Dr. Patel, stable dose.  Last filled for 2 months on 3/21/23, so due for refill.  Approved, along with guaifenesin.  Sent to [facility].    Emeli Parikh MD  (covering for Dr. Patel while out of office)

## 2023-06-02 NOTE — TELEPHONE ENCOUNTER
[facility] phone call message- general phone call:    Reason for call: She needs a call back from  re he Depakote,anti depressant and her mucus relief her pharmacy is questioning her about these    Action desired:      Call back.    Return call needed: Yes    OK to leave a message on voice mail? Yes    Advised patient to response may take up to 2 business days: Yes    Primary language: English      needed? No    Call taken on June 2, 2023 at 9:56 AM by Ana Serna

## 2023-06-02 NOTE — TELEPHONE ENCOUNTER
[facility] phone call message- patient requesting a refill:    Full Medication Name: divalproex sodium delayed-release (DEPAKOTE) 250 MG DR tablet - Take 2 tablets (500 mg) by mouth every morning AND 1 tablet (250 mg) At Bedtime. - Oral    guaiFENesin (MUCINEX) 600 MG 12 hr tablet - Take 2 tablets (1,200 mg) by mouth 2 times daily - Oral    Pharmacy confirmed as   [id number] - [place], [place] - [address] [place] [address]  Phone: [phone] Fax: [phone]  : Yes    Additional Comments: Pt is requesting this get filled today.  She will be without for the weekend.     OK to leave a message on voice mail? Yes    Primary language: English      needed? No    Call taken on June 2, 2023 at 2:52 PM by Juliet Mcintosh

## 2023-06-06 ENCOUNTER — OFFICE VISIT (OUTPATIENT)
Dept: FAMILY MEDICINE | Facility: CLINIC | Age: 63
End: 2023-06-06
Payer: COMMERCIAL

## 2023-06-06 VITALS
DIASTOLIC BLOOD PRESSURE: 123 MMHG | BODY MASS INDEX: 22.96 KG/M2 | SYSTOLIC BLOOD PRESSURE: 216 MMHG | WEIGHT: 115.6 LBS | TEMPERATURE: 98.4 F | HEART RATE: 82 BPM | OXYGEN SATURATION: 100 %

## 2023-06-06 DIAGNOSIS — G89.11 ACUTE PAIN OF LEFT SHOULDER DUE TO TRAUMA: ICD-10-CM

## 2023-06-06 DIAGNOSIS — M25.512 ACUTE PAIN OF LEFT SHOULDER DUE TO TRAUMA: ICD-10-CM

## 2023-06-06 DIAGNOSIS — I10 ESSENTIAL HYPERTENSION: ICD-10-CM

## 2023-06-06 DIAGNOSIS — K40.90 NON-RECURRENT UNILATERAL INGUINAL HERNIA WITHOUT OBSTRUCTION OR GANGRENE: Primary | ICD-10-CM

## 2023-06-06 PROCEDURE — 99214 OFFICE O/P EST MOD 30 MIN: CPT | Performed by: FAMILY MEDICINE

## 2023-06-06 RX ORDER — LISINOPRIL 10 MG/1
10 TABLET ORAL DAILY
Qty: 30 TABLET | Refills: 1 | Status: SHIPPED | OUTPATIENT
Start: 2023-06-06 | End: 2023-08-16

## 2023-06-06 NOTE — PROGRESS NOTES
Assessment & Plan   Groin swelling with occasional pain.  Exam consistent with reducible inguinal hernia.  Has history of right inguinal hernia s/p repair.  -- Gen surg referral.    Elevated BP, on lisinopril 2.5.  Was previously too low on lisinopril 40 and amlodipine 5.  Last BMP showed K+ and Cr within normal limits.  -- Increase to lisinopril 10.  Recheck at next visit.    Follow-up with PCP for BP recheck.    Subjective   Lisa Scott is a 63 year old female with a history including alcohol dependence in remission, COPD, HTN, and CKD who presents for left groin swelling.    She says that the left side of her groin is swollen, like in the past when she had an inguinal hernia.  It is sometimes painful.  It is worse when she pushes down with her belly.  No changes to bowel.  The swelling is soft.    Other: Her BP is elevated today.  She endorses some headache.  No chest pain.  Her BP has been a moving target recently.  She was previously on lisinopril 40 and amlopdine 5, and then she had low BP.  As of today, she is on only lisinopril 2.5.    BP Readings from Last 6 Encounters:   06/06/23 (!) 216/123   05/17/23 (!) 167/108   04/26/23 98/70   04/18/23 138/79   03/23/23 114/79   02/28/23 (!) 79/56     Social: She reports that she has quit smoking. She has never used smokeless tobacco. She reports that she does not currently use alcohol. She reports that she does not use drugs.    Objective   Vitals: BP (!) 216/123 (BP Location: Left arm, Patient Position: Sitting, Cuff Size: Adult Regular)   Pulse 82   Temp 98.4  F (36.9  C) (Tympanic)   Wt 52.4 kg (115 lb 9.6 oz)   SpO2 100%   BMI 22.96 kg/m    General: Pleasant. Middle-aged woman. No distress.  Heart: Regular rate and rhythm. No murmurs, rubs, or gallops.  Lungs: Clear to auscultation bilaterally. No wheezes or crackles. Good air movement.  GI: Abdomen is non-tender.  Normoactive bowel sounds.  Soft, reducible, non-tender protrusion of left groin.  Psych:  Appropriate grooming and hygiene. Speech normal rate. Appropriate mood and affect.    Labs reviewed  Component      Latest Ref Rng 2/24/2023  10:26 AM   Sodium      136 - 145 mmol/L 139    Potassium      3.4 - 5.3 mmol/L 4.8    Chloride      98 - 107 mmol/L 103    Carbon Dioxide (CO2)      22 - 29 mmol/L 22    Anion Gap      7 - 15 mmol/L 14    Urea Nitrogen      8.0 - 23.0 mg/dL 24.2 (H)    Creatinine      0.51 - 0.95 mg/dL 0.72    Calcium      8.8 - 10.2 mg/dL 9.1    Glucose      70 - 99 mg/dL 87    GFR Estimate      >60 mL/min/1.73m2 >90

## 2023-06-06 NOTE — PATIENT INSTRUCTIONS
Your blood pressure is high today.  Increase lisinopril from 2.5 to 10 mg daily.  I sent new pills to [facility].    Also pick-up depakote and mucinex.    Surgery referral.  Your last hernia repair was with Dr. Phillips in Feb 2021.

## 2023-06-07 RX ORDER — IBUPROFEN 600 MG/1
600 TABLET, FILM COATED ORAL EVERY 6 HOURS PRN
Qty: 30 TABLET | Refills: 0 | Status: SHIPPED | OUTPATIENT
Start: 2023-06-07 | End: 2023-07-27

## 2023-06-14 ENCOUNTER — OFFICE VISIT (OUTPATIENT)
Dept: FAMILY MEDICINE | Facility: CLINIC | Age: 63
End: 2023-06-14
Payer: COMMERCIAL

## 2023-06-14 VITALS
BODY MASS INDEX: 21.81 KG/M2 | OXYGEN SATURATION: 100 % | HEART RATE: 92 BPM | WEIGHT: 109.8 LBS | TEMPERATURE: 97.2 F | SYSTOLIC BLOOD PRESSURE: 120 MMHG | DIASTOLIC BLOOD PRESSURE: 80 MMHG

## 2023-06-14 DIAGNOSIS — J44.1 COPD EXACERBATION (H): Primary | ICD-10-CM

## 2023-06-14 DIAGNOSIS — J06.9 UPPER RESPIRATORY TRACT INFECTION, UNSPECIFIED TYPE: ICD-10-CM

## 2023-06-14 PROCEDURE — 99214 OFFICE O/P EST MOD 30 MIN: CPT | Performed by: FAMILY MEDICINE

## 2023-06-16 RX ORDER — AZITHROMYCIN 250 MG/1
TABLET, FILM COATED ORAL
Qty: 6 TABLET | Refills: 0 | Status: SHIPPED | OUTPATIENT
Start: 2023-06-16 | End: 2023-06-21

## 2023-06-16 RX ORDER — HYDROXYZINE HYDROCHLORIDE 25 MG/1
25 TABLET, FILM COATED ORAL 3 TIMES DAILY PRN
Qty: 30 TABLET | Refills: 0 | Status: SHIPPED | OUTPATIENT
Start: 2023-06-16 | End: 2023-06-23

## 2023-06-16 NOTE — TELEPHONE ENCOUNTER
Refilled hydroxyzine.    Sample showed bacteria.  I will send some antibiotics (azithromycin Z pack).    /AW

## 2023-06-16 NOTE — TELEPHONE ENCOUNTER
Sending to Dr. Currie per pt's request. Pt still w/ itching. She said she saw Dr. Currie on Wed and talked to her about her cough and itching. She would also like to know her results regarding the samples she gave her. Notified pt can't see what was discussed as note is not done.    She would like a refill on her hydroxyzine as well.    Greer Lopez RN on 6/16/2023 at 10:28 AM

## 2023-06-16 NOTE — TELEPHONE ENCOUNTER
Pt notified of DR. Parikh's message and verbalized understanding.    Greer Lopez RN on 6/16/2023 at 4:13 PM

## 2023-06-19 NOTE — PROGRESS NOTES
Assessment & Plan   Minor COPD exacerbation, with increased cough and sputum purulence.  Lungs with diffuse expiratory wheezing, but decent air movement all the way to the bases and SpO2 higher than would even be recommended typically (100%).  -- Will hold off on steroids.  -- Ordered azithromycin x5d.  Informed that specimen was not a slug.  -- Continue current inhalers, which include LAMA, LABA, and ICS: tiotropium + fluticasone-salmeterol.    Follow-up with PCP if not improved.  Future Appointments   Date Time Provider Department Center   6/23/2023  2:00 PM [facility]   6/23/2023  2:45 PM [facility]   6/26/2023 10:10 AM Kj Phillips MD [facility]   10/4/2023 11:15 AM Tasha Belcher PA-C [facility]   Lisa DACIA Sonia is a 63 year old female with a history including alcohol dependence in remission, COPD, HTN, and CKD who presents for concern for infection.    She has brought in some specimens that she coughed up, and she worries that she may have a lung infection.  She has increased cough, and the stuff she coughs up is now green.  She worries that it could be worms because it looks like a slug.  She denies fever or coughing up blood.  Her wheezes are the same as usual.  No ill contacts.  She is on triple therapy of inhalers, including tiotropium, fluticasone, and salmeterol.    Social: She reports that she has quit smoking. She has never used smokeless tobacco. She reports that she does not currently use alcohol. She reports that she does not use drugs.    Objective   Vitals: /80 (BP Location: Left arm, Patient Position: Sitting, Cuff Size: Adult Regular)   Pulse 92   Temp 97.2  F (36.2  C) (Tympanic)   Wt 49.8 kg (109 lb 12.8 oz)   SpO2 100%   BMI 21.81 kg/m    General: Pleasant. Middle-aged woman. No distress.  Heart: Regular rate and rhythm. No murmurs, rubs, or gallops.  Lungs: Diffuse expiratory wheezing.  No crackles.  Decent  air movement, including to bases bilaterally.  Psych: Appropriate grooming and hygiene. Speech normal rate. Appropriate mood and affect.

## 2023-06-23 DIAGNOSIS — M54.30 SCIATICA, UNSPECIFIED LATERALITY: ICD-10-CM

## 2023-06-23 DIAGNOSIS — J06.9 UPPER RESPIRATORY TRACT INFECTION, UNSPECIFIED TYPE: ICD-10-CM

## 2023-06-23 RX ORDER — HYDROXYZINE HYDROCHLORIDE 25 MG/1
25 TABLET, FILM COATED ORAL 3 TIMES DAILY PRN
Qty: 30 TABLET | Refills: 0 | Status: SHIPPED | OUTPATIENT
Start: 2023-06-23 | End: 2023-07-24

## 2023-06-30 DIAGNOSIS — J30.2 SEASONAL ALLERGIC RHINITIS, UNSPECIFIED TRIGGER: ICD-10-CM

## 2023-06-30 DIAGNOSIS — J30.89 NON-SEASONAL ALLERGIC RHINITIS, UNSPECIFIED TRIGGER: ICD-10-CM

## 2023-06-30 NOTE — TELEPHONE ENCOUNTER
Allergy meds and nasal spray inhaler she is out and needs them to go to [facility] food pharmay

## 2023-07-05 RX ORDER — CETIRIZINE HYDROCHLORIDE 10 MG/1
10 TABLET ORAL 2 TIMES DAILY
Qty: 30 TABLET | Refills: 3 | Status: SHIPPED | OUTPATIENT
Start: 2023-07-05 | End: 2023-08-23

## 2023-07-05 RX ORDER — ECHINACEA PURPUREA EXTRACT 125 MG
TABLET ORAL
Qty: 30 ML | Refills: 3 | Status: SHIPPED | OUTPATIENT
Start: 2023-07-05

## 2023-07-06 DIAGNOSIS — M54.12 CERVICAL RADICULOPATHY: ICD-10-CM

## 2023-07-06 DIAGNOSIS — M54.2 CERVICALGIA: ICD-10-CM

## 2023-07-07 RX ORDER — GABAPENTIN 300 MG/1
CAPSULE ORAL
Qty: 180 CAPSULE | Refills: 0 | Status: SHIPPED | OUTPATIENT
Start: 2023-07-07 | End: 2023-09-18

## 2023-07-11 DIAGNOSIS — F29 PSYCHOSIS, UNSPECIFIED PSYCHOSIS TYPE (H): ICD-10-CM

## 2023-07-11 RX ORDER — QUETIAPINE FUMARATE 100 MG/1
TABLET, FILM COATED ORAL
Qty: 180 TABLET | Refills: 0 | Status: SHIPPED | OUTPATIENT
Start: 2023-07-11 | End: 2023-08-11

## 2023-07-20 DIAGNOSIS — J42 CHRONIC BRONCHITIS, UNSPECIFIED CHRONIC BRONCHITIS TYPE (H): ICD-10-CM

## 2023-07-21 DIAGNOSIS — M54.30 SCIATICA, UNSPECIFIED LATERALITY: ICD-10-CM

## 2023-07-21 DIAGNOSIS — J06.9 UPPER RESPIRATORY TRACT INFECTION, UNSPECIFIED TYPE: ICD-10-CM

## 2023-07-21 NOTE — TELEPHONE ENCOUNTER
[facility] phone call message- patient requesting a refill:    Full Medication Name: hydroxyzine tizanidine     Dose: 10 mg 25mg    Pharmacy confirmed as   [id number] - [place], [place] - [address] [place] [address]  Phone: [phone] Fax: [phone]  : Yes    Additional Comments: the pt is out and needs them asap     OK to leave a message on voice mail? Yes    Primary language: English      needed? No    Call taken on July 21, 2023 at 1:53 PM by Rick Bronson

## 2023-07-24 DIAGNOSIS — G89.11 ACUTE PAIN OF LEFT SHOULDER DUE TO TRAUMA: ICD-10-CM

## 2023-07-24 DIAGNOSIS — M25.512 ACUTE PAIN OF LEFT SHOULDER DUE TO TRAUMA: ICD-10-CM

## 2023-07-24 RX ORDER — HYDROXYZINE HYDROCHLORIDE 25 MG/1
25 TABLET, FILM COATED ORAL 3 TIMES DAILY PRN
Qty: 30 TABLET | Refills: 0 | Status: SHIPPED | OUTPATIENT
Start: 2023-07-24 | End: 2023-08-01

## 2023-07-24 RX ORDER — ALBUTEROL SULFATE 90 UG/1
AEROSOL, METERED RESPIRATORY (INHALATION)
Qty: 18 G | Refills: 0 | Status: SHIPPED | OUTPATIENT
Start: 2023-07-24 | End: 2023-09-25

## 2023-07-27 RX ORDER — IBUPROFEN 600 MG/1
TABLET, FILM COATED ORAL
Qty: 30 TABLET | Refills: 0 | Status: SHIPPED | OUTPATIENT
Start: 2023-07-27 | End: 2023-08-23

## 2023-07-28 ENCOUNTER — TELEPHONE (OUTPATIENT)
Dept: FAMILY MEDICINE | Facility: CLINIC | Age: 63
End: 2023-07-28
Payer: COMMERCIAL

## 2023-07-28 NOTE — TELEPHONE ENCOUNTER
[facility] phone call message- patient requesting a refill:    Full Medication Name: hydroxyzine     Dose: 50 mg     Pharmacy confirmed as   [id number] - [place], [place] - [address] [facility]  [address] [place] [address]  Phone: [phone] Fax: [phone]  : Yes    Additional Comments: the Pharmacy only has the 50 mg not the 25 mg     OK to leave a message on voice mail? Yes    Primary language: English      needed? No    Call taken on July 28, 2023 at 3:26 PM by Rick Bronson

## 2023-08-01 ENCOUNTER — ANCILLARY PROCEDURE (OUTPATIENT)
Dept: GENERAL RADIOLOGY | Facility: CLINIC | Age: 63
End: 2023-08-01
Attending: FAMILY MEDICINE
Payer: COMMERCIAL

## 2023-08-01 ENCOUNTER — OFFICE VISIT (OUTPATIENT)
Dept: FAMILY MEDICINE | Facility: CLINIC | Age: 63
End: 2023-08-01
Payer: COMMERCIAL

## 2023-08-01 VITALS
BODY MASS INDEX: 20.5 KG/M2 | OXYGEN SATURATION: 97 % | TEMPERATURE: 97.6 F | DIASTOLIC BLOOD PRESSURE: 63 MMHG | SYSTOLIC BLOOD PRESSURE: 111 MMHG | WEIGHT: 103.2 LBS | HEART RATE: 93 BPM

## 2023-08-01 DIAGNOSIS — M25.512 ACUTE PAIN OF LEFT SHOULDER: ICD-10-CM

## 2023-08-01 DIAGNOSIS — J06.9 UPPER RESPIRATORY TRACT INFECTION, UNSPECIFIED TYPE: ICD-10-CM

## 2023-08-01 DIAGNOSIS — L98.8 MORGELLONS DISEASE: Primary | ICD-10-CM

## 2023-08-01 PROCEDURE — 73030 X-RAY EXAM OF SHOULDER: CPT | Mod: TC | Performed by: RADIOLOGY

## 2023-08-01 PROCEDURE — 99213 OFFICE O/P EST LOW 20 MIN: CPT | Performed by: FAMILY MEDICINE

## 2023-08-01 RX ORDER — PERMETHRIN 50 MG/G
CREAM TOPICAL
Qty: 60 G | Refills: 1 | Status: SHIPPED | OUTPATIENT
Start: 2023-08-01 | End: 2023-08-16

## 2023-08-01 RX ORDER — GUAIFENESIN 600 MG/1
1200 TABLET, EXTENDED RELEASE ORAL 2 TIMES DAILY
Qty: 30 TABLET | Refills: 1 | Status: SHIPPED | OUTPATIENT
Start: 2023-08-01 | End: 2023-08-23

## 2023-08-01 RX ORDER — HYDROXYZINE HYDROCHLORIDE 50 MG/1
25 TABLET, FILM COATED ORAL 3 TIMES DAILY PRN
Qty: 30 TABLET | Refills: 3 | Status: SHIPPED | OUTPATIENT
Start: 2023-08-01 | End: 2023-10-20

## 2023-08-01 RX ORDER — BENZONATATE 100 MG/1
100 CAPSULE ORAL 3 TIMES DAILY PRN
Qty: 30 CAPSULE | Refills: 1 | Status: SHIPPED | OUTPATIENT
Start: 2023-08-01 | End: 2023-09-27

## 2023-08-04 ENCOUNTER — TELEPHONE (OUTPATIENT)
Dept: FAMILY MEDICINE | Facility: CLINIC | Age: 63
End: 2023-08-04
Payer: COMMERCIAL

## 2023-08-04 NOTE — TELEPHONE ENCOUNTER
[facility] phone call message- general phone call:    Reason for call: She dd the treatment for the bugs but now there is a big bump below her ear its painful she popped it but and it wont stop bleeding and there is one on her shoulder. Swollen feet it hurts to talk    Action desired: call back    Return call needed: Yes    OK to leave a message on voice mail? Yes    Advised patient to response may take up to 2 business days: Yes    Primary language: English      needed? No    Call taken on August 4, 2023 at 9:54 AM by Ana Serna

## 2023-08-04 NOTE — TELEPHONE ENCOUNTER
"Pt states that the open area below her ear popped when she rubbed yesterday and since has been oozing \"yellow stuff\".  She states that she iced it and is keeping it clean.  She states that she has another bump on her shoulder that she is leaving alone.  She did the treatment for \"bugs\" day before yesterday and since her feet are very swollen and it is painful to walk.  She states that she has cramps all over her body and does not know if it is reaction to the \"bugs\" or treatment.  She is waiting for her ILS worker to come and is planning to stay in bed a little longer, ice, and take Tylenol.  She is wondering if Dr Parikh has any other recommendations or can call her?  Offered appts in clinic and pt states \"will see\" when her ILS worker gets there as it is too painful to walk.  She states that if it gets worse she will call 911. Routed note to Dr Parikh./Lima Cruz RN BSN   "

## 2023-08-07 NOTE — PROGRESS NOTES
Assessment & Plan   Presenting for concern for lice.  None visualized on exam.  Will treat based on patient report.  -- Permethrin.    Incidentally, left shoulder noted to be riding higher and more anterior than right.  Does not feel unstable/dislocated.  She reports previous ORIF.  -- Imaging pending.    Follow-up with PCP as scheduled.  Future Appointments   Date Time Provider Department [facility]   8/16/2023  8:40 AM Mirian Patel MD [facility]   10/4/2023 11:15 AM Tasha Belcher PA-C [facility]       Subjective   Lisa Scott is a 63 year old female with a history including alcohol dependence in remission, COPD, HTN, and CKD who presents for concern for lice.    Her head has been very itchy despite showering for 3 hours today.  She says that she has noticed lice in her hair.  Her scalp is itchy, but no hair loss.  She has not tried anything for lice.    Other: Left shoulder noted incidentally to be riding higher and more forward than right shoulder.  She denies any trauma or injury, but reports it has been painful for about a week.  She has a history of surgical repair of that humerus.    Social: She reports that she has quit smoking. She has never used smokeless tobacco. She reports that she does not currently use alcohol. She reports that she does not use drugs.    Objective   Vitals: /63 (BP Location: Right arm, Patient Position: Sitting, Cuff Size: Adult Regular)   Pulse 93   Temp 97.6  F (36.4  C) (Oral)   Wt 46.8 kg (103 lb 3.2 oz)   SpO2 97%   BMI 20.50 kg/m    General: Pleasant. Middle-aged woman. No distress.  Skin: Scalp/hair inspected - no evidence of lice at this time.  Shoulder: Left humeral head more superior and anterior than right, but without sulcus sign or AP instability.  Full IR, ER.  Abduction and forward flexion to 90 degrees.

## 2023-08-10 NOTE — TELEPHONE ENCOUNTER
Routed note to Dr Parikh.  Pt called  this morning and asked that Dr Parikh give her a call ASAP.  Routed note to Dr Parikh./Lima Cruz RN BSN

## 2023-08-11 DIAGNOSIS — F29 PSYCHOSIS, UNSPECIFIED PSYCHOSIS TYPE (H): ICD-10-CM

## 2023-08-11 RX ORDER — QUETIAPINE FUMARATE 100 MG/1
TABLET, FILM COATED ORAL
Qty: 180 TABLET | Refills: 0 | Status: SHIPPED | OUTPATIENT
Start: 2023-08-11 | End: 2023-08-23

## 2023-08-16 ENCOUNTER — OFFICE VISIT (OUTPATIENT)
Dept: FAMILY MEDICINE | Facility: CLINIC | Age: 63
End: 2023-08-16
Payer: COMMERCIAL

## 2023-08-16 VITALS
HEART RATE: 83 BPM | HEIGHT: 59 IN | SYSTOLIC BLOOD PRESSURE: 180 MMHG | RESPIRATION RATE: 20 BRPM | DIASTOLIC BLOOD PRESSURE: 83 MMHG | WEIGHT: 109 LBS | TEMPERATURE: 97.4 F | BODY MASS INDEX: 21.97 KG/M2 | OXYGEN SATURATION: 100 %

## 2023-08-16 DIAGNOSIS — H04.123 DRY EYES: ICD-10-CM

## 2023-08-16 DIAGNOSIS — H60.502 ACUTE OTITIS EXTERNA OF LEFT EAR, UNSPECIFIED TYPE: ICD-10-CM

## 2023-08-16 DIAGNOSIS — L30.1 DYSHIDROTIC ECZEMA: ICD-10-CM

## 2023-08-16 DIAGNOSIS — J30.2 SEASONAL ALLERGIC RHINITIS, UNSPECIFIED TRIGGER: ICD-10-CM

## 2023-08-16 DIAGNOSIS — L98.8 MORGELLONS DISEASE: Primary | ICD-10-CM

## 2023-08-16 DIAGNOSIS — J44.1 COPD EXACERBATION (H): ICD-10-CM

## 2023-08-16 DIAGNOSIS — I10 ESSENTIAL HYPERTENSION: ICD-10-CM

## 2023-08-16 PROCEDURE — 99214 OFFICE O/P EST MOD 30 MIN: CPT | Performed by: STUDENT IN AN ORGANIZED HEALTH CARE EDUCATION/TRAINING PROGRAM

## 2023-08-16 RX ORDER — OLOPATADINE HYDROCHLORIDE 1 MG/ML
1 SOLUTION/ DROPS OPHTHALMIC 2 TIMES DAILY
Qty: 5 ML | Refills: 1 | Status: SHIPPED | OUTPATIENT
Start: 2023-08-16

## 2023-08-16 RX ORDER — TRIAMCINOLONE ACETONIDE 1 MG/G
OINTMENT TOPICAL 2 TIMES DAILY
Qty: 80 G | Refills: 1 | Status: SHIPPED | OUTPATIENT
Start: 2023-08-16

## 2023-08-16 RX ORDER — POLYVINYL ALCOHOL 14 MG/ML
2 SOLUTION/ DROPS OPHTHALMIC PRN
Qty: 30 ML | Refills: 1 | Status: SHIPPED | OUTPATIENT
Start: 2023-08-16

## 2023-08-16 RX ORDER — HYDROCORTISONE AND ACETIC ACID 20.75; 10.375 MG/ML; MG/ML
3 SOLUTION AURICULAR (OTIC) 2 TIMES DAILY
Qty: 10 ML | Refills: 1 | Status: SHIPPED | OUTPATIENT
Start: 2023-08-16

## 2023-08-16 RX ORDER — DOXYCYCLINE 100 MG/1
100 CAPSULE ORAL 2 TIMES DAILY
Qty: 10 CAPSULE | Refills: 0 | Status: SHIPPED | OUTPATIENT
Start: 2023-08-16 | End: 2023-08-21

## 2023-08-16 RX ORDER — PREDNISONE 20 MG/1
40 TABLET ORAL DAILY
Qty: 6 TABLET | Refills: 0 | Status: SHIPPED | OUTPATIENT
Start: 2023-08-16 | End: 2023-08-19

## 2023-08-16 RX ORDER — LISINOPRIL 10 MG/1
10 TABLET ORAL DAILY
Qty: 90 TABLET | Refills: 1 | Status: SHIPPED | OUTPATIENT
Start: 2023-08-16 | End: 2024-03-29

## 2023-08-16 RX ORDER — PERMETHRIN 50 MG/G
CREAM TOPICAL
Qty: 60 G | Refills: 1 | Status: SHIPPED | OUTPATIENT
Start: 2023-08-16 | End: 2023-08-23
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
## 2023-08-17 ENCOUNTER — TELEPHONE (OUTPATIENT)
Dept: PHARMACY | Facility: CLINIC | Age: 63
End: 2023-08-17
Payer: COMMERCIAL

## 2023-08-17 NOTE — TELEPHONE ENCOUNTER
[employer] recruitment outreach attempt #2: SERGEI Stein, PharmD  Medication Therapy Management Pharmacist

## 2023-08-22 NOTE — PROGRESS NOTES
Assessment & Plan   Morgellons disease  Bipolar 1 disorder (H)  Psychosis, unspecified psychosis type (H)  Patient is been seen frequently by myself, Dr. Patel, and Dr. Parikh for management of parasitosis delusion.  Patient does better with 400 mg daily quetiapine and some as needed if she cannot sleep.  Goes back and forth on whether she is able to take care of herself or if her house gets into disarray, has never quite met admission criteria and does not today.  Patient feels that permethrin helps her last time and would like to try some again.  Minimal chance for harm, refilling at this time.  Patient would also like to see psychiatry here at our clinic at next nearest convenience.  I think she would benefit from medication optimization at this time.  - QUEtiapine (SEROQUEL) 100 MG tablet; TAKE 1 TABLET BY MOUTH TWICE DAILY AS NEEDED FOR ANXIETY OR PANIC ATTACKS.  - QUEtiapine ER (SEROQUEL XR) 400 MG 24 hr tablet; Take 1 tablet (400 mg) by mouth daily (with dinner)  - Adult Mental Health  Referral; Future  - permethrin (ELIMITE) 5 % external cream; Apply cream from head to toe (except the face); leave on for 8-14 hours then wash off with water; reapply in 1 week if needed.  - Adult Mental Health  Referral; Future    Seasonal allergic rhinitis, unspecified trigger  Non-seasonal allergic rhinitis, unspecified trigger  Upper respiratory tract infection, unspecified type  Chronic bronchitis, unspecified chronic bronchitis type (H)  Patient has COPD and lives in an apartment where she is near [facility], resulting in frequent exacerbation of symptoms.  Patient also believes that she has a lot of allergies to things in the apartment.  In light of this, patient said that her CADI worker recommended getting an air purifier covered.  I do believe that she demonstrates medical necessity due to frail status, easily provoked COPD, and poor environmental situation and could benefit from having one.  - Air   ([employer] AIR PURIFIER/HEPA) (Device) MISC; 1 each daily Use daily for prevention of COPD flares.  - fluticasone (FLONASE) 50 MCG/ACT nasal spray; Spray 2 sprays into both nostrils daily SHAKE LIQUID AND USE  - cetirizine (ZYRTEC) 10 MG tablet; Take 1 tablet (10 mg) by mouth 2 times daily  - guaiFENesin (MUCINEX) 600 MG 12 hr tablet; Take 2 tablets (1,200 mg) by mouth 2 times daily    Injury of left rotator cuff, subsequent encounter  Acute pain of left shoulder due to trauma  Patient continues to have pain in left shoulder.  Pain managed with Tylenol, ibuprofen, diclofenac.  Patient requires a refill at this time.  Currently waiting on repair secondary to frail status.  - ibuprofen (ADVIL/MOTRIN) 600 MG tablet; Take 1 tablet (600 mg) by mouth every 6 hours as needed for moderate pain  - diclofenac (VOLTAREN) 1 % topical gel; Apply 2 g topically 4 times daily  - acetaminophen (TYLENOL) 500 MG tablet; Take 2 tablets (1,000 mg) by mouth 3 times daily as needed for mild pain    Essential hypertension  She previously was on amlodipine 5 mg, currently only on lisinopril.  Hypertensive 166/108 today.  Recommend starting 5 mg amlodipine.  We will recheck at next visit.  - amLODIPine (NORVASC) 5 MG tablet; Take 1 tablet (5 mg) by mouth daily  -Lisinopril as directed      Diagnosis or treatment significantly limited by social determinants of health - poor living situation, exposure to pollution  Ordering of each unique test  Prescription drug management  45 minutes spent by me on the date of the encounter doing chart review, history and exam, documentation and further activities per the note       Patient Instructions   Have your  submit a claim for high-protein diet.  They should generate a form which you can mail or fax to me and then I can get it filled out.    I refilled your Mucinex, allergy medication, Seroquel, permethrin, Tylenol, diclofenac, and ibuprofen.  Please call the clinic back if you have any  further things that you need to manage right now.  Also have a medication refill available for your amlodipine.  Please start taking this in addition to your lisinopril for your high blood pressure.    I have you on the wait list for Dr. Mccray, nearest opening is September 25, but we will let you know if we can get you in sooner.    Also see that you had a previous  named Tracy Freeman.  If you are still working with her, please encourage her to reach out to us so we can help make sure you are getting to your appointments and getting the medications you need.    Let's see you back in 2 weeks to see how you're doing.     Return in about 2 weeks (around 9/6/2023).    Mirian Castanon MD  PGY3 Family Medicine Resident  [facility] OSCAR Gonzalez is a 63 year old, presenting for the following health issues:  Follow Up (Lungs.  Wants to see if she could be prescribed an air purifier) and Allergies        8/23/2023    10:24 AM   Additional Questions   Roomed by Abbey Burrell follow up    Last visit, patient given another course of permetherin cream and triamcinolone ointment for itching. Given prednisone burst and short course of doxycycline for COPD exacerbation.     Today, still reports itching, but did get benefit for a short period of time with the permetherin and triamcinolone. Wheezing is a bit better today but reports difficulty expectorating mucous at this time.     Still looking for a new place to move to where hoping to get free of bugs. Concern for worsening lungs with the apartment. Needs mucinex, has plenty of benzonatate, needs flonase and allergy pills. Needs seroquel refill.     Needs another medical reason letter for why to leave apartment. Also needs diet letter for high protein diet. Discussed talking with  to get things started.     [employer] ko was told they may be able to cover the air purifier. Just needs to be worded correctly.  "    Would love to see Dr. Jones here for psychiatry management. Cites poor sleep and stress secondary to the bugs bothering her all the time. Washed her glasses in the sink during our conversation to get \"the black spots\" off them that she knew were from the bugs.     Review of Systems   Constitutional, HEENT, cardiovascular, pulmonary, gi and gu systems are negative, except as otherwise noted.      Objective    BP (!) 166/108   Pulse 93   Temp 98.3  F (36.8  C)   Resp 16   Wt 50.1 kg (110 lb 6.4 oz)   SpO2 98%   BMI 22.15 kg/m    Body mass index is 22.15 kg/m .  Physical Exam   GENERAL: healthy, alert, wearing shower cap to cover up appearance of hair, otherwise pleasant and in mild emotional distress  RESP: wheezing audible in bilateral upper airways, increased mucous production, upper airway noises transmitted, no rales or rhonchi, no increased work of breathing at baseline  CV: regular rate and rhythm, normal S1 S2, no S3 or S4, no murmur, click or rub, no peripheral edema and peripheral pulses strong  MS: no gross musculoskeletal defects noted, neck and upper back muscles tender and tight, mild pain with raising shoulders above head  SKIN: no suspicious lesions or rashes, varicose veins noted on legs, no eulogio rashes, bites or erythema consistent with bugs  PSYCH: mentation appears generally well but still has isolated delusion of parasites over body, affect normal/bright, mood \"stressed\", insight: poor, judgement: poor, varies with task and subject. Memory, selective, has some things she remembers very well and others that are difficult for her. Gestures to legs and hair and ears stating she can feel bugs crawling at any given time. Endorses poor sleep and high stress.         2/7/2023     9:14 AM 4/26/2023    12:49 PM 8/23/2023    11:33 AM   PHQ   PHQ-9 Total Score 20 14 10   Q9: Thoughts of better off dead/self-harm past 2 weeks Not at all Not at all Not at all         1/11/2023     1:42 PM 2/7/2023 "     9:14 AM 8/23/2023    11:33 AM   THANH-7 SCORE   Total Score 21 18 20             ----- Service Performed and Documented by Resident or Fellow ------

## 2023-08-23 ENCOUNTER — OFFICE VISIT (OUTPATIENT)
Dept: FAMILY MEDICINE | Facility: CLINIC | Age: 63
End: 2023-08-23
Payer: COMMERCIAL

## 2023-08-23 VITALS
DIASTOLIC BLOOD PRESSURE: 108 MMHG | TEMPERATURE: 98.3 F | BODY MASS INDEX: 22.15 KG/M2 | RESPIRATION RATE: 16 BRPM | HEART RATE: 93 BPM | WEIGHT: 110.4 LBS | SYSTOLIC BLOOD PRESSURE: 166 MMHG | OXYGEN SATURATION: 98 %

## 2023-08-23 DIAGNOSIS — S46.002D INJURY OF LEFT ROTATOR CUFF, SUBSEQUENT ENCOUNTER: ICD-10-CM

## 2023-08-23 DIAGNOSIS — F29 PSYCHOSIS, UNSPECIFIED PSYCHOSIS TYPE (H): ICD-10-CM

## 2023-08-23 DIAGNOSIS — G89.11 ACUTE PAIN OF LEFT SHOULDER DUE TO TRAUMA: ICD-10-CM

## 2023-08-23 DIAGNOSIS — J30.89 NON-SEASONAL ALLERGIC RHINITIS, UNSPECIFIED TRIGGER: ICD-10-CM

## 2023-08-23 DIAGNOSIS — J06.9 UPPER RESPIRATORY TRACT INFECTION, UNSPECIFIED TYPE: ICD-10-CM

## 2023-08-23 DIAGNOSIS — I10 ESSENTIAL HYPERTENSION: ICD-10-CM

## 2023-08-23 DIAGNOSIS — L98.8 MORGELLONS DISEASE: ICD-10-CM

## 2023-08-23 DIAGNOSIS — J42 CHRONIC BRONCHITIS, UNSPECIFIED CHRONIC BRONCHITIS TYPE (H): Primary | ICD-10-CM

## 2023-08-23 DIAGNOSIS — J30.2 SEASONAL ALLERGIC RHINITIS, UNSPECIFIED TRIGGER: ICD-10-CM

## 2023-08-23 DIAGNOSIS — M25.512 ACUTE PAIN OF LEFT SHOULDER DUE TO TRAUMA: ICD-10-CM

## 2023-08-23 DIAGNOSIS — F31.9 BIPOLAR 1 DISORDER (H): ICD-10-CM

## 2023-08-23 PROCEDURE — 99214 OFFICE O/P EST MOD 30 MIN: CPT | Mod: GC

## 2023-08-23 RX ORDER — CETIRIZINE HYDROCHLORIDE 10 MG/1
10 TABLET ORAL 2 TIMES DAILY
Qty: 30 TABLET | Refills: 3 | Status: SHIPPED | OUTPATIENT
Start: 2023-08-23 | End: 2023-11-18

## 2023-08-23 RX ORDER — QUETIAPINE FUMARATE 100 MG/1
TABLET, FILM COATED ORAL
Qty: 180 TABLET | Refills: 0 | Status: SHIPPED | OUTPATIENT
Start: 2023-08-23

## 2023-08-23 RX ORDER — IBUPROFEN 600 MG/1
600 TABLET, FILM COATED ORAL EVERY 6 HOURS PRN
Qty: 90 TABLET | Refills: 1 | Status: SHIPPED | OUTPATIENT
Start: 2023-08-23

## 2023-08-23 RX ORDER — AMLODIPINE BESYLATE 5 MG/1
5 TABLET ORAL DAILY
COMMUNITY
Start: 2022-06-24 | End: 2023-08-23

## 2023-08-23 RX ORDER — GUAIFENESIN 600 MG/1
1200 TABLET, EXTENDED RELEASE ORAL 2 TIMES DAILY
Qty: 30 TABLET | Refills: 1 | Status: SHIPPED | OUTPATIENT
Start: 2023-08-23 | End: 2023-10-25

## 2023-08-23 RX ORDER — AMLODIPINE BESYLATE 5 MG/1
5 TABLET ORAL DAILY
Qty: 90 TABLET | Refills: 3 | Status: SHIPPED | OUTPATIENT
Start: 2023-08-23

## 2023-08-23 RX ORDER — QUETIAPINE 400 MG/1
400 TABLET, FILM COATED, EXTENDED RELEASE ORAL
Qty: 90 TABLET | Refills: 3 | Status: SHIPPED | OUTPATIENT
Start: 2023-08-23

## 2023-08-23 RX ORDER — ACETAMINOPHEN 500 MG
1000 TABLET ORAL 3 TIMES DAILY PRN
Qty: 180 TABLET | Refills: 11 | Status: SHIPPED | OUTPATIENT
Start: 2023-08-23

## 2023-08-23 RX ORDER — PERMETHRIN 50 MG/G
CREAM TOPICAL
Qty: 60 G | Refills: 1 | Status: SHIPPED | OUTPATIENT
Start: 2023-08-23 | End: 2023-11-18

## 2023-08-23 RX ORDER — FLUTICASONE PROPIONATE 50 MCG
2 SPRAY, SUSPENSION (ML) NASAL DAILY
Qty: 16 G | Refills: 11 | Status: SHIPPED | OUTPATIENT
Start: 2023-08-23

## 2023-08-23 ASSESSMENT — PATIENT HEALTH QUESTIONNAIRE - PHQ9
SUM OF ALL RESPONSES TO PHQ QUESTIONS 1-9: 10
5. POOR APPETITE OR OVEREATING: NEARLY EVERY DAY

## 2023-08-23 ASSESSMENT — ANXIETY QUESTIONNAIRES
6. BECOMING EASILY ANNOYED OR IRRITABLE: NEARLY EVERY DAY
2. NOT BEING ABLE TO STOP OR CONTROL WORRYING: NEARLY EVERY DAY
5. BEING SO RESTLESS THAT IT IS HARD TO SIT STILL: NEARLY EVERY DAY
3. WORRYING TOO MUCH ABOUT DIFFERENT THINGS: NEARLY EVERY DAY
1. FEELING NERVOUS, ANXIOUS, OR ON EDGE: MORE THAN HALF THE DAYS
7. FEELING AFRAID AS IF SOMETHING AWFUL MIGHT HAPPEN: NEARLY EVERY DAY
GAD7 TOTAL SCORE: 20
GAD7 TOTAL SCORE: 20

## 2023-08-23 NOTE — PATIENT INSTRUCTIONS
Have your  submit a claim for high-protein diet.  They should generate a form which you can mail or fax to me and then I can get it filled out.    I refilled your Mucinex, allergy medication, Seroquel, permethrin, Tylenol, diclofenac, and ibuprofen.  Please call the clinic back if you have any further things that you need to manage right now.  Also have a medication refill available for your amlodipine.  Please start taking this in addition to your lisinopril for your high blood pressure.    I have you on the wait list for Dr. Mccray, nearest opening is September 25, but we will let you know if we can get you in sooner.    Also see that you had a previous  named Tracy Freeman.  If you are still working with her, please encourage her to reach out to us so we can help make sure you are getting to your appointments and getting the medications you need.    Let's see you back in 2 weeks to see how you're doing.

## 2023-08-25 ENCOUNTER — TELEPHONE (OUTPATIENT)
Dept: FAMILY MEDICINE | Facility: CLINIC | Age: 63
End: 2023-08-25
Payer: COMMERCIAL

## 2023-08-25 NOTE — TELEPHONE ENCOUNTER
"Pharmacy Message: [employer] Air Purifier/HEPA (Device).     \"Unable to order in for the patient. Please send to a Medical Supply Store.\"    - [facility]    Please advise, Thank you.    Erika Conroy MA    "

## 2023-08-31 ENCOUNTER — TELEPHONE (OUTPATIENT)
Dept: FAMILY MEDICINE | Facility: CLINIC | Age: 63
End: 2023-08-31
Payer: COMMERCIAL

## 2023-08-31 NOTE — TELEPHONE ENCOUNTER
[facility] phone call message- general phone call:    Reason for call: she would like to talk     Action desired: call back.    Return call needed: Yes    OK to leave a message on voice mail? Yes    Advised patient to response may take up to 2 business days: Yes    Primary language: English      needed? No    Call taken on August 31, 2023 at 2:07 PM by Ana Serna

## 2023-09-05 NOTE — TELEPHONE ENCOUNTER
Pt has an appt to see Dr Castanon on Wed, 9/6/23 at 8:40 AM to follow-up hypertension.  Routed note to Dr Parikh./Lima Cruz RN BSN

## 2023-09-16 DIAGNOSIS — M54.12 CERVICAL RADICULOPATHY: ICD-10-CM

## 2023-09-16 DIAGNOSIS — M54.2 CERVICALGIA: ICD-10-CM

## 2023-09-18 RX ORDER — GABAPENTIN 300 MG/1
CAPSULE ORAL
Qty: 180 CAPSULE | Refills: 0 | Status: SHIPPED | OUTPATIENT
Start: 2023-09-18 | End: 2024-01-12

## 2023-09-21 DIAGNOSIS — J42 CHRONIC BRONCHITIS, UNSPECIFIED CHRONIC BRONCHITIS TYPE (H): ICD-10-CM

## 2023-09-25 RX ORDER — ALBUTEROL SULFATE 90 UG/1
AEROSOL, METERED RESPIRATORY (INHALATION)
Qty: 18 G | Refills: 0 | Status: SHIPPED | OUTPATIENT
Start: 2023-09-25 | End: 2023-10-09

## 2023-09-27 DIAGNOSIS — J06.9 UPPER RESPIRATORY TRACT INFECTION, UNSPECIFIED TYPE: ICD-10-CM

## 2023-09-27 RX ORDER — BENZONATATE 100 MG/1
100 CAPSULE ORAL 3 TIMES DAILY PRN
Qty: 30 CAPSULE | Refills: 0 | Status: SHIPPED | OUTPATIENT
Start: 2023-09-27 | End: 2023-10-25

## 2023-09-28 ENCOUNTER — OFFICE VISIT (OUTPATIENT)
Dept: FAMILY MEDICINE | Facility: CLINIC | Age: 63
End: 2023-09-28
Payer: COMMERCIAL

## 2023-09-28 VITALS
TEMPERATURE: 98.2 F | DIASTOLIC BLOOD PRESSURE: 79 MMHG | RESPIRATION RATE: 16 BRPM | HEART RATE: 83 BPM | HEIGHT: 59 IN | WEIGHT: 104.6 LBS | SYSTOLIC BLOOD PRESSURE: 122 MMHG | OXYGEN SATURATION: 97 % | BODY MASS INDEX: 21.09 KG/M2

## 2023-09-28 DIAGNOSIS — F43.21 GRIEF: Primary | ICD-10-CM

## 2023-09-28 DIAGNOSIS — F29 PSYCHOSIS, UNSPECIFIED PSYCHOSIS TYPE (H): ICD-10-CM

## 2023-09-28 DIAGNOSIS — L98.8 MORGELLONS DISEASE: ICD-10-CM

## 2023-09-28 DIAGNOSIS — F31.9 BIPOLAR 1 DISORDER (H): ICD-10-CM

## 2023-09-28 PROCEDURE — 99213 OFFICE O/P EST LOW 20 MIN: CPT | Mod: GC

## 2023-09-28 NOTE — PROGRESS NOTES
Preceptor Attestation:    I discussed the patient with the resident and evaluated the patient in person. I have verified the content of the note, which accurately reflects my assessment of the patient and the plan of care.   Supervising Physician:  Rick Hooks MD.
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
## 2023-10-02 ENCOUNTER — TELEPHONE (OUTPATIENT)
Dept: FAMILY MEDICINE | Facility: CLINIC | Age: 63
End: 2023-10-02
Payer: COMMERCIAL

## 2023-10-06 DIAGNOSIS — J42 CHRONIC BRONCHITIS, UNSPECIFIED CHRONIC BRONCHITIS TYPE (H): ICD-10-CM

## 2023-10-09 RX ORDER — ALBUTEROL SULFATE 90 UG/1
AEROSOL, METERED RESPIRATORY (INHALATION)
Qty: 18 G | Refills: 0 | Status: SHIPPED | OUTPATIENT
Start: 2023-10-09 | End: 2023-10-25

## 2023-10-16 ENCOUNTER — TELEPHONE (OUTPATIENT)
Dept: FAMILY MEDICINE | Facility: CLINIC | Age: 63
End: 2023-10-16

## 2023-10-16 NOTE — TELEPHONE ENCOUNTER
[facility] phone call message- general phone call:    Reason for call:     Patient would like Dr. Parikh or Dr. Patel to return her phone call in regard to the letter of moving out. Patient need to move out so she can get well. Please return phone call to patient.    Return call needed: Yes    OK to leave a message on voice mail? Yes    Primary language: English      needed? No    Call taken on October 16, 2023 at 3:32 PM by Chinyere Arias

## 2023-10-20 ENCOUNTER — OFFICE VISIT (OUTPATIENT)
Dept: FAMILY MEDICINE | Facility: CLINIC | Age: 63
End: 2023-10-20
Payer: COMMERCIAL

## 2023-10-20 VITALS
BODY MASS INDEX: 21.53 KG/M2 | WEIGHT: 106.6 LBS | TEMPERATURE: 97.9 F | HEART RATE: 74 BPM | SYSTOLIC BLOOD PRESSURE: 151 MMHG | OXYGEN SATURATION: 99 % | DIASTOLIC BLOOD PRESSURE: 96 MMHG | RESPIRATION RATE: 18 BRPM

## 2023-10-20 DIAGNOSIS — J42 CHRONIC BRONCHITIS, UNSPECIFIED CHRONIC BRONCHITIS TYPE (H): Primary | ICD-10-CM

## 2023-10-20 DIAGNOSIS — J06.9 UPPER RESPIRATORY TRACT INFECTION, UNSPECIFIED TYPE: ICD-10-CM

## 2023-10-20 DIAGNOSIS — I10 ESSENTIAL HYPERTENSION: ICD-10-CM

## 2023-10-20 DIAGNOSIS — L98.8 MORGELLONS DISEASE: ICD-10-CM

## 2023-10-20 DIAGNOSIS — M54.30 SCIATICA, UNSPECIFIED LATERALITY: ICD-10-CM

## 2023-10-20 DIAGNOSIS — E44.1 MILD PROTEIN-CALORIE MALNUTRITION (H): ICD-10-CM

## 2023-10-20 PROCEDURE — 99214 OFFICE O/P EST MOD 30 MIN: CPT | Mod: GC

## 2023-10-20 RX ORDER — HYDROXYZINE HYDROCHLORIDE 50 MG/1
25 TABLET, FILM COATED ORAL 3 TIMES DAILY PRN
Qty: 30 TABLET | Refills: 3 | Status: SHIPPED | OUTPATIENT
Start: 2023-10-20 | End: 2024-02-29

## 2023-10-20 NOTE — LETTER
Housing Conditions Impacting Health Condition    10/20/2023    Re: Lisa Scott  1960      To Whom It May Concern:     Lisa Scott was seen in clinic today.  She has a history of COPD which is exacerbated by poor air quality. Based on [place] her current air quality is likely poor and is negatively impacting her health. In order to improve this condition alternate housing is medically recommended.       Cy Galvan MD  10/20/2023 10:29 AM

## 2023-10-20 NOTE — PROGRESS NOTES
Preceptor attestation:  Vital signs reviewed: BP (!) 151/96   Pulse 74   Temp 97.9  F (36.6  C) (Oral)   Resp 18   Wt 48.4 kg (106 lb 9.6 oz)   SpO2 99%   BMI 21.53 kg/m      Patient seen, evaluated, and discussed with the resident.  I verified the content of the note, which accurately reflects my assessment of the patient and the plan of care.    Supervising physician: Emeli Parikh MD  [facility]

## 2023-10-20 NOTE — PROGRESS NOTES
Assessment & Plan     Chronic bronchitis, unspecified chronic bronchitis type (H)  Patient is requesting letter for simple public housing stating that her current living situation is worsening her breathing.  She reports that she [place] [address] and has been having worsening cough, shortness of breath, changes in her phlegm over the last 2 weeks.  No fever and her oxygen sats today 99%.  Possible COPD exacerbation though very mild given the lack of fever and normal O2 saturation.  If not improving or worsening at follow-up visit consider chest x-ray.  -Letter provided stating a change in living location may improve her current breathing condition    Essential hypertension  Blood pressure acutely elevated today 151/96.  Previous visit about 2 weeks ago blood pressure was around 120/80.  Currently on lisinopril 10 mg daily and amlodipine 5 mg daily.  Acutely dealing with a lot of grief related to the passing of her son about 1 month ago.  It appears her blood pressure has been up and down like this over the past year.  She has scheduled follow-up on November 1 and recommended following up blood pressure at that time with no medication changes today due to her acute grief likely contributing to the elevated readings.  -Continue lisinopril 10 mg daily  - Continue amlodipine 5 mg daily  - Recheck blood pressure at follow-up visit    Morgellons disease  Continues to report bugs in her veins and skin and lungs.    Mild protein-calorie malnutrition (H24)  -Forms for [facility] special diet request filled out; requested low-cholesterol diet    Anxiety  - Refill hydroxyzine    Rotator cuff tear  - Refill tizanidine      Cy Galvan MD  [facility]    Suzy Gonzalez is a 63 year old, presenting for the following health issues:  RECHECK (Lungs?.... bugs in her house... LEFT shoulder)      10/20/2023     9:53 AM   Additional Questions   Roomed by FABRICIO   Accompanied by SELF       HPI  "  Follow up Grief  Son passed away in 2023. Continues to feel very sad about this and has his ashes but the  isn't scheduled until 2023.     Weight-loss  Needs paper work to increase her food stamps to help her gain weight. Trouble with picky eating and low income food insecurity.     Torn rotator cuff  Can't have surgery till she has gained some weight.     Hypertension Follow-up    Do you check your blood pressure regularly outside of the clinic? No   Are you following a low salt diet? Yes  Acute stress from her recent loss.     COPD Follow-Up  Overall, how are your COPD symptoms since your last clinic visit?  Slightly worse  How much fatigue or shortness of breath do you have when you are walking?  More than usual  How much shortness of breath do you have when you are resting?  More than usual  How often do you cough? Often  Have you noticed any change in your sputum/phlegm?  Yes- over 2 weeks  Have you experienced a recent fever? No  Needs paper work stating that she cannot breath in her apartment and she needs a new apartment.      History   Smoking Status    Former   Smokeless Tobacco    Never     No results found for: \"FEV1\", \"JJW0FMV\"          Objective    BP (!) 151/96   Pulse 74   Temp 97.9  F (36.6  C) (Oral)   Resp 18   Wt 48.4 kg (106 lb 9.6 oz)   SpO2 99%   BMI 21.53 kg/m    Body mass index is 21.53 kg/m .  Physical Exam   GENERAL: healthy, alert and no distress  RESP: rhonchi throughout and expiratory wheezes throughout  CV: regular rate and rhythm, normal S1 S2, no S3 or S4, no murmur, click or rub, no peripheral edema and peripheral pulses strong  SKIN: Multiple excoriations throughout the body  PSYCH: mentation appears normal, tearful, and anxious        ----- Service Performed and Documented by Resident or Fellow ------              "

## 2023-10-23 DIAGNOSIS — J42 CHRONIC BRONCHITIS, UNSPECIFIED CHRONIC BRONCHITIS TYPE (H): ICD-10-CM

## 2023-10-25 DIAGNOSIS — J06.9 UPPER RESPIRATORY TRACT INFECTION, UNSPECIFIED TYPE: ICD-10-CM

## 2023-10-25 RX ORDER — ALBUTEROL SULFATE 90 UG/1
AEROSOL, METERED RESPIRATORY (INHALATION)
Qty: 18 G | Refills: 0 | Status: SHIPPED | OUTPATIENT
Start: 2023-10-25 | End: 2023-12-19

## 2023-10-25 RX ORDER — GUAIFENESIN 600 MG/1
2 TABLET, EXTENDED RELEASE ORAL 2 TIMES DAILY
Qty: 40 TABLET | Refills: 0 | Status: SHIPPED | OUTPATIENT
Start: 2023-10-25 | End: 2023-11-24

## 2023-10-25 RX ORDER — BENZONATATE 100 MG/1
CAPSULE ORAL
Qty: 30 CAPSULE | Refills: 0 | Status: SHIPPED | OUTPATIENT
Start: 2023-10-25 | End: 2023-12-21
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
## 2023-11-01 ENCOUNTER — OFFICE VISIT (OUTPATIENT)
Dept: FAMILY MEDICINE | Facility: CLINIC | Age: 63
End: 2023-11-01
Payer: COMMERCIAL

## 2023-11-01 VITALS
BODY MASS INDEX: 21.73 KG/M2 | OXYGEN SATURATION: 98 % | TEMPERATURE: 97.4 F | HEART RATE: 84 BPM | WEIGHT: 107.6 LBS | DIASTOLIC BLOOD PRESSURE: 83 MMHG | SYSTOLIC BLOOD PRESSURE: 152 MMHG

## 2023-11-01 DIAGNOSIS — J30.89 NON-SEASONAL ALLERGIC RHINITIS, UNSPECIFIED TRIGGER: ICD-10-CM

## 2023-11-01 DIAGNOSIS — L98.8 MORGELLONS DISEASE: ICD-10-CM

## 2023-11-01 PROCEDURE — 99213 OFFICE O/P EST LOW 20 MIN: CPT | Performed by: FAMILY MEDICINE

## 2023-11-03 ENCOUNTER — TELEPHONE (OUTPATIENT)
Dept: PHARMACY | Facility: OTHER | Age: 63
End: 2023-11-03
Payer: COMMERCIAL

## 2023-11-18 RX ORDER — CETIRIZINE HYDROCHLORIDE 10 MG/1
10 TABLET ORAL 2 TIMES DAILY
Qty: 30 TABLET | Refills: 3 | Status: SHIPPED | OUTPATIENT
Start: 2023-11-18 | End: 2024-04-09

## 2023-11-18 RX ORDER — PERMETHRIN 50 MG/G
CREAM TOPICAL
Qty: 60 G | Refills: 1 | Status: SHIPPED | OUTPATIENT
Start: 2023-11-18 | End: 2024-02-02

## 2023-11-18 NOTE — PROGRESS NOTES
Assessment & Plan   Visit to discuss housing concerns, as she attributes unsanitary conditions at her current housing to her skin and underlying COPD issues.  -- We discussed that given her current housing contract is month-to-month, and she does not have an ongoing lease, she should not require medical documentation to seek alternative housing.  She was able to identify people to help her with this process (see contacts in HPI; also added to CareTeam).  Should those individuals require any medical correspondence, they can reach out to Dr. Mirian Patel or myself.    Other: Refilled cetirizine and permethrin cream.    Follow-up with PCP as needed.    Subjective   Lisa Scott is a 63 year old female with a history including alcohol dependence in remission, COPD, HTN, and CKD who presents to discuss housing.    She describes unsanitary living conditions at her current housing, which exacerbate her skin and underlying COPD symptoms.  When asked about the remaining time on her please, she reports that she is currently well the month.  She is actively looking for a new place.  She has some individuals with [facility] who are helping her with this:  - Tracy Freeman:  [phone].  -Erika Elpidio: [phone]    Social: She reports that she has quit smoking. She has never used smokeless tobacco. She reports that she does not currently use alcohol. She reports that she does not use drugs.    Objective   Vitals: BP (!) 152/83 (BP Location: Left arm, Patient Position: Sitting, Cuff Size: Adult Regular)   Pulse 84   Temp 97.4  F (36.3  C) (Oral)   Wt 48.8 kg (107 lb 9.6 oz)   SpO2 98%   BMI 21.73 kg/m    General: Pleasant. Middle-aged woman. No distress.  Psych: Appropriate grooming and hygiene. Speech normal rate. Appropriate mood and affect.

## 2023-11-24 DIAGNOSIS — J06.9 UPPER RESPIRATORY TRACT INFECTION, UNSPECIFIED TYPE: ICD-10-CM

## 2023-11-24 RX ORDER — GUAIFENESIN 600 MG/1
2 TABLET, EXTENDED RELEASE ORAL 2 TIMES DAILY
Qty: 40 TABLET | Refills: 0 | Status: SHIPPED | OUTPATIENT
Start: 2023-11-24 | End: 2023-12-27

## 2023-12-06 ENCOUNTER — TELEPHONE (OUTPATIENT)
Dept: FAMILY MEDICINE | Facility: CLINIC | Age: 63
End: 2023-12-06

## 2023-12-06 NOTE — TELEPHONE ENCOUNTER
[facility] phone call message- general phone call:    Reason for call: she would like to talk to a nurse.    Action desired: call back.    Return call needed: Yes    OK to leave a message on voice mail? Yes    Advised patient to response may take up to 2 business days: Yes    Primary language: English      needed? No    Call taken on December 6, 2023 at 10:05 AM by Ana Serna

## 2023-12-13 ENCOUNTER — TELEPHONE (OUTPATIENT)
Dept: FAMILY MEDICINE | Facility: CLINIC | Age: 63
End: 2023-12-13

## 2023-12-13 NOTE — TELEPHONE ENCOUNTER
[facility] phone call message- general phone call:    Reason for call: the pt called to ask for her Dr to call her to talk about her condition     Return call needed: Yes    OK to leave a message on voice mail? Yes    Primary language: English      needed? No    Call taken on December 13, 2023 at 8:15 AM by Rick Bronson

## 2023-12-13 NOTE — TELEPHONE ENCOUNTER
Call returned to patient. She states the doctors Dr. Patel and Kati will know why she is calling. She states people in her apartment building have been experiencing bug bites like her and have been getting Ivermectin Rx's to help. She desires this at this time and would like to know the opinion on the doctors. Discussed she may need to come in. Patient did not desire to discuss further what bites she was referring to. Routed to PCP. QING Leone

## 2023-12-15 ENCOUNTER — TELEPHONE (OUTPATIENT)
Dept: FAMILY MEDICINE | Facility: CLINIC | Age: 63
End: 2023-12-15
Payer: COMMERCIAL

## 2023-12-15 DIAGNOSIS — J42 CHRONIC BRONCHITIS, UNSPECIFIED CHRONIC BRONCHITIS TYPE (H): ICD-10-CM

## 2023-12-15 NOTE — TELEPHONE ENCOUNTER
[facility] phone call message- patient requesting a refill:    Full Medication Name: inhaler     Dose:     Pharmacy confirmed as   [id number] - [place], [place] - [address] [facility]  [address] [place] [address]  Phone: [phone] Fax: [phone]  : Yes    Additional Comments: the Pt is out and needs a refill      OK to leave a message on voice mail? Yes    Primary language: English      needed? No    Call taken on December 15, 2023 at 12:22 PM by Rick Bronson

## 2023-12-19 DIAGNOSIS — J42 CHRONIC BRONCHITIS, UNSPECIFIED CHRONIC BRONCHITIS TYPE (H): ICD-10-CM

## 2023-12-19 RX ORDER — ALBUTEROL SULFATE 90 UG/1
2 AEROSOL, METERED RESPIRATORY (INHALATION) EVERY 4 HOURS PRN
Qty: 18 G | Refills: 3 | Status: SHIPPED | OUTPATIENT
Start: 2023-12-19 | End: 2023-12-21

## 2023-12-20 RX ORDER — FLUTICASONE PROPIONATE AND SALMETEROL XINAFOATE 230; 21 UG/1; UG/1
2 AEROSOL, METERED RESPIRATORY (INHALATION) 2 TIMES DAILY
Qty: 12 G | Refills: 11 | Status: SHIPPED | OUTPATIENT
Start: 2023-12-20

## 2023-12-21 ENCOUNTER — OFFICE VISIT (OUTPATIENT)
Dept: FAMILY MEDICINE | Facility: CLINIC | Age: 63
End: 2023-12-21
Payer: COMMERCIAL

## 2023-12-21 VITALS
HEART RATE: 78 BPM | SYSTOLIC BLOOD PRESSURE: 137 MMHG | BODY MASS INDEX: 21.77 KG/M2 | TEMPERATURE: 98.5 F | WEIGHT: 107.8 LBS | RESPIRATION RATE: 18 BRPM | DIASTOLIC BLOOD PRESSURE: 86 MMHG | OXYGEN SATURATION: 97 %

## 2023-12-21 DIAGNOSIS — J06.9 UPPER RESPIRATORY TRACT INFECTION, UNSPECIFIED TYPE: ICD-10-CM

## 2023-12-21 DIAGNOSIS — J44.1 COPD EXACERBATION (H): Primary | ICD-10-CM

## 2023-12-21 DIAGNOSIS — L98.8 MORGELLONS DISEASE: ICD-10-CM

## 2023-12-21 DIAGNOSIS — J42 CHRONIC BRONCHITIS, UNSPECIFIED CHRONIC BRONCHITIS TYPE (H): ICD-10-CM

## 2023-12-21 DIAGNOSIS — L85.3 DRY SKIN: ICD-10-CM

## 2023-12-21 PROCEDURE — 99214 OFFICE O/P EST MOD 30 MIN: CPT | Mod: GC

## 2023-12-21 RX ORDER — AZITHROMYCIN 250 MG/1
TABLET, FILM COATED ORAL
Qty: 6 TABLET | Refills: 0 | Status: SHIPPED | OUTPATIENT
Start: 2023-12-21 | End: 2023-12-26

## 2023-12-21 RX ORDER — AMMONIUM LACTATE 12 G/100G
CREAM TOPICAL 2 TIMES DAILY
Qty: 385 G | Refills: 11 | Status: SHIPPED | OUTPATIENT
Start: 2023-12-21

## 2023-12-21 RX ORDER — PERMETHRIN 50 MG/G
CREAM TOPICAL
Qty: 60 G | Refills: 1 | Status: CANCELLED | OUTPATIENT
Start: 2023-12-21

## 2023-12-21 RX ORDER — BENZONATATE 100 MG/1
CAPSULE ORAL
Qty: 30 CAPSULE | Refills: 0 | Status: SHIPPED | OUTPATIENT
Start: 2023-12-21 | End: 2024-02-02

## 2023-12-21 RX ORDER — PREDNISONE 20 MG/1
40 TABLET ORAL DAILY
Qty: 10 TABLET | Refills: 0 | Status: SHIPPED | OUTPATIENT
Start: 2023-12-21 | End: 2023-12-26

## 2023-12-21 RX ORDER — ALBUTEROL SULFATE 90 UG/1
2 AEROSOL, METERED RESPIRATORY (INHALATION) EVERY 4 HOURS PRN
Qty: 18 G | Refills: 3 | Status: SHIPPED | OUTPATIENT
Start: 2023-12-21 | End: 2024-02-29

## 2023-12-21 NOTE — PROGRESS NOTES
Preceptor Attestation:    I discussed the patient with the resident and evaluated the patient in person. I have verified the content of the note, which accurately reflects my assessment of the patient and the plan of care.   Supervising Physician:  Cordell Zee MD.

## 2023-12-21 NOTE — PROGRESS NOTES
Assessment & Plan     COPD exacerbation (H)  Chronic bronchitis, unspecified chronic bronchitis type (H)  Symptoms consistent with COPD exacerbation. Wheezes present throughout on exam.   - predniSONE (DELTASONE) 20 MG tablet  Dispense: 10 tablet; Refill: 0  - azithromycin (ZITHROMAX) 250 MG tablet  Dispense: 6 tablet; Refill: 0  - albuterol (VENTOLIN HFA) 108 (90 Base) MCG/ACT inhaler  Dispense: 18 g; Refill: 3  - benzonatate (TESSALON) 100 MG capsule  Dispense: 30 capsule; Refill: 0    Morgellons disease  Dry skin  No evidence of rash or bugs in hair or elsewhere on exam. Perhaps some dry skin noted.   - ammonium lactate (LAC-HYDRIN) 12 % external cream  Dispense: 385 g; Refill: 11  - Message sent to referral  requesting assistance with scheduling with Dr Abreu, Psychiatry here as patient is agreeable to seeing him but has been hard to get a hold of her.   - Follow up PCP as scheduled     No follow-ups on file.    Eleonora Espinoza MD  [facility]    Suzy Gonzalez is a 63 year old, presenting for the following health issues:  Derm Problem (Rash on forehead/nose area.) and Referral (Psychiatrist.  Son had past away.  )        11/1/2023    10:09 AM   Additional Questions   Roomed by brandi   Accompanied by self       HPI     Patient presents reporting bug infestation in her hair and skin. Reports she has been seen several times for this by her PCP. She believes bugs have been coming out of her hair and skin. She reports bleaching her apartment to try to get rid of them. She has tried permetherin several times.  She also notes increased cough, sputum, and sob. She feels she has been wheezing as well. This has been ongoing for a few days. She is out of her albuterol inhaler as well.   Denies fever, cp, abdominal pain, urinary symptoms.       Review of Systems   Constitutional, HEENT, cardiovascular, pulmonary, gi and gu systems are negative, except as otherwise noted.      Objective     /86   Pulse 78   Temp 98.5  F (36.9  C)   Resp 18   Wt 48.9 kg (107 lb 12.8 oz)   SpO2 97%   BMI 21.77 kg/m    Body mass index is 21.77 kg/m .  Physical Exam   GENERAL: healthy, alert and no distress  RESP: expiratory wheezes bilateral and inspiratory wheezes bilateral  CV: regular rate and rhythm, normal S1 S2, no S3 or S4, no murmur, click or rub, no peripheral edema and peripheral pulses strong  MS: no gross musculoskeletal defects noted, no edema  PSYCH: concentration poor, inattentive, tangential, and appearance disheveled    I precepted today with Dr Zee.    Yomaira Espinoza MD PGY-3

## 2023-12-27 DIAGNOSIS — J06.9 UPPER RESPIRATORY TRACT INFECTION, UNSPECIFIED TYPE: ICD-10-CM

## 2023-12-27 RX ORDER — GUAIFENESIN 600 MG/1
2 TABLET, EXTENDED RELEASE ORAL 2 TIMES DAILY
Qty: 40 TABLET | Refills: 0 | Status: SHIPPED | OUTPATIENT
Start: 2023-12-27 | End: 2024-03-01

## 2024-01-10 ENCOUNTER — TELEPHONE (OUTPATIENT)
Dept: FAMILY MEDICINE | Facility: CLINIC | Age: 64
End: 2024-01-10

## 2024-01-10 NOTE — TELEPHONE ENCOUNTER
01/10/24    Spoke to patient regarding excessive no-shows. Reviewed No-show policy with patient and the importance of coming to appointment. Pt voiced understanding and has agreed to call or use their MyChart and cancel their appointments. Pt is aware if they continue to no-show their appointments, they may only be eligible for same day appointments, if available.     Pt was apologetic for missing appts. Stated that she has had a lot going on in her life recently. Confirmed phone number on file and sent text message to enroll in text reminders.     Erika Santana on 1/10/2024 at 3:54 PM

## 2024-01-11 DIAGNOSIS — M54.2 CERVICALGIA: ICD-10-CM

## 2024-01-11 DIAGNOSIS — M54.12 CERVICAL RADICULOPATHY: ICD-10-CM

## 2024-01-11 DIAGNOSIS — G40.909 SEIZURE DISORDER (H): ICD-10-CM

## 2024-01-12 RX ORDER — GABAPENTIN 300 MG/1
CAPSULE ORAL
Qty: 180 CAPSULE | Refills: 0 | Status: SHIPPED | OUTPATIENT
Start: 2024-01-12 | End: 2024-01-22

## 2024-01-12 RX ORDER — DIVALPROEX SODIUM 250 MG/1
TABLET, EXTENDED RELEASE ORAL
Qty: 180 TABLET | Refills: 0 | Status: SHIPPED | OUTPATIENT
Start: 2024-01-12

## 2024-01-12 NOTE — TELEPHONE ENCOUNTER
Medication requested: Gabapentin Oral Capsule 300 MG   Last office visit: 12/21/23  Future [facility] appointments: 1/23/24  Medication last refilled: 9/19/23  Last qualifying labs: none    Routing refill request to provider for review/approval because:  Drug not on the [facility] refill protocol     Medication requested: Divalproex Sodium ER Oral Tablet Extended Release 24 Hour 250 MG   Medication last refilled: 10/6/23  Last qualifying labs: none    Routing refill request to provider for review/approval because:    Anti-Seizure Meds Protocol  [id number]/11/2024 01:33 PM   Protocol Details Review Authorizing provider's last note.    Normal CBC on file in past 26 months    Depakote level within therapeutic range in past 26 months        Lissy RN, BSN

## 2024-01-18 DIAGNOSIS — M54.30 SCIATICA, UNSPECIFIED LATERALITY: ICD-10-CM

## 2024-01-18 NOTE — TELEPHONE ENCOUNTER
Medication requested: tiZANidine HCl Oral Tablet 4 MG   Last office visit: 12/21/23  [facility] appointments: 1/23/24  Medication last refilled: 12/6/23  Last qualifying labs: none    Routing refill request to provider for review/approval because:  No attached protocol for RN refill  QING Yuan, BSN

## 2024-01-21 DIAGNOSIS — M54.12 CERVICAL RADICULOPATHY: ICD-10-CM

## 2024-01-21 DIAGNOSIS — M54.2 CERVICALGIA: ICD-10-CM

## 2024-01-22 RX ORDER — GABAPENTIN 300 MG/1
CAPSULE ORAL
Qty: 180 CAPSULE | Refills: 0 | Status: SHIPPED | OUTPATIENT
Start: 2024-01-22

## 2024-01-23 ENCOUNTER — TELEPHONE (OUTPATIENT)
Dept: FAMILY MEDICINE | Facility: CLINIC | Age: 64
End: 2024-01-23

## 2024-01-23 NOTE — TELEPHONE ENCOUNTER
[facility] phone call message- general phone call:    Reason for call:     Patient left a voice mail message needing Dr Parikh to call patient.       Return call needed: Yes    OK to leave a message on voice mail? Yes    Primary language: English      needed? No    Call taken on January 23, 2024 at 3:53 PM by Chinyere Arias

## 2024-02-02 ENCOUNTER — VIRTUAL VISIT (OUTPATIENT)
Dept: FAMILY MEDICINE | Facility: CLINIC | Age: 64
End: 2024-02-02
Payer: COMMERCIAL

## 2024-02-02 DIAGNOSIS — M54.30 SCIATICA, UNSPECIFIED LATERALITY: ICD-10-CM

## 2024-02-02 DIAGNOSIS — L98.8 MORGELLONS DISEASE: ICD-10-CM

## 2024-02-02 DIAGNOSIS — Z09 HOSPITAL DISCHARGE FOLLOW-UP: Primary | ICD-10-CM

## 2024-02-02 DIAGNOSIS — J06.9 UPPER RESPIRATORY TRACT INFECTION, UNSPECIFIED TYPE: ICD-10-CM

## 2024-02-02 DIAGNOSIS — M25.512 ACUTE PAIN OF LEFT SHOULDER DUE TO TRAUMA: ICD-10-CM

## 2024-02-02 DIAGNOSIS — G89.11 ACUTE PAIN OF LEFT SHOULDER DUE TO TRAUMA: ICD-10-CM

## 2024-02-02 PROCEDURE — 99214 OFFICE O/P EST MOD 30 MIN: CPT | Mod: 93 | Performed by: FAMILY MEDICINE

## 2024-02-02 RX ORDER — PERMETHRIN 50 MG/G
CREAM TOPICAL
Qty: 60 G | Refills: 1 | Status: SHIPPED | OUTPATIENT
Start: 2024-02-02

## 2024-02-02 RX ORDER — BENZONATATE 100 MG/1
CAPSULE ORAL
Qty: 30 CAPSULE | Refills: 3 | Status: SHIPPED | OUTPATIENT
Start: 2024-02-02

## 2024-02-02 RX ORDER — NAPROXEN SODIUM 275 MG/1
275 TABLET ORAL 2 TIMES DAILY WITH MEALS
Qty: 30 TABLET | Refills: 0 | Status: SHIPPED | OUTPATIENT
Start: 2024-02-02 | End: 2024-02-17

## 2024-02-02 NOTE — PROGRESS NOTES
VIRTUAL VISIT  This visit was a phone visit.  Visit start time: 11:54 AM  Visit end time: 12:16 PM  Duration: 22 min  Patient location: Home  Provider location: [facility]     Assessment & Plan   Hospital follow-up, admitted for one night for ROSALBA (Cr 3.17) in setting of resolved gastroenteritis symptoms.  Cr 1.08 on discharge.  Instructed to hold lisinopril, but has been taking.  -- Ideally, we would collect a BMP, blood pressure, etc today, but unable to due to virtual visit.  She had difficulty with transportation to the clinic today.  She reports resolution of symptoms though, and feels hydrated at baseline.  She will come in for a BMP later.  Once that is reviewed -- if Cr remains improved -- then may use naproxen PRN for back pain related to currently increased activity in preparation for moving apartments next weekend.  Use topical diclofenac until BMP collected.    Other: Refilled benzonatate, per request, in setting of COPD.    Follow-up with PCP as scheduled in ~2 weeks.  Future Appointments   Date Time Provider Department Center   2/14/2024 12:30 PM Mirian Patel MD [facility]     uSzy Scott is a 63 year old female with a history including alcohol dependence in remission, COPD, HTN, and CKD who wants to discuss hospital follow-up.    She was hospitalized overnight at [facility] from 1/23/24-1/24/24 for ROSALBA.  She presented with abdominal pain, nausea, vomiting, and diarrhea.  Her Cr on admission was 3.17.  She was given IV fluids, and Cr the next day was 1.08.  She was discharged with instructions to hold lisinopril, but continue amlodipine, until follow-up.    Other notable findings include anemia at baseline (Hgb 10.4), mildly elevated AST (56), lumbar CT showing degenerative disease, CT abdomen with non-obstructed left inguinal hernia, and UDS positive for methamphetamine.    Today, her abdomen is feeling fine; vomiting and diarrhea have resolved.  She  attributes her illness to some sort of stomach flu.  She has been taking all of her medications, including the lisinopril.  She feels like she is back to her baseline hydration.  Her greatest concern is back pain, exacerbated by preparing to move.  She is moving next weekend, so she's been mopping, cleaning, and packing.  She is also stressed because Manolo is in the hospital for liver failure.    Social: She reports that she has quit smoking. She has never used smokeless tobacco. She reports that she does not currently use alcohol. She reports that she does not use drugs.    Objective   Vitals: There were no vitals taken for this visit.  Psychiatric: Speech with normal rate and volume.  Thoughts are logical and organized.  No evidence by virtual conversation for internal stimuli.    Hospital data reviewed              EXAM: CT ABD PELVIS WO IV CONT   LOCATION: [facility]   DATE: 1/23/2024   INDICATION: Abdominal pain, diffuse. History hernia, additionally with concern for infection. No contrast given ROSALBA.   COMPARISON: 11/09/2019, 07/15/2018   IMPRESSION:   1.  Left inguinal canal hernia containing nonobstructed small bowel and a minimal amount of fat.   2.  No bowel dilatation or overt inflammatory change. Appendix unremarkable.   3.  Moderate vascular calcification.     EXAM: CT LUMBAR 2D RECONSTRUCTION   LOCATION: [facility]   DATE: 1/23/2024   INDICATION: Low back pain, acute on chronic.   COMPARISON: MRI lumbar spine from 10/31/2021.   IMPRESSION:   1. No acute lumbar spine fracture.   2.  Moderate-to-marked lumbar spondylosis with marked degenerative canal and right foraminal narrowing at L4-L5.

## 2024-02-06 ENCOUNTER — TELEPHONE (OUTPATIENT)
Dept: FAMILY MEDICINE | Facility: CLINIC | Age: 64
End: 2024-02-06

## 2024-02-29 ENCOUNTER — OFFICE VISIT (OUTPATIENT)
Dept: FAMILY MEDICINE | Facility: CLINIC | Age: 64
End: 2024-02-29
Payer: COMMERCIAL

## 2024-02-29 VITALS
WEIGHT: 106.8 LBS | OXYGEN SATURATION: 99 % | HEART RATE: 73 BPM | RESPIRATION RATE: 20 BRPM | SYSTOLIC BLOOD PRESSURE: 132 MMHG | TEMPERATURE: 96.8 F | BODY MASS INDEX: 21.53 KG/M2 | HEIGHT: 59 IN | DIASTOLIC BLOOD PRESSURE: 82 MMHG

## 2024-02-29 DIAGNOSIS — R23.8 SCALP IRRITATION: ICD-10-CM

## 2024-02-29 DIAGNOSIS — J42 CHRONIC BRONCHITIS, UNSPECIFIED CHRONIC BRONCHITIS TYPE (H): Primary | ICD-10-CM

## 2024-02-29 DIAGNOSIS — F43.21 GRIEF: ICD-10-CM

## 2024-02-29 DIAGNOSIS — L98.8 MORGELLONS DISEASE: ICD-10-CM

## 2024-02-29 PROCEDURE — 99214 OFFICE O/P EST MOD 30 MIN: CPT | Mod: GC

## 2024-02-29 RX ORDER — ALBUTEROL SULFATE 90 UG/1
2 AEROSOL, METERED RESPIRATORY (INHALATION) EVERY 4 HOURS PRN
Qty: 18 G | Refills: 3 | Status: SHIPPED | OUTPATIENT
Start: 2024-02-29 | End: 2024-02-29

## 2024-02-29 RX ORDER — FLUOCINONIDE TOPICAL SOLUTION USP, 0.05% 0.5 MG/ML
SOLUTION TOPICAL 2 TIMES DAILY
Qty: 60 ML | Refills: 0 | Status: SHIPPED | OUTPATIENT
Start: 2024-02-29 | End: 2024-02-29

## 2024-02-29 RX ORDER — FLUOCINONIDE TOPICAL SOLUTION USP, 0.05% 0.5 MG/ML
SOLUTION TOPICAL 2 TIMES DAILY
Qty: 60 ML | Refills: 0 | Status: SHIPPED | OUTPATIENT
Start: 2024-02-29 | End: 2024-03-11

## 2024-02-29 RX ORDER — ALBUTEROL SULFATE 90 UG/1
2 AEROSOL, METERED RESPIRATORY (INHALATION) EVERY 4 HOURS PRN
Qty: 18 G | Refills: 3 | Status: SHIPPED | OUTPATIENT
Start: 2024-02-29 | End: 2024-03-11

## 2024-02-29 RX ORDER — TIOTROPIUM BROMIDE 18 UG/1
18 CAPSULE ORAL; RESPIRATORY (INHALATION) DAILY
Qty: 30 CAPSULE | Refills: 3 | Status: SHIPPED | OUTPATIENT
Start: 2024-02-29 | End: 2024-03-11

## 2024-02-29 RX ORDER — HYDROXYZINE HYDROCHLORIDE 50 MG/1
25 TABLET, FILM COATED ORAL 3 TIMES DAILY PRN
Qty: 30 TABLET | Refills: 3 | Status: SHIPPED | OUTPATIENT
Start: 2024-02-29 | End: 2024-02-29

## 2024-02-29 RX ORDER — TIOTROPIUM BROMIDE 18 UG/1
18 CAPSULE ORAL; RESPIRATORY (INHALATION) DAILY
Qty: 30 CAPSULE | Refills: 3 | Status: SHIPPED | OUTPATIENT
Start: 2024-02-29 | End: 2024-02-29

## 2024-02-29 RX ORDER — HYDROXYZINE HYDROCHLORIDE 50 MG/1
25 TABLET, FILM COATED ORAL 3 TIMES DAILY PRN
Qty: 30 TABLET | Refills: 3 | Status: SHIPPED | OUTPATIENT
Start: 2024-02-29 | End: 2024-03-11

## 2024-02-29 ASSESSMENT — PATIENT HEALTH QUESTIONNAIRE - PHQ9: SUM OF ALL RESPONSES TO PHQ QUESTIONS 1-9: 9

## 2024-02-29 NOTE — PROGRESS NOTES
"Preceptor attestation:  Vital signs reviewed: /82 (BP Location: Left arm, Patient Position: Sitting, Cuff Size: Adult Regular)   Pulse 73   Temp 96.8  F (36  C) (Tympanic)   Resp 20   Ht 1.499 m (4' 11.02\")   Wt 48.4 kg (106 lb 12.8 oz)   SpO2 99%   BMI 21.56 kg/m      Patient seen, evaluated, and discussed with the resident.  I verified the content of the note, which accurately reflects my assessment of the patient and the plan of care.    Supervising physician: Emeli Parikh MD  [facility]  "

## 2024-02-29 NOTE — PROGRESS NOTES
Assessment & Plan     Morgellons disease  Scalp irritation  Still having delusions of parasitosis with scalp irritation due to excoriations, do not visualize any insects or nits that would be consistent with lice infection.  No rash or plaques consistent with psoriasis or eczema.  Counseled patient to shampoo her hair and then apply Lidex twice daily to decrease inflammation of the scalp.  Will have her follow-up in 2 to 4 weeks alongside below symptoms for reevaluation.  - Adult Mental Health  Referral; Future  - hydrOXYzine HCl (ATARAX) 50 MG tablet; Take 0.5 tablets (25 mg) by mouth 3 times daily as needed for itching  - fluocinonide (LIDEX) 0.05 % external solution; Apply topically 2 times daily    Chronic bronchitis, unspecified chronic bronchitis type (H)  2.5 weeks of worsening cough and sputum production, has not had Spiriva for months and has been out of albuterol as well.  Saturating well on room air, afebrile, exam findings more consistent with worsening chronic bronchitis than pneumonia.  Do not suspect that she needs treatment for an exacerbation at this time rather needs to resume medication regimen, and will have her follow-up closely should her symptoms worsen.  - tiotropium (SPIRIVA) 18 MCG inhaled capsule; Inhale 1 capsule (18 mcg) into the lungs daily  - albuterol (VENTOLIN HFA) 108 (90 Base) MCG/ACT inhaler; Inhale 2 puffs into the lungs every 4 hours as needed for shortness of breath, wheezing or cough    Grief  Patient would like another referral for long-term psychiatry as she is still having difficulty with the passing of her son last year.  Referral placed.  - Adult Mental [facility]  Referral; Future      Return in about 1 month (around 3/29/2024) for Follow up with primary care provider for symptom recheck.    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 Coughing out  "greens mucus x 2 weeks)        2024     1:25 PM   Additional Questions   Roomed by GH   Accompanied by self         2024    Information    services provided? No     HPI   Patient is a 63-year-old female with a complex mental health and substance use history, following up for worsening cough over the past 2.5 weeks.  Patient is a poor historian and is very tangential during interview.    Worsening cough for the past 2.5 weeks.  Denies smoking history.  No fevers or chills.  Feels that there is increasing amount of phlegm and that is occasionally more green.  No orthopnea.    Additionally she states that there are bugs crawling all over her and her hair.  She recently moved apartments and hope that these bugs would be present, but feels that they followed her to her new apartment.  She is digging at her scalp and pointing at various areas of her scalp trying to show these bugs but there are none present.  She is tangential and pressured in her speech during this time.    Additionally she states her son  in 2023 and she was told should be able to follow-up with psychiatrist but no one has reached out to her.  She is requesting that this referral be resent.    Review of Systems  Constitutional, HEENT, cardiovascular, pulmonary, gi and gu systems are negative, except as otherwise noted.      Objective    /82 (BP Location: Left arm, Patient Position: Sitting, Cuff Size: Adult Regular)   Pulse 73   Temp 96.8  F (36  C) (Tympanic)   Resp 20   Ht 1.499 m (4' 11.02\")   Wt 48.4 kg (106 lb 12.8 oz)   SpO2 99%   BMI 21.56 kg/m    Body mass index is 21.56 kg/m .  Physical Exam   GENERAL: alert and no distress.  Appears disheveled.   RESP: Expiratory wheezes throughout with coarse lung sounds in the middle lung fields bilaterally.  CV: regular rate and rhythm, normal S1 S2, no S3 or S4, no murmur, click or rub, no peripheral edema  ABDOMEN: soft, nontender, no hepatosplenomegaly, " no masses and bowel sounds normal  SKIN: Scattered excoriations throughout body, as well as on the scalp that appear mildly irritated.  No signs of nits or other insects.  MENTAL STATUS: Tangential and pressured speech with poor insight into medical conditions.        Signed Electronically by: Jules Francis DO

## 2024-02-29 NOTE — PATIENT INSTRUCTIONS
Thank you for visiting our clinic today.  Please feel free to call the clinic with any questions or concerns at [phone], or send a [employer] message and we will get back to you as soon as we can.'

## 2024-03-01 ENCOUNTER — TELEPHONE (OUTPATIENT)
Dept: FAMILY MEDICINE | Facility: CLINIC | Age: 64
End: 2024-03-01
Payer: COMMERCIAL

## 2024-03-01 DIAGNOSIS — M54.30 SCIATICA, UNSPECIFIED LATERALITY: ICD-10-CM

## 2024-03-01 DIAGNOSIS — J06.9 UPPER RESPIRATORY TRACT INFECTION, UNSPECIFIED TYPE: ICD-10-CM

## 2024-03-01 NOTE — TELEPHONE ENCOUNTER
[facility] phone call message- patient requesting a refill:    Full Medication Name: ZANAFLEX mucus relief     Dose: 4 mg    Pharmacy confirmed as   [id number] - [place], [place] - [id number] [place] [address]  Phone: [phone] Fax: [phone]      : Yes    Additional Comments:      OK to leave a message on voice mail? Yes    Primary language: English      needed? No    Call taken on March 1, 2024 at 8:42 AM by Rick Bronson

## 2024-03-04 RX ORDER — GUAIFENESIN 600 MG/1
2 TABLET, EXTENDED RELEASE ORAL 2 TIMES DAILY
Qty: 40 TABLET | Refills: 0 | Status: SHIPPED | OUTPATIENT
Start: 2024-03-04 | End: 2024-04-09

## 2024-03-07 ENCOUNTER — TELEPHONE (OUTPATIENT)
Dept: FAMILY MEDICINE | Facility: CLINIC | Age: 64
End: 2024-03-07
Payer: COMMERCIAL

## 2024-03-07 NOTE — TELEPHONE ENCOUNTER
[facility] phone call message- patient requesting a refill:    Full Medication Name: albuterol (VENTOLIN HFA) 108 (90 Base) MCG/ACT inhaler - Inhale 2 puffs into the lungs every 4 hours as needed for shortness of breath, wheezing or cough - Inhalation     fluocinonide (LIDEX) 0.05 % external solution - Apply topically 2 times daily - Topical     hydrOXYzine HCl (ATARAX) 50 MG tablet - Take 0.5 tablets (25 mg) by mouth 3 times daily as needed for itching - Oral     tiotropium (SPIRIVA) 18 MCG inhaled capsule - Inhale 1 capsule (18 mcg) into the lungs daily - Inhalation     tiZANidine (ZANAFLEX) 4 MG tablet - Take 1 tablet (4 mg) by mouth 3 times daily as needed for muscle spasms - Oral     guaiFENesin (FT MUCUS RELIEF 12HR) 600 MG 12 hr tablet - Take 2 tablets (1,200 mg) by mouth 2 times daily - Oral     Pharmacy confirmed as   [id number] - [place], [place] - [address] [place] [address]  Phone: [phone] Fax: [phone]  : Yes    Additional Comments: Pt is claiming that the pharmacy is having some difficulties with prescriptions.  They asked her to call us and have these resent.     OK to leave a message on voice mail? Yes    Primary language: English      needed? No    Call taken on March 7, 2024 at 8:29 AM by Juliet Mcintosh

## 2024-03-08 NOTE — TELEPHONE ENCOUNTER
Called [facility] pharmacy regarding aforementioned Rx, spoke with pharmacist Shadia Murrell. He states that the patient is restricted to the [facility] and only will provide prescriptions written by Dr. Mirian Patel. 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    Routed chart to Dr. Patel for further clarification.

## 2024-03-11 ENCOUNTER — OFFICE VISIT (OUTPATIENT)
Dept: FAMILY MEDICINE | Facility: CLINIC | Age: 64
End: 2024-03-11
Payer: COMMERCIAL

## 2024-03-11 VITALS
WEIGHT: 101 LBS | BODY MASS INDEX: 20.36 KG/M2 | DIASTOLIC BLOOD PRESSURE: 72 MMHG | OXYGEN SATURATION: 95 % | RESPIRATION RATE: 24 BRPM | HEART RATE: 77 BPM | SYSTOLIC BLOOD PRESSURE: 113 MMHG | TEMPERATURE: 98.2 F | HEIGHT: 59 IN

## 2024-03-11 DIAGNOSIS — L98.8 MORGELLONS DISEASE: Primary | ICD-10-CM

## 2024-03-11 DIAGNOSIS — R23.8 SCALP IRRITATION: ICD-10-CM

## 2024-03-11 DIAGNOSIS — J42 CHRONIC BRONCHITIS, UNSPECIFIED CHRONIC BRONCHITIS TYPE (H): ICD-10-CM

## 2024-03-11 PROCEDURE — 99214 OFFICE O/P EST MOD 30 MIN: CPT

## 2024-03-11 RX ORDER — ALBUTEROL SULFATE 90 UG/1
2 AEROSOL, METERED RESPIRATORY (INHALATION) EVERY 4 HOURS PRN
Qty: 18 G | Refills: 3 | Status: SHIPPED | OUTPATIENT
Start: 2024-03-11

## 2024-03-11 RX ORDER — KETOTIFEN FUMARATE 0.35 MG/ML
1 SOLUTION/ DROPS OPHTHALMIC 2 TIMES DAILY
Qty: 10 ML | Refills: 0 | Status: SHIPPED | OUTPATIENT
Start: 2024-03-11

## 2024-03-11 RX ORDER — TIOTROPIUM BROMIDE 18 UG/1
18 CAPSULE ORAL; RESPIRATORY (INHALATION) DAILY
Qty: 30 CAPSULE | Refills: 3 | Status: SHIPPED | OUTPATIENT
Start: 2024-03-11

## 2024-03-11 RX ORDER — HYDROXYZINE HYDROCHLORIDE 50 MG/1
25 TABLET, FILM COATED ORAL 3 TIMES DAILY PRN
Qty: 30 TABLET | Refills: 3 | Status: SHIPPED | OUTPATIENT
Start: 2024-03-11

## 2024-03-11 RX ORDER — FLUOCINONIDE TOPICAL SOLUTION USP, 0.05% 0.5 MG/ML
SOLUTION TOPICAL 2 TIMES DAILY
Qty: 60 ML | Refills: 0 | Status: SHIPPED | OUTPATIENT
Start: 2024-03-11

## 2024-03-11 NOTE — PROGRESS NOTES
Preceptor Attestation:    I discussed the patient with the resident and evaluated the patient in person. I have verified the content of the note, which accurately reflects my assessment of the patient and the plan of care.   Supervising Physician:  Chris Yang MD.

## 2024-03-11 NOTE — PROGRESS NOTES
Assessment & Plan     Morgellons disease  Scalp irritation  Last seen by Dr. Francis for this issue on 2/29/2024.  At that time, they made plan for shampooing with Lidex to help with irritation of her scalp.  Unfortunately, Lidex along with other prescriptions were not filled due to provider restriction.  Therefore patient following up today to get these filled by prescribing physician Dr. Patel.  Additionally adding on ketotifen fumarate for itching of the eyes and refill of hydroxyzine.  Again, skin exam essentially unremarkable.  Does have small scabs on lower extremities from apparent picking lesions.  - hydrOXYzine HCl (ATARAX) 50 MG tablet; Take 0.5 tablets (25 mg) by mouth 3 times daily as needed for itching  - ketotifen fumarate 0.035% 0.035 % SOLN ophthalmic solution; Place 1 drop into both eyes 2 times daily  - fluocinonide (LIDEX) 0.05 % external solution; Apply topically 2 times daily  - Dr. Francis placed  referral at 2/29/2024 visit; patient affirms continued interest and agreement to this referral    Chronic bronchitis, unspecified chronic bronchitis type (H)  As above, was unable to refill prescriptions due to restricted provider.  Therefore provided prescriptions per Dr. Patel today.  - albuterol (PROAIR HFA) 108 (90 Base) MCG/ACT inhaler; Inhale 2 puffs into the lungs every 4 hours as needed for shortness of breath, wheezing or cough  - tiotropium (SPIRIVA) 18 MCG inhaled capsule; Inhale 1 capsule (18 mcg) into the lungs daily    Return if symptoms worsen or fail to improve.    Suzy Gonzalez is a 63 year old, presenting for the following health issues:  Derm Problem (Discuss about bites around legs and body. Patient feel some bug living in the hair) and Recheck Medication (Need med refill on seizure med)        3/11/2024     3:23 PM   Additional Questions   Roomed by msy   Accompanied by self         3/11/2024    Information    services provided? No     HPI   Patient  "presenting today for follow-up of \"bugs on her skin and body.\"  She was last seen for this issue by Dr. Francis on 2/29/2024.  At that time they made a plan with hydroxyzine and Lidex cream as well as refilled her inhalers.  However these medications were not actually filled due to patient being restricted to Dr. Patel.  Patient therefore coming back in today to see if she is able to get her medications now.  Fortunately, Dr. Patel is able to authorize his medications.    Review of Systems  CONSTITUTIONAL: NEGATIVE for fever, chills, change in weight  INTEGUMENTARY/SKIN: POSITIVE for generalized pruritus  ENT/MOUTH: NEGATIVE for ear, mouth and throat problems  PSYCHIATRIC: POSITIVE for Hx anxiety and Hx depression      Objective    /72   Pulse 77   Temp 98.2  F (36.8  C) (Oral)   Resp 24   Ht 1.499 m (4' 11\")   Wt 45.8 kg (101 lb)   SpO2 95%   BMI 20.40 kg/m    Body mass index is 20.4 kg/m .  Physical Exam   GENERAL: alert and no distress  NECK: no asymmetry, masses, or scars  RESP: nonlabored breathing on room air, no cough, no use of accessory muscles  CV: regular rate and rhythm, no murmur  MS: no gross musculoskeletal defects noted, no edema  SKIN: two small scabs on ankles with evidence of previous lesion picking but no current signs of infection, diffuse scattered excoriations, no signs of parasitic skin invasion  NEURO: normal strength and tone, speech is tangential and pressured  PSYCH: poor insight, requires frequent redirection but overall pleasant mood/affect        Signed Electronically by: VIRGIE REYNA MD    "

## 2024-03-25 ENCOUNTER — OFFICE VISIT (OUTPATIENT)
Dept: FAMILY MEDICINE | Facility: CLINIC | Age: 64
End: 2024-03-25
Payer: COMMERCIAL

## 2024-03-25 ENCOUNTER — TELEPHONE (OUTPATIENT)
Dept: FAMILY MEDICINE | Facility: CLINIC | Age: 64
End: 2024-03-25
Payer: COMMERCIAL

## 2024-03-25 VITALS
OXYGEN SATURATION: 96 % | HEIGHT: 59 IN | SYSTOLIC BLOOD PRESSURE: 115 MMHG | DIASTOLIC BLOOD PRESSURE: 73 MMHG | TEMPERATURE: 98.3 F | BODY MASS INDEX: 22.22 KG/M2 | HEART RATE: 102 BPM | WEIGHT: 110.2 LBS | RESPIRATION RATE: 16 BRPM

## 2024-03-25 DIAGNOSIS — M25.512 ACUTE PAIN OF LEFT SHOULDER DUE TO TRAUMA: ICD-10-CM

## 2024-03-25 DIAGNOSIS — Z12.12 ENCOUNTER FOR COLORECTAL CANCER SCREENING: ICD-10-CM

## 2024-03-25 DIAGNOSIS — Z12.11 ENCOUNTER FOR COLORECTAL CANCER SCREENING: ICD-10-CM

## 2024-03-25 DIAGNOSIS — G89.11 ACUTE PAIN OF LEFT SHOULDER DUE TO TRAUMA: ICD-10-CM

## 2024-03-25 DIAGNOSIS — J44.1 COPD EXACERBATION (H): Primary | ICD-10-CM

## 2024-03-25 PROCEDURE — 99214 OFFICE O/P EST MOD 30 MIN: CPT | Mod: GC

## 2024-03-25 RX ORDER — PREDNISONE 20 MG/1
40 TABLET ORAL DAILY
Qty: 10 TABLET | Refills: 0 | Status: SHIPPED | OUTPATIENT
Start: 2024-03-25 | End: 2024-04-01

## 2024-03-25 RX ORDER — DOXYCYCLINE 100 MG/1
100 CAPSULE ORAL 2 TIMES DAILY
Qty: 10 CAPSULE | Refills: 0 | Status: SHIPPED | OUTPATIENT
Start: 2024-03-25 | End: 2024-04-01

## 2024-03-25 NOTE — PATIENT INSTRUCTIONS
Take the doxycycline 2 times daily for 5 days for the COPD    Take your spiriva 2 times daily until your breathing improves.

## 2024-03-25 NOTE — TELEPHONE ENCOUNTER
Outgoing call placed to patient. She states she has had flu-like symptoms for approx 4 days. She states she has cough, congestion, fever, and vomiting. Pt denies known exposure to anyone ill but was in a large group last week that she believes may be the time she contracted something. Pt agrees to be seen in the clinic and plans to call back once she knows her availability. QING Paredes

## 2024-03-25 NOTE — PROGRESS NOTES
Assessment & Plan     COPD exacerbation (H)  Increased, green sputum production, wheezing, worsening SOB, fatigue. No fevers. Using inhalers as prescribed. Exam shows expiratory wheezes, no crackles. Temp 98.3F. Plan to treat as COPD exacerbation. Start prednisone burst, doxycycline and obtain chest xray. Instructed patient to take extra evening dose of Spiriva until breathing improves.  Patient unable to get x-rays today due to timing.  Plans to come back tomorrow to get them done.  - doxycycline hyclate (VIBRAMYCIN) 100 MG capsule  Dispense: 10 capsule; Refill: 0  - XR CHEST 2 VW  - predniSONE (DELTASONE) 20 MG tablet  Dispense: 10 tablet; Refill: 0    Acute pain of left shoulder due to trauma  Acute on chronic shoulder pain.  Reports hearing a pop when she was lifting a box o 2 days ago and has had worse pain since then.  Known hx of osteoarthritis and ORIF about 20 years ago on left humerus. Shoulder feels out of joint on exam. Has pain with external rotation and positive Hung.  Patient most likely has rotator cuff tendinopathy particularly the subscapularis given the location of the pain.  Plan to get x-rays of the shoulder today as well as refer for physical therapy.    - XR Shoulder Left 2 Views  - Physical Therapy  Referral    Encounter for colorectal cancer screening  - Fecal colorectal cancer screen FIT      No follow-ups on file.    Suzy Gonzalez is a 63 year old, presenting for the following health issues:  OTHER (CK LUNGS- hard time breathing ..... LEFT SHOULDER PAIN/)      3/25/2024    11:26 AM   Additional Questions   Roomed by FABRICIO   Accompanied by SELF         3/25/2024    Information    services provided? No     HPI       COPD Follow-Up  Overall, how are your COPD symptoms since your last clinic visit?  Slightly worse  How much fatigue or shortness of breath do you have when you are walking?  More than usual  How much shortness of breath do you have when  "you are resting?  More than usual  How often do you cough? Often  Have you noticed any change in your sputum/phlegm?  Yes- green phlegm  Have you experienced a recent fever? No  Please describe how far you can walk without stopping to rest:  The length of 3-5 rooms  How many flights of stairs are you able to walk up without stopping?  None  Have you had any Emergency Room Visits, Urgent Care Visits, or Hospital Admissions because of your COPD since your last office visit?  No    History   Smoking Status    Former   Smokeless Tobacco    Never     Concern -left shoulder pain  Onset: 2 days ago  Description: Patient has chronic left shoulder pain, however, she has had worse pain since Saturday when she was lifting a box and heard a pop.  She has a history of ORIF on her left shoulder over 20 years ago.  Known osteoarthritis.  He is wondering if she needs a surgical referral.  Progression of Symptoms:  worsening  Previous history of similar problem: Patient has chronic left shoulder pain, however, she has had worse pain since Saturday when she was lifting a box and heard a pop.  She has a history of ORIF on her left shoulder over 20 years ago.  Known osteoarthritis.  He is wondering if she needs a surgical referral.  Precipitating factors:        Worsened by: Shoulder abduction  Therapies tried and outcome: None          Objective    /73 (BP Location: Left arm, Patient Position: Sitting, Cuff Size: Adult Small)   Pulse 102   Temp 98.3  F (36.8  C) (Oral)   Resp 16   Ht 1.499 m (4' 11\")   Wt 50 kg (110 lb 3.2 oz)   SpO2 96%   BMI 22.26 kg/m    Body mass index is 22.26 kg/m .  Physical Exam   GENERAL: alert and no distress  RESP: Diffuse expiratory wheezing.  No crackles.  CV: regular rate and rhythm, normal S1 S2, no S3 or S4, no murmur, click or rub   MS: Pain with external rotation.  Empty can test positive.  Hung test positive.  Tenderness to the proximal humerus along the medial aspect.    No results " found. However, due to the size of the patient record, not all encounters were searched. Please check Results Review for a complete set of results.        Signed Electronically by: Cy Galvan MD

## 2024-03-26 NOTE — PROGRESS NOTES
Physician Attestation   I, Nitish Butler MD, saw this patient and agree with the findings and plan of care as documented in the note.      Items personally reviewed/procedural attestation: vitals.    Nitish Butler MD

## 2024-03-27 ENCOUNTER — APPOINTMENT (OUTPATIENT)
Dept: FAMILY MEDICINE | Facility: CLINIC | Age: 64
End: 2024-03-27
Payer: COMMERCIAL

## 2024-03-27 ENCOUNTER — LAB (OUTPATIENT)
Dept: LAB | Facility: CLINIC | Age: 64
End: 2024-03-27
Payer: COMMERCIAL

## 2024-03-27 ENCOUNTER — TELEPHONE (OUTPATIENT)
Dept: PHARMACY | Facility: OTHER | Age: 64
End: 2024-03-27
Payer: COMMERCIAL

## 2024-03-27 ENCOUNTER — ANCILLARY PROCEDURE (OUTPATIENT)
Dept: GENERAL RADIOLOGY | Facility: CLINIC | Age: 64
End: 2024-03-27
Attending: STUDENT IN AN ORGANIZED HEALTH CARE EDUCATION/TRAINING PROGRAM
Payer: COMMERCIAL

## 2024-03-27 DIAGNOSIS — G89.11 ACUTE PAIN OF LEFT SHOULDER DUE TO TRAUMA: ICD-10-CM

## 2024-03-27 DIAGNOSIS — M25.512 ACUTE PAIN OF LEFT SHOULDER DUE TO TRAUMA: ICD-10-CM

## 2024-03-27 DIAGNOSIS — J44.1 COPD EXACERBATION (H): ICD-10-CM

## 2024-03-27 DIAGNOSIS — Z12.11 ENCOUNTER FOR COLORECTAL CANCER SCREENING: ICD-10-CM

## 2024-03-27 DIAGNOSIS — Z12.12 ENCOUNTER FOR COLORECTAL CANCER SCREENING: ICD-10-CM

## 2024-03-27 PROCEDURE — 71046 X-RAY EXAM CHEST 2 VIEWS: CPT | Mod: TC | Performed by: RADIOLOGY

## 2024-03-27 PROCEDURE — 73030 X-RAY EXAM OF SHOULDER: CPT | Mod: TC | Performed by: RADIOLOGY

## 2024-03-27 NOTE — TELEPHONE ENCOUNTER
[facility] Recruitment: [facility]     Referral outreach attempt #1 on March 27, 2024      Outcome: left voicemail- Call back number [phone]    Cris Laureano CPhT  [facility]

## 2024-03-28 DIAGNOSIS — I10 ESSENTIAL HYPERTENSION: ICD-10-CM

## 2024-03-29 ENCOUNTER — TELEPHONE (OUTPATIENT)
Dept: FAMILY MEDICINE | Facility: CLINIC | Age: 64
End: 2024-03-29
Payer: COMMERCIAL

## 2024-03-29 DIAGNOSIS — J44.1 COPD EXACERBATION (H): ICD-10-CM

## 2024-03-29 RX ORDER — LISINOPRIL 10 MG/1
10 TABLET ORAL DAILY
Qty: 90 TABLET | Refills: 0 | Status: SHIPPED | OUTPATIENT
Start: 2024-03-29

## 2024-03-29 NOTE — TELEPHONE ENCOUNTER
[facility] phone call message- general phone call:    Reason for call: Pt stated that her insurance will not cover the prescriptions if it is not signed by Dr. Patel.    Return call needed: Yes    OK to leave a message on voice mail? Yes    Primary language: English      needed? No    Call taken on March 29, 2024 at 8:26 AM by Ivana Lopez

## 2024-03-29 NOTE — TELEPHONE ENCOUNTER
Lisinopril Oral Tablet 10 MG          Will file in chart as: lisinopril (ZESTRIL) 10 MG tablet    Sig: Take 1 tablet (10 mg) by mouth daily    Disp: 90 tablet    Refills: 0    Start: 3/28/2024    Class: E-Prescribe    Non-formulary For: Essential hypertension    Last ordered: 7 months ago (8/16/2023) by Mirian Patel MD    Last refill: 12/5/2023    Rx #: [id number]     Routing refill request to provider for review/approval because:    ACE Inhibitors (Including Combos) Protocol [id number]/28/2024 06:43 PM   Protocol Details Has GFR on file in past 12 months and most recent value is normal    Normal serum creatinine on file in past 12 months    Normal serum potassium on file in past 12 months        Lissy RN, BSN

## 2024-04-01 ENCOUNTER — TELEPHONE (OUTPATIENT)
Dept: PHARMACY | Facility: OTHER | Age: 64
End: 2024-04-01
Payer: COMMERCIAL

## 2024-04-01 RX ORDER — DOXYCYCLINE 100 MG/1
100 CAPSULE ORAL 2 TIMES DAILY
Qty: 10 CAPSULE | Refills: 0 | Status: SHIPPED | OUTPATIENT
Start: 2024-04-01

## 2024-04-01 RX ORDER — PREDNISONE 20 MG/1
40 TABLET ORAL DAILY
Qty: 10 TABLET | Refills: 0 | Status: SHIPPED | OUTPATIENT
Start: 2024-04-01

## 2024-04-01 NOTE — TELEPHONE ENCOUNTER
[facility] Recruitment: [facility] insurance     Referral outreach attempt #2 on April 1, 2024      Outcome: patient requested call back at later date, she would like a call back tomorrow April 2    Cris Laureano CPhT  [facility]

## 2024-04-08 ENCOUNTER — TELEPHONE (OUTPATIENT)
Dept: PHARMACY | Facility: OTHER | Age: 64
End: 2024-04-08
Payer: COMMERCIAL

## 2024-04-08 NOTE — TELEPHONE ENCOUNTER
[facility] Recruitment: [facility]     Referral outreach attempt #3 on April 8, 2024      Outcome: left voicemail- Call back number [phone]    [facility] Mcgowan

## 2024-04-09 DIAGNOSIS — J30.89 NON-SEASONAL ALLERGIC RHINITIS, UNSPECIFIED TRIGGER: ICD-10-CM

## 2024-04-09 DIAGNOSIS — J06.9 UPPER RESPIRATORY TRACT INFECTION, UNSPECIFIED TYPE: ICD-10-CM

## 2024-04-09 NOTE — TELEPHONE ENCOUNTER
[facility] phone call message- patient requesting a refill:    Full Medication Name: guaiFENesin (FT MUCUS RELIEF 12HR) 600 MG 12 hr tablet - Take 2 tablets (1,200 mg) by mouth 2 times daily - Oral     cetirizine (ZYRTEC) 10 MG tablet - Take 1 tablet (10 mg) by mouth 2 times daily     Pharmacy confirmed as   [facility] - [place], [place] - [address], [address],  [address]  [address], [facility] [address]  Phone: [phone] Fax: [phone]  : Yes    Additional Comments: NO     OK to leave a message on voice mail? Yes    Primary language: English      needed? No    Call taken on April 9, 2024 at 1:17 PM by Juliet Mcintosh

## 2024-04-10 RX ORDER — GUAIFENESIN 600 MG/1
2 TABLET, EXTENDED RELEASE ORAL 2 TIMES DAILY
Qty: 40 TABLET | Refills: 0 | Status: SHIPPED | OUTPATIENT
Start: 2024-04-10

## 2024-04-10 RX ORDER — CETIRIZINE HYDROCHLORIDE 10 MG/1
10 TABLET ORAL 2 TIMES DAILY
Qty: 30 TABLET | Refills: 3 | Status: SHIPPED | OUTPATIENT
Start: 2024-04-10

## 2024-04-15 NOTE — PROGRESS NOTES
"       HPI       Lisa Scott is a 58 year old female who presents for:  Chief Complaint   Patient presents with     Pain     Medication Reconciliation     Completed, needs refills today         Medication reconcilliation   - was staying at a half-way sober house but then left given argument with staffing there.  - she left all her medications there. When she went back to get them, she said not all of them were available. She is requesting the below:  - melatonin 10mg at bedtime  - seroquel 100mg at bedtime, requesting 200mg at bedtime as she usually takes 2 pills anyway during the night. QTc from March 2019 461  - gabapentin 600mg tid  - omeprazole 20mg bid  - both controller and rescue inhalers  - reviewed pharmacist note from 3/27/19 and she already has seroquel and gabapentin refills at her pharmacy    Pain management  - chronic bilateral shoulder pain, back pain, neck pain, foot pain.  - wanting some medication to help with her pain  - wanting tylenol and also wanting a muscle relaxant. Has tried flexeril in the past with improvement in pain. She would mainly use this for her chronic neck pain and headaches    Foot pain  - onset 3 days ago  - no obvious fall or trauma to the area  - difficult to walk on it  - bruising over upper foot    ROS: Complete 6 point ROS completed and negative other than stated above.         Physical Exam:     Vitals:    04/26/19 1116   BP: 132/78   Pulse: 111   Resp: 20   Temp: 98.2  F (36.8  C)   SpO2: 97%   Weight: 58.3 kg (128 lb 9.6 oz)   Height: 1.537 m (5' 0.5\")     Body mass index is 24.7 kg/m .  Vitals were reviewed and     BP Readings from Last 6 Encounters:   04/26/19 132/78   03/27/19 (!) 138/97   03/13/19 (!) 163/130   03/06/19 106/72   03/05/19 106/70   01/30/19 (!) 170/106       General: Alert and orientated in NAD. Pleasant and cooperative.   Cards: RRR, no murmurs, rubs or gallops. No lower extremity edema  Resp: clear vesicular breath sounds bilaterally, no crackles " Provider, MD Saritha   fluticasone (FLONASE) 50 MCG/ACT nasal spray 1 spray by Nasal route daily 10/30/16   Provider, MD Saritha   ondansetron (ZOFRAN ODT) 4 MG disintegrating tablet Take 1 tablet by mouth every 8 hours as needed for Nausea. 8/20/14   Yane Day MD   dicyclomine (BENTYL) 10 MG capsule Take 1 capsule by mouth every 6 hours as needed (cramps).  Patient not taking: Reported on 4/12/2024 8/20/14   Yane Day MD       Current medications:    No current facility-administered medications for this encounter.       Allergies:    Allergies   Allergen Reactions    Ibuprofen      Gastric bypass    Amoxicillin     Ciprofloxacin        Problem List:  There is no problem list on file for this patient.      Past Medical History:        Diagnosis Date    Abdominal pain     COVID-19     x2    COVID-19 vaccine administered     Depression     Eczema     Environmental allergies     dust mites,etc    Gastrojejunal ulcer     Hyperlipidemia     Hypertension     Kidney stones     Nausea & vomiting     Under care of team     pcp dr whalen    Wears glasses        Past Surgical History:        Procedure Laterality Date    BREAST SURGERY Bilateral     augmentation    CHOLECYSTECTOMY      2002    GASTRIC BYPASS SURGERY      2002 in [place]    OVARIAN CYST REMOVAL      RHINOPLASTY      TYMPANOPLASTY         Social History:    Social History     Tobacco Use    Smoking status: Former     Types: Cigarettes    Smokeless tobacco: Not on file   Substance Use Topics    Alcohol use: No                                Counseling given: Not Answered      Vital Signs (Current):   Vitals:    04/12/24 0933 04/15/24 0619   BP:  128/65   Pulse:  50   Resp:  14   Temp:  96.8 °F (36 °C)   TempSrc:  Temporal   SpO2:  100%   Weight: 79.8 kg (176 lb)    Height: 1.676 m (5' 6\")                                               BP Readings from Last 3 Encounters:   04/15/24 128/65   04/07/24 107/66   04/04/24 120/70  or wheezes. No increased work of breathing  MSK: Right foot with bruising over entire dorsum of foot. No open areas other than rash over the dorsum of the foot. No pain with palpation to the plantar surface of the foot, pain diffusely with palpation to the distal, dorsum surface of foot. No pain with palpation to the 5th metatarsal head, posterior distal lateral or medial malleoli, or navicular bone. She was able to ambulate into clinic today and has been ambulating for the last few days.  Psych: mood good, affect congruent. Patient with tangential and pressured speech    Assessment and Plan     1. Encounter for medication refill  See below for medications that were refilled. She was strongly encouraged to bring in all her medications to her next clinic visit. She will need ongoing support from pharmacy as well. Only new medication is flexeril.  - Melatonin 10 MG TABS tablet; Take 1 tablet (10 mg) by mouth At Bedtime , additional tablet as needed for late night awakenings  Dispense: 90 tablet; Refill: 1  - albuterol (PROAIR HFA/PROVENTIL HFA/VENTOLIN HFA) 108 (90 Base) MCG/ACT inhaler; Inhale 2 puffs into the lungs every 6 hours as needed for shortness of breath / dyspnea or wheezing  Dispense: 8.5 g; Refill: 11  - fluticasone-salmeterol (AIRDUO RESPICLICK) 232-14 MCG/ACT inhaler; Inhale 1 puff into the lungs 2 times daily  Dispense: 1 Inhaler; Refill: 1  - omeprazole (PRILOSEC) 20 MG DR capsule; Take 1 capsule (20 mg) by mouth 2 times daily  Dispense: 180 capsule; Refill: 3  - acetaminophen (TYLENOL) 500 MG tablet; Take 1-2 tablets (500-1,000 mg) by mouth every 8 hours as needed for mild pain  Dispense: 90 tablet; Refill: 1  - cyclobenzaprine (FLEXERIL) 10 MG tablet; Take 1 tablet (10 mg) by mouth 3 times daily as needed for muscle spasms  Dispense: 30 tablet; Refill: 0    2. Right foot pain  No known trauma. Diffuse bruising to the plantar surface of the right foot. Diffusely tender to distal dorsum of foot.  Recommended xray today, however, not available in our clinic today. Difficult to coordinate out of office xray for patient as she uses public transportation and requires rides to be set up ahead of time. She has been able to ambulate for the last few days.  - follow up on Monday for xray    She will be seen on Monday for foot pain only  She will need to follow up after that to discuss her sciatia    Options for treatment and follow-up care were reviewed with the patient. Lisa Scott  engaged in the decision making process and verbalized understanding of the options discussed and agreed with the final plan.    Precepted with: MD Teri Schaffer MD ([id number])  Pager: [phone]  [facility] Resident

## 2024-04-17 ENCOUNTER — TELEPHONE (OUTPATIENT)
Dept: PHARMACY | Facility: OTHER | Age: 64
End: 2024-04-17
Payer: COMMERCIAL

## 2024-04-17 NOTE — TELEPHONE ENCOUNTER
[place] Recruitment: [facility]     Referral outreach attempt #4 on April 17, 2024      Outcome: call attempted, could not leave voicemail     Esteban Grier  MT
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
## 2024-11-19 NOTE — TELEPHONE ENCOUNTER
Called patient's insurance to obtain information regarding restrictiona nd to obtain a referral form for patient's psychiatrist to prescribe medication. Insurance stated patient is not restricted for mental health and provided me the 's number to further assist. [phone] (Zana). Called Zana and was routed to  that stated he is out of office until 07/11/2019.  and PharmD updated. Sherif LONGO   No

## 2025-02-03 DIAGNOSIS — Z12.31 ENCOUNTER FOR SCREENING MAMMOGRAM FOR BREAST CANCER: Primary | ICD-10-CM

## 2025-04-20 NOTE — LETTER
MEDICAL LETTER OF NECESSITY    This letter is to verify that Lisa Scott is under my care.  She carries the diagnosis of chronic pain with osteoarthritis of multiple joints as well as low back pain/sciatica. I believe she qualifies for medical marijuana to treat this condition.     If any additional information is needed from our office, please do not hesitate to contact me at the number above.        Sincerely,        Nikolay Cook MD   No

## (undated) RX ORDER — TRIAMCINOLONE ACETONIDE 40 MG/ML
INJECTION, SUSPENSION INTRA-ARTICULAR; INTRAMUSCULAR
Status: DISPENSED
Start: 2019-07-25

## (undated) RX ORDER — ROPIVACAINE HYDROCHLORIDE 5 MG/ML
INJECTION, SOLUTION EPIDURAL; INFILTRATION; PERINEURAL
Status: DISPENSED
Start: 2019-07-25

## (undated) RX ORDER — LIDOCAINE HYDROCHLORIDE 10 MG/ML
INJECTION, SOLUTION EPIDURAL; INFILTRATION; INTRACAUDAL; PERINEURAL
Status: DISPENSED
Start: 2019-07-25